# Patient Record
Sex: FEMALE | Race: WHITE | NOT HISPANIC OR LATINO | Employment: FULL TIME | URBAN - METROPOLITAN AREA
[De-identification: names, ages, dates, MRNs, and addresses within clinical notes are randomized per-mention and may not be internally consistent; named-entity substitution may affect disease eponyms.]

---

## 2017-01-23 ENCOUNTER — MYC MEDICAL ADVICE (OUTPATIENT)
Dept: FAMILY MEDICINE | Facility: CLINIC | Age: 42
End: 2017-01-23

## 2017-01-23 ENCOUNTER — OFFICE VISIT (OUTPATIENT)
Dept: FAMILY MEDICINE | Facility: CLINIC | Age: 42
End: 2017-01-23
Payer: COMMERCIAL

## 2017-01-23 VITALS
BODY MASS INDEX: 25.95 KG/M2 | WEIGHT: 141 LBS | SYSTOLIC BLOOD PRESSURE: 104 MMHG | HEART RATE: 72 BPM | TEMPERATURE: 98.6 F | DIASTOLIC BLOOD PRESSURE: 64 MMHG | HEIGHT: 62 IN

## 2017-01-23 DIAGNOSIS — N89.8 VAGINAL DISCHARGE: ICD-10-CM

## 2017-01-23 DIAGNOSIS — B37.31 YEAST INFECTION OF THE VAGINA: ICD-10-CM

## 2017-01-23 DIAGNOSIS — B96.89 BACTERIAL VAGINOSIS: ICD-10-CM

## 2017-01-23 DIAGNOSIS — Z01.419 ENCOUNTER FOR GYNECOLOGICAL EXAMINATION WITHOUT ABNORMAL FINDING: Primary | ICD-10-CM

## 2017-01-23 DIAGNOSIS — Z11.3 ROUTINE SCREENING FOR STI (SEXUALLY TRANSMITTED INFECTION): ICD-10-CM

## 2017-01-23 DIAGNOSIS — N76.0 BACTERIAL VAGINOSIS: ICD-10-CM

## 2017-01-23 LAB
MICRO REPORT STATUS: ABNORMAL
SPECIMEN SOURCE: ABNORMAL
WET PREP SPEC: ABNORMAL

## 2017-01-23 PROCEDURE — G0145 SCR C/V CYTO,THINLAYER,RESCR: HCPCS | Performed by: NURSE PRACTITIONER

## 2017-01-23 PROCEDURE — 87491 CHLMYD TRACH DNA AMP PROBE: CPT | Performed by: NURSE PRACTITIONER

## 2017-01-23 PROCEDURE — 87210 SMEAR WET MOUNT SALINE/INK: CPT | Performed by: NURSE PRACTITIONER

## 2017-01-23 PROCEDURE — 99213 OFFICE O/P EST LOW 20 MIN: CPT | Performed by: NURSE PRACTITIONER

## 2017-01-23 PROCEDURE — 87591 N.GONORRHOEAE DNA AMP PROB: CPT | Performed by: NURSE PRACTITIONER

## 2017-01-23 PROCEDURE — 87624 HPV HI-RISK TYP POOLED RSLT: CPT | Performed by: NURSE PRACTITIONER

## 2017-01-23 RX ORDER — FLUCONAZOLE 150 MG/1
150 TABLET ORAL ONCE
Qty: 1 TABLET | Refills: 0 | Status: SHIPPED | OUTPATIENT
Start: 2017-01-23 | End: 2017-01-23

## 2017-01-23 RX ORDER — METRONIDAZOLE 500 MG/1
500 TABLET ORAL 2 TIMES DAILY
Qty: 14 TABLET | Refills: 0 | Status: SHIPPED | OUTPATIENT
Start: 2017-01-23 | End: 2017-02-23

## 2017-01-23 NOTE — MR AVS SNAPSHOT
After Visit Summary   1/23/2017    Cecilia Chambers    MRN: [id number]           Patient Information     Date Of Birth          1975        Visit Information        Provider Department      1/23/2017 4:20 PM Lo Stallings APRN [facility]        Today's Diagnoses     Encounter for gynecological examination without abnormal finding    -  1     Routine screening for STI (sexually transmitted infection)         Vaginal discharge         Bacterial vaginosis         Yeast infection of the vagina           Care Instructions    Diflucan and Flagyl sent to the pharmacy  Follow up if symptoms do not improve or worsen.    Vaginal Infection: Bacterial Vaginosis  Both good and bad bacteria are present in a healthy vagina. Bacterial vaginosis (BV) occurs when these bacteria get out of balance. The numbers of good bacteria decrease. This allows the numbers of bad bacteria to increase and cause BV. In most cases, BV is not a serious problem. This sheet tells you more about BV and how it can be treated.  Causes of Bacterial Vaginosis  The cause of BV is not clear. Douching may lead to it. Having sex with a new partner or more than 1 partner makes it more likely.  Symptoms of Bacterial Vaginosis  Symptoms of BV vary for each woman. Some women have few symptoms or none at all. If symptoms are present, they can include:    Thin, milky white or gray discharge    Unpleasant  fishy  odor    Irritation, itching, and burning at opening of vagina.    Burning or irritation with sex or urination  Diagnosing Bacterial Vaginosis  Your health care provider will ask about your symptoms and health history. He or she will also perform a pelvic exam. This is an exam of your vagina and cervix. A sample of vaginal fluid or discharge may be taken. This sample is checked for signs of BV.  Treating Bacterial Vaginosis  BV is often treated with antibiotics. They may be given in oral pill form or as  a vaginal cream. To use these medications:    Be sure to take all of your medication, even if your symptoms go away.    If you re taking antibiotic pills, do not drink alcohol until you re finished with all of your medication.    If you re using vaginal cream, apply it as directed. Be aware that the cream may make condoms and diaphragms less effective.    Call your health care provider if symptoms do not go away within 4 days of starting treatment. Also call if you have a reaction to the medication.  Why Treatment Matters  Even if you have no symptoms or your symptoms are mild, BV should be treated. Untreated BV can lead to health problems such as:    Increased risk of  delivery if you re pregnant.    Increased risk of complications after surgery on the reproductive organs.    Possible increased risk of pelvic inflammatory disease (PID).     6204-7749 [employer]. [address] [place] [address]. All rights reserved. This information is not intended as a substitute for professional medical care. Always follow your healthcare professional's instructions.        Vaginal Infection: Yeast (Candidiasis)  Yeast infection occurs when yeast in the vagina increase and start attacking the vaginal tissues. Yeast is a type of fungus. These infections are often caused by a type of yeast called Candida albicans. Other species of yeast can also cause infections. Factors that may make infection more likely include recent antibiotic use, douching, or increased sex. Yeast infections are more common in women who have diabetes, or are obese or pregnant, or have a weak immune system.  Symptoms of yeast infection    Clumpy or thin, white discharge, which may look like cottage cheese    No odor or minimal odor    Severe vaginal itching or burning    Burning with urination    Swelling, redness of vulva    Pain during sex  Treating yeast infection  Yeast infection is treated with a vaginal antifungal cream. In  some cases, antifungal pills are prescribed instead. During treatment:    Finish all of your medicine, even if your symptoms go away.    Apply the cream before going to bed. Lie flat after applying so that it doesn't drip out.    Do not douche or use tampons.    Don't rely on a diaphragm or condoms, since the cream may weaken them.    Avoid intercourse if advised by your healthcare provider.     Should I treat a yeast infection myself?  Discuss with your healthcare provider whether you should use over-the-counter medicines to treat a yeast infection. Self-treatment may depend on whether:    You've had a yeast infection in the past.    You're at risk for STDs.  Call your healthcare provider if symptoms do not go away or come back after treatment.     2760-6323 [employer]. [address] [place] [address], [place] [address]. All rights reserved. This information is not intended as a substitute for professional medical care. Always follow your healthcare professional's instructions.              Follow-ups after your visit        Who to contact     If you have questions or need follow up information about today's clinic visit or your schedule please contact [facility] directly at [phone].  Normal or non-critical lab and imaging results will be communicated to you by MyChart, letter or phone within 4 business days after the clinic has received the results. If you do not hear from us within 7 days, please contact the clinic through [employer]hart or phone. If you have a critical or abnormal lab result, we will notify you by phone as soon as possible.  Submit refill requests through [employer] or call your pharmacy and they will forward the refill request to us. Please allow 3 business days for your refill to be completed.          Additional Information About Your Visit        MyChart Information     [employer] gives you secure access to your electronic health record. If you see a primary care provider,  "you can also send messages to your care team and make appointments. If you have questions, please call your primary care clinic.  If you do not have a primary care provider, please call [phone] and they will assist you.        Care EveryWhere ID     This is your Care EveryWhere ID. This could be used by other organizations to access your [facility] medical records  [id number]        Your Vitals Were     Pulse Temperature Height BMI (Body Mass Index)          72 98.6  F (37  C) (Tympanic) 5' 2\" (1.575 m) 25.78 kg/m2         Blood Pressure from Last 3 Encounters:   01/23/17 104/64   12/14/16 123/86   09/14/16 113/74    Weight from Last 3 Encounters:   01/23/17 141 lb (63.957 kg)   12/14/16 145 lb (65.772 kg)   04/08/16 143 lb (64.864 kg)              We Performed the Following     Asthma Action Plan (AAP)     Chlamydia trachomatis PCR     HPV High Risk Types DNA Cervical     Neisseria gonorrhoeae PCR     Pap imaged thin layer screen with HPV - recommended age 30 - 65 years (select HPV order below)     Wet prep          Today's Medication Changes          These changes are accurate as of: 1/23/17  4:54 PM.  If you have any questions, ask your nurse or doctor.               Start taking these medicines.        Dose/Directions    fluconazole 150 MG tablet   Commonly known as:  DIFLUCAN   Used for:  Yeast infection of the vagina   Started by:  Lo Stallings APRN CNP        Dose:  150 mg   Take 1 tablet (150 mg) by mouth once for 1 dose   Quantity:  1 tablet   Refills:  0       metroNIDAZOLE 500 MG tablet   Commonly known as:  FLAGYL   Used for:  Bacterial vaginosis   Started by:  Lo Stallings APRN CNP        Dose:  500 mg   Take 1 tablet (500 mg) by mouth 2 times daily   Quantity:  14 tablet   Refills:  0         Stop taking these medicines if you haven't already. Please contact your care team if you have questions.     mometasone-formoterol 200-5 MCG/ACT oral inhaler   Commonly known as:  DULERA "   Stopped by:  Lo Stallings APRN CNP                Where to get your medicines      These medications were sent to [id number] - [place], [place] - [address]  [address] [address]    Hours:  Closed 10-16-08 business to [facility] Phone:  [phone]    - fluconazole 150 MG tablet  - metroNIDAZOLE 500 MG tablet             Primary Care Provider Office Phone # Fax #    Ammy Rolon [phone]       [facility] [address] [facility] [address]        Thank you!     Thank you for choosing [facility]  for your care. Our goal is always to provide you with excellent care. Hearing back from our patients is one way we can continue to improve our services. Please take a few minutes to complete the written survey that you may receive in the mail after your visit with us. Thank you!             Your Updated Medication List - Protect others around you: Learn how to safely use, store and throw away your medicines at www.disposemymeds.org.          This list is accurate as of: 1/23/17  4:54 PM.  Always use your most recent med list.                   Brand Name Dispense Instructions for use    albuterol 108 (90 BASE) MCG/ACT Inhaler    PROAIR HFA/PROVENTIL HFA/VENTOLIN HFA    1 Inhaler    Inhale 2 puffs into the lungs every 6 hours as needed for shortness of breath / dyspnea       buPROPion 150 MG 24 hr tablet    WELLBUTRIN XL    90 tablet    Take 1 tablet (150 mg) by mouth daily       colestipol 1 G tablet    COLESTID    30 tablet    Take 1 tablet (1 g) by mouth daily       escitalopram 20 MG tablet    LEXAPRO    45 tablet    Take 0.5 tablets (10 mg) by mouth daily       fluconazole 150 MG tablet    DIFLUCAN    1 tablet    Take 1 tablet (150 mg) by mouth once for 1 dose       metroNIDAZOLE 500 MG tablet    FLAGYL    14 tablet    Take 1 tablet (500 mg) by mouth 2 times daily       SUMAtriptan 100 MG tablet     IMITREX    18 tablet    Take 1 tablet (100 mg) by mouth at onset of headache for migraine May repeat dose in 2 hours.  Do not exceed 200 mg in 24 hours       topiramate 50 MG tablet    TOPAMAX    90 tablet    Take 1 tablet (50 mg) by mouth daily

## 2017-01-23 NOTE — Clinical Note
My Asthma Action Plan  Name: Cecilia Chambers   YOB: 1975  Date: 1/23/2017   My doctor: Ammy Rolon   My clinic: [facility]        My Rescue Medicine: Albuterol (Proair/Ventolin/Proventil) HFA          My Asthma Severity: mild persistent  Avoid your asthma triggers        GREEN ZONE   Good Control    I feel good    No cough or wheeze    Can work, sleep and play without asthma symptoms       Take your asthma control medicine every day.     1. If exercise triggers your asthma, take your rescue medication    15 minutes before exercise or sports, and    During exercise if you have asthma symptoms  2. Spacer to use with inhaler: If you have a spacer, make sure to use it with your inhaler             YELLOW ZONE Getting Worse  I have ANY of these:    I do not feel good    Cough or wheeze    Chest feels tight    Wake up at night   1. Keep taking your Green Zone medications  2. Start taking your rescue medicine:    every 20 minutes for up to 1 hour. Then every 4 hours for 24-48 hours.  3. If you stay in the Yellow Zone for more than 12-24 hours, contact your doctor.  4. If you do not return to the Green Zone in 12-24 hours or you get worse, start taking your oral steroid medicine if prescribed by your provider.           RED ZONE Medical Alert - Get Help  I have ANY of these:    I feel awful    Medicine is not helping    Breathing getting harder    Trouble walking or talking    Nose opens wide to breathe       1. Take your rescue medicine NOW  2. If your provider has prescribed an oral steroid medicine, start taking it NOW  3. Call your doctor NOW  4. If you are still in the Red Zone after 20 minutes and you have not reached your doctor:    Take your rescue medicine again and    Call 911 or go to the emergency room right away    See your regular doctor within 2 weeks of an Emergency Room or Urgent Care visit for follow-up treatment.        The above medication may be given  at school or day care?: N/A (Adult Patient)  Child can carry and use inhaler(s) at school with approval of school nurse?: N/A (Adult Patient)    Electronically signed by: Jodi Benavidez, January 23, 2017    Annual Reminders:  Meet with Asthma Educator,  Flu Shot in the Fall, consider Pneumonia Vaccination for patients with asthma (aged 19 and older).    Pharmacy:    [facility] [address] [facility] [facility] [address] [facility] [address]  [id number] - [place], [place] - [address]                    Asthma Triggers  How To Control Things That Make Your Asthma Worse    Triggers are things that make your asthma worse.  Look at the list below to help you find your triggers and what you can do about them.  You can help prevent asthma flare-ups by staying away from your triggers.      Trigger                                                          What you can do   Cigarette Smoke  Tobacco smoke can make asthma worse. Do not allow smoking in your home, car or around you.  Be sure no one smokes at a child s day care or school.  If you smoke, ask your health care provider for ways to help you quit.  Ask family members to quit too.  Ask your health care provider for a referral to Quit Plan to help you quit smoking, or call 8-729-401-PLAN.     Colds, Flu, Bronchitis  These are common triggers of asthma. Wash your hands often.  Don t touch your eyes, nose or mouth.  Get a flu shot every year.     Dust Mites  These are tiny bugs that live in cloth or carpet. They are too small to see. Wash sheets and blankets in hot water every week.   Encase pillows and mattress in dust mite proof covers.  Avoid having carpet if you can. If you have carpet, vacuum weekly.   Use a dust mask and HEPA vacuum.   Pollen and Outdoor Mold  Some people are allergic to trees, grass, or weed pollen, or molds. Try to keep your windows closed.  Limit time out  doors when pollen count is high.   Ask you health care provider about taking medicine during allergy season.     Animal Dander  Some people are allergic to skin flakes, urine or saliva from pets with fur or feathers. Keep pets with fur or feathers out of your home.    If you can t keep the pet outdoors, then keep the pet out of your bedroom.  Keep the bedroom door closed.  Keep pets off cloth furniture and away from stuffed toys.     Mice, Rats, and Cockroaches  Some people are allergic to the waste from these pests.   Cover food and garbage.  Clean up spills and food crumbs.  Store grease in the refrigerator.   Keep food out of the bedroom.   Indoor Mold  This can be a trigger if your home has high moisture. Fix leaking faucets, pipes, or other sources of water.   Clean moldy surfaces.  Dehumidify basement if it is damp and smelly.   Smoke, Strong Odors, and Sprays  These can reduce air quality. Stay away from strong odors and sprays, such as perfume, powder, hair spray, paints, smoke incense, paint, cleaning products, candles and new carpet.   Exercise or Sports  Some people with asthma have this trigger. Be active!  Ask your doctor about taking medicine before sports or exercise to prevent symptoms.    Warm up for 5-10 minutes before and after sports or exercise.     Other Triggers of Asthma  Cold air:  Cover your nose and mouth with a scarf.  Sometimes laughing or crying can be a trigger.  Some medicines and food can trigger asthma.

## 2017-01-23 NOTE — NURSING NOTE
"Chief Complaint   Patient presents with     Gyn Exam     pap and std testing        Initial /64 mmHg  Pulse 72  Temp(Src) 98.6  F (37  C) (Tympanic)  Ht 5' 2\" (1.575 m)  Wt 141 lb (63.957 kg)  BMI 25.78 kg/m2 Estimated body mass index is 25.78 kg/(m^2) as calculated from the following:    Height as of this encounter: 5' 2\" (1.575 m).    Weight as of this encounter: 141 lb (63.957 kg).  BP completed using cuff size: regular    "

## 2017-01-23 NOTE — PROGRESS NOTES
SUBJECTIVE:                                                    Cecilia Chambers is a 41 year old female who presents to clinic today for the following health issues:      Vaginal Symptoms     Onset: 1 week      Description:  Vaginal Discharge: white - little   Itching (Pruritis): YES  Burning sensation:  no   Odor: no     Accompanying Signs & Symptoms:  Pain with Urination: no   Abdominal Pain: no   Fever: no    History:   Sexually active: YES  New Partner: YES- pt would like std testing as well   Possibility of Pregnancy:  No    Precipitating factors:   Recent Antibiotic Use: no     Alleviating factors:  Pt took bath with lavender sea salt      Therapies Tried and outcome: none       Problem list and histories reviewed & adjusted, as indicated.  Additional history: as documented    Patient Active Problem List   Diagnosis     Headache     Anxiety state     Mild persistent asthma     Family history of breast cancer in mother     HYPERLIPIDEMIA LDL GOAL <160     Acute cholecystitis     Enteritis     Abdominal pain     Past Surgical History   Procedure Laterality Date     Surgical history of -        Csection     Laparoscopic cholecystectomy N/A 12/30/2014     Procedure: LAPAROSCOPIC CHOLECYSTECTOMY;  Surgeon: Ulises Starr MD;  Location: [facility]     Abdomen surgery  [id number]     Csecttion     Colonoscopy       Gi surgery         Social History   Substance Use Topics     Smoking status: Never Smoker      Smokeless tobacco: Never Used     Alcohol Use: Yes      Comment: rare     Family History   Problem Relation Age of Onset     Depression Mother      Respiratory Mother      COPD, emphysema     CANCER Mother      Neurologic Disorder Mother      raunads syndrom     GASTROINTESTINAL DISEASE Maternal Grandmother      Depression Maternal Grandmother      Hypertension Maternal Grandmother      CANCER Maternal Grandmother      skin cancer     Breast Cancer Maternal Grandmother      Breast Cancer Mother      Stage 4      "Other Cancer Mother      Cervical     Other Cancer Maternal Grandmother      Melanoma     Anesthesia Reaction Mother      OSTEOPOROSIS Mother          Current Outpatient Prescriptions   Medication Sig Dispense Refill     metroNIDAZOLE (FLAGYL) 500 MG tablet Take 1 tablet (500 mg) by mouth 2 times daily 14 tablet 0     colestipol (COLESTID) 1 G tablet Take 1 tablet (1 g) by mouth daily 30 tablet 0     topiramate (TOPAMAX) 50 MG tablet Take 1 tablet (50 mg) by mouth daily 90 tablet 0     escitalopram (LEXAPRO) 20 MG tablet Take 0.5 tablets (10 mg) by mouth daily 45 tablet 1     buPROPion (WELLBUTRIN XL) 150 MG 24 hr tablet Take 1 tablet (150 mg) by mouth daily 90 tablet 1     SUMAtriptan (IMITREX) 100 MG tablet Take 1 tablet (100 mg) by mouth at onset of headache for migraine May repeat dose in 2 hours.  Do not exceed 200 mg in 24 hours 18 tablet 5     albuterol (PROAIR HFA, PROVENTIL HFA, VENTOLIN HFA) 108 (90 BASE) MCG/ACT inhaler Inhale 2 puffs into the lungs every 6 hours as needed for shortness of breath / dyspnea 1 Inhaler 1     No Known Allergies  Problem list, Medication list, Allergies, and Medical/Social/Surgical histories reviewed in [facility] and updated as appropriate.    ROS:  Constitutional, HEENT, cardiovascular, pulmonary, gi and gu systems are negative, except as otherwise noted.    OBJECTIVE:                                                    /64 mmHg  Pulse 72  Temp(Src) 98.6  F (37  C) (Tympanic)  Ht 5' 2\" (1.575 m)  Wt 141 lb (63.957 kg)  BMI 25.78 kg/m2  Body mass index is 25.78 kg/(m^2).  GENERAL: healthy, alert and no distress  ABDOMEN: soft, nontender, no hepatosplenomegaly, no masses and bowel sounds normal   (female): normal female external genitalia, normal urethral meatus, vaginal mucosa, normal cervix/adnexa/uterus without masses, milky discharge present  PSYCH: mentation appears normal, affect normal/bright    Diagnostic Test Results:  Results for orders placed or performed in " visit on 01/23/17 (from the past 24 hour(s))   Wet prep   Result Value Ref Range    Specimen Description Vagina     Wet Prep (A)      No Trichomonas seen  Few Clue cells seen  Few Yeast seen      Micro Report Status FINAL 01/23/2017         ASSESSMENT/PLAN:                                                      1. Encounter for gynecological examination without abnormal finding    - Pap imaged thin layer screen with HPV - recommended age 30 - 65 years (select HPV order below)  - HPV High Risk Types DNA Cervical    2. Bacterial vaginosis  Symptomatic care and follow up discussed  - metroNIDAZOLE (FLAGYL) 500 MG tablet; Take 1 tablet (500 mg) by mouth 2 times daily  Dispense: 14 tablet; Refill: 0    3. Yeast infection of the vagina  Symptomatic care and follow up discussed  - fluconazole (DIFLUCAN) 150 MG tablet; Take 1 tablet (150 mg) by mouth once for 1 dose  Dispense: 1 tablet; Refill: 0    4. Routine screening for STI (sexually transmitted infection)    - Chlamydia trachomatis PCR  - Neisseria gonorrhoeae PCR    5. Vaginal discharge    - Wet prep    Home care instructions were reviewed with the patient. The risks, benefits and treatment options of prescribed medications or other treatments have been discussed with the patient. The patient verbalized their understanding and should call or follow up if no improvement or if they develop further problems.      Patient Instructions     Diflucan and Flagyl sent to the pharmacy  Follow up if symptoms do not improve or worsen.    Vaginal Infection: Bacterial Vaginosis  Both good and bad bacteria are present in a healthy vagina. Bacterial vaginosis (BV) occurs when these bacteria get out of balance. The numbers of good bacteria decrease. This allows the numbers of bad bacteria to increase and cause BV. In most cases, BV is not a serious problem. This sheet tells you more about BV and how it can be treated.  Causes of Bacterial Vaginosis  The cause of BV is not clear.  Douching may lead to it. Having sex with a new partner or more than 1 partner makes it more likely.  Symptoms of Bacterial Vaginosis  Symptoms of BV vary for each woman. Some women have few symptoms or none at all. If symptoms are present, they can include:    Thin, milky white or gray discharge    Unpleasant  fishy  odor    Irritation, itching, and burning at opening of vagina.    Burning or irritation with sex or urination  Diagnosing Bacterial Vaginosis  Your health care provider will ask about your symptoms and health history. He or she will also perform a pelvic exam. This is an exam of your vagina and cervix. A sample of vaginal fluid or discharge may be taken. This sample is checked for signs of BV.  Treating Bacterial Vaginosis  BV is often treated with antibiotics. They may be given in oral pill form or as a vaginal cream. To use these medications:    Be sure to take all of your medication, even if your symptoms go away.    If you re taking antibiotic pills, do not drink alcohol until you re finished with all of your medication.    If you re using vaginal cream, apply it as directed. Be aware that the cream may make condoms and diaphragms less effective.    Call your health care provider if symptoms do not go away within 4 days of starting treatment. Also call if you have a reaction to the medication.  Why Treatment Matters  Even if you have no symptoms or your symptoms are mild, BV should be treated. Untreated BV can lead to health problems such as:    Increased risk of  delivery if you re pregnant.    Increased risk of complications after surgery on the reproductive organs.    Possible increased risk of pelvic inflammatory disease (PID).     9899-3542 [employer]. [address] [place] [address], [place] [address]. All rights reserved. This information is not intended as a substitute for professional medical care. Always follow your healthcare professional's instructions.        Vaginal  Infection: Yeast (Candidiasis)  Yeast infection occurs when yeast in the vagina increase and start attacking the vaginal tissues. Yeast is a type of fungus. These infections are often caused by a type of yeast called Candida albicans. Other species of yeast can also cause infections. Factors that may make infection more likely include recent antibiotic use, douching, or increased sex. Yeast infections are more common in women who have diabetes, or are obese or pregnant, or have a weak immune system.  Symptoms of yeast infection    Clumpy or thin, white discharge, which may look like cottage cheese    No odor or minimal odor    Severe vaginal itching or burning    Burning with urination    Swelling, redness of vulva    Pain during sex  Treating yeast infection  Yeast infection is treated with a vaginal antifungal cream. In some cases, antifungal pills are prescribed instead. During treatment:    Finish all of your medicine, even if your symptoms go away.    Apply the cream before going to bed. Lie flat after applying so that it doesn't drip out.    Do not douche or use tampons.    Don't rely on a diaphragm or condoms, since the cream may weaken them.    Avoid intercourse if advised by your healthcare provider.     Should I treat a yeast infection myself?  Discuss with your healthcare provider whether you should use over-the-counter medicines to treat a yeast infection. Self-treatment may depend on whether:    You've had a yeast infection in the past.    You're at risk for STDs.  Call your healthcare provider if symptoms do not go away or come back after treatment.     6952-6047 [employer]. [address] [place] [address], [place] [id number]. All rights reserved. This information is not intended as a substitute for professional medical care. Always follow your healthcare professional's instructions.              ARTURO Barber [facility]

## 2017-01-23 NOTE — PATIENT INSTRUCTIONS
Diflucan and Flagyl sent to the pharmacy  Follow up if symptoms do not improve or worsen.    Vaginal Infection: Bacterial Vaginosis  Both good and bad bacteria are present in a healthy vagina. Bacterial vaginosis (BV) occurs when these bacteria get out of balance. The numbers of good bacteria decrease. This allows the numbers of bad bacteria to increase and cause BV. In most cases, BV is not a serious problem. This sheet tells you more about BV and how it can be treated.  Causes of Bacterial Vaginosis  The cause of BV is not clear. Douching may lead to it. Having sex with a new partner or more than 1 partner makes it more likely.  Symptoms of Bacterial Vaginosis  Symptoms of BV vary for each woman. Some women have few symptoms or none at all. If symptoms are present, they can include:    Thin, milky white or gray discharge    Unpleasant  fishy  odor    Irritation, itching, and burning at opening of vagina.    Burning or irritation with sex or urination  Diagnosing Bacterial Vaginosis  Your health care provider will ask about your symptoms and health history. He or she will also perform a pelvic exam. This is an exam of your vagina and cervix. A sample of vaginal fluid or discharge may be taken. This sample is checked for signs of BV.  Treating Bacterial Vaginosis  BV is often treated with antibiotics. They may be given in oral pill form or as a vaginal cream. To use these medications:    Be sure to take all of your medication, even if your symptoms go away.    If you re taking antibiotic pills, do not drink alcohol until you re finished with all of your medication.    If you re using vaginal cream, apply it as directed. Be aware that the cream may make condoms and diaphragms less effective.    Call your health care provider if symptoms do not go away within 4 days of starting treatment. Also call if you have a reaction to the medication.  Why Treatment Matters  Even if you have no symptoms or your symptoms are mild,  BV should be treated. Untreated BV can lead to health problems such as:    Increased risk of  delivery if you re pregnant.    Increased risk of complications after surgery on the reproductive organs.    Possible increased risk of pelvic inflammatory disease (PID).     9884-3668 [employer]. [address] [place] [address]. All rights reserved. This information is not intended as a substitute for professional medical care. Always follow your healthcare professional's instructions.        Vaginal Infection: Yeast (Candidiasis)  Yeast infection occurs when yeast in the vagina increase and start attacking the vaginal tissues. Yeast is a type of fungus. These infections are often caused by a type of yeast called Candida albicans. Other species of yeast can also cause infections. Factors that may make infection more likely include recent antibiotic use, douching, or increased sex. Yeast infections are more common in women who have diabetes, or are obese or pregnant, or have a weak immune system.  Symptoms of yeast infection    Clumpy or thin, white discharge, which may look like cottage cheese    No odor or minimal odor    Severe vaginal itching or burning    Burning with urination    Swelling, redness of vulva    Pain during sex  Treating yeast infection  Yeast infection is treated with a vaginal antifungal cream. In some cases, antifungal pills are prescribed instead. During treatment:    Finish all of your medicine, even if your symptoms go away.    Apply the cream before going to bed. Lie flat after applying so that it doesn't drip out.    Do not douche or use tampons.    Don't rely on a diaphragm or condoms, since the cream may weaken them.    Avoid intercourse if advised by your healthcare provider.     Should I treat a yeast infection myself?  Discuss with your healthcare provider whether you should use over-the-counter medicines to treat a yeast infection. Self-treatment may depend  on whether:    You've had a yeast infection in the past.    You're at risk for STDs.  Call your healthcare provider if symptoms do not go away or come back after treatment.     9010-8094 [employer]. [address] [place] [address], [place] [address]. All rights reserved. This information is not intended as a substitute for professional medical care. Always follow your healthcare professional's instructions.

## 2017-01-24 ENCOUNTER — MYC MEDICAL ADVICE (OUTPATIENT)
Dept: FAMILY MEDICINE | Facility: CLINIC | Age: 42
End: 2017-01-24

## 2017-01-24 LAB
C TRACH DNA SPEC QL NAA+PROBE: NORMAL
N GONORRHOEA DNA SPEC QL NAA+PROBE: NORMAL
SPECIMEN SOURCE: NORMAL
SPECIMEN SOURCE: NORMAL

## 2017-01-24 ASSESSMENT — ASTHMA QUESTIONNAIRES: ACT_TOTALSCORE: 21

## 2017-01-25 LAB
COPATH REPORT: NORMAL
PAP: NORMAL

## 2017-01-26 LAB
FINAL DIAGNOSIS: NORMAL
HPV HR 12 DNA CVX QL NAA+PROBE: NEGATIVE
HPV16 DNA SPEC QL NAA+PROBE: NEGATIVE
HPV18 DNA SPEC QL NAA+PROBE: NEGATIVE
SPECIMEN DESCRIPTION: NORMAL

## 2017-02-01 ENCOUNTER — MYC REFILL (OUTPATIENT)
Dept: FAMILY MEDICINE | Facility: CLINIC | Age: 42
End: 2017-02-01

## 2017-02-01 DIAGNOSIS — R51.9 HEADACHE, UNSPECIFIED HEADACHE TYPE: Primary | ICD-10-CM

## 2017-02-02 RX ORDER — SUMATRIPTAN 100 MG/1
100 TABLET, FILM COATED ORAL
Qty: 18 TABLET | Refills: 11 | Status: SHIPPED | OUTPATIENT
Start: 2017-02-02 | End: 2018-08-13

## 2017-02-02 NOTE — TELEPHONE ENCOUNTER
Message from MyChart:  Original authorizing provider: MD Cecilia Lang would like a refill of the following medications:  SUMAtriptan (IMITREX) 100 MG tablet [Ammy Rolon MD]    Preferred pharmacy: [id number] [address]    Comment:

## 2017-02-09 ENCOUNTER — MYC MEDICAL ADVICE (OUTPATIENT)
Dept: FAMILY MEDICINE | Facility: CLINIC | Age: 42
End: 2017-02-09

## 2017-02-14 ENCOUNTER — MYC REFILL (OUTPATIENT)
Dept: FAMILY MEDICINE | Facility: CLINIC | Age: 42
End: 2017-02-14

## 2017-02-14 DIAGNOSIS — K59.1 FUNCTIONAL DIARRHEA: ICD-10-CM

## 2017-02-14 RX ORDER — MONTELUKAST SODIUM 4 MG/1
1 TABLET, CHEWABLE ORAL DAILY
Qty: 30 TABLET | Refills: 0 | Status: CANCELLED | OUTPATIENT
Start: 2017-02-14

## 2017-02-15 ENCOUNTER — MYC REFILL (OUTPATIENT)
Dept: FAMILY MEDICINE | Facility: CLINIC | Age: 42
End: 2017-02-15

## 2017-02-15 DIAGNOSIS — K59.1 FUNCTIONAL DIARRHEA: ICD-10-CM

## 2017-02-15 RX ORDER — MONTELUKAST SODIUM 4 MG/1
1 TABLET, CHEWABLE ORAL DAILY
Qty: 30 TABLET | Refills: 0 | Status: CANCELLED | OUTPATIENT
Start: 2017-02-15

## 2017-02-16 ENCOUNTER — MYC MEDICAL ADVICE (OUTPATIENT)
Dept: FAMILY MEDICINE | Facility: CLINIC | Age: 42
End: 2017-02-16

## 2017-02-16 DIAGNOSIS — K59.1 FUNCTIONAL DIARRHEA: ICD-10-CM

## 2017-02-16 DIAGNOSIS — Z53.9 ERRONEOUS ENCOUNTER--DISREGARD: Primary | ICD-10-CM

## 2017-02-16 RX ORDER — MONTELUKAST SODIUM 4 MG/1
1 TABLET, CHEWABLE ORAL DAILY
Qty: 30 TABLET | Refills: 0 | Status: SHIPPED | OUTPATIENT
Start: 2017-02-16 | End: 2017-02-27

## 2017-02-16 NOTE — TELEPHONE ENCOUNTER
Message from MyChart:  Original authorizing provider: MD Andi Langher RUIZ Chambers would like a refill of the following medications:  colestipol (COLESTID) 1 G tablet [Ammy Rolon MD]    Preferred pharmacy: [facility] #[id number] [address]    Comment:  I know I need an appointment for another re-fill, but can I please have 1 more refill and I will set up an appointment for this and get my tetanus shot? Thank You.

## 2017-02-16 NOTE — TELEPHONE ENCOUNTER
Message from [employer]:  Original authorizing provider: MD Cecilia Lang would like a refill of the following medications:  colestipol (COLESTID) 1 G tablet [Ammy Rolon MD]    Preferred pharmacy: [facility][id number] [facility] [address]    Comment:  I have previously made this request and I am now out. I understand I need to make an appointment however can I please get 1 more refill?

## 2017-02-16 NOTE — TELEPHONE ENCOUNTER
Routing refill request to provider for review/approval because:  Drug not on the [facility] refill protocol   In PCP absence.    Thank you  Luly DUARTE RN

## 2017-02-23 ENCOUNTER — APPOINTMENT (OUTPATIENT)
Dept: GENERAL RADIOLOGY | Facility: CLINIC | Age: 42
End: 2017-02-23
Attending: FAMILY MEDICINE
Payer: COMMERCIAL

## 2017-02-23 ENCOUNTER — HOSPITAL ENCOUNTER (EMERGENCY)
Facility: CLINIC | Age: 42
Discharge: HOME | End: 2017-02-23
Attending: FAMILY MEDICINE | Admitting: FAMILY MEDICINE
Payer: COMMERCIAL

## 2017-02-23 ENCOUNTER — APPOINTMENT (OUTPATIENT)
Dept: CT IMAGING | Facility: CLINIC | Age: 42
End: 2017-02-23
Attending: FAMILY MEDICINE
Payer: COMMERCIAL

## 2017-02-23 VITALS
BODY MASS INDEX: 25.03 KG/M2 | SYSTOLIC BLOOD PRESSURE: 102 MMHG | WEIGHT: 136 LBS | RESPIRATION RATE: 15 BRPM | HEIGHT: 62 IN | OXYGEN SATURATION: 98 % | TEMPERATURE: 98.6 F | DIASTOLIC BLOOD PRESSURE: 71 MMHG

## 2017-02-23 DIAGNOSIS — R07.89 CHEST PAIN, NON-CARDIAC: ICD-10-CM

## 2017-02-23 LAB
ALBUMIN SERPL-MCNC: 3.8 G/DL (ref 3.4–5)
ALP SERPL-CCNC: 62 U/L (ref 40–150)
ALT SERPL W P-5'-P-CCNC: 24 U/L (ref 0–50)
ANION GAP SERPL CALCULATED.3IONS-SCNC: 9 MMOL/L (ref 3–14)
AST SERPL W P-5'-P-CCNC: 15 U/L (ref 0–45)
BASOPHILS # BLD AUTO: 0 10E9/L (ref 0–0.2)
BASOPHILS NFR BLD AUTO: 0.3 %
BILIRUB SERPL-MCNC: 0.6 MG/DL (ref 0.2–1.3)
BUN SERPL-MCNC: 8 MG/DL (ref 7–30)
CALCIUM SERPL-MCNC: 8.5 MG/DL (ref 8.5–10.1)
CHLORIDE SERPL-SCNC: 106 MMOL/L (ref 94–109)
CO2 SERPL-SCNC: 26 MMOL/L (ref 20–32)
CREAT SERPL-MCNC: 0.71 MG/DL (ref 0.52–1.04)
D DIMER PPP FEU-MCNC: 0.4 UG/ML FEU (ref 0–0.5)
DIFFERENTIAL METHOD BLD: NORMAL
EOSINOPHIL # BLD AUTO: 0.2 10E9/L (ref 0–0.7)
EOSINOPHIL NFR BLD AUTO: 1.9 %
ERYTHROCYTE [DISTWIDTH] IN BLOOD BY AUTOMATED COUNT: 12.1 % (ref 10–15)
GFR SERPL CREATININE-BSD FRML MDRD: ABNORMAL ML/MIN/1.7M2
GLUCOSE SERPL-MCNC: 104 MG/DL (ref 70–99)
HCG SERPL QL: NEGATIVE
HCT VFR BLD AUTO: 43 % (ref 35–47)
HGB BLD-MCNC: 14.6 G/DL (ref 11.7–15.7)
IMM GRANULOCYTES # BLD: 0 10E9/L (ref 0–0.4)
IMM GRANULOCYTES NFR BLD: 0.1 %
LIPASE SERPL-CCNC: 179 U/L (ref 73–393)
LYMPHOCYTES # BLD AUTO: 1.8 10E9/L (ref 0.8–5.3)
LYMPHOCYTES NFR BLD AUTO: 22.5 %
MCH RBC QN AUTO: 32.8 PG (ref 26.5–33)
MCHC RBC AUTO-ENTMCNC: 34 G/DL (ref 31.5–36.5)
MCV RBC AUTO: 97 FL (ref 78–100)
MONOCYTES # BLD AUTO: 0.7 10E9/L (ref 0–1.3)
MONOCYTES NFR BLD AUTO: 8.8 %
NEUTROPHILS # BLD AUTO: 5.2 10E9/L (ref 1.6–8.3)
NEUTROPHILS NFR BLD AUTO: 66.4 %
PLATELET # BLD AUTO: 217 10E9/L (ref 150–450)
POTASSIUM SERPL-SCNC: 3.5 MMOL/L (ref 3.4–5.3)
PROT SERPL-MCNC: 7.8 G/DL (ref 6.8–8.8)
RBC # BLD AUTO: 4.45 10E12/L (ref 3.8–5.2)
SODIUM SERPL-SCNC: 141 MMOL/L (ref 133–144)
TROPONIN I SERPL-MCNC: NORMAL UG/L (ref 0–0.04)
WBC # BLD AUTO: 7.9 10E9/L (ref 4–11)

## 2017-02-23 PROCEDURE — 83690 ASSAY OF LIPASE: CPT | Performed by: FAMILY MEDICINE

## 2017-02-23 PROCEDURE — 80053 COMPREHEN METABOLIC PANEL: CPT | Performed by: FAMILY MEDICINE

## 2017-02-23 PROCEDURE — 96360 HYDRATION IV INFUSION INIT: CPT

## 2017-02-23 PROCEDURE — 84703 CHORIONIC GONADOTROPIN ASSAY: CPT | Performed by: FAMILY MEDICINE

## 2017-02-23 PROCEDURE — 85379 FIBRIN DEGRADATION QUANT: CPT | Performed by: FAMILY MEDICINE

## 2017-02-23 PROCEDURE — 25000125 ZZHC RX 250: Performed by: FAMILY MEDICINE

## 2017-02-23 PROCEDURE — 25500064 ZZH RX 255 OP 636: Performed by: FAMILY MEDICINE

## 2017-02-23 PROCEDURE — 71260 CT THORAX DX C+: CPT

## 2017-02-23 PROCEDURE — 76604 US EXAM CHEST: CPT | Mod: 26 | Performed by: FAMILY MEDICINE

## 2017-02-23 PROCEDURE — 93005 ELECTROCARDIOGRAM TRACING: CPT

## 2017-02-23 PROCEDURE — 84484 ASSAY OF TROPONIN QUANT: CPT | Performed by: FAMILY MEDICINE

## 2017-02-23 PROCEDURE — 71020 XR CHEST 2 VW: CPT

## 2017-02-23 PROCEDURE — 25000128 H RX IP 250 OP 636: Performed by: FAMILY MEDICINE

## 2017-02-23 PROCEDURE — 99285 EMERGENCY DEPT VISIT HI MDM: CPT | Mod: 25

## 2017-02-23 PROCEDURE — 76604 US EXAM CHEST: CPT

## 2017-02-23 PROCEDURE — 93010 ELECTROCARDIOGRAM REPORT: CPT | Mod: 76 | Performed by: FAMILY MEDICINE

## 2017-02-23 PROCEDURE — 99284 EMERGENCY DEPT VISIT MOD MDM: CPT | Mod: 25 | Performed by: FAMILY MEDICINE

## 2017-02-23 PROCEDURE — 85025 COMPLETE CBC W/AUTO DIFF WBC: CPT | Performed by: FAMILY MEDICINE

## 2017-02-23 PROCEDURE — 96361 HYDRATE IV INFUSION ADD-ON: CPT

## 2017-02-23 PROCEDURE — 25000132 ZZH RX MED GY IP 250 OP 250 PS 637: Performed by: FAMILY MEDICINE

## 2017-02-23 RX ORDER — ASPIRIN 81 MG/1
324 TABLET, CHEWABLE ORAL ONCE
Status: COMPLETED | OUTPATIENT
Start: 2017-02-23 | End: 2017-02-23

## 2017-02-23 RX ORDER — SODIUM CHLORIDE 9 MG/ML
1000 INJECTION, SOLUTION INTRAVENOUS CONTINUOUS
Status: DISCONTINUED | OUTPATIENT
Start: 2017-02-23 | End: 2017-02-23 | Stop reason: HOSPADM

## 2017-02-23 RX ORDER — NITROGLYCERIN 0.4 MG/1
0.4 TABLET SUBLINGUAL EVERY 5 MIN PRN
Status: DISCONTINUED | OUTPATIENT
Start: 2017-02-23 | End: 2017-02-23 | Stop reason: HOSPADM

## 2017-02-23 RX ORDER — IOPAMIDOL 755 MG/ML
80 INJECTION, SOLUTION INTRAVASCULAR ONCE
Status: COMPLETED | OUTPATIENT
Start: 2017-02-23 | End: 2017-02-23

## 2017-02-23 RX ADMIN — SODIUM CHLORIDE 1000 ML: 9 INJECTION, SOLUTION INTRAVENOUS at 10:02

## 2017-02-23 RX ADMIN — IOPAMIDOL 80 ML: 755 INJECTION, SOLUTION INTRAVENOUS at 11:25

## 2017-02-23 RX ADMIN — SODIUM CHLORIDE 1000 ML: 9 INJECTION, SOLUTION INTRAVENOUS at 10:42

## 2017-02-23 RX ADMIN — NITROGLYCERIN 0.4 MG: 0.4 TABLET SUBLINGUAL at 10:02

## 2017-02-23 RX ADMIN — ASPIRIN 81 MG 324 MG: 81 TABLET ORAL at 10:02

## 2017-02-23 RX ADMIN — NITROGLYCERIN 0.4 MG: 0.4 TABLET SUBLINGUAL at 10:40

## 2017-02-23 RX ADMIN — SODIUM CHLORIDE 80 ML: 9 INJECTION, SOLUTION INTRAVENOUS at 11:25

## 2017-02-23 NOTE — ED AVS SNAPSHOT
[facility] Emergency Department    [address] [facility] [facility] [address]    Phone:  [phone]    Fax:  [phone]                                       Cecilia Chambers   MRN: [id number]    Department:  [facility] Emergency Department   Date of Visit:  2/23/2017           Patient Information     Date Of Birth          1975        Your diagnoses for this visit were:     Chest pain, non-cardiac Unclear cause.  normal evaluation including CT chest for dissection, d-dimer for PE, bedside ultrasound, troponin for CAD along with 2 normal EKGs.  remainder of labs were normal.  Possible causes include heart related pain (unlikely), pericardiitis (unlikely based on u/s, ekg) and GERD, anxiety, chest wall.  Start with antacids - prilosec 20 mg daily for 1 month and zantac 150 mg twice daily for the first 7 days. avoid caffeine, stay hydrated >64 oz/fluid/day, avoid ibuprofen, upright for at least 2 hours after eating.   follow-up within 3 days for re-eval in clinic. in some cases stress testing is indicated.  If 1 week of antacid is not effective, then consider ibuprofen 600 mg every 6 hours for chest wall pain (also effective for pericarditis).       You were seen by Wily Renee MD.      Follow-up Information     Schedule an appointment as soon as possible for a visit with Ammy Rolon MD.    Specialty:  Family Practice    Contact information:    [facility]  [address] [address]  [phone]          Follow up with [facility] Emergency Department.    Specialty:  EMERGENCY MEDICINE    Why:  As needed, If symptoms worsen    Contact information:    [address] [place] [address] [phone]    Additional information:    The medical center is located at   [address] [facility] [address] [facility]. (between [address] and   [address] in [place], [place] north   of [place]).        Discharge Instructions         ICD-10-CM    1. Chest pain, non-cardiac  R07.89     Unclear cause.  normal evaluation including CT chest for dissection, d-dimer for PE, bedside ultrasound, troponin for CAD along with 2 normal EKGs.  remainder of labs were normal.  Possible causes include heart related pain (unlikely), pericardiitis (unlikely based on u/s, ekg) and GERD, anxiety, chest wall.  Start with antacids - prilosec 20 mg daily for 1 month and zantac 150 mg twice daily for the first 7 days. avoid caffeine, stay hydrated >64 oz/fluid/day, avoid ibuprofen, upright for at least 2 hours after eating.   follow-up within 3 days for re-eval in clinic. in some cases stress testing is indicated.  If 1 week of antacid is not effective, then consider ibuprofen 600 mg every 6 hours for chest wall pain (also effective for pericarditis).          *CHEST PAIN, UNCERTAIN CAUSE    Based on your exam today, the exact cause of your chest pain is not certain. Your condition does not seem serious at this time, and your pain does not appear to be coming from your heart. However, sometimes the signs of a serious problem take more time to appear. Therefore, watch for the warning signs listed below.  HOME CARE:  1. Rest today and avoid strenuous activity.  2. Take any prescribed medicine as directed.  FOLLOW UP with your doctor in 1-3 days.   GET PROMPT MEDICAL ATTENTION if any of the following occur:    A change in the type of pain: if it feels different, becomes more severe, lasts longer, or begins to spread into your shoulder, arm, neck, jaw or back    Shortness of breath or increased pain with breathing    Weakness, dizziness, or fainting    Cough with blood or dark colored sputum (phlegm)    Fever over 101  F (38.3  C)    Swelling, pain or redness in one leg    1757-0230 [facility], [address] [place] [address], [place] [address]. All rights reserved. This information is not intended as a substitute for professional medical care. Always follow your healthcare professional's instructions.      Future  Appointments        Provider Department Dept Phone Center    3/10/2017 3:40 PM Ammy Rolon MD [facility] [phone] [facility]      24 Hour Appointment Hotline       To make an appointment at any [facility], call [phone] ([phone]). If you don't have a family doctor or clinic, we will help you find one. [facility] are conveniently located to serve the needs of you and your family.             Review of your medicines      Our records show that you are taking the medicines listed below. If these are incorrect, please call your family doctor or clinic.        Dose / Directions Last dose taken    albuterol 108 (90 BASE) MCG/ACT Inhaler   Commonly known as:  PROAIR HFA/PROVENTIL HFA/VENTOLIN HFA   Dose:  2 puff   Quantity:  1 Inhaler        Inhale 2 puffs into the lungs every 6 hours as needed for shortness of breath / dyspnea   Refills:  1        buPROPion 150 MG 24 hr tablet   Commonly known as:  WELLBUTRIN XL   Dose:  150 mg   Quantity:  90 tablet        Take 1 tablet (150 mg) by mouth daily   Refills:  1        colestipol 1 G tablet   Commonly known as:  COLESTID   Dose:  1 g   Quantity:  30 tablet        Take 1 tablet (1 g) by mouth daily   Refills:  0        escitalopram 20 MG tablet   Commonly known as:  LEXAPRO   Dose:  10 mg   Quantity:  45 tablet        Take 0.5 tablets (10 mg) by mouth daily   Refills:  1        SUMAtriptan 100 MG tablet   Commonly known as:  IMITREX   Dose:  100 mg   Quantity:  18 tablet        Take 1 tablet (100 mg) by mouth at onset of headache for migraine May repeat dose in 2 hours.  Do not exceed 200 mg in 24 hours   Refills:  11        TYLENOL PO   Dose:  1000 mg        Take 1,000 mg by mouth daily as needed for mild pain or fever   Refills:  0                Procedures and tests performed during your visit     CBC with platelets differential    Chest CT - IV contrast only - TRAUMA / DISSECTION    Comprehensive metabolic panel    D dimer  quantitative    EKG 12 lead    EKG 12-lead    HCG qualitative pregnancy (blood)    Lipase    POC US CHEST B-SCAN (PTX/Edema/PNA)    Troponin I    XR Chest 2 Views      Orders Needing Specimen Collection     None      Pending Results     No orders found from 2/21/2017 to 2/24/2017.            Pending Culture Results     No orders found from 2/21/2017 to 2/24/2017.             Test Results from your hospital stay     2/23/2017 10:22 AM - Interface, Flexilab Results      Component Results     Component Value Ref Range & Units Status    D Dimer 0.4 0.0 - 0.50 ug/ml FEU Final    This D-dimer assay is intended for use in conjuntion with a clinical pretest   probability assessment model to exclude pulmonary embolism (PE) and as an aid   in the diagnosis of deep venous thrombosis (DVT) in outpatients suspected of   PE   or DVT. The cut-off value is 0.5 g/mL FEU.           2/23/2017 10:32 AM - Interface, Flexilab Results      Component Results     Component Value Ref Range & Units Status    Sodium 141 133 - 144 mmol/L Final    Potassium 3.5 3.4 - 5.3 mmol/L Final    Chloride 106 94 - 109 mmol/L Final    Carbon Dioxide 26 20 - 32 mmol/L Final    Anion Gap 9 3 - 14 mmol/L Final    Glucose 104 (H) 70 - 99 mg/dL Final    Urea Nitrogen 8 7 - 30 mg/dL Final    Creatinine 0.71 0.52 - 1.04 mg/dL Final    GFR Estimate >90  Non  GFR Calc   >60 mL/min/1.7m2 Final    GFR Estimate If Black >90   GFR Calc   >60 mL/min/1.7m2 Final    Calcium 8.5 8.5 - 10.1 mg/dL Final    Bilirubin Total 0.6 0.2 - 1.3 mg/dL Final    Albumin 3.8 3.4 - 5.0 g/dL Final    Protein Total 7.8 6.8 - 8.8 g/dL Final    Alkaline Phosphatase 62 40 - 150 U/L Final    ALT 24 0 - 50 U/L Final    AST 15 0 - 45 U/L Final         2/23/2017 10:32 AM - Interface, Flexilab Results      Component Results     Component Value Ref Range & Units Status    Lipase 179 73 - 393 U/L Final         2/23/2017 10:13 AM - Interface, Flexilab Results       Component Results     Component Value Ref Range & Units Status    WBC 7.9 4.0 - 11.0 10e9/L Final    RBC Count 4.45 3.8 - 5.2 10e12/L Final    Hemoglobin 14.6 11.7 - 15.7 g/dL Final    Hematocrit 43.0 35.0 - 47.0 % Final    MCV 97 78 - 100 fl Final    MCH 32.8 26.5 - 33.0 pg Final    MCHC 34.0 31.5 - 36.5 g/dL Final    RDW 12.1 10.0 - 15.0 % Final    Platelet Count 217 150 - 450 10e9/L Final    Diff Method Automated Method  Final    % Neutrophils 66.4 % Final    % Lymphocytes 22.5 % Final    % Monocytes 8.8 % Final    % Eosinophils 1.9 % Final    % Basophils 0.3 % Final    % Immature Granulocytes 0.1 % Final    Absolute Neutrophil 5.2 1.6 - 8.3 10e9/L Final    Absolute Lymphocytes 1.8 0.8 - 5.3 10e9/L Final    Absolute Monocytes 0.7 0.0 - 1.3 10e9/L Final    Absolute Eosinophils 0.2 0.0 - 0.7 10e9/L Final    Absolute Basophils 0.0 0.0 - 0.2 10e9/L Final    Abs Immature Granulocytes 0.0 0 - 0.4 10e9/L Final         2/23/2017 10:32 AM - Interface, Flexilab Results      Component Results     Component Value Ref Range & Units Status    Troponin I ES  0.000 - 0.045 ug/L Final    <0.015  The 99th percentile for upper reference range is 0.045 ug/L.  Troponin values in   the range of 0.045 - 0.120 ug/L may be associated with risks of adverse   clinical events.           2/23/2017 10:22 AM - Interface, Radiant Ib      Narrative     XR CHEST 2 VW 2/23/2017 10:18 AM    HISTORY: Chest pain.    COMPARISON: 1/22/2016    FINDINGS: No airspace consolidation, pleural effusion or pneumothorax.  Normal heart size.        Impression     IMPRESSION: No acute cardiopulmonary abnormality.    MARYLIN AGUILAR MD         2/23/2017 12:20 PM - Interface, Radiant Ib      Narrative     CT CHEST WITH CONTRAST February 23, 2017 11:36 AM     HISTORY: Chest pain radiating to back and diminished right radial  pulse, evaluate for dissection.     TECHNIQUE: Thin section axial images are performed from the thoracic  inlet to the lung bases utilizing 80  mL of Isovue 370 IV contrast  without adverse event. Coronal reformatted images are also generated.  Radiation dose for this scan was reduced using automated exposure  control, adjustment of the mA and/or kV according to patient size, or  iterative reconstruction technique.    FINDINGS: The lungs are clear. No infiltrate or consolidation. No  pleural or pericardial fluid. Heart is normal in size. Esophagus is  unremarkable. No enlarged lymph nodes. No evidence of thoracic aortic  aneurysm or dissection. No evidence of pulmonary embolism. Limited  images upper abdomen demonstrate cholecystectomy changes. Bone window  examination is unremarkable.        Impression     IMPRESSION:  1. No evidence of thoracic aortic aneurysm or dissection. No evidence  of pulmonary embolism.  2. Lungs are clear. No enlarged lymph nodes.     MONO BENTON MD         2/23/2017 11:17 AM - Interface, Flexilab Results      Component Results     Component Value Ref Range & Units Status    HCG Qualitative Serum Negative NEG Final         2/23/2017 12:27 PM - Wily Renee MD      Impression     [facility] Procedure Note      Limited Bedside ED Cardiac Ultrasound:    PROCEDURE: PERFORMED BY: Dr. Wily Renee  INDICATIONS/SYMPTOM:  Chest Pain  PROBE: Low frequency convex probe and Cardiac phased array probe  BODY LOCATION: Chest  FINDINGS:   The ultrasound was performed utilizing the subcostal views.  Cardiac contractility:  Present  Gross estimation of cardiac kinesis: normal  Pericardial Effusion:  None  RV:LV ratio: LV > RV  IVC:    Diameter:  IVC diameter expiration (IVCe) 1 cm                                                   IVC diameter inspiration (IVCi) <1 cm                                                       Collapsibility:  IVC collapses > 50% with inspiration  INTERPRETATION:    Chamber size and motion were grossly normal with LV > RV, normal cardiac kinesis.  No pericardial effusion was found.  IVC visualized and  findings indicate normovolemia.  IMAGE DOCUMENTATION: Images were archived to PACs system.                    Thank you for choosing [facility]       Thank you for choosing [facility] for your care. Our goal is always to provide you with excellent care. Hearing back from our patients is one way we can continue to improve our services. Please take a few minutes to complete the written survey that you may receive in the mail after you visit with us. Thank you!        [employer] Information     [facility] gives you secure access to your electronic health record. If you see a primary care provider, you can also send messages to your care team and make appointments. If you have questions, please call your primary care clinic.  If you do not have a primary care provider, please call [phone] and they will assist you.        Care EveryWhere ID     This is your Care EveryWhere ID. This could be used by other organizations to access your [facility] medical records  [id number]        After Visit Summary       This is your record. Keep this with you and show to your community pharmacist(s) and doctor(s) at your next visit.

## 2017-02-23 NOTE — LETTER
[facility] EMERGENCY DEPARTMENT  [address] [facility] [address]  [phone]    2017    Cecilia Chambers  [address] [id number] [phone] (home) [phone] (work)    : 1975      To Whom it may concern:    Cecilia Chambers was seen in our Emergency Department today, 2017.  I expect her condition to improve over the next 2 days.  She may return to work/school when improved.    Sincerely,        Wily Renee

## 2017-02-23 NOTE — DISCHARGE INSTRUCTIONS
ICD-10-CM    1. Chest pain, non-cardiac R07.89     Unclear cause.  normal evaluation including CT chest for dissection, d-dimer for PE, bedside ultrasound, troponin for CAD along with 2 normal EKGs.  remainder of labs were normal.  Possible causes include heart related pain (unlikely), pericardiitis (unlikely based on u/s, ekg) and GERD, anxiety, chest wall.  Start with antacids - prilosec 20 mg daily for 1 month and zantac 150 mg twice daily for the first 7 days. avoid caffeine, stay hydrated >64 oz/fluid/day, avoid ibuprofen, upright for at least 2 hours after eating.   follow-up within 3 days for re-eval in clinic. in some cases stress testing is indicated.  If 1 week of antacid is not effective, then consider ibuprofen 600 mg every 6 hours for chest wall pain (also effective for pericarditis).          *CHEST PAIN, UNCERTAIN CAUSE    Based on your exam today, the exact cause of your chest pain is not certain. Your condition does not seem serious at this time, and your pain does not appear to be coming from your heart. However, sometimes the signs of a serious problem take more time to appear. Therefore, watch for the warning signs listed below.  HOME CARE:  1. Rest today and avoid strenuous activity.  2. Take any prescribed medicine as directed.  FOLLOW UP with your doctor in 1-3 days.   GET PROMPT MEDICAL ATTENTION if any of the following occur:    A change in the type of pain: if it feels different, becomes more severe, lasts longer, or begins to spread into your shoulder, arm, neck, jaw or back    Shortness of breath or increased pain with breathing    Weakness, dizziness, or fainting    Cough with blood or dark colored sputum (phlegm)    Fever over 101  F (38.3  C)    Swelling, pain or redness in one leg    [id number] [facility], [address] [place] [address], [place] [address]. All rights reserved. This information is not intended as a substitute for professional medical care. Always follow your healthcare  professional's instructions.

## 2017-02-23 NOTE — ED NOTES
Pt with 2nd nitro given and pt states marginal relief with the meds. B/P 101 systolic with med administration

## 2017-02-23 NOTE — ED NOTES
Pt with cp that started overnight awoke her from sleep and did not change with position changes or any meds

## 2017-02-23 NOTE — ED PROVIDER NOTES
History     Chief Complaint   Patient presents with     Chest Pain     sudden onset of central chest pain, @ 2 am, woke fromsleep.  sharp radiating to back     HPI  Cecilia RUIZ Chambers is a 41 year old female who presents with chest pain anterior 2 am onset - sharp pressure x20-30 min, radiated to back. chest heaviness.  Resolved at 2:30 am spontaneously - no palliative, no provocative factors.  not affected by movement or walking. radiation into the bilateral arms, rt chest, back worse with deep breathing, but no dyspnea.. . recurred at 830 am and persists.  anterior chest heaviness  onset 7/10. nausea or vomiting.  mild sweats.  No fever.   asthma history - no recent wheezing  no hypertension  pos hyperlipidemia  No diabetes  No premature heart disease      Denies recent prolonged travel (>3 hours) by car or plane, history or FHx of venous thromboembolism, recent surgery (last 4 weeks), active cancer history, hypercoagulable state, estrogen or other medications/conditions causing VTE or  new unilateral swelling or pain in the legs or calves.    Normal stress echo in 2006 after chest pain that was likely related to bronchospasm    Patient Active Problem List   Diagnosis     Headache     Anxiety state     Mild persistent asthma     Family history of breast cancer in mother     HYPERLIPIDEMIA LDL GOAL <160     Acute cholecystitis     Enteritis     Abdominal pain     Current Outpatient Prescriptions   Medication Sig Dispense Refill     colestipol (COLESTID) 1 G tablet Take 1 tablet (1 g) by mouth daily 30 tablet 0     SUMAtriptan (IMITREX) 100 MG tablet Take 1 tablet (100 mg) by mouth at onset of headache for migraine May repeat dose in 2 hours.  Do not exceed 200 mg in 24 hours 18 tablet 11     metroNIDAZOLE (FLAGYL) 500 MG tablet Take 1 tablet (500 mg) by mouth 2 times daily 14 tablet 0     topiramate (TOPAMAX) 50 MG tablet Take 1 tablet (50 mg) by mouth daily 90 tablet 0     escitalopram (LEXAPRO) 20 MG tablet  "Take 0.5 tablets (10 mg) by mouth daily 45 tablet 1     buPROPion (WELLBUTRIN XL) 150 MG 24 hr tablet Take 1 tablet (150 mg) by mouth daily 90 tablet 1     albuterol (PROAIR HFA, PROVENTIL HFA, VENTOLIN HFA) 108 (90 BASE) MCG/ACT inhaler Inhale 2 puffs into the lungs every 6 hours as needed for shortness of breath / dyspnea 1 Inhaler 1      No Known Allergies      I have reviewed the Medications, Allergies, Past Medical and Surgical History, and Social History in the Epic system.    Review of Systems   No fever chills or sweats  No cough, sore throat or ear pain  No abdominal pain, nausea, vomiting,  constipation or blood in the stool or black tarry stools  occl diarrhea - chronically  No dysuria, urgency,  frequency or hematuria  No new rashes  No headaches  ?change vision - several days ago - ?vision cloudy both eyes  No enlarged lymph nodes  Review of systems otherwise negative     Physical Exam   BP: 120/82  Heart Rate: 115  Temp: 98.6  F (37  C)  Resp: 16  Height: 157.5 cm (5' 2\")  Weight: 61.7 kg (136 lb)  SpO2: 99 %  Physical Exam   Constitutional: She appears distressed.   HENT:   Mouth/Throat: Oropharynx is clear and moist.   Eyes: Conjunctivae are normal.   Neck: Neck supple.   Cardiovascular: Regular rhythm.  Tachycardia present.  Exam reveals no gallop and no friction rub.    No murmur heard.  Pulses:       Radial pulses are 2+ on the right side, and 1+ on the left side.   Pulmonary/Chest: Effort normal and breath sounds normal. No respiratory distress. She has no wheezes. She has no rales. She exhibits no tenderness.   Abdominal: Soft. Bowel sounds are normal. She exhibits no distension. There is tenderness in the epigastric area. There is no rebound.   Musculoskeletal: She exhibits no edema.   Skin: No rash noted. She is not diaphoretic.       ED Course     ED Course     Procedures             EKG Interpretation:      Interpreted by Wily Renee  Time reviewed: 3151  Symptoms at time of EKG: chest " pain   Rhythm: normal sinus   Rate: 100  Axis: normal  Ectopy: none  Conduction: normal; short AK, no delta  ST Segments/ T Waves: No ST-T wave changes  Q Waves: none  Comparison to prior: Unchanged from 2006    Clinical Impression: normal EKG except short AK           EKG Interpretation:      Interpreted by Wily Renee  Time reviewed: 1147  Symptoms at time of EKG: none  Rhythm: normal sinus   Rate: 100  Axis: normal  Ectopy: none  Conduction: normal; normal AK  ST Segments/ T Waves: No ST-T wave changes  Q Waves: none  Comparison to prior: Unchanged from 2006    Clinical Impression: normal EKG except short AK        Critical Care time:  none                Results for orders placed or performed during the hospital encounter of 02/23/17   XR Chest 2 Views    Narrative    XR CHEST 2 VW 2/23/2017 10:18 AM    HISTORY: Chest pain.    COMPARISON: 1/22/2016    FINDINGS: No airspace consolidation, pleural effusion or pneumothorax.  Normal heart size.      Impression    IMPRESSION: No acute cardiopulmonary abnormality.    MARYLIN AGUILAR MD   Chest CT - IV contrast only - TRAUMA / DISSECTION    Narrative    CT CHEST WITH CONTRAST February 23, 2017 11:36 AM     HISTORY: Chest pain radiating to back and diminished right radial  pulse, evaluate for dissection.     TECHNIQUE: Thin section axial images are performed from the thoracic  inlet to the lung bases utilizing 80 mL of Isovue 370 IV contrast  without adverse event. Coronal reformatted images are also generated.  Radiation dose for this scan was reduced using automated exposure  control, adjustment of the mA and/or kV according to patient size, or  iterative reconstruction technique.    FINDINGS: The lungs are clear. No infiltrate or consolidation. No  pleural or pericardial fluid. Heart is normal in size. Esophagus is  unremarkable. No enlarged lymph nodes. No evidence of thoracic aortic  aneurysm or dissection. No evidence of pulmonary embolism. Limited  images upper  abdomen demonstrate cholecystectomy changes. Bone window  examination is unremarkable.      Impression    IMPRESSION:  1. No evidence of thoracic aortic aneurysm or dissection. No evidence  of pulmonary embolism.  2. Lungs are clear. No enlarged lymph nodes.     MONO BENTON MD   POC US CHEST B-SCAN (PTX/Edema/PNA)    Impression    [facility] Procedure Note      Limited Bedside ED Cardiac Ultrasound:    PROCEDURE: PERFORMED BY: Dr. Wily Renee  INDICATIONS/SYMPTOM:  Chest Pain  PROBE: Low frequency convex probe and Cardiac phased array probe  BODY LOCATION: Chest  FINDINGS:   The ultrasound was performed utilizing the subcostal views.  Cardiac contractility:  Present  Gross estimation of cardiac kinesis: normal  Pericardial Effusion:  None  RV:LV ratio: LV > RV  IVC:    Diameter:  IVC diameter expiration (IVCe) 1 cm                                                   IVC diameter inspiration (IVCi) <1 cm                                                       Collapsibility:  IVC collapses > 50% with inspiration  INTERPRETATION:    Chamber size and motion were grossly normal with LV > RV, normal cardiac kinesis.  No pericardial effusion was found.  IVC visualized and findings indicate normovolemia.  IMAGE DOCUMENTATION: Images were archived to PACs system.       D dimer quantitative   Result Value Ref Range    D Dimer 0.4 0.0 - 0.50 ug/ml FEU   Comprehensive metabolic panel   Result Value Ref Range    Sodium 141 133 - 144 mmol/L    Potassium 3.5 3.4 - 5.3 mmol/L    Chloride 106 94 - 109 mmol/L    Carbon Dioxide 26 20 - 32 mmol/L    Anion Gap 9 3 - 14 mmol/L    Glucose 104 (H) 70 - 99 mg/dL    Urea Nitrogen 8 7 - 30 mg/dL    Creatinine 0.71 0.52 - 1.04 mg/dL    GFR Estimate >90  Non  GFR Calc   >60 mL/min/1.7m2    GFR Estimate If Black >90   GFR Calc   >60 mL/min/1.7m2    Calcium 8.5 8.5 - 10.1 mg/dL    Bilirubin Total 0.6 0.2 - 1.3 mg/dL    Albumin 3.8 3.4 - 5.0 g/dL    Protein  Total 7.8 6.8 - 8.8 g/dL    Alkaline Phosphatase 62 40 - 150 U/L    ALT 24 0 - 50 U/L    AST 15 0 - 45 U/L   Lipase   Result Value Ref Range    Lipase 179 73 - 393 U/L   CBC with platelets differential   Result Value Ref Range    WBC 7.9 4.0 - 11.0 10e9/L    RBC Count 4.45 3.8 - 5.2 10e12/L    Hemoglobin 14.6 11.7 - 15.7 g/dL    Hematocrit 43.0 35.0 - 47.0 %    MCV 97 78 - 100 fl    MCH 32.8 26.5 - 33.0 pg    MCHC 34.0 31.5 - 36.5 g/dL    RDW 12.1 10.0 - 15.0 %    Platelet Count 217 150 - 450 10e9/L    Diff Method Automated Method     % Neutrophils 66.4 %    % Lymphocytes 22.5 %    % Monocytes 8.8 %    % Eosinophils 1.9 %    % Basophils 0.3 %    % Immature Granulocytes 0.1 %    Absolute Neutrophil 5.2 1.6 - 8.3 10e9/L    Absolute Lymphocytes 1.8 0.8 - 5.3 10e9/L    Absolute Monocytes 0.7 0.0 - 1.3 10e9/L    Absolute Eosinophils 0.2 0.0 - 0.7 10e9/L    Absolute Basophils 0.0 0.0 - 0.2 10e9/L    Abs Immature Granulocytes 0.0 0 - 0.4 10e9/L   Troponin I   Result Value Ref Range    Troponin I ES  0.000 - 0.045 ug/L     <0.015  The 99th percentile for upper reference range is 0.045 ug/L.  Troponin values in   the range of 0.045 - 0.120 ug/L may be associated with risks of adverse   clinical events.     HCG qualitative pregnancy (blood)   Result Value Ref Range    HCG Qualitative Serum Negative NEG         Assessments & Plan (with Medical Decision Making)     MDM: Cecilia MELCHOR Zelalemludwinhugone is a 41 year old female  presented with acute onset chest pain overnight that it radiated to both arms and back.  on her examination diminished pulses in the left radius compared to the right. Minimal relief with nitroglycerin. Was given aspirin in the emergency department.   after d-dimer was negative she was sent to CT to  exclude dissection and this was negative.She also underwent serial EKG And labs including troponin all which were normal. Bedside ultrasound was also performed and demonstrated no pericardial effusion.  We discussed  differential diagnosis And management is below. she is given precautions for return.      I have reviewed the nursing notes.    I have reviewed the findings, diagnosis, plan and need for follow up with the patient.    New Prescriptions    No medications on file       Final diagnoses:   Chest pain, non-cardiac - Unclear cause.  normal evaluation including CT chest for dissection, d-dimer for PE, bedside ultrasound, troponin for CAD along with 2 normal EKGs.  remainder of labs were normal.  Possible causes include heart related pain (unlikely), pericardiitis (unlikely based on u/s, ekg) and GERD, anxiety, chest wall.  Start with antacids - prilosec 20 mg daily for 1 month and zantac 150 mg twice daily for the first 7 days. avoid caffeine, stay hydrated >64 oz/fluid/day, avoid ibuprofen, upright for at least 2 hours after eating.   follow-up within 3 days for re-eval in clinic. in some cases stress testing is indicated.  If 1 week of antacid is not effective, then consider ibuprofen 600 mg every 6 hours for chest wall pain (also effective for pericarditis).       2/23/2017   [facility] EMERGENCY DEPARTMENT     Wily Renee MD  02/23/17 2404

## 2017-02-23 NOTE — ED AVS SNAPSHOT
[facility] Emergency Department    [address] [address]    Phone:  [phone]    Fax:  [phone]                                       Cecilia Chambers   MRN: [id number]    Department:  [facility] Emergency Department   Date of Visit:  2/23/2017           After Visit Summary Signature Page     I have received my discharge instructions, and my questions have been answered. I have discussed any challenges I see with this plan with the nurse or doctor.    ..........................................................................................................................................  Patient/Patient Representative Signature      ..........................................................................................................................................  Patient Representative Print Name and Relationship to Patient    ..................................................               ................................................  Date                                            Time    ..........................................................................................................................................  Reviewed by Signature/Title    ...................................................              ..............................................  Date                                                            Time

## 2017-02-27 ENCOUNTER — OFFICE VISIT (OUTPATIENT)
Dept: FAMILY MEDICINE | Facility: CLINIC | Age: 42
End: 2017-02-27
Payer: COMMERCIAL

## 2017-02-27 ENCOUNTER — RADIANT APPOINTMENT (OUTPATIENT)
Dept: GENERAL RADIOLOGY | Facility: CLINIC | Age: 42
End: 2017-02-27
Attending: FAMILY MEDICINE
Payer: COMMERCIAL

## 2017-02-27 VITALS
DIASTOLIC BLOOD PRESSURE: 74 MMHG | HEART RATE: 80 BPM | TEMPERATURE: 98.5 F | BODY MASS INDEX: 26.13 KG/M2 | WEIGHT: 142 LBS | SYSTOLIC BLOOD PRESSURE: 110 MMHG | OXYGEN SATURATION: 98 % | HEIGHT: 62 IN

## 2017-02-27 DIAGNOSIS — J45.31 MILD PERSISTENT ASTHMA WITH ACUTE EXACERBATION: ICD-10-CM

## 2017-02-27 DIAGNOSIS — K59.1 FUNCTIONAL DIARRHEA: ICD-10-CM

## 2017-02-27 DIAGNOSIS — J20.9 ACUTE BRONCHITIS, UNSPECIFIED ORGANISM: Primary | ICD-10-CM

## 2017-02-27 DIAGNOSIS — K21.9 GASTROESOPHAGEAL REFLUX DISEASE WITHOUT ESOPHAGITIS: ICD-10-CM

## 2017-02-27 DIAGNOSIS — F41.1 ANXIETY STATE: ICD-10-CM

## 2017-02-27 DIAGNOSIS — J20.9 ACUTE BRONCHITIS, UNSPECIFIED ORGANISM: ICD-10-CM

## 2017-02-27 PROCEDURE — 99214 OFFICE O/P EST MOD 30 MIN: CPT | Performed by: FAMILY MEDICINE

## 2017-02-27 PROCEDURE — 71020 XR CHEST 2 VW: CPT

## 2017-02-27 RX ORDER — ALBUTEROL SULFATE 90 UG/1
2 AEROSOL, METERED RESPIRATORY (INHALATION) EVERY 6 HOURS PRN
Qty: 1 INHALER | Refills: 1 | Status: SHIPPED | OUTPATIENT
Start: 2017-02-27 | End: 2018-07-15

## 2017-02-27 RX ORDER — BUPROPION HYDROCHLORIDE 150 MG/1
150 TABLET ORAL DAILY
Qty: 90 TABLET | Refills: 1 | Status: SHIPPED | OUTPATIENT
Start: 2017-02-27 | End: 2017-06-06

## 2017-02-27 RX ORDER — MONTELUKAST SODIUM 4 MG/1
1 TABLET, CHEWABLE ORAL DAILY
Qty: 30 TABLET | Refills: 0 | Status: SHIPPED | OUTPATIENT
Start: 2017-02-27 | End: 2017-06-12

## 2017-02-27 RX ORDER — AZITHROMYCIN 250 MG/1
TABLET, FILM COATED ORAL
Qty: 6 TABLET | Refills: 0 | Status: SHIPPED | OUTPATIENT
Start: 2017-02-27 | End: 2017-03-15

## 2017-02-27 NOTE — MR AVS SNAPSHOT
After Visit Summary   2/27/2017    Cecilia Chambers    MRN: [id number]           Patient Information     Date Of Birth          1975        Visit Information        Provider Department      2/27/2017 9:20 AM Ammy Rolon MD [facility]        Today's Diagnoses     Acute bronchitis, unspecified organism    -  1    Mild persistent asthma with acute exacerbation        Functional diarrhea        Anxiety state        Gastroesophageal reflux disease without esophagitis          Care Instructions    Bronchitis with asthma exacerbation   Continue to use inhaler as needed   Zpak for bronchitis     Wellbutrin refilled   Colestid refilled     Continue on on the zantac to complete one week and then the prilosec for one month. Then off meds and if symptoms come back let me know         Follow-ups after your visit        Your next 10 appointments already scheduled     Mar 10, 2017  3:40 PM CST   MyChart Long with Ammy Rolon MD   [facility] ([facility]    [address] [place] [id number]   [phone]              Who to contact     If you have questions or need follow up information about today's clinic visit or your schedule please contact [facility] directly at [phone].  Normal or non-critical lab and imaging results will be communicated to you by MyChart, letter or phone within 4 business days after the clinic has received the results. If you do not hear from us within 7 days, please contact the clinic through ClientShow[employer] or phone. If you have a critical or abnormal lab result, we will notify you by phone as soon as possible.  Submit refill requests through [employer] or call your pharmacy and they will forward the refill request to us. Please allow 3 business days for your refill to be completed.          Additional Information About Your Visit        MyChart Information     [employer] gives you  "secure access to your electronic health record. If you see a primary care provider, you can also send messages to your care team and make appointments. If you have questions, please call your primary care clinic.  If you do not have a primary care provider, please call [phone] and they will assist you.        Care EveryWhere ID     This is your Care EveryWhere ID. This could be used by other organizations to access your [facility] medical records  [id number]        Your Vitals Were     Pulse Temperature Height Pulse Oximetry BMI (Body Mass Index)       80 98.5  F (36.9  C) (Tympanic) 5' 2\" (1.575 m) 98% 25.97 kg/m2        Blood Pressure from Last 3 Encounters:   02/27/17 110/74   02/23/17 102/71   01/23/17 104/64    Weight from Last 3 Encounters:   02/27/17 142 lb (64.4 kg)   02/23/17 136 lb (61.7 kg)   01/23/17 141 lb (64 kg)                 Today's Medication Changes          These changes are accurate as of: 2/27/17  9:58 AM.  If you have any questions, ask your nurse or doctor.               Start taking these medicines.        Dose/Directions    azithromycin 250 MG tablet   Commonly known as:  ZITHROMAX   Used for:  Acute bronchitis, unspecified organism   Started by:  Ammy Rolon MD        Two tablets first day, then one tablet daily for four days.   Quantity:  6 tablet   Refills:  0            Where to get your medicines      These medications were sent to [id number] [address] [facility] [address] [facility]  [address] [address]    Hours:  Closed 10-16-08 business to [facility] Phone:  [phone]     albuterol 108 (90 BASE) MCG/ACT Inhaler    azithromycin 250 MG tablet    buPROPion 150 MG 24 hr tablet    colestipol 1 G tablet                Primary Care Provider Office Phone # Fax #    Ammy Rolon [phone]       [facility] [id number] [facility] [address]        Thank you!     Thank you for choosing [facility]  for your care. Our goal is always to provide you with excellent care. Hearing back from our patients is one way we can continue to improve our services. Please take a few minutes to complete the written survey that you may receive in the mail after your visit with us. Thank you!             Your Updated Medication List - Protect others around you: Learn how to safely use, store and throw away your medicines at www.disposemymeds.org.          This list is accurate as of: 2/27/17  9:58 AM.  Always use your most recent med list.                   Brand Name Dispense Instructions for use    albuterol 108 (90 BASE) MCG/ACT Inhaler    PROAIR HFA/PROVENTIL HFA/VENTOLIN HFA    1 Inhaler    Inhale 2 puffs into the lungs every 6 hours as needed for shortness of breath / dyspnea       azithromycin 250 MG tablet    ZITHROMAX    6 tablet    Two tablets first day, then one tablet daily for four days.       buPROPion 150 MG 24 hr tablet    WELLBUTRIN XL    90 tablet    Take 1 tablet (150 mg) by mouth daily       colestipol 1 G tablet    COLESTID    30 tablet    Take 1 tablet (1 g) by mouth daily       escitalopram 20 MG tablet    LEXAPRO    45 tablet    Take 0.5 tablets (10 mg) by mouth daily       SUMAtriptan 100 MG tablet    IMITREX    18 tablet    Take 1 tablet (100 mg) by mouth at onset of headache for migraine May repeat dose in 2 hours.  Do not exceed 200 mg in 24 hours       TYLENOL PO      Take 1,000 mg by mouth daily as needed for mild pain or fever

## 2017-02-27 NOTE — NURSING NOTE
"Chief Complaint   Patient presents with     ER F/U     Cough       Initial /74 (BP Location: Right arm, Patient Position: Chair, Cuff Size: Adult Regular)  Pulse 80  Temp 98.5  F (36.9  C) (Tympanic)  Ht 5' 2\" (1.575 m)  Wt 142 lb (64.4 kg)  SpO2 98%  BMI 25.97 kg/m2 Estimated body mass index is 25.97 kg/(m^2) as calculated from the following:    Height as of this encounter: 5' 2\" (1.575 m).    Weight as of this encounter: 142 lb (64.4 kg).  Medication Reconciliation: complete        "

## 2017-02-27 NOTE — PATIENT INSTRUCTIONS
Bronchitis with asthma exacerbation   Continue to use inhaler as needed   Zpak for bronchitis     Wellbutrin refilled   Colestid refilled     Continue on on the zantac to complete one week and then the prilosec for one month. Then off meds and if symptoms come back let me know

## 2017-02-27 NOTE — PROGRESS NOTES
"  SUBJECTIVE:                                                    Cecilia Chambers is a 41 year old female who presents to clinic today for the following health issues:      ED/UC Followup:    Facility:  [facility]  Date of visit: 2/23/2017  Reason for visit: Chest pain  Current Status: this is much better on the zantac she was put on in the ER   She had neg work up otherwise.          Anxiety Follow-Up    Status since last visit: No change    Other associated symptoms:None    Complicating factors:   Significant life event: No   Current substance abuse: None  Depression symptoms: No  BRAULIO-7 SCORE 10/29/2013 8/5/2015 2/18/2016   Total Score 3 2 -   Total Score - 2 17 (severe anxiety)        GAD7                     SUBJECTIVE:  Cecilia Chambers is a 41 year old female who presents with the following concerns;              Symptoms: cc Present Absent Comment   Fever/Chills   x    Fatigue  x     Muscle Aches  x  A little   Eye Irritation   x    Sneezing  x     Nasal Raoul/Drg  x     Sinus Pressure/Pain  x     Loss of smell   x    Dental pain  x     Sore Throat  x     Swollen Glands   x    Ear Pain/Fullness  x     Cough  x     Wheeze  x  mild   Chest Pain  x     Shortness of breath  x     Rash   x    Other         Symptom duration:  Friday   Sympom severity:  moderate   Treatments tried:  albuterol inhaler, robitussin 12, vitamin c   Contacts:  work     Problem list and histories reviewed & adjusted, as indicated.  Additional history: as documented    Problem list, Medication list, Allergies, and Medical/Social/Surgical histories reviewed in Psychiatric and updated as appropriate.    ROS:  Constitutional, HEENT, cardiovascular, pulmonary, gi and gu systems are negative, except as otherwise noted.    OBJECTIVE:                                                    /74 (BP Location: Right arm, Patient Position: Chair, Cuff Size: Adult Regular)  Pulse 80  Temp 98.5  F (36.9  C) (Tympanic)  Ht 5' 2\" (1.575 m)  Wt " 142 lb (64.4 kg)  SpO2 98%  BMI 25.97 kg/m2  Body mass index is 25.97 kg/(m^2).  GENERAL APPEARANCE: healthy, alert and no distress  EYES: Eyes grossly normal to inspection, PERRL and conjunctivae and sclerae normal  HENT: ear canals and TM's normal and nose and mouth without ulcers or lesions  NECK: no adenopathy, no asymmetry, masses, or scars and thyroid normal to palpation  RESP: rhonchi throughout  CV: regular rates and rhythm, normal S1 S2, no S3 or S4 and no murmur, click or rub  ABDOMEN: soft, nontender, without hepatosplenomegaly or masses and bowel sounds normal  PSYCH: mentation appears normal and affect normal/bright         ASSESSMENT/PLAN:                                                    1. Acute bronchitis, unspecified organism    - XR Chest 2 Views; Future    2. Mild persistent asthma with acute exacerbation    - albuterol (PROAIR HFA/PROVENTIL HFA/VENTOLIN HFA) 108 (90 BASE) MCG/ACT Inhaler; Inhale 2 puffs into the lungs every 6 hours as needed for shortness of breath / dyspnea  Dispense: 1 Inhaler; Refill: 1    3. Functional diarrhea  Stable no change in treatment plan.   - colestipol (COLESTID) 1 G tablet; Take 1 tablet (1 g) by mouth daily  Dispense: 30 tablet; Refill: 0    4. Anxiety state  Stable no change in treatment plan.   - buPROPion (WELLBUTRIN XL) 150 MG 24 hr tablet; Take 1 tablet (150 mg) by mouth daily  Dispense: 90 tablet; Refill: 1    5. Gastroesophageal reflux disease without esophagitis  On antacid regimen and will have her continue for one month then off and see how sxs are       Patient Instructions   Bronchitis with asthma exacerbation   Continue to use inhaler as needed   Zpak for bronchitis     Wellbutrin refilled   Colestid refilled     Continue on on the zantac to complete one week and then the prilosec for one month. Then off meds and if symptoms come back let me know       Ammy Rolon MD  [facility]

## 2017-02-28 ENCOUNTER — MYC MEDICAL ADVICE (OUTPATIENT)
Dept: FAMILY MEDICINE | Facility: CLINIC | Age: 42
End: 2017-02-28

## 2017-03-15 ENCOUNTER — OFFICE VISIT (OUTPATIENT)
Dept: URGENT CARE | Facility: URGENT CARE | Age: 42
End: 2017-03-15
Payer: COMMERCIAL

## 2017-03-15 VITALS
HEART RATE: 102 BPM | DIASTOLIC BLOOD PRESSURE: 69 MMHG | SYSTOLIC BLOOD PRESSURE: 102 MMHG | WEIGHT: 142.8 LBS | OXYGEN SATURATION: 97 % | TEMPERATURE: 99.3 F | BODY MASS INDEX: 26.12 KG/M2

## 2017-03-15 DIAGNOSIS — J01.90 ACUTE SINUSITIS WITH SYMPTOMS > 10 DAYS: Primary | ICD-10-CM

## 2017-03-15 PROCEDURE — 99213 OFFICE O/P EST LOW 20 MIN: CPT | Performed by: NURSE PRACTITIONER

## 2017-03-15 RX ORDER — FLUTICASONE PROPIONATE 50 MCG
1-2 SPRAY, SUSPENSION (ML) NASAL DAILY
Qty: 16 G | Refills: 0 | Status: SHIPPED | OUTPATIENT
Start: 2017-03-15 | End: 2017-06-12

## 2017-03-15 NOTE — MR AVS SNAPSHOT
After Visit Summary   3/15/2017    Cecilia Chambers    MRN: [id number]           Patient Information     Date Of Birth          1975        Visit Information        Provider Department      3/15/2017 5:35 PM YADIRA SAME DAY PROVIDER [facility]        Today's Diagnoses     Acute sinusitis with symptoms > 10 days    -  1      Care Instructions    Augmentin 875 bid for 10 days was prescribed.  Symptom Relief: Afdrin do not use greater then 3 days  Intranasal corticosteroid   Flonase has  been prescribed.     Tylenol or Ibuprofen if able to take for fevers and discomfort.  Do not exceed 4 grams of Tylenol in a 24 hour period.  Take Ibuprofen with food.      Follow up in 3-5 days if not improving or return sooner if worsening or fail to improve as anticipated.  Follow-up with your primary care provider next week and as needed.    Indications for emergent return to emergency department discussed with patient, who verbalized good understanding and agreement.  Patient understands the limitations of today's evaluation.         Sinusitis (Antibiotic Treatment)    The sinuses are air-filled spaces within the bones of the face. They connect to the inside of the nose. Sinusitis is an inflammation of the tissue lining the sinus cavity. Sinus inflammation can occur during a cold. It can also be due to allergies to pollens and other particles in the air. Sinusitis can cause symptoms of sinus congestion and fullness. A sinus infection causes fever, headache and facial pain. There is often green or yellow drainage from the nose or into the back of the throat (post-nasal drip). You have been given antibiotics to treat this condition.  Home care:    Take the full course of antibiotics as instructed. Do not stop taking them, even if you feel better.    Drink plenty of water, hot tea, and other liquids. This may help thin mucus. It also may promote sinus drainage.    Heat may help  soothe painful areas of the face. Use a towel soaked in hot water. Or,  the shower and direct the hot spray onto your face. Using a vaporizer along with a menthol rub at night may also help.     An expectorant containing guaifenesin may help thin the mucus and promote drainage from the sinuses.    Over-the-counter decongestants may be used unless a similar medicine was prescribed. Nasal sprays work the fastest. Use one that contains phenylephrine or oxymetazoline. First blow the nose gently. Then use the spray. Do not use these medicines more often than directed on the label or symptoms may get worse. You may also use tablets containing pseudoephedrine. Avoid products that combine ingredients, because side effects may be increased. Read labels. You can also ask the pharmacist for help. (NOTE: Persons with high blood pressure should not use decongestants. They can raise blood pressure.)    Over-the-counter antihistamines may help if allergies contributed to your sinusitis.      Do not use nasal rinses or irrigation during an acute sinus infection, unless told to by your health care provider. Rinsing may spread the infection to other sinuses.    Use acetaminophen or ibuprofen to control pain, unless another pain medicine was prescribed. (If you have chronic liver or kidney disease or ever had a stomach ulcer, talk with your doctor before using these medicines. Aspirin should never be used in anyone under 18 years of age who is ill with a fever. It may cause severe liver damage.)    Don't smoke. This can worsen symptoms.  Follow-up care  Follow up with your healthcare provider or our staff if you are not improving within the next week.  When to seek medical advice  Call your healthcare provider if any of these occur:    Facial pain or headache becoming more severe    Stiff neck    Unusual drowsiness or confusion    Swelling of the forehead or eyelids    Vision problems, including blurred or double  vision    Fever of 100.4 F (38 C) or higher, or as directed by your healthcare provider    Seizure    Breathing problems    Symptoms not resolving within 10 days    8062-3743 [employer]. [address] [place] [address], [place] [address]. All rights reserved. This information is not intended as a substitute for professional medical care. Always follow your healthcare professional's instructions.              Follow-ups after your visit        Who to contact     If you have questions or need follow up information about today's clinic visit or your schedule please contact [facility] directly at [phone].  Normal or non-critical lab and imaging results will be communicated to you by [employer], letter or phone within 4 business days after the clinic has received the results. If you do not hear from us within 7 days, please contact the clinic through [employer] or phone. If you have a critical or abnormal lab result, we will notify you by phone as soon as possible.  Submit refill requests through [employer] or call your pharmacy and they will forward the refill request to us. Please allow 3 business days for your refill to be completed.          Additional Information About Your Visit        MyChart Information     [employer] gives you secure access to your electronic health record. If you see a primary care provider, you can also send messages to your care team and make appointments. If you have questions, please call your primary care clinic.  If you do not have a primary care provider, please call [phone] and they will assist you.        Care EveryWhere ID     This is your Care EveryWhere ID. This could be used by other organizations to access your [facility] medical records  [id number]        Your Vitals Were     Pulse Temperature Pulse Oximetry BMI (Body Mass Index)          102 99.3  F (37.4  C) (Tympanic) 97% 26.12 kg/m2         Blood Pressure from Last 3 Encounters:   03/15/17  102/69   02/27/17 110/74   02/23/17 102/71    Weight from Last 3 Encounters:   03/15/17 142 lb 12.8 oz (64.8 kg)   02/27/17 142 lb (64.4 kg)   02/23/17 136 lb (61.7 kg)              Today, you had the following     No orders found for display         Today's Medication Changes          These changes are accurate as of: 3/15/17  7:24 PM.  If you have any questions, ask your nurse or doctor.               Start taking these medicines.        Dose/Directions    amoxicillin-clavulanate 875-125 MG per tablet   Commonly known as:  AUGMENTIN   Used for:  Acute sinusitis with symptoms > 10 days        Dose:  1 tablet   Take 1 tablet by mouth 2 times daily   Quantity:  20 tablet   Refills:  0       fluticasone 50 MCG/ACT spray   Commonly known as:  FLONASE   Used for:  Acute sinusitis with symptoms > 10 days        Dose:  1-2 spray   Spray 1-2 sprays into both nostrils daily   Quantity:  16 g   Refills:  0            Where to get your medicines      These medications were sent to [id number] [address] [facility]  [address] [facility] [address]    Hours:  Closed 10-16-08 business to [facility] Phone:  [phone]     amoxicillin-clavulanate 875-125 MG per tablet    fluticasone 50 MCG/ACT spray                Primary Care Provider Office Phone # Fax #    Ammy Rolon [phone]       [facility] [address] [place] [address]        Thank you!     Thank you for choosing [facility]  for your care. Our goal is always to provide you with excellent care. Hearing back from our patients is one way we can continue to improve our services. Please take a few minutes to complete the written survey that you may receive in the mail after your visit with us. Thank you!             Your Updated Medication List - Protect others around you: Learn how to safely use, store and throw away your medicines at  www.disposemymeds.org.          This list is accurate as of: 3/15/17  7:24 PM.  Always use your most recent med list.                   Brand Name Dispense Instructions for use    albuterol 108 (90 BASE) MCG/ACT Inhaler    PROAIR HFA/PROVENTIL HFA/VENTOLIN HFA    1 Inhaler    Inhale 2 puffs into the lungs every 6 hours as needed for shortness of breath / dyspnea       amoxicillin-clavulanate 875-125 MG per tablet    AUGMENTIN    20 tablet    Take 1 tablet by mouth 2 times daily       buPROPion 150 MG 24 hr tablet    WELLBUTRIN XL    90 tablet    Take 1 tablet (150 mg) by mouth daily       colestipol 1 G tablet    COLESTID    30 tablet    Take 1 tablet (1 g) by mouth daily       escitalopram 20 MG tablet    LEXAPRO    45 tablet    Take 0.5 tablets (10 mg) by mouth daily       fluticasone 50 MCG/ACT spray    FLONASE    16 g    Spray 1-2 sprays into both nostrils daily       SUMAtriptan 100 MG tablet    IMITREX    18 tablet    Take 1 tablet (100 mg) by mouth at onset of headache for migraine May repeat dose in 2 hours.  Do not exceed 200 mg in 24 hours       TYLENOL PO      Take 1,000 mg by mouth daily as needed for mild pain or fever Reported on 3/15/2017

## 2017-03-15 NOTE — LETTER
[facility]  [address] [address]  Phone: [phone]  Fax: [phone]    March 15, 2017        Cecilia Chambers  [address] [address]          To whom it may concern:    RE: Cecilia Chambers    Patient was seen and treated today at our clinic and missed work.    Can return to work once fever free for 24 hours.     Please contact me for questions or concerns.      Sincerely,    Waleska MAY SAME DAY PROVIDER

## 2017-03-16 NOTE — PATIENT INSTRUCTIONS
Augmentin 875 bid for 10 days was prescribed.  Symptom Relief: Afdrin do not use greater then 3 days  Intranasal corticosteroid   Flonase has  been prescribed.     Tylenol or Ibuprofen if able to take for fevers and discomfort.  Do not exceed 4 grams of Tylenol in a 24 hour period.  Take Ibuprofen with food.      Follow up in 3-5 days if not improving or return sooner if worsening or fail to improve as anticipated.  Follow-up with your primary care provider next week and as needed.    Indications for emergent return to emergency department discussed with patient, who verbalized good understanding and agreement.  Patient understands the limitations of today's evaluation.         Sinusitis (Antibiotic Treatment)    The sinuses are air-filled spaces within the bones of the face. They connect to the inside of the nose. Sinusitis is an inflammation of the tissue lining the sinus cavity. Sinus inflammation can occur during a cold. It can also be due to allergies to pollens and other particles in the air. Sinusitis can cause symptoms of sinus congestion and fullness. A sinus infection causes fever, headache and facial pain. There is often green or yellow drainage from the nose or into the back of the throat (post-nasal drip). You have been given antibiotics to treat this condition.  Home care:    Take the full course of antibiotics as instructed. Do not stop taking them, even if you feel better.    Drink plenty of water, hot tea, and other liquids. This may help thin mucus. It also may promote sinus drainage.    Heat may help soothe painful areas of the face. Use a towel soaked in hot water. Or,  the shower and direct the hot spray onto your face. Using a vaporizer along with a menthol rub at night may also help.     An expectorant containing guaifenesin may help thin the mucus and promote drainage from the sinuses.    Over-the-counter decongestants may be used unless a similar medicine was prescribed. Nasal sprays  work the fastest. Use one that contains phenylephrine or oxymetazoline. First blow the nose gently. Then use the spray. Do not use these medicines more often than directed on the label or symptoms may get worse. You may also use tablets containing pseudoephedrine. Avoid products that combine ingredients, because side effects may be increased. Read labels. You can also ask the pharmacist for help. (NOTE: Persons with high blood pressure should not use decongestants. They can raise blood pressure.)    Over-the-counter antihistamines may help if allergies contributed to your sinusitis.      Do not use nasal rinses or irrigation during an acute sinus infection, unless told to by your health care provider. Rinsing may spread the infection to other sinuses.    Use acetaminophen or ibuprofen to control pain, unless another pain medicine was prescribed. (If you have chronic liver or kidney disease or ever had a stomach ulcer, talk with your doctor before using these medicines. Aspirin should never be used in anyone under 18 years of age who is ill with a fever. It may cause severe liver damage.)    Don't smoke. This can worsen symptoms.  Follow-up care  Follow up with your healthcare provider or our staff if you are not improving within the next week.  When to seek medical advice  Call your healthcare provider if any of these occur:    Facial pain or headache becoming more severe    Stiff neck    Unusual drowsiness or confusion    Swelling of the forehead or eyelids    Vision problems, including blurred or double vision    Fever of 100.4 F (38 C) or higher, or as directed by your healthcare provider    Seizure    Breathing problems    Symptoms not resolving within 10 days    9662-6503 [employer]. [address] [place] [address], [place] [address]. All rights reserved. This information is not intended as a substitute for professional medical care. Always follow your healthcare professional's instructions.

## 2017-03-16 NOTE — PROGRESS NOTES
SUBJECTIVE:                                                    Cecilia Chambers is a 41 year old female who presents to clinic today for the following health issues:      Chief Complaint   Patient presents with     Sinus Problem     1 month, cough, runny nose, pain behind eyes, headache, nausea, body aches, sinus pressure, cough     SUBJECTIVE:  Cecilia Chambers is a 41 year old female here with concerns about sinus infection.  She states onset of symptoms were 1 month(s) ago.    She has had maxillary, frontal pressure. Course of illness is worsening.   Severity moderate  Current and Associated symptoms: nasal congestion, ear pain bilateral, sore throat and facial pain/pressure  Predisposing factors include HX of recurrent sinusitis.   Recent treatment has included: Azithromycin     Past Medical History   Diagnosis Date     Abdominal pain, other specified site      Abdominal pain, right upper quadrant      Anxiety state, unspecified      ASCUS on Pap smear 2003     +HR HPV 16     COPD (chronic obstructive pulmonary disease) (H)      My Mom has this     Family history of breast cancer in mother 9/17/2009     Family history of breast cancer in mother      Headache 2/25/2005     Headache      Headache(784.0)      Hx of colposcopy with cervical biopsy 6/2/2003     WNL     Mental disorders of mother, postpartum      postpartum depression     Migraine, unspecified, without mention of intractable migraine without mention of status migrainosus      Other specified cardiac dysrhythmias(427.89)      Other specified complication, antepartum(646.83)      Swelling of limb      Uncomplicated asthma      Myself     Unspecified viral infection, in conditions classified elsewhere and of unspecified site      Urinary tract infection, site not specified      Social History   Substance Use Topics     Smoking status: Never Smoker     Smokeless tobacco: Never Used     Alcohol use Yes      Comment: rare          ROS:  CONSTITUTIONAL:NEGATIVE for fever, chills, change in weight  INTEGUMENTARY/SKIN: NEGATIVE for worrisome rashes, moles or lesions  EYES: NEGATIVE for vision changes or irritation  ENT/MOUTH: See above   RESP:NEGATIVE for significant cough or SOB  CV: NEGATIVE for chest pain, palpitations or peripheral edema  MUSCULOSKELETAL: NEGATIVE for significant arthralgias or myalgia  NEURO: NEGATIVE for weakness, dizziness or paresthesias    OBJECTIVE:  /69  Pulse 102  Temp 99.3  F (37.4  C) (Tympanic)  Wt 142 lb 12.8 oz (64.8 kg)  SpO2 97%  BMI 26.12 kg/m2  Exam:GENERAL APPEARANCE: healthy, alert and no distress  EYES: EOMI,  PERRL, conjunctiva clear  HENT: ear canals and TM's normal. Mouth without ulcers, erythema or lesions  HENT: Maxillary sinus tenderness, bilateral turbinates inflamed and edematous    NECK: supple, nontender, no lymphadenopathy  RESP: lungs clear to auscultation - no rales, rhonchi or wheezes  CV: regular rates and rhythm, normal S1 S2, no murmur noted  ABDOMEN:  soft, nontender, no HSM or masses and bowel sounds normal  NEURO: Normal strength and tone, sensory exam grossly normal,  normal speech and mentation  SKIN: no suspicious lesions or rashes      ASSESSMENT:    ICD-10-CM    1. Acute sinusitis with symptoms > 10 days J01.90 fluticasone (FLONASE) 50 MCG/ACT spray     amoxicillin-clavulanate (AUGMENTIN) 875-125 MG per tablet         PLAN:  Patient Instructions   Augmentin 875 bid for 10 days was prescribed.  Symptom Relief: Afdrin do not use greater then 3 days  Intranasal corticosteroid   Flonase has  been prescribed.     Tylenol or Ibuprofen if able to take for fevers and discomfort.  Do not exceed 4 grams of Tylenol in a 24 hour period.  Take Ibuprofen with food.      Follow up in 3-5 days if not improving or return sooner if worsening or fail to improve as anticipated.  Follow-up with your primary care provider next week and as needed.    Indications for emergent return to  emergency department discussed with patient, who verbalized good understanding and agreement.  Patient understands the limitations of today's evaluation.         Sinusitis (Antibiotic Treatment)    The sinuses are air-filled spaces within the bones of the face. They connect to the inside of the nose. Sinusitis is an inflammation of the tissue lining the sinus cavity. Sinus inflammation can occur during a cold. It can also be due to allergies to pollens and other particles in the air. Sinusitis can cause symptoms of sinus congestion and fullness. A sinus infection causes fever, headache and facial pain. There is often green or yellow drainage from the nose or into the back of the throat (post-nasal drip). You have been given antibiotics to treat this condition.  Home care:    Take the full course of antibiotics as instructed. Do not stop taking them, even if you feel better.    Drink plenty of water, hot tea, and other liquids. This may help thin mucus. It also may promote sinus drainage.    Heat may help soothe painful areas of the face. Use a towel soaked in hot water. Or,  the shower and direct the hot spray onto your face. Using a vaporizer along with a menthol rub at night may also help.     An expectorant containing guaifenesin may help thin the mucus and promote drainage from the sinuses.    Over-the-counter decongestants may be used unless a similar medicine was prescribed. Nasal sprays work the fastest. Use one that contains phenylephrine or oxymetazoline. First blow the nose gently. Then use the spray. Do not use these medicines more often than directed on the label or symptoms may get worse. You may also use tablets containing pseudoephedrine. Avoid products that combine ingredients, because side effects may be increased. Read labels. You can also ask the pharmacist for help. (NOTE: Persons with high blood pressure should not use decongestants. They can raise blood  pressure.)    Over-the-counter antihistamines may help if allergies contributed to your sinusitis.      Do not use nasal rinses or irrigation during an acute sinus infection, unless told to by your health care provider. Rinsing may spread the infection to other sinuses.    Use acetaminophen or ibuprofen to control pain, unless another pain medicine was prescribed. (If you have chronic liver or kidney disease or ever had a stomach ulcer, talk with your doctor before using these medicines. Aspirin should never be used in anyone under 18 years of age who is ill with a fever. It may cause severe liver damage.)    Don't smoke. This can worsen symptoms.  Follow-up care  Follow up with your healthcare provider or our staff if you are not improving within the next week.  When to seek medical advice  Call your healthcare provider if any of these occur:    Facial pain or headache becoming more severe    Stiff neck    Unusual drowsiness or confusion    Swelling of the forehead or eyelids    Vision problems, including blurred or double vision    Fever of 100.4 F (38 C) or higher, or as directed by your healthcare provider    Seizure    Breathing problems    Symptoms not resolving within 10 days    5261-4184 [employer]. [address] [place] [address]. All rights reserved. This information is not intended as a substitute for professional medical care. Always follow your healthcare professional's instructions.            ARTURO Mullins CNP

## 2017-03-17 ENCOUNTER — MYC MEDICAL ADVICE (OUTPATIENT)
Dept: FAMILY MEDICINE | Facility: CLINIC | Age: 42
End: 2017-03-17

## 2017-03-17 ENCOUNTER — E-VISIT (OUTPATIENT)
Dept: FAMILY MEDICINE | Facility: CLINIC | Age: 42
End: 2017-03-17
Payer: COMMERCIAL

## 2017-03-17 DIAGNOSIS — B37.31 YEAST INFECTION OF THE VAGINA: Primary | ICD-10-CM

## 2017-03-17 DIAGNOSIS — N89.8 VAGINAL DISCHARGE: Primary | ICD-10-CM

## 2017-03-17 PROCEDURE — 99207 ZZC NO CHARGE NURSE ONLY: CPT | Performed by: FAMILY MEDICINE

## 2017-03-17 RX ORDER — FLUCONAZOLE 150 MG/1
150 TABLET ORAL ONCE
Qty: 1 TABLET | Refills: 0 | Status: SHIPPED | OUTPATIENT
Start: 2017-03-17 | End: 2017-03-17

## 2017-03-30 ENCOUNTER — MYC MEDICAL ADVICE (OUTPATIENT)
Dept: FAMILY MEDICINE | Facility: CLINIC | Age: 42
End: 2017-03-30

## 2017-06-06 ENCOUNTER — MYC MEDICAL ADVICE (OUTPATIENT)
Dept: FAMILY MEDICINE | Facility: CLINIC | Age: 42
End: 2017-06-06

## 2017-06-06 ENCOUNTER — MYC REFILL (OUTPATIENT)
Dept: FAMILY MEDICINE | Facility: CLINIC | Age: 42
End: 2017-06-06

## 2017-06-06 DIAGNOSIS — F41.1 ANXIETY STATE: ICD-10-CM

## 2017-06-06 RX ORDER — BUPROPION HYDROCHLORIDE 150 MG/1
150 TABLET ORAL DAILY
Qty: 90 TABLET | Refills: 1 | Status: SHIPPED | OUTPATIENT
Start: 2017-06-06 | End: 2018-03-25

## 2017-06-06 ASSESSMENT — ANXIETY QUESTIONNAIRES
3. WORRYING TOO MUCH ABOUT DIFFERENT THINGS: NEARLY EVERY DAY
GAD7 TOTAL SCORE: 0
GAD7 TOTAL SCORE: 13
7. FEELING AFRAID AS IF SOMETHING AWFUL MIGHT HAPPEN: MORE THAN HALF THE DAYS
1. FEELING NERVOUS, ANXIOUS, OR ON EDGE: SEVERAL DAYS
2. NOT BEING ABLE TO STOP OR CONTROL WORRYING: NEARLY EVERY DAY
7. FEELING AFRAID AS IF SOMETHING AWFUL MIGHT HAPPEN: MORE THAN HALF THE DAYS
6. BECOMING EASILY ANNOYED OR IRRITABLE: MORE THAN HALF THE DAYS
4. TROUBLE RELAXING: SEVERAL DAYS
GAD7 TOTAL SCORE: 0
5. BEING SO RESTLESS THAT IT IS HARD TO SIT STILL: SEVERAL DAYS

## 2017-06-06 ASSESSMENT — PATIENT HEALTH QUESTIONNAIRE - PHQ9
SUM OF ALL RESPONSES TO PHQ QUESTIONS 1-9: 8
10. IF YOU CHECKED OFF ANY PROBLEMS, HOW DIFFICULT HAVE THESE PROBLEMS MADE IT FOR YOU TO DO YOUR WORK, TAKE CARE OF THINGS AT HOME, OR GET ALONG WITH OTHER PEOPLE: SOMEWHAT DIFFICULT
SUM OF ALL RESPONSES TO PHQ QUESTIONS 1-9: 8

## 2017-06-06 NOTE — TELEPHONE ENCOUNTER
Message from MyChart:  Original authorizing provider: MD Cecilia Lang would like a refill of the following medications:  buPROPion (WELLBUTRIN XL) 150 MG 24 hr tablet [Ammy Rolon MD]    Preferred pharmacy: [id number] [address] [facility]    Comment:

## 2017-06-07 ASSESSMENT — PATIENT HEALTH QUESTIONNAIRE - PHQ9: SUM OF ALL RESPONSES TO PHQ QUESTIONS 1-9: 8

## 2017-06-12 ENCOUNTER — MYC MEDICAL ADVICE (OUTPATIENT)
Dept: FAMILY MEDICINE | Facility: CLINIC | Age: 42
End: 2017-06-12

## 2017-06-12 ENCOUNTER — OFFICE VISIT (OUTPATIENT)
Dept: FAMILY MEDICINE | Facility: CLINIC | Age: 42
End: 2017-06-12
Payer: COMMERCIAL

## 2017-06-12 VITALS
WEIGHT: 134.8 LBS | DIASTOLIC BLOOD PRESSURE: 68 MMHG | HEART RATE: 76 BPM | TEMPERATURE: 98.2 F | BODY MASS INDEX: 24.8 KG/M2 | SYSTOLIC BLOOD PRESSURE: 103 MMHG | HEIGHT: 62 IN

## 2017-06-12 DIAGNOSIS — K59.1 FUNCTIONAL DIARRHEA: ICD-10-CM

## 2017-06-12 DIAGNOSIS — Z02.89 ENCOUNTER FOR COMPLETION OF FORM WITH PATIENT: Primary | ICD-10-CM

## 2017-06-12 PROCEDURE — 99213 OFFICE O/P EST LOW 20 MIN: CPT | Performed by: PHYSICIAN ASSISTANT

## 2017-06-12 RX ORDER — MONTELUKAST SODIUM 4 MG/1
TABLET, CHEWABLE ORAL
Qty: 150 TABLET | Refills: 11 | Status: SHIPPED | OUTPATIENT
Start: 2017-06-12 | End: 2018-06-19

## 2017-06-12 NOTE — PATIENT INSTRUCTIONS
Try dose increase cholestipol - we can increase further if needed  FMLA completed today   Lab sometime, but not urgent    Contact me if questions

## 2017-06-12 NOTE — MR AVS SNAPSHOT
After Visit Summary   6/12/2017    Cecilia Chambers    MRN: [id number]           Patient Information     Date Of Birth          1975        Visit Information        Provider Department      6/12/2017 6:00 PM Rebecca Ahuja PA-C [facility]        Today's Diagnoses     Functional diarrhea          Care Instructions    Try dose increase cholestipol - we can increase further if needed  FMLA completed today   Lab sometime, but not urgent    Contact me if questions          Follow-ups after your visit        Future tests that were ordered for you today     Open Future Orders        Priority Expected Expires Ordered    **TSH with free T4 reflex FUTURE anytime Routine 6/12/2017 6/12/2018 6/12/2017            Who to contact     If you have questions or need follow up information about today's clinic visit or your schedule please contact [facility] directly at [phone].  Normal or non-critical lab and imaging results will be communicated to you by MyChart, letter or phone within 4 business days after the clinic has received the results. If you do not hear from us within 7 days, please contact the clinic through [employer] or phone. If you have a critical or abnormal lab result, we will notify you by phone as soon as possible.  Submit refill requests through [employer] or call your pharmacy and they will forward the refill request to us. Please allow 3 business days for your refill to be completed.          Additional Information About Your Visit        MyChart Information     [employer] gives you secure access to your electronic health record. If you see a primary care provider, you can also send messages to your care team and make appointments. If you have questions, please call your primary care clinic.  If you do not have a primary care provider, please call [phone] and they will assist you.        Care EveryWhere ID     This is your Care EveryWhere  "ID. This could be used by other organizations to access your [facility] medical records  [id number]        Your Vitals Were     Pulse Temperature Height BMI (Body Mass Index)          76 98.2  F (36.8  C) (Tympanic) 5' 2\" (1.575 m) 24.66 kg/m2         Blood Pressure from Last 3 Encounters:   06/12/17 103/68   03/15/17 102/69   02/27/17 110/74    Weight from Last 3 Encounters:   06/12/17 134 lb 12.8 oz (61.1 kg)   03/15/17 142 lb 12.8 oz (64.8 kg)   02/27/17 142 lb (64.4 kg)                 Today's Medication Changes          These changes are accurate as of: 6/12/17  6:42 PM.  If you have any questions, ask your nurse or doctor.               These medicines have changed or have updated prescriptions.        Dose/Directions    colestipol 1 G tablet   Commonly known as:  COLESTID   This may have changed:    - how much to take  - how to take this  - when to take this  - additional instructions   Used for:  Functional diarrhea   Changed by:  Rebecca Ahuja PA-C        2-4 grams as needed once or twice daily.   Quantity:  150 tablet   Refills:  11         Stop taking these medicines if you haven't already. Please contact your care team if you have questions.     amoxicillin-clavulanate 875-125 MG per tablet   Commonly known as:  AUGMENTIN   Stopped by:  Rebecca Ahuja PA-C           fluticasone 50 MCG/ACT spray   Commonly known as:  FLONASE   Stopped by:  Rebecca Ahuja PA-C                Where to get your medicines      These medications were sent to [id number] [address] [address] [facility] [facility]  [address] [address]    Hours:  Closed 10-16-08 business to [facility] Phone:  [phone]     colestipol 1 G tablet                Primary Care Provider Office Phone # Fax #    Ammy Rolon [phone]       [facility] [address] [facility] [address]        Thank you!     Thank you for choosing [facility]  for your care. Our goal is always to provide you with excellent care. Hearing back from our patients is one way we can continue to improve our services. Please take a few minutes to complete the written survey that you may receive in the mail after your visit with us. Thank you!             Your Updated Medication List - Protect others around you: Learn how to safely use, store and throw away your medicines at www.disposemymeds.org.          This list is accurate as of: 6/12/17  6:42 PM.  Always use your most recent med list.                   Brand Name Dispense Instructions for use    albuterol 108 (90 BASE) MCG/ACT Inhaler    PROAIR HFA/PROVENTIL HFA/VENTOLIN HFA    1 Inhaler    Inhale 2 puffs into the lungs every 6 hours as needed for shortness of breath / dyspnea       buPROPion 150 MG 24 hr tablet    WELLBUTRIN XL    90 tablet    Take 1 tablet (150 mg) by mouth daily       colestipol 1 G tablet    COLESTID    150 tablet    2-4 grams as needed once or twice daily.       escitalopram 20 MG tablet    LEXAPRO    45 tablet    Take 0.5 tablets (10 mg) by mouth daily       SUMAtriptan 100 MG tablet    IMITREX    18 tablet    Take 1 tablet (100 mg) by mouth at onset of headache for migraine May repeat dose in 2 hours.  Do not exceed 200 mg in 24 hours       TYLENOL PO      Take 1,000 mg by mouth daily as needed for mild pain or fever Reported on 3/15/2017

## 2017-06-12 NOTE — NURSING NOTE
"Chief Complaint   Patient presents with     Forms       Initial /68 (BP Location: Right arm, Patient Position: Chair, Cuff Size: Adult Regular)  Pulse 76  Temp 98.2  F (36.8  C) (Tympanic)  Ht 5' 2\" (1.575 m)  Wt 134 lb 12.8 oz (61.1 kg)  BMI 24.66 kg/m2 Estimated body mass index is 24.66 kg/(m^2) as calculated from the following:    Height as of this encounter: 5' 2\" (1.575 m).    Weight as of this encounter: 134 lb 12.8 oz (61.1 kg).  Medication Reconciliation: complete    Health Maintenance that is potentially due pending provider review:  NONE    Janelle BANKS MA        "

## 2017-06-12 NOTE — PROGRESS NOTES
"  SUBJECTIVE:                                                    Cecilia Chambers is a 41 year old female who presents to clinic today for the following health issues:      * Forms-  Needs FMLA papers filled out.  Ever since she has had a gall bladder removed she has had issues with stomach problems such as pain, diarrhea.  Work is wanting to put restrictions on her in regards to leaving or coming in late.    Cholecystectomy 2014.  Tries to limit greasy foods, but intermittent diarrhea ever since.  2-3 x per week, sometimes being a few hours late, occasionally will have symptoms for whole day.  Has tried colestipol 1 g, helps a little.      Has been doing a lot of exercise and working on wt loss.  8 pounds since March.      Problem list and histories reviewed & adjusted, as indicated.  Additional history: as documented    Reviewed and updated as needed this visit by clinical staff       Reviewed and updated as needed this visit by Provider         ROS:  Constitutional, GI, and psych systems are negative, except as otherwise noted.    OBJECTIVE:                                                    /68 (BP Location: Right arm, Patient Position: Chair, Cuff Size: Adult Regular)  Pulse 76  Temp 98.2  F (36.8  C) (Tympanic)  Ht 5' 2\" (1.575 m)  Wt 134 lb 12.8 oz (61.1 kg)  BMI 24.66 kg/m2  Body mass index is 24.66 kg/(m^2).  GENERAL: healthy, alert and no distress  PSYCH: mentation appears normal, affect normal/bright       ASSESSMENT/PLAN:                                                        ICD-10-CM    1. Encounter for completion of form with patient Z02.89    2. Functional diarrhea K59.1 colestipol (COLESTID) 1 G tablet     **TSH with free T4 reflex FUTURE anytime       Patient Instructions   Try dose increase cholestipol - we can increase further if needed  FMLA completed today   Lab sometime, but not urgent    Contact me if questions      Rebecca Ahuja PA-C  [facility] " [facility]

## 2017-06-29 ENCOUNTER — MYC MEDICAL ADVICE (OUTPATIENT)
Dept: FAMILY MEDICINE | Facility: CLINIC | Age: 42
End: 2017-06-29

## 2017-06-29 ENCOUNTER — MYC REFILL (OUTPATIENT)
Dept: FAMILY MEDICINE | Facility: CLINIC | Age: 42
End: 2017-06-29

## 2017-06-29 DIAGNOSIS — F41.1 ANXIETY STATE: ICD-10-CM

## 2017-06-30 RX ORDER — ESCITALOPRAM OXALATE 20 MG/1
10 TABLET ORAL DAILY
Qty: 45 TABLET | Refills: 1 | Status: SHIPPED | OUTPATIENT
Start: 2017-06-30 | End: 2017-11-26

## 2017-06-30 NOTE — TELEPHONE ENCOUNTER
Message from MyChart:  Original authorizing provider: MD Cecilia Lang would like a refill of the following medications:  escitalopram (LEXAPRO) 20 MG tablet [Ammy Rolon MD]    Preferred pharmacy: [id number] [address]    Comment:

## 2017-07-06 ASSESSMENT — PATIENT HEALTH QUESTIONNAIRE - PHQ9
SUM OF ALL RESPONSES TO PHQ QUESTIONS 1-9: 12
10. IF YOU CHECKED OFF ANY PROBLEMS, HOW DIFFICULT HAVE THESE PROBLEMS MADE IT FOR YOU TO DO YOUR WORK, TAKE CARE OF THINGS AT HOME, OR GET ALONG WITH OTHER PEOPLE: SOMEWHAT DIFFICULT
SUM OF ALL RESPONSES TO PHQ QUESTIONS 1-9: 12

## 2017-07-07 ENCOUNTER — MYC MEDICAL ADVICE (OUTPATIENT)
Dept: FAMILY MEDICINE | Facility: CLINIC | Age: 42
End: 2017-07-07

## 2017-07-07 ASSESSMENT — PATIENT HEALTH QUESTIONNAIRE - PHQ9: SUM OF ALL RESPONSES TO PHQ QUESTIONS 1-9: 12

## 2017-07-07 NOTE — TELEPHONE ENCOUNTER
Rebecca Ahuja,  Please see my chart question below. Patient has appointment this Monday at 5:40pm, do you expect to do any fasting labs? Leodan

## 2017-07-10 ENCOUNTER — OFFICE VISIT (OUTPATIENT)
Dept: FAMILY MEDICINE | Facility: CLINIC | Age: 42
End: 2017-07-10
Payer: COMMERCIAL

## 2017-07-10 VITALS
BODY MASS INDEX: 23.55 KG/M2 | TEMPERATURE: 98 F | DIASTOLIC BLOOD PRESSURE: 61 MMHG | HEART RATE: 75 BPM | WEIGHT: 128 LBS | HEIGHT: 62 IN | SYSTOLIC BLOOD PRESSURE: 100 MMHG

## 2017-07-10 DIAGNOSIS — R53.83 FATIGUE, UNSPECIFIED TYPE: ICD-10-CM

## 2017-07-10 DIAGNOSIS — R51.9 HEADACHE, UNSPECIFIED HEADACHE TYPE: ICD-10-CM

## 2017-07-10 DIAGNOSIS — R73.09 ELEVATED GLUCOSE: ICD-10-CM

## 2017-07-10 DIAGNOSIS — Z00.00 ROUTINE HISTORY AND PHYSICAL EXAMINATION OF ADULT: Primary | ICD-10-CM

## 2017-07-10 DIAGNOSIS — M25.50 MULTIPLE JOINT PAIN: ICD-10-CM

## 2017-07-10 DIAGNOSIS — Z23 ENCOUNTER FOR IMMUNIZATION: ICD-10-CM

## 2017-07-10 DIAGNOSIS — R23.3 EASY BRUISING: ICD-10-CM

## 2017-07-10 LAB
ERYTHROCYTE [DISTWIDTH] IN BLOOD BY AUTOMATED COUNT: 12 % (ref 10–15)
HBA1C MFR BLD: 4.7 % (ref 4.3–6)
HCT VFR BLD AUTO: 38.1 % (ref 35–47)
HGB BLD-MCNC: 13 G/DL (ref 11.7–15.7)
MCH RBC QN AUTO: 33.8 PG (ref 26.5–33)
MCHC RBC AUTO-ENTMCNC: 34.1 G/DL (ref 31.5–36.5)
MCV RBC AUTO: 99 FL (ref 78–100)
PLATELET # BLD AUTO: 210 10E9/L (ref 150–450)
RBC # BLD AUTO: 3.85 10E12/L (ref 3.8–5.2)
WBC # BLD AUTO: 8.3 10E9/L (ref 4–11)

## 2017-07-10 PROCEDURE — 36415 COLL VENOUS BLD VENIPUNCTURE: CPT | Performed by: PHYSICIAN ASSISTANT

## 2017-07-10 PROCEDURE — 84443 ASSAY THYROID STIM HORMONE: CPT | Performed by: PHYSICIAN ASSISTANT

## 2017-07-10 PROCEDURE — 90471 IMMUNIZATION ADMIN: CPT | Performed by: PHYSICIAN ASSISTANT

## 2017-07-10 PROCEDURE — 86618 LYME DISEASE ANTIBODY: CPT | Performed by: PHYSICIAN ASSISTANT

## 2017-07-10 PROCEDURE — 99213 OFFICE O/P EST LOW 20 MIN: CPT | Mod: 25 | Performed by: PHYSICIAN ASSISTANT

## 2017-07-10 PROCEDURE — 80061 LIPID PANEL: CPT | Performed by: PHYSICIAN ASSISTANT

## 2017-07-10 PROCEDURE — 99395 PREV VISIT EST AGE 18-39: CPT | Mod: 25 | Performed by: PHYSICIAN ASSISTANT

## 2017-07-10 PROCEDURE — 85027 COMPLETE CBC AUTOMATED: CPT | Performed by: PHYSICIAN ASSISTANT

## 2017-07-10 PROCEDURE — 90715 TDAP VACCINE 7 YRS/> IM: CPT | Performed by: PHYSICIAN ASSISTANT

## 2017-07-10 PROCEDURE — 83036 HEMOGLOBIN GLYCOSYLATED A1C: CPT | Performed by: PHYSICIAN ASSISTANT

## 2017-07-10 NOTE — PROGRESS NOTES
SUBJECTIVE:   CC: Cecilia Chambers is an 41 year old woman who presents for preventive health visit.     Physical   Annual:     Getting at least 3 servings of Calcium per day::  Yes    Bi-annual eye exam::  NO    Dental care twice a year::  Yes    Sleep apnea or symptoms of sleep apnea::  Daytime drowsiness    Diet::  Other    Frequency of exercise::  6-7 days/week    Duration of exercise::  Greater than 60 minutes    Taking medications regularly::  No    Barriers to taking medications::  Other    Additional concerns today::  YES      * Would like to get blood work done, due to bruising extremely easily and I've been having calf   pains and joint pains in knees and elbow.    Not heavy periods, just easy bruising in past 1-2 months.  No supplements, herbs.    R elbow pain.  No particular triggers - can be sitting at work, sleeping, using or not using arm.  In olecranon fossa.  Only lasts 1-2 min at a time.  Knee pain bilaterally - under knee cap.  Initially she stats that she iaffected by weather but not affected by motion, however later agrees that symptoms exacerbate with stairs.  No edema.  Worries about recent non engorged unspecified tick bite and fam history - Fam history sister with leukemia.     Fatigue - a bit more tired in past month.  Harder to get up for her 5:30 walk.  No change in sleep.  Not snoring.  Mood - up and down.  Some stress with ex .    Migraines - 2-3 in past yr.  Reduced after ear piercing at acupressure point.    Today's PHQ-2 Score:   PHQ-2 ( 1999 Pfizer) 7/6/2017   Q1: Little interest or pleasure in doing things 1   Q2: Feeling down, depressed or hopeless 2   PHQ-2 Score 3   Q1: Little interest or pleasure in doing things Several days   Q2: Feeling down, depressed or hopeless More than half the days   PHQ-2 Score 3       Abuse: Current or Past(Physical, Sexual or Emotional)- No  Do you feel safe in your environment - Yes    Social History   Substance Use Topics      Smoking status: Never Smoker     Smokeless tobacco: Never Used     Alcohol use Yes      Comment: 1-2 times week     The patient does not drink >3 drinks per day nor >7 drinks per week.    Reviewed orders with patient.  Reviewed health maintenance and updated orders accordingly - Yes  Labs reviewed in EPIC  BP Readings from Last 3 Encounters:   07/10/17 100/61   06/12/17 103/68   03/15/17 102/69    Wt Readings from Last 3 Encounters:   07/10/17 128 lb (58.1 kg)   06/12/17 134 lb 12.8 oz (61.1 kg)   03/15/17 142 lb 12.8 oz (64.8 kg)        Mammogram not appropriate for this patient based on age.  Patient reports previous mammogram due to family history.  She is not interested in considering any further imaging aside from mammogram, and is not wishing mammogram at this time.    Pertinent mammograms are reviewed under the imaging tab.  History of abnormal Pap smear: NO - age 30-65 PAP every 5 years with negative HPV co-testing recommended    Reviewed and updated as needed this visit by clinical staff         Reviewed and updated as needed this visit by Provider            ROS:  C: NEGATIVE for fever, chills, change in weight  I: NEGATIVE for worrisome rashes, moles or lesions  E: NEGATIVE for vision changes or irritation  ENT: NEGATIVE for ear, mouth and throat problems  R: NEGATIVE for significant cough or SOB  B: NEGATIVE for masses, tenderness or discharge  CV: NEGATIVE for chest pain, palpitations or peripheral edema  GI: NEGATIVE for nausea, abdominal pain, heartburn, or change in bowel habits  : NEGATIVE for unusual urinary or vaginal symptoms. Periods are regular.  M: NEGATIVE for significant arthralgias or myalgia  N: NEGATIVE for weakness, dizziness or paresthesias  P: NEGATIVE for changes in mood or affect    ROS is negative except as listed above in ROS or in HPI.     OBJECTIVE:   /61 (BP Location: Right arm, Patient Position: Chair, Cuff Size: Adult Regular)  Pulse 75  Temp 98  F (36.7  C)  "(Tympanic)  Ht 5' 2\" (1.575 m)  Wt 128 lb (58.1 kg)  BMI 23.41 kg/m2  EXAM:  GENERAL: healthy, alert and no distress  EYES: Eyes grossly normal to inspection, PERRL and conjunctivae and sclerae normal  HENT: ear canals and TM's normal, nose and mouth without ulcers or lesions  NECK: no adenopathy, no asymmetry, masses, or scars and thyroid normal to palpation  RESP: lungs clear to auscultation - no rales, rhonchi or wheezes  BREAST: normal without masses, tenderness or nipple discharge and no palpable axillary masses or adenopathy  CV: regular rate and rhythm, normal S1 S2, no S3 or S4, no murmur, click or rub, no peripheral edema and peripheral pulses strong  ABDOMEN: soft, nontender, no hepatosplenomegaly, no masses and bowel sounds normal  MS: no gross musculoskeletal defects noted, no edema  SKIN: no suspicious lesions or rashes, several unremarkable bruises  NEURO: Normal strength and tone, mentation intact and speech normal  PSYCH: mentation appears normal, affect normal/bright      PHQ-9 SCORE 9/14/2016 6/6/2017 7/6/2017   Total Score - - -   Total Score MyChart 7 (Mild depression) 8 (Mild depression) 12 (Moderate depression)   Total Score - 8 12     BRAULIO-7 SCORE 8/5/2015 2/18/2016 6/6/2017   Total Score 2 - -   Total Score 2 17 (severe anxiety) 0 (minimal anxiety)   Total Score - - 13       ASSESSMENT/PLAN:       ICD-10-CM    1. Routine history and physical examination of adult Z00.00 Hemoglobin A1c     Lipid Profile with reflex to direct LDL   2. Easy bruising R23.8 CBC with platelets   3. Multiple joint pain M25.50 Lyme Disease Rachael with reflex to WB Serum     CBC with platelets   4. Fatigue, unspecified type R53.83 CBC with platelets     TSH with free T4 reflex   5. Elevated glucose R73.09 Hemoglobin A1c   6. Headache, unspecified headache type R51    7. Encounter for immunization Z23 TDAP VACCINE (ADACEL)     ADMIN 1st VACCINE   Her primary concern today is making sure that her symptoms do not support " "leukemia.  Half sister  of leukemia.  Patient otherwise not worried/bothered with her symptoms.  Discussed likely patellofemoral syndrome.      COUNSELING:  Reviewed preventive health counseling, as reflected in patient instructions       Regular exercise       Healthy diet/nutrition     reports that she has never smoked. She has never used smokeless tobacco.    Estimated body mass index is 24.66 kg/(m^2) as calculated from the following:    Height as of 17: 5' 2\" (1.575 m).    Weight as of 17: 134 lb 12.8 oz (61.1 kg).     Counseling Resources:  ATP IV Guidelines  Pooled Cohorts Equation Calculator  Breast Cancer Risk Calculator  FRAX Risk Assessment  ICSI Preventive Guidelines  Dietary Guidelines for Americans,   [employer]'s MyPlate  ASA Prophylaxis  Lung CA Screening    Rebecca Ahuja PA-C  [facility]    Patient Instructions   Labs   Tetanus today     Consider trial increase of wellbutrin if fatigue continues    Physical Therapy if knees bother more    Preventive Health Recommendations  Female Ages 26 - 39  Yearly exam:   See your health care provider every year in order to    Review health changes.     Discuss preventive care.      Review your medicines if you your doctor has prescribed any.    Until age 30: Get a Pap test every three years (more often if you have had an abnormal result).    After age 30: Talk to your doctor about whether you should have a Pap test every 3 years or have a Pap test with HPV screening every 5 years.   You do not need a Pap test if your uterus was removed (hysterectomy) and you have not had cancer.  You should be tested each year for STDs (sexually transmitted diseases), if you're at risk.   Talk to your provider about how often to have your cholesterol checked.  If you are at risk for diabetes, you should have a diabetes test (fasting glucose).  Shots: Get a flu shot each year. Get a tetanus shot every 10 years.   Nutrition:     Eat at least " 5 servings of fruits and vegetables each day.    Eat whole-grain bread, whole-wheat pasta and brown rice instead of white grains and rice.    Talk to your provider about Calcium and Vitamin D.     Lifestyle    Exercise at least 150 minutes a week (30 minutes a day, 5 days of the week). This will help you control your weight and prevent disease.    Limit alcohol to one drink per day.    No smoking.     Wear sunscreen to prevent skin cancer.    See your dentist every six months for an exam and cleaning.

## 2017-07-10 NOTE — PATIENT INSTRUCTIONS
Labs   Tetanus today     Consider trial increase of wellbutrin if fatigue continues    Physical Therapy if knees bother more    Preventive Health Recommendations  Female Ages 26 - 39  Yearly exam:   See your health care provider every year in order to    Review health changes.     Discuss preventive care.      Review your medicines if you your doctor has prescribed any.    Until age 30: Get a Pap test every three years (more often if you have had an abnormal result).    After age 30: Talk to your doctor about whether you should have a Pap test every 3 years or have a Pap test with HPV screening every 5 years.   You do not need a Pap test if your uterus was removed (hysterectomy) and you have not had cancer.  You should be tested each year for STDs (sexually transmitted diseases), if you're at risk.   Talk to your provider about how often to have your cholesterol checked.  If you are at risk for diabetes, you should have a diabetes test (fasting glucose).  Shots: Get a flu shot each year. Get a tetanus shot every 10 years.   Nutrition:     Eat at least 5 servings of fruits and vegetables each day.    Eat whole-grain bread, whole-wheat pasta and brown rice instead of white grains and rice.    Talk to your provider about Calcium and Vitamin D.     Lifestyle    Exercise at least 150 minutes a week (30 minutes a day, 5 days of the week). This will help you control your weight and prevent disease.    Limit alcohol to one drink per day.    No smoking.     Wear sunscreen to prevent skin cancer.    See your dentist every six months for an exam and cleaning.

## 2017-07-10 NOTE — NURSING NOTE
"Chief Complaint   Patient presents with     Physical       Initial /61 (BP Location: Right arm, Patient Position: Chair, Cuff Size: Adult Regular)  Pulse 75  Temp 98  F (36.7  C) (Tympanic)  Ht 5' 2\" (1.575 m)  Wt 128 lb (58.1 kg)  BMI 23.41 kg/m2 Estimated body mass index is 23.41 kg/(m^2) as calculated from the following:    Height as of this encounter: 5' 2\" (1.575 m).    Weight as of this encounter: 128 lb (58.1 kg).  Medication Reconciliation: complete    Health Maintenance that is potentially due pending provider review:  NONE    Janelle BANKS MA        "

## 2017-07-10 NOTE — PROGRESS NOTES
"   SUBJECTIVE:   CC: Cecilia Chambers is an 41 year old woman who presents for preventive health visit.     Healthy Habits:    Do you get at least three servings of calcium containing foods daily (dairy, green leafy vegetables, etc.)? {YES/NO, DAIRY INTAKE:480886::\"yes\"}    Amount of exercise or daily activities, outside of work: {AMOUNT EXERCISE:002875}    Problems taking medications regularly {Yes /No default:484161::\"No\"}    Medication side effects: {Yes /No default.:635719::\"No\"}    Have you had an eye exam in the past two years? {YESNOBLANK:878035}    Do you see a dentist twice per year? {YESNOBLANK:809144}    Do you have sleep apnea, excessive snoring or daytime drowsiness?{YESNOBLANK:800738}    {Outside tests to abstract? :219641}    {additional problems to add (Optional):726795}    Today's PHQ-2 Score:   PHQ-2 ( 1999 Pfizer) 7/6/2017 2/27/2017   Q1: Little interest or pleasure in doing things 1 0   Q2: Feeling down, depressed or hopeless 2 0   PHQ-2 Score 3 0   Q1: Little interest or pleasure in doing things Several days -   Q2: Feeling down, depressed or hopeless More than half the days -   PHQ-2 Score 3 -     {PHQ-2 LOOK IN ASSESSMENTS (Optional) :059732}  Abuse: Current or Past(Physical, Sexual or Emotional)- {YES/NO/NA:767766}  Do you feel safe in your environment - {YES/NO/NA:093800}    Social History   Substance Use Topics     Smoking status: Never Smoker     Smokeless tobacco: Never Used     Alcohol use Yes      Comment: 1-2 times week     {ETOH AUDIT:539052}    Reviewed orders with patient.  Reviewed health maintenance and updated orders accordingly - {Yes/No:047637::\"Yes\"}  {Chronicprobdata (Optional):635905}    {Mammo Decision Support (Optional):674916}    Pertinent mammograms are reviewed under the imaging tab.  History of abnormal Pap smear: {PAP HX:603888}    Reviewed and updated as needed this visit by clinical staff         Reviewed and updated as needed this visit by Provider      " "  {HISTORY OPTIONS (Optional):835358}    ROS:  {FEMALE PREVENTATIVE ROS:214914}    OBJECTIVE:   There were no vitals taken for this visit.  EXAM:  {Exam Choices:964607}    ASSESSMENT/PLAN:   {Diag Picklist:873028}    COUNSELING:   {FEMALE COUNSELING MESSAGES:527280::\"Reviewed preventive health counseling, as reflected in patient instructions\"}    {BP Counseling- Complete if BP >= 120/80  (Optional):973626}     reports that she has never smoked. She has never used smokeless tobacco.  {Tobacco Cessation -- Complete if patient is a smoker (Optional):582973}  Estimated body mass index is 24.66 kg/(m^2) as calculated from the following:    Height as of 6/12/17: 5' 2\" (1.575 m).    Weight as of 6/12/17: 134 lb 12.8 oz (61.1 kg).   {Weight Management Plan (ACO) Complete if BMI is abnormal-  Ages 18-64  BMI >24.9.  Age 65+ with BMI <23 or >30 (Optional):475438}    Counseling Resources:  ATP IV Guidelines  Pooled Cohorts Equation Calculator  Breast Cancer Risk Calculator  FRAX Risk Assessment  ICSI Preventive Guidelines  Dietary Guidelines for Americans, 2010  USDA's MyPlate  ASA Prophylaxis  Lung CA Screening    Rebecca Ahuja PA-C  [facility]  "

## 2017-07-10 NOTE — MR AVS SNAPSHOT
After Visit Summary   7/10/2017    Cecilia Chambers    MRN: [id number]           Patient Information     Date Of Birth          1975        Visit Information        Provider Department      7/10/2017 5:40 PM Rebecca Ahuja PA-C [facility]        Today's Diagnoses     Routine history and physical examination of adult    -  1    Easy bruising        Multiple joint pain        Fatigue, unspecified type        Elevated glucose        Headache, unspecified headache type          Care Instructions    Labs   Tetanus today     Consider trial increase of wellbutrin if fatigue continues    Physical Therapy if knees bother more    Preventive Health Recommendations  Female Ages 26 - 39  Yearly exam:   See your health care provider every year in order to    Review health changes.     Discuss preventive care.      Review your medicines if you your doctor has prescribed any.    Until age 30: Get a Pap test every three years (more often if you have had an abnormal result).    After age 30: Talk to your doctor about whether you should have a Pap test every 3 years or have a Pap test with HPV screening every 5 years.   You do not need a Pap test if your uterus was removed (hysterectomy) and you have not had cancer.  You should be tested each year for STDs (sexually transmitted diseases), if you're at risk.   Talk to your provider about how often to have your cholesterol checked.  If you are at risk for diabetes, you should have a diabetes test (fasting glucose).  Shots: Get a flu shot each year. Get a tetanus shot every 10 years.   Nutrition:     Eat at least 5 servings of fruits and vegetables each day.    Eat whole-grain bread, whole-wheat pasta and brown rice instead of white grains and rice.    Talk to your provider about Calcium and Vitamin D.     Lifestyle    Exercise at least 150 minutes a week (30 minutes a day, 5 days of the week). This will help you control your weight  "and prevent disease.    Limit alcohol to one drink per day.    No smoking.     Wear sunscreen to prevent skin cancer.    See your dentist every six months for an exam and cleaning.            Follow-ups after your visit        Who to contact     If you have questions or need follow up information about today's clinic visit or your schedule please contact [facility] directly at [phone].  Normal or non-critical lab and imaging results will be communicated to you by WILEXhart, letter or phone within 4 business days after the clinic has received the results. If you do not hear from us within 7 days, please contact the clinic through Invenit or phone. If you have a critical or abnormal lab result, we will notify you by phone as soon as possible.  Submit refill requests through Superb or call your pharmacy and they will forward the refill request to us. Please allow 3 business days for your refill to be completed.          Additional Information About Your Visit        WILEXhart Information     Superb gives you secure access to your electronic health record. If you see a primary care provider, you can also send messages to your care team and make appointments. If you have questions, please call your primary care clinic.  If you do not have a primary care provider, please call [phone] and they will assist you.        Care EveryWhere ID     This is your Care EveryWhere ID. This could be used by other organizations to access your [place] medical records  [id number]        Your Vitals Were     Pulse Temperature Height BMI (Body Mass Index)          75 98  F (36.7  C) (Tympanic) 5' 2\" (1.575 m) 23.41 kg/m2         Blood Pressure from Last 3 Encounters:   07/10/17 100/61   06/12/17 103/68   03/15/17 102/69    Weight from Last 3 Encounters:   07/10/17 128 lb (58.1 kg)   06/12/17 134 lb 12.8 oz (61.1 kg)   03/15/17 142 lb 12.8 oz (64.8 kg)              We Performed the Following     CBC with " platelets     Hemoglobin A1c     Lipid Profile with reflex to direct LDL     Lyme Disease Rachael with reflex to WB Serum     TSH with free T4 reflex          Today's Medication Changes          These changes are accurate as of: 7/10/17  6:33 PM.  If you have any questions, ask your nurse or doctor.               Stop taking these medicines if you haven't already. Please contact your care team if you have questions.     TYLENOL PO   Stopped by:  Rebecca Ahuja PA-C                    Primary Care Provider Office Phone # Fax #    Ammy Chalo Rolon [phone]       [facility] [address] [facility] [address]        Equal Access to Services     [facility] CenterERMELINDA : Hadii aad lisandro hadasho Soray, waaxda luqadaha, qaybta kaalmada adecliveyada, bryce solomon . So [facility] [phone].    ATENCIÓN: Si habla español, tiene a ortega disposición servicios gratuitos de asistencia lingüística. Ll[facility] [phone].    We comply with applicable federal civil rights laws and [place] laws. We do not discriminate on the basis of race, color, national origin, age, disability sex, sexual orientation or gender identity.            Thank you!     Thank you for choosing [facility]  for your care. Our goal is always to provide you with excellent care. Hearing back from our patients is one way we can continue to improve our services. Please take a few minutes to complete the written survey that you may receive in the mail after your visit with us. Thank you!             Your Updated Medication List - Protect others around you: Learn how to safely use, store and throw away your medicines at www.disposemymeds.org.          This list is accurate as of: 7/10/17  6:33 PM.  Always use your most recent med list.                   Brand Name Dispense Instructions for use Diagnosis    albuterol 108 (90 BASE) MCG/ACT Inhaler    PROAIR HFA/PROVENTIL HFA/VENTOLIN HFA    1  Inhaler    Inhale 2 puffs into the lungs every 6 hours as needed for shortness of breath / dyspnea    Mild persistent asthma with acute exacerbation       buPROPion 150 MG 24 hr tablet    WELLBUTRIN XL    90 tablet    Take 1 tablet (150 mg) by mouth daily    Anxiety state       colestipol 1 G tablet    COLESTID    150 tablet    2-4 grams as needed once or twice daily.    Functional diarrhea       escitalopram 20 MG tablet    LEXAPRO    45 tablet    Take 0.5 tablets (10 mg) by mouth daily    Anxiety state       SUMAtriptan 100 MG tablet    IMITREX    18 tablet    Take 1 tablet (100 mg) by mouth at onset of headache for migraine May repeat dose in 2 hours.  Do not exceed 200 mg in 24 hours    Headache, unspecified headache type

## 2017-07-11 LAB
B BURGDOR IGG+IGM SER QL: 0.12 (ref 0–0.89)
CHOLEST SERPL-MCNC: 176 MG/DL
HDLC SERPL-MCNC: 38 MG/DL
LDLC SERPL CALC-MCNC: 95 MG/DL
NONHDLC SERPL-MCNC: 138 MG/DL
TRIGL SERPL-MCNC: 217 MG/DL
TSH SERPL DL<=0.005 MIU/L-ACNC: 0.98 MU/L (ref 0.4–4)

## 2017-07-11 ASSESSMENT — ASTHMA QUESTIONNAIRES: ACT_TOTALSCORE: 24

## 2017-07-12 ENCOUNTER — MYC MEDICAL ADVICE (OUTPATIENT)
Dept: FAMILY MEDICINE | Facility: CLINIC | Age: 42
End: 2017-07-12

## 2017-07-12 NOTE — PROGRESS NOTES
Nancy Brooks,    Your complete blood count, a1c (blood sugar), thyroid and lymes test were all normal.  Cholesterol is ok.  Triglycerides were somewhat elevated, which is normal for not having fasted.  HDL is a low, which can improve with maintaining healthy weight and exercise, or can be genetic.  Focus on healthy lifestyle, but you do not need cholesterol medication at this time.    Please call clinic at [phone] if you have questions.    Rebecca Ahuja PA-C

## 2017-08-12 ENCOUNTER — HOSPITAL ENCOUNTER (EMERGENCY)
Facility: CLINIC | Age: 42
Discharge: HOME | End: 2017-08-12
Attending: PHYSICIAN ASSISTANT | Admitting: PHYSICIAN ASSISTANT
Payer: COMMERCIAL

## 2017-08-12 VITALS
SYSTOLIC BLOOD PRESSURE: 109 MMHG | DIASTOLIC BLOOD PRESSURE: 75 MMHG | WEIGHT: 123 LBS | OXYGEN SATURATION: 99 % | BODY MASS INDEX: 22.5 KG/M2 | RESPIRATION RATE: 16 BRPM

## 2017-08-12 DIAGNOSIS — S61.211A LACERATION OF LEFT INDEX FINGER WITHOUT FOREIGN BODY WITHOUT DAMAGE TO NAIL, INITIAL ENCOUNTER: ICD-10-CM

## 2017-08-12 PROCEDURE — 99213 OFFICE O/P EST LOW 20 MIN: CPT | Mod: 25 | Performed by: PHYSICIAN ASSISTANT

## 2017-08-12 PROCEDURE — 99213 OFFICE O/P EST LOW 20 MIN: CPT

## 2017-08-12 PROCEDURE — 12001 RPR S/N/AX/GEN/TRNK 2.5CM/<: CPT | Performed by: PHYSICIAN ASSISTANT

## 2017-08-12 PROCEDURE — 12001 RPR S/N/AX/GEN/TRNK 2.5CM/<: CPT

## 2017-08-12 NOTE — ED AVS SNAPSHOT
[facility]    [address] [address]    Phone:  [phone]    Fax:  [phone]                                       Cecilia Chambers   MRN: [id number]    Department:  [facility]   Date of Visit:  8/12/2017           After Visit Summary Signature Page     I have received my discharge instructions, and my questions have been answered. I have discussed any challenges I see with this plan with the nurse or doctor.    ..........................................................................................................................................  Patient/Patient Representative Signature      ..........................................................................................................................................  Patient Representative Print Name and Relationship to Patient    ..................................................               ................................................  Date                                            Time    ..........................................................................................................................................  Reviewed by Signature/Title    ...................................................              ..............................................  Date                                                            Time

## 2017-08-12 NOTE — DISCHARGE INSTRUCTIONS
Extremity Laceration: Sutures, Staples, or Tape  A laceration is a cut through the skin. If it is deep, it may require stitches (sutures) or staples to close so it can heal. Minor cuts may be treated with surgical tape closures.   X-rays may be done if something may have entered the skin through the cut. You may also need a tetanus shot if you are not up to date on this vaccination.  Home care    Follow the health care provider s instructions on how to care for the cut.    Wash your hands with soap and warm water before and after caring for your wound. This is to help prevent infection.    Keep the wound clean and dry. If a bandage was applied and it becomes wet or dirty, replace it. Otherwise, leave it in place for the first 24 hours, then change it once a day or as directed.    If sutures or staples were used, clean the wound daily:    After removing the bandage, wash the area with soap and water. Use a wet cotton swab to loosen and remove any blood or crust that forms.    After cleaning, keep the wound clean and dry. Talk with your doctor before applying any antibiotic ointment to the wound. Reapply the bandage.    You may remove the bandage to shower as usual after the first 24 hours, but do not soak the area in water (no swimming) until the stitches or staples are removed.    If surgical tape closures were used, keep the area clean and dry. If it becomes wet, blot it dry with a towel.    The doctor may prescribe an antibiotic cream or ointment to prevent infection. Do not stop taking this medication until you have finished the prescribed course or the doctor tells you to stop. The doctor may also prescribe medications for pain. Follow the doctor s instructions for taking these medications.    Avoid activities that may reopen your wound.  Follow-up care  Follow up with your health care provider. Most skin wounds heal within ten days. However, an infection may sometimes occur despite proper treatment.  Therefore, check the wound daily for the signs of infection listed below. Stitches and staples should be removed within 7-14 days. If surgical tape closures were used, you may remove them after 10 days if they have not fallen off by then.   When to seek medical advice  Call your health care provider right away if any of these occur:    Wound bleeding not controlled by direct pressure    Signs of infection, including increasing pain in the wound, increasing wound redness or swelling, or pus or bad odor coming from the wound    Fever of 100.4 F (38 C) or higher or as directed by your healthcare provider    Stitches or staples come apart or fall out or surgical tape falls off before 7 days    Wound edges re-open    Wound changes colors    Numbness around the wound     Decreased movement around the injured area  Date Last Reviewed: 6/14/2015 2000-2017 [employer]. [address] [place] [address]. All rights reserved. This information is not intended as a substitute for professional medical care. Always follow your healthcare professional's instructions.

## 2017-08-12 NOTE — ED NOTES
Patient here for laceration on the left hand - happened yesterday while cutting sausage.  Patient presents ambulatory to the urgent care.

## 2017-08-12 NOTE — ED AVS SNAPSHOT
[facility]    [address] [address]    Phone:  [phone]    Fax:  [phone]                                       Cecilia Chambers   MRN: [id number]    Department:  [facility]   Date of Visit:  8/12/2017           Patient Information     Date Of Birth          1975        Your diagnoses for this visit were:     Laceration of left index finger without foreign body without damage to nail, initial encounter        You were seen by Antoni Estes PA-C.        Discharge Instructions         Extremity Laceration: Sutures, Staples, or Tape  A laceration is a cut through the skin. If it is deep, it may require stitches (sutures) or staples to close so it can heal. Minor cuts may be treated with surgical tape closures.   X-rays may be done if something may have entered the skin through the cut. You may also need a tetanus shot if you are not up to date on this vaccination.  Home care    Follow the health care provider s instructions on how to care for the cut.    Wash your hands with soap and warm water before and after caring for your wound. This is to help prevent infection.    Keep the wound clean and dry. If a bandage was applied and it becomes wet or dirty, replace it. Otherwise, leave it in place for the first 24 hours, then change it once a day or as directed.    If sutures or staples were used, clean the wound daily:    After removing the bandage, wash the area with soap and water. Use a wet cotton swab to loosen and remove any blood or crust that forms.    After cleaning, keep the wound clean and dry. Talk with your doctor before applying any antibiotic ointment to the wound. Reapply the bandage.    You may remove the bandage to shower as usual after the first 24 hours, but do not soak the area in water (no swimming) until the stitches or staples are removed.    If surgical tape closures were used, keep the area clean and  dry. If it becomes wet, blot it dry with a towel.    The doctor may prescribe an antibiotic cream or ointment to prevent infection. Do not stop taking this medication until you have finished the prescribed course or the doctor tells you to stop. The doctor may also prescribe medications for pain. Follow the doctor s instructions for taking these medications.    Avoid activities that may reopen your wound.  Follow-up care  Follow up with your health care provider. Most skin wounds heal within ten days. However, an infection may sometimes occur despite proper treatment. Therefore, check the wound daily for the signs of infection listed below. Stitches and staples should be removed within 7-14 days. If surgical tape closures were used, you may remove them after 10 days if they have not fallen off by then.   When to seek medical advice  Call your health care provider right away if any of these occur:    Wound bleeding not controlled by direct pressure    Signs of infection, including increasing pain in the wound, increasing wound redness or swelling, or pus or bad odor coming from the wound    Fever of 100.4 F (38 C) or higher or as directed by your healthcare provider    Stitches or staples come apart or fall out or surgical tape falls off before 7 days    Wound edges re-open    Wound changes colors    Numbness around the wound     Decreased movement around the injured area  Date Last Reviewed: 6/14/2015 2000-2017 [employer] [address] [place] [address]. All rights reserved. This information is not intended as a substitute for professional medical care. Always follow your healthcare professional's instructions.          24 Hour Appointment Hotline       To make an appointment at any [facility], call [phone] ([phone]). If you don't have a family doctor or clinic, we will help you find one. [facility] clinics are conveniently located to serve the needs of you and your  family.             Review of your medicines      Our records show that you are taking the medicines listed below. If these are incorrect, please call your family doctor or clinic.        Dose / Directions Last dose taken    albuterol 108 (90 BASE) MCG/ACT Inhaler   Commonly known as:  PROAIR HFA/PROVENTIL HFA/VENTOLIN HFA   Dose:  2 puff   Quantity:  1 Inhaler        Inhale 2 puffs into the lungs every 6 hours as needed for shortness of breath / dyspnea   Refills:  1        buPROPion 150 MG 24 hr tablet   Commonly known as:  WELLBUTRIN XL   Dose:  150 mg   Quantity:  90 tablet        Take 1 tablet (150 mg) by mouth daily   Refills:  1        colestipol 1 G tablet   Commonly known as:  COLESTID   Quantity:  150 tablet        2-4 grams as needed once or twice daily.   Refills:  11        escitalopram 20 MG tablet   Commonly known as:  LEXAPRO   Dose:  10 mg   Quantity:  45 tablet        Take 0.5 tablets (10 mg) by mouth daily   Refills:  1        SUMAtriptan 100 MG tablet   Commonly known as:  IMITREX   Dose:  100 mg   Quantity:  18 tablet        Take 1 tablet (100 mg) by mouth at onset of headache for migraine May repeat dose in 2 hours.  Do not exceed 200 mg in 24 hours   Refills:  11                Orders Needing Specimen Collection     None      Pending Results     No orders found from 8/10/2017 to 8/13/2017.            Pending Culture Results     No orders found from 8/10/2017 to 8/13/2017.            Pending Results Instructions     If you had any lab results that were not finalized at the time of your Discharge, you can call the ED Lab Result RN at [phone]. You will be contacted by this team for any positive Lab results or changes in treatment. The nurses are available 7 days a week from 10A to 6:30P.  You can leave a message 24 hours per day and they will return your call.        Test Results From Your Hospital Stay               Thank you for choosing [facility]       Thank you for choosing [facility] for  your care. Our goal is always to provide you with excellent care. Hearing back from our patients is one way we can continue to improve our services. Please take a few minutes to complete the written survey that you may receive in the mail after you visit with us. Thank you!        [employer] Information     [facility] gives you secure access to your electronic health record. If you see a primary care provider, you can also send messages to your care team and make appointments. If you have questions, please call your primary care clinic.  If you do not have a primary care provider, please call [phone] and they will assist you.        Care EveryWhere ID     This is your Care EveryWhere ID. This could be used by other organizations to access your [facility] medical records  [id number]        Equal Access to Services     FELICIA AWAD : Luis Medina, tyron weinberg, andreea martines, bryce ross. So [facility] [phone].    ATENCIÓN: Si habla español, tiene a ortega disposición servicios gratuitos de asistencia lingüística. Llame al [phone].    We comply with applicable federal civil rights laws and [place] laws. We do not discriminate on the basis of race, color, national origin, age, disability sex, sexual orientation or gender identity.            After Visit Summary       This is your record. Keep this with you and show to your community pharmacist(s) and doctor(s) at your next visit.

## 2017-08-12 NOTE — ED PROVIDER NOTES
Chief Complaint: L pointer Laceration     HPI: Cecilia Chambers is an 41 year old female that presents to clinic after cutting self on the L pointer finger with a knife. She denies numbness of the finger. She denies dysfunction of the finger.  Patient is up to date on tetanus.     Review of Systems:  10 point review of systems completed and negative unless included in HPI       Vitals reviewed by Antoni Estes  /75  Resp 16  Wt 55.8 kg (123 lb)  SpO2 99%  BMI 22.5 kg/m2    Physical Exam:  General appearance: healthy, alert and no distress  Extremities - L pointer finger - full range of motion, neurologically intact, cap refill less than 2 seconds  Skin: 2cm laceration to the distal phalange of the L pointer finger subcutaneus in nature clean wound edges, no foreign bodies     Medical Decision Making:  Laceration no evidence of neurovascular injury. The wound will be closed using sutures.     Assessment:     (S61.472A) Laceration of left index finger without foreign body without damage to nail, initial encounter       Plan:  Imaging of the injured area area for foreign body or fracture was not  indicated  Wound closed per procedure note below.    Wound was cleaned with sterile saline and surgiscrub.  Antibiotic ointment and sterile dressing applied in clinic.     Discussed home wound care and need for follow up for Suture removal in 7 days. Return to  with increased swelling, pain, redness, pus or fevers.    Patient was discharged in stable condition.  Patient verbalized understanding and agreed with this plan.      Procedure Note - Wound Repair:  Procedure performed by Kamaljit Estes.     Anesthesia was obtained with 1% plain lidocaine via digital block.  The wound, located on the distal phalange of the L pointer finger, measured 2 cm and was clean wound edges, no foreign bodies.  The level of complexity was: simple .  The neurovascular exam was normal.  It was cleaned with surgiscrub, irrigated with  normal saline and explored.  The wound was closed using 5.0 Prolene interrupted.    Antoni Estes 1:49 PM       Antoni Estes PA-C  08/12/17 [id number]

## 2017-09-21 ENCOUNTER — MYC MEDICAL ADVICE (OUTPATIENT)
Dept: FAMILY MEDICINE | Facility: CLINIC | Age: 42
End: 2017-09-21

## 2017-11-26 ENCOUNTER — MYC REFILL (OUTPATIENT)
Dept: FAMILY MEDICINE | Facility: CLINIC | Age: 42
End: 2017-11-26

## 2017-11-26 DIAGNOSIS — F41.1 ANXIETY STATE: ICD-10-CM

## 2017-11-26 NOTE — TELEPHONE ENCOUNTER
Requested Prescriptions   Pending Prescriptions Disp Refills     escitalopram (LEXAPRO) 20 MG tablet   Last Written Prescription Date: 6/30/17  Last Fill Quantity: 45,  # refills: 1   Last Office Visit with G, P or [facility] prescribing provider: 2/27/17                                              45 tablet 0     Sig: TAKE 1/2 TABLET BY MOUTH DAILY    SSRIs Protocol Passed    11/26/2017  9:45 AM       Passed - Recent or future visit with authorizing provider    Patient had office visit in the last year or has a visit in the next 30 days with authorizing provider.  See chart review.              Passed - Medication is NOT Bupropion    If the medication is Bupropion (Wellbutrin), and the patient is taking for smoking cessation; OK to refill.         Passed - Patient is age 18 or older       Passed - No active pregnancy on record       Passed - No positive pregnancy test in last 12 months

## 2017-11-27 RX ORDER — ESCITALOPRAM OXALATE 20 MG/1
20 TABLET ORAL DAILY
Qty: 90 TABLET | Refills: 1 | Status: SHIPPED | OUTPATIENT
Start: 2017-11-27 | End: 2018-07-15

## 2017-11-27 RX ORDER — ESCITALOPRAM OXALATE 20 MG/1
TABLET ORAL
Qty: 45 TABLET | Refills: 0 | OUTPATIENT
Start: 2017-11-27

## 2017-11-27 NOTE — TELEPHONE ENCOUNTER
Message from AlaMarkahart:  Original authorizing provider: MD Cecilia Lang would like a refill of the following medications:  escitalopram (LEXAPRO) 20 MG tablet [Ammy Rolon MD]    Preferred pharmacy: [id number] [address]    Comment:  Just a reminder that I take 1 whole pill and no longer take a half.

## 2018-01-17 ENCOUNTER — OFFICE VISIT (OUTPATIENT)
Dept: URGENT CARE | Facility: URGENT CARE | Age: 43
End: 2018-01-17
Payer: COMMERCIAL

## 2018-01-17 ENCOUNTER — RADIANT APPOINTMENT (OUTPATIENT)
Dept: GENERAL RADIOLOGY | Facility: CLINIC | Age: 43
End: 2018-01-17
Attending: NURSE PRACTITIONER
Payer: COMMERCIAL

## 2018-01-17 VITALS
WEIGHT: 142.4 LBS | BODY MASS INDEX: 26.05 KG/M2 | SYSTOLIC BLOOD PRESSURE: 117 MMHG | DIASTOLIC BLOOD PRESSURE: 64 MMHG | TEMPERATURE: 99 F | RESPIRATION RATE: 14 BRPM | OXYGEN SATURATION: 100 %

## 2018-01-17 DIAGNOSIS — R05.9 COUGH: ICD-10-CM

## 2018-01-17 DIAGNOSIS — B34.9 VIRAL SYNDROME: Primary | ICD-10-CM

## 2018-01-17 DIAGNOSIS — R50.9 FEVER, UNSPECIFIED FEVER CAUSE: ICD-10-CM

## 2018-01-17 LAB
FLUAV+FLUBV AG SPEC QL: NEGATIVE
FLUAV+FLUBV AG SPEC QL: NEGATIVE
SPECIMEN SOURCE: NORMAL

## 2018-01-17 PROCEDURE — 71046 X-RAY EXAM CHEST 2 VIEWS: CPT | Mod: FY

## 2018-01-17 PROCEDURE — 87804 INFLUENZA ASSAY W/OPTIC: CPT | Performed by: NURSE PRACTITIONER

## 2018-01-17 PROCEDURE — 99214 OFFICE O/P EST MOD 30 MIN: CPT | Performed by: NURSE PRACTITIONER

## 2018-01-17 RX ORDER — BENZONATATE 200 MG/1
200 CAPSULE ORAL 3 TIMES DAILY PRN
Qty: 30 CAPSULE | Refills: 0 | Status: SHIPPED | OUTPATIENT
Start: 2018-01-17 | End: 2018-06-19

## 2018-01-17 NOTE — MR AVS SNAPSHOT
"              After Visit Summary   1/17/2018    Cecilia Blackmon    MRN: [id number]           Patient Information     Date Of Birth          1975        Visit Information        Provider Department      1/17/2018 5:20 PM Beth Driscoll APRN [facility]        Today's Diagnoses     Viral syndrome    -  1    Fever        Cough          Care Instructions    Increase rest and fluids. Tylenol and/or Ibuprofen for comfort. Cool mist vaporizer. If your symptoms worsen or do not resolve follow up with your primary care provider in 1 week and sooner if needed.      Mucinex 600 mg 12 hour formula for ear, head and chest congestion.  It can also thin post nasal drip which can cause a cough and sore throat.    Indications for emergent return to emergency department discussed with patient, who verbalized good understanding and agreement.  Patient understands the limitations of today's evaluation.           Viral Syndrome (Adult)  A viral illness may cause a number of symptoms. The symptoms depend on the part of the body that the virus affects. If it settles in your nose, throat, and lungs, it may cause cough, sore throat, congestion, and sometimes headache. If it settles in your stomach and intestinal tract, it may cause vomiting and diarrhea. Sometimes it causes vague symptoms like \"aching all over,\" feeling tired, loss of appetite, or fever.  A viral illness usually lasts 1 to 2 weeks, but sometimes it lasts longer. In some cases, a more serious infection can look like a viral syndrome in the first few days of the illness. You may need another exam and additional tests to know the difference. Watch for the warning signs listed below.  Home care  Follow these guidelines for taking care of yourself at home:    If symptoms are severe, rest at home for the first 2 to 3 days.    Stay away from cigarette smoke - both your smoke and the smoke from others.    You may use " over-the-counter acetaminophen or ibuprofen for fever, muscle aching, and headache, unless another medicine was prescribed for this. If you have chronic liver or kidney disease or ever had a stomach ulcer or GI bleeding, talk with your doctor before using these medicines. No one who is younger than 18 and ill with a fever should take aspirin. It may cause severe disease or death.    Your appetite may be poor, so a light diet is fine. Avoid dehydration by drinking 8 to 12 8-ounce glasses of fluids each day. This may include water; orange juice; lemonade; apple, grape, and cranberry juice; clear fruit drinks; electrolyte replacement and sports drinks; and decaffeinated teas and coffee. If you have been diagnosed with a kidney disease, ask your doctor how much and what types of fluids you should drink to prevent dehydration. If you have kidney disease, drinking too much fluid can cause it build up in the your body and be dangerous to your health.    Over-the-counter remedies won't shorten the length of the illness but may be helpful for cough, sore throat; and nasal and sinus congestion. Don't use decongestants if you have high blood pressure.  Follow-up care  Follow up with your healthcare provider if you do not improve over the next week.  Call 911  Get emergency medical care if any of the following occur:    Convulsion    Feeling weak, dizzy, or like you are going to faint    Chest pain, shortness of breath, wheezing, or difficulty breathing  When to seek medical advice  Call your healthcare provider right away if any of these occur:    Cough with lots of colored sputum (mucus) or blood in your sputum    Chest pain, shortness of breath, wheezing, or difficulty breathing    Severe headache; face, neck, or ear pain    Severe, constant pain in the lower right side of your belly (abdominal)    Continued vomiting (can t keep liquids down)    Frequent diarrhea (more than 5 times a day); blood (red or black color) or mucus  in diarrhea    Feeling weak, dizzy, or like you are going to faint    Extreme thirst    Fever of 100.4 F (38 C) or higher, or as directed by your healthcare provider  Date Last Reviewed: 9/25/2015 2000-2017 [employer]. [address] [place] [address]. All rights reserved. This information is not intended as a substitute for professional medical care. Always follow your healthcare professional's instructions.                Follow-ups after your visit        Follow-up notes from your care team     See patient instructions section of the AVS Return if symptoms worsen or fail to improve, for Follow up with your primary care provider.      Who to contact     If you have questions or need follow up information about today's clinic visit or your schedule please contact [facility] directly at [phone].  Normal or non-critical lab and imaging results will be communicated to you by [employer], letter or phone within 4 business days after the clinic has received the results. If you do not hear from us within 7 days, please contact the clinic through [employer] or phone. If you have a critical or abnormal lab result, we will notify you by phone as soon as possible.  Submit refill requests through [employer] or call your pharmacy and they will forward the refill request to us. Please allow 3 business days for your refill to be completed.          Additional Information About Your Visit        [employer] Information     [employer] gives you secure access to your electronic health record. If you see a primary care provider, you can also send messages to your care team and make appointments. If you have questions, please call your primary care clinic.  If you do not have a primary care provider, please call [phone] and they will assist you.        Care EveryWhere ID     This is your Care EveryWhere ID. This could be used by other organizations to access your [facility]  records  [id number]        Your Vitals Were     Temperature Respirations Pulse Oximetry BMI (Body Mass Index)          99  F (37.2  C) (Tympanic) 14 100% 26.05 kg/m2         Blood Pressure from Last 3 Encounters:   01/17/18 117/64   08/12/17 109/75   07/10/17 100/61    Weight from Last 3 Encounters:   01/17/18 142 lb 6.4 oz (64.6 kg)   08/12/17 123 lb (55.8 kg)   07/10/17 128 lb (58.1 kg)              We Performed the Following     Influenza A/B antigen          Today's Medication Changes          These changes are accurate as of: 1/17/18  6:43 PM.  If you have any questions, ask your nurse or doctor.               Start taking these medicines.        Dose/Directions    benzonatate 200 MG capsule   Commonly known as:  TESSALON   Used for:  Cough   Started by:  Beth Driscoll APRN CNP        Dose:  200 mg   Take 1 capsule (200 mg) by mouth 3 times daily as needed   Quantity:  30 capsule   Refills:  0            Where to get your medicines      These medications were sent to [id number] [address]  [address] [place] [address]    Hours:  Closed 10-16-08 business to [facility] Phone:  [phone]     benzonatate 200 MG capsule                Primary Care Provider Office Phone # Fax #    Ammy Rolon [phone] [id number] [facility] [address]        Equal Access to Services     FELICIA AWAD AH: Hadii lima de leóno Soray, waaxda luqadaha, qaybta kaalmada adeegyada, waxay juan solomon . So [facility] [phone].    ATENCIÓN: Si habla lucia, tiene a ortega disposición servicios gratuitos de asistencia lingüística. Llame al [phone].    We comply with applicable federal civil rights laws and [place] laws. We do not discriminate on the basis of race, color, national origin, age, disability, sex, sexual orientation, or gender identity.            Thank you!     Thank you for choosing [facility]  for your care. Our goal is always to provide you with excellent care. Hearing back from our patients is one way we can continue to improve our services. Please take a few minutes to complete the written survey that you may receive in the mail after your visit with us. Thank you!             Your Updated Medication List - Protect others around you: Learn how to safely use, store and throw away your medicines at www.disposemymeds.org.          This list is accurate as of: 1/17/18  6:43 PM.  Always use your most recent med list.                   Brand Name Dispense Instructions for use Diagnosis    albuterol 108 (90 BASE) MCG/ACT Inhaler    PROAIR HFA/PROVENTIL HFA/VENTOLIN HFA    1 Inhaler    Inhale 2 puffs into the lungs every 6 hours as needed for shortness of breath / dyspnea    Mild persistent asthma with acute exacerbation       benzonatate 200 MG capsule    TESSALON    30 capsule    Take 1 capsule (200 mg) by mouth 3 times daily as needed    Cough       buPROPion 150 MG 24 hr tablet    WELLBUTRIN XL    90 tablet    Take 1 tablet (150 mg) by mouth daily    Anxiety state       colestipol 1 G tablet    COLESTID    150 tablet    2-4 grams as needed once or twice daily.    Functional diarrhea       escitalopram 20 MG tablet    LEXAPRO    90 tablet    Take 1 tablet (20 mg) by mouth daily    Anxiety state       SUMAtriptan 100 MG tablet    IMITREX    18 tablet    Take 1 tablet (100 mg) by mouth at onset of headache for migraine May repeat dose in 2 hours.  Do not exceed 200 mg in 24 hours    Headache, unspecified headache type

## 2018-01-17 NOTE — LETTER
January 17, 2018      Cecilia Blackmon  [address] [address]        To Whom It May Concern:    Cecilia Blackmon was seen in our clinic. Please release her from work today and for the next 1-2 days due to illness.      Sincerely,        ARTURO Freeman CNP

## 2018-01-17 NOTE — PROGRESS NOTES
SUBJECTIVE:   Cecilia Blackmon is a 42 year old female who presents to clinic today for the following health issues:      Chief Complaint   Patient presents with     Fever     started monday, fevers up to 102F, sinus pressure, headache, dry cough, fatigue, shortness of breath, congestion, sore throat- because of coughing, body aches, no ear pain, no wheezing            Problem list and histories reviewed & adjusted, as indicated.  Additional history: as documented    Patient Active Problem List   Diagnosis     Headache     Anxiety state     Mild persistent asthma     Family history of breast cancer in mother     HYPERLIPIDEMIA LDL GOAL <160     Acute cholecystitis     Enteritis     Abdominal pain     Past Surgical History:   Procedure Laterality Date     ABDOMEN SURGERY  [id number]    Csecttion     COLONOSCOPY       GI SURGERY       LAPAROSCOPIC CHOLECYSTECTOMY N/A 12/30/2014    Procedure: LAPAROSCOPIC CHOLECYSTECTOMY;  Surgeon: Ulises Starr MD;  Location: [facility]     SURGICAL HISTORY OF -       Csection       Social History   Substance Use Topics     Smoking status: Never Smoker     Smokeless tobacco: Never Used     Alcohol use Yes      Comment: 1-2 times week     Family History   Problem Relation Age of Onset     Depression Mother      Respiratory Mother      COPD, emphysema     CANCER Mother      Neurologic Disorder Mother      raunads syndrom     Breast Cancer Mother      Stage 4     Other Cancer Mother      Cervical     Anesthesia Reaction Mother      OSTEOPOROSIS Mother      GASTROINTESTINAL DISEASE Maternal Grandmother      Depression Maternal Grandmother      Hypertension Maternal Grandmother      CANCER Maternal Grandmother      skin cancer     Breast Cancer Maternal Grandmother      Other Cancer Maternal Grandmother      Melanoma     Breast Cancer Cousin      Grandfathers side     Breast Cancer Cousin      Grandfathers side BRCA 1     Prostate Cancer Other      maternal side     Prostate Cancer  Other      maternal side     Prostate Cancer Other      maternal side     Leukemia Paternal Half-Sister      1/2 sister,  in age 13 or 15         Current Outpatient Prescriptions   Medication Sig Dispense Refill     benzonatate (TESSALON) 200 MG capsule Take 1 capsule (200 mg) by mouth 3 times daily as needed 30 capsule 0     escitalopram (LEXAPRO) 20 MG tablet Take 1 tablet (20 mg) by mouth daily 90 tablet 1     buPROPion (WELLBUTRIN XL) 150 MG 24 hr tablet Take 1 tablet (150 mg) by mouth daily 90 tablet 1     SUMAtriptan (IMITREX) 100 MG tablet Take 1 tablet (100 mg) by mouth at onset of headache for migraine May repeat dose in 2 hours.  Do not exceed 200 mg in 24 hours 18 tablet 11     colestipol (COLESTID) 1 G tablet 2-4 grams as needed once or twice daily. (Patient not taking: Reported on 2018) 150 tablet 11     albuterol (PROAIR HFA/PROVENTIL HFA/VENTOLIN HFA) 108 (90 BASE) MCG/ACT Inhaler Inhale 2 puffs into the lungs every 6 hours as needed for shortness of breath / dyspnea (Patient not taking: Reported on 2018) 1 Inhaler 1     No Known Allergies  Labs reviewed in EPIC      Reviewed and updated as needed this visit by clinical staffTobacco  Allergies  Meds  Problems  Med Hx  Surg Hx  Fam Hx  Soc Hx        Reviewed and updated as needed this visit by Provider  Allergies  Meds  Problems         ROS:  Constitutional, HEENT, cardiovascular, pulmonary, GI, , musculoskeletal, neuro, skin, endocrine and psych systems are negative, except as otherwise noted.      OBJECTIVE:   /64  Temp 99  F (37.2  C) (Tympanic)  Resp 14  Wt 142 lb 6.4 oz (64.6 kg)  SpO2 100%  BMI 26.05 kg/m2  Body mass index is 26.05 kg/(m^2).   GENERAL: healthy, alert and no distress, nontoxic in appearance  EYES: Eyes grossly normal to inspection, PERRL and conjunctivae and sclerae normal  HENT: ear canals and TM's normal, nose and mouth without ulcers or lesions  NECK: no adenopathy, supple with full  ROM  RESP: lungs clear to auscultation - no rales, rhonchi or wheezes  CV: regular rate and rhythm, normal S1 S2, no S3 or S4, no murmur, click or rub, no peripheral edema   ABDOMEN: soft, nontender, no hepatosplenomegaly, no masses and bowel sounds normal  MS: no gross musculoskeletal defects noted, no edema  No rash    Diagnostic Test Results:CHEST TWO VIEWS    1/17/2018 6:29 PM      HISTORY: Cough     COMPARISON: 2/27/2017.         IMPRESSION: No acute cardiopulmonary disease.  Results for orders placed or performed in visit on 01/17/18 (from the past 24 hour(s))   Influenza A/B antigen   Result Value Ref Range    Influenza A/B Agn Specimen Nasal     Influenza A Negative NEG^Negative    Influenza B Negative NEG^Negative       ASSESSMENT/PLAN:     Problem List Items Addressed This Visit     None      Visit Diagnoses     Viral syndrome    -  Primary    Fever        Relevant Orders    Influenza A/B antigen (Completed)    Cough        Relevant Medications    benzonatate (TESSALON) 200 MG capsule    Other Relevant Orders    XR Chest 2 Views (Completed)               Patient Instructions   Increase rest and fluids. Tylenol and/or Ibuprofen for comfort. Cool mist vaporizer. If your symptoms worsen or do not resolve follow up with your primary care provider in 1 week and sooner if needed.      Mucinex 600 mg 12 hour formula for ear, head and chest congestion.  It can also thin post nasal drip which can cause a cough and sore throat.    Indications for emergent return to emergency department discussed with patient, who verbalized good understanding and agreement.  Patient understands the limitations of today's evaluation.           Viral Syndrome (Adult)  A viral illness may cause a number of symptoms. The symptoms depend on the part of the body that the virus affects. If it settles in your nose, throat, and lungs, it may cause cough, sore throat, congestion, and sometimes headache. If it settles in your stomach and  "intestinal tract, it may cause vomiting and diarrhea. Sometimes it causes vague symptoms like \"aching all over,\" feeling tired, loss of appetite, or fever.  A viral illness usually lasts 1 to 2 weeks, but sometimes it lasts longer. In some cases, a more serious infection can look like a viral syndrome in the first few days of the illness. You may need another exam and additional tests to know the difference. Watch for the warning signs listed below.  Home care  Follow these guidelines for taking care of yourself at home:    If symptoms are severe, rest at home for the first 2 to 3 days.    Stay away from cigarette smoke - both your smoke and the smoke from others.    You may use over-the-counter acetaminophen or ibuprofen for fever, muscle aching, and headache, unless another medicine was prescribed for this. If you have chronic liver or kidney disease or ever had a stomach ulcer or GI bleeding, talk with your doctor before using these medicines. No one who is younger than 18 and ill with a fever should take aspirin. It may cause severe disease or death.    Your appetite may be poor, so a light diet is fine. Avoid dehydration by drinking 8 to 12 8-ounce glasses of fluids each day. This may include water; orange juice; lemonade; apple, grape, and cranberry juice; clear fruit drinks; electrolyte replacement and sports drinks; and decaffeinated teas and coffee. If you have been diagnosed with a kidney disease, ask your doctor how much and what types of fluids you should drink to prevent dehydration. If you have kidney disease, drinking too much fluid can cause it build up in the your body and be dangerous to your health.    Over-the-counter remedies won't shorten the length of the illness but may be helpful for cough, sore throat; and nasal and sinus congestion. Don't use decongestants if you have high blood pressure.  Follow-up care  Follow up with your healthcare provider if you do not improve over the next " week.  Call 911  Get emergency medical care if any of the following occur:    Convulsion    Feeling weak, dizzy, or like you are going to faint    Chest pain, shortness of breath, wheezing, or difficulty breathing  When to seek medical advice  Call your healthcare provider right away if any of these occur:    Cough with lots of colored sputum (mucus) or blood in your sputum    Chest pain, shortness of breath, wheezing, or difficulty breathing    Severe headache; face, neck, or ear pain    Severe, constant pain in the lower right side of your belly (abdominal)    Continued vomiting (can t keep liquids down)    Frequent diarrhea (more than 5 times a day); blood (red or black color) or mucus in diarrhea    Feeling weak, dizzy, or like you are going to faint    Extreme thirst    Fever of 100.4 F (38 C) or higher, or as directed by your healthcare provider  Date Last Reviewed: 9/25/2015 2000-2017 [employer]. [address] [place] [address]. All rights reserved. This information is not intended as a substitute for professional medical care. Always follow your healthcare professional's instructions.            ARTURO Freeman [facility]

## 2018-01-18 ENCOUNTER — MYC MEDICAL ADVICE (OUTPATIENT)
Dept: FAMILY MEDICINE | Facility: CLINIC | Age: 43
End: 2018-01-18

## 2018-01-18 NOTE — PATIENT INSTRUCTIONS
"Increase rest and fluids. Tylenol and/or Ibuprofen for comfort. Cool mist vaporizer. If your symptoms worsen or do not resolve follow up with your primary care provider in 1 week and sooner if needed.      Mucinex 600 mg 12 hour formula for ear, head and chest congestion.  It can also thin post nasal drip which can cause a cough and sore throat.    Indications for emergent return to emergency department discussed with patient, who verbalized good understanding and agreement.  Patient understands the limitations of today's evaluation.           Viral Syndrome (Adult)  A viral illness may cause a number of symptoms. The symptoms depend on the part of the body that the virus affects. If it settles in your nose, throat, and lungs, it may cause cough, sore throat, congestion, and sometimes headache. If it settles in your stomach and intestinal tract, it may cause vomiting and diarrhea. Sometimes it causes vague symptoms like \"aching all over,\" feeling tired, loss of appetite, or fever.  A viral illness usually lasts 1 to 2 weeks, but sometimes it lasts longer. In some cases, a more serious infection can look like a viral syndrome in the first few days of the illness. You may need another exam and additional tests to know the difference. Watch for the warning signs listed below.  Home care  Follow these guidelines for taking care of yourself at home:    If symptoms are severe, rest at home for the first 2 to 3 days.    Stay away from cigarette smoke - both your smoke and the smoke from others.    You may use over-the-counter acetaminophen or ibuprofen for fever, muscle aching, and headache, unless another medicine was prescribed for this. If you have chronic liver or kidney disease or ever had a stomach ulcer or GI bleeding, talk with your doctor before using these medicines. No one who is younger than 18 and ill with a fever should take aspirin. It may cause severe disease or death.    Your appetite may be poor, so a " light diet is fine. Avoid dehydration by drinking 8 to 12 8-ounce glasses of fluids each day. This may include water; orange juice; lemonade; apple, grape, and cranberry juice; clear fruit drinks; electrolyte replacement and sports drinks; and decaffeinated teas and coffee. If you have been diagnosed with a kidney disease, ask your doctor how much and what types of fluids you should drink to prevent dehydration. If you have kidney disease, drinking too much fluid can cause it build up in the your body and be dangerous to your health.    Over-the-counter remedies won't shorten the length of the illness but may be helpful for cough, sore throat; and nasal and sinus congestion. Don't use decongestants if you have high blood pressure.  Follow-up care  Follow up with your healthcare provider if you do not improve over the next week.  Call 911  Get emergency medical care if any of the following occur:    Convulsion    Feeling weak, dizzy, or like you are going to faint    Chest pain, shortness of breath, wheezing, or difficulty breathing  When to seek medical advice  Call your healthcare provider right away if any of these occur:    Cough with lots of colored sputum (mucus) or blood in your sputum    Chest pain, shortness of breath, wheezing, or difficulty breathing    Severe headache; face, neck, or ear pain    Severe, constant pain in the lower right side of your belly (abdominal)    Continued vomiting (can t keep liquids down)    Frequent diarrhea (more than 5 times a day); blood (red or black color) or mucus in diarrhea    Feeling weak, dizzy, or like you are going to faint    Extreme thirst    Fever of 100.4 F (38 C) or higher, or as directed by your healthcare provider  Date Last Reviewed: 9/25/2015 2000-2017 [employer]. [address] [place] [address], [place] [address]. All rights reserved. This information is not intended as a substitute for professional medical care. Always follow your healthcare  professional's instructions.

## 2018-03-25 DIAGNOSIS — F41.1 ANXIETY STATE: ICD-10-CM

## 2018-03-26 RX ORDER — BUPROPION HYDROCHLORIDE 150 MG/1
TABLET ORAL
Qty: 90 TABLET | Refills: 0 | Status: SHIPPED | OUTPATIENT
Start: 2018-03-26 | End: 2018-07-15

## 2018-03-26 NOTE — TELEPHONE ENCOUNTER
"Requested Prescriptions   Pending Prescriptions Disp Refills     buPROPion (WELLBUTRIN XL) 150 MG 24 hr tablet [Pharmacy Med Name: BUPROPN HCL X 150MG TAB PARP] 90 tablet 0     Sig: TAKE ONE TABLET BY MOUTH ONE TIME DAILY    SSRIs Protocol Passed    3/25/2018 11:10 AM       Passed - Recent (12 mo) or future (30 days) visit within the authorizing provider's specialty    Patient had office visit in the last 12 months or has a visit in the next 30 days with authorizing provider or within the authorizing provider's specialty.  See \"Patient Info\" tab in inbasket, or \"Choose Columns\" in Meds & Orders section of the refill encounter.           Passed - Medication is Bupropion    If the medication is Bupropion (Wellbutrin), and the patient is taking for smoking cessation; OK to refill.         Passed - Patient is age 18 or older       Passed - No active pregnancy on record       Passed - No positive pregnancy test in last 12 months        buPROPion (WELLBUTRIN XL) 150 MG 24 hr tablet  Last Written Prescription Date:  05/06/2017  Last Fill Quantity: 90 tablet,  # refills: 1   Last office visit: 7/10/2017 with prescribing provider:  KULDIP Ahuja   Future Office Visit:      PHQ-9 SCORE 9/14/2016 6/6/2017 7/6/2017   Total Score - - -   Total Score MyChart 7 (Mild depression) 8 (Mild depression) 12 (Moderate depression)   Total Score - 8 12     BRAULIO-7 SCORE 8/5/2015 2/18/2016 6/6/2017   Total Score 2 - -   Total Score 2 17 (severe anxiety) 0 (minimal anxiety)   Total Score - - 13       Whitley Walker RT (R) (M)    "

## 2018-06-06 ENCOUNTER — MYC MEDICAL ADVICE (OUTPATIENT)
Dept: FAMILY MEDICINE | Facility: CLINIC | Age: 43
End: 2018-06-06

## 2018-06-19 ENCOUNTER — HOSPITAL ENCOUNTER (EMERGENCY)
Facility: CLINIC | Age: 43
Discharge: HOME | End: 2018-06-19
Attending: FAMILY MEDICINE | Admitting: FAMILY MEDICINE
Payer: COMMERCIAL

## 2018-06-19 ENCOUNTER — APPOINTMENT (OUTPATIENT)
Dept: CT IMAGING | Facility: CLINIC | Age: 43
End: 2018-06-19
Attending: FAMILY MEDICINE
Payer: COMMERCIAL

## 2018-06-19 VITALS
DIASTOLIC BLOOD PRESSURE: 81 MMHG | RESPIRATION RATE: 16 BRPM | HEIGHT: 62 IN | SYSTOLIC BLOOD PRESSURE: 118 MMHG | OXYGEN SATURATION: 99 % | BODY MASS INDEX: 27.97 KG/M2 | WEIGHT: 152 LBS | TEMPERATURE: 99.2 F

## 2018-06-19 DIAGNOSIS — R10.9 RIGHT FLANK PAIN: ICD-10-CM

## 2018-06-19 LAB
ALBUMIN SERPL-MCNC: 3.5 G/DL (ref 3.4–5)
ALBUMIN UR-MCNC: NEGATIVE MG/DL
ALP SERPL-CCNC: 58 U/L (ref 40–150)
ALT SERPL W P-5'-P-CCNC: 22 U/L (ref 0–50)
ANION GAP SERPL CALCULATED.3IONS-SCNC: 6 MMOL/L (ref 3–14)
APPEARANCE UR: CLEAR
AST SERPL W P-5'-P-CCNC: 35 U/L (ref 0–45)
BACTERIA #/AREA URNS HPF: ABNORMAL /HPF
BASOPHILS # BLD AUTO: 0 10E9/L (ref 0–0.2)
BASOPHILS NFR BLD AUTO: 0.5 %
BILIRUB SERPL-MCNC: 0.4 MG/DL (ref 0.2–1.3)
BILIRUB UR QL STRIP: NEGATIVE
BUN SERPL-MCNC: 9 MG/DL (ref 7–30)
CALCIUM SERPL-MCNC: 8.3 MG/DL (ref 8.5–10.1)
CHLORIDE SERPL-SCNC: 108 MMOL/L (ref 94–109)
CO2 SERPL-SCNC: 26 MMOL/L (ref 20–32)
COLOR UR AUTO: ABNORMAL
CREAT SERPL-MCNC: 0.65 MG/DL (ref 0.52–1.04)
DIFFERENTIAL METHOD BLD: NORMAL
EOSINOPHIL # BLD AUTO: 0.1 10E9/L (ref 0–0.7)
EOSINOPHIL NFR BLD AUTO: 2.1 %
ERYTHROCYTE [DISTWIDTH] IN BLOOD BY AUTOMATED COUNT: 12.6 % (ref 10–15)
GFR SERPL CREATININE-BSD FRML MDRD: >90 ML/MIN/1.7M2
GLUCOSE SERPL-MCNC: 90 MG/DL (ref 70–99)
GLUCOSE UR STRIP-MCNC: NEGATIVE MG/DL
HCT VFR BLD AUTO: 36.8 % (ref 35–47)
HGB BLD-MCNC: 11.9 G/DL (ref 11.7–15.7)
HGB UR QL STRIP: NEGATIVE
IMM GRANULOCYTES # BLD: 0 10E9/L (ref 0–0.4)
IMM GRANULOCYTES NFR BLD: 0.2 %
KETONES UR STRIP-MCNC: NEGATIVE MG/DL
LEUKOCYTE ESTERASE UR QL STRIP: NEGATIVE
LIPASE SERPL-CCNC: 154 U/L (ref 73–393)
LYMPHOCYTES # BLD AUTO: 2.6 10E9/L (ref 0.8–5.3)
LYMPHOCYTES NFR BLD AUTO: 45.6 %
MCH RBC QN AUTO: 30.3 PG (ref 26.5–33)
MCHC RBC AUTO-ENTMCNC: 32.3 G/DL (ref 31.5–36.5)
MCV RBC AUTO: 94 FL (ref 78–100)
MONOCYTES # BLD AUTO: 0.4 10E9/L (ref 0–1.3)
MONOCYTES NFR BLD AUTO: 7.7 %
NEUTROPHILS # BLD AUTO: 2.5 10E9/L (ref 1.6–8.3)
NEUTROPHILS NFR BLD AUTO: 43.9 %
NITRATE UR QL: NEGATIVE
NRBC # BLD AUTO: 0 10*3/UL
NRBC BLD AUTO-RTO: 0 /100
PH UR STRIP: 6 PH (ref 5–7)
PLATELET # BLD AUTO: 239 10E9/L (ref 150–450)
POTASSIUM SERPL-SCNC: 4.2 MMOL/L (ref 3.4–5.3)
PROT SERPL-MCNC: 7.6 G/DL (ref 6.8–8.8)
RBC # BLD AUTO: 3.93 10E12/L (ref 3.8–5.2)
RBC #/AREA URNS AUTO: <1 /HPF (ref 0–2)
SODIUM SERPL-SCNC: 140 MMOL/L (ref 133–144)
SOURCE: ABNORMAL
SP GR UR STRIP: 1 (ref 1–1.03)
SQUAMOUS #/AREA URNS AUTO: <1 /HPF (ref 0–1)
UROBILINOGEN UR STRIP-MCNC: 0 MG/DL (ref 0–2)
WBC # BLD AUTO: 5.6 10E9/L (ref 4–11)
WBC #/AREA URNS AUTO: <1 /HPF (ref 0–5)

## 2018-06-19 PROCEDURE — 81001 URINALYSIS AUTO W/SCOPE: CPT | Performed by: FAMILY MEDICINE

## 2018-06-19 PROCEDURE — 99284 EMERGENCY DEPT VISIT MOD MDM: CPT | Mod: 25 | Performed by: FAMILY MEDICINE

## 2018-06-19 PROCEDURE — 74176 CT ABD & PELVIS W/O CONTRAST: CPT

## 2018-06-19 PROCEDURE — 83690 ASSAY OF LIPASE: CPT | Performed by: FAMILY MEDICINE

## 2018-06-19 PROCEDURE — 85025 COMPLETE CBC W/AUTO DIFF WBC: CPT | Performed by: FAMILY MEDICINE

## 2018-06-19 PROCEDURE — 25000125 ZZHC RX 250: Performed by: FAMILY MEDICINE

## 2018-06-19 PROCEDURE — 80053 COMPREHEN METABOLIC PANEL: CPT | Performed by: FAMILY MEDICINE

## 2018-06-19 PROCEDURE — 99284 EMERGENCY DEPT VISIT MOD MDM: CPT | Mod: Z6 | Performed by: FAMILY MEDICINE

## 2018-06-19 RX ORDER — ONDANSETRON 4 MG/1
4-8 TABLET, ORALLY DISINTEGRATING ORAL EVERY 8 HOURS PRN
Qty: 12 TABLET | Refills: 0 | Status: SHIPPED | OUTPATIENT
Start: 2018-06-19 | End: 2019-02-06

## 2018-06-19 RX ORDER — ONDANSETRON 4 MG/1
4 TABLET, ORALLY DISINTEGRATING ORAL ONCE
Status: COMPLETED | OUTPATIENT
Start: 2018-06-19 | End: 2018-06-19

## 2018-06-19 RX ADMIN — ONDANSETRON 4 MG: 4 TABLET, ORALLY DISINTEGRATING ORAL at 11:00

## 2018-06-19 NOTE — ED NOTES
"Hx of kidney infection.   Pt reports right lower back pain radiates to left with intermittent nausea.   Pt denies urinary frequency or burning \"but I don't get those with infections\"   Pt denies hx of kidney stones.    Pt given water to drink to get urine sample.  "

## 2018-06-19 NOTE — ED AVS SNAPSHOT
[facility] Emergency Department    [address] [address]    Phone:  [phone]    Fax:  [phone]                                       Cecilia Blackmon   MRN: [id number]    Department:  [facility] Emergency Department   Date of Visit:  6/19/2018           After Visit Summary Signature Page     I have received my discharge instructions, and my questions have been answered. I have discussed any challenges I see with this plan with the nurse or doctor.    ..........................................................................................................................................  Patient/Patient Representative Signature      ..........................................................................................................................................  Patient Representative Print Name and Relationship to Patient    ..................................................               ................................................  Date                                            Time    ..........................................................................................................................................  Reviewed by Signature/Title    ...................................................              ..............................................  Date                                                            Time

## 2018-06-19 NOTE — DISCHARGE INSTRUCTIONS
Return to the Emergency Room if the following occurs:     Worsened pain, fever >101, vomiting/dehydration, or for any concern at anytime.    Or, follow-up with the following provider as we discussed:     Return to your primary doctor as needed, or if not improved over the next 48 hours.    Medications discussed:    Ibuprofen 600 mg every six hours for pain (7 days duration).  Tylenol 1000 mg every six hours for pain (7 days duration).  Therefore, you can alternate these every three hours and do it safely.  Zofran for nausea, as needed.    If you received pain-relieving or sedating medication during your time in the ER, avoid alcohol, driving automobiles, or working with machinery.  Also, a responsible adult must stay with you.        Call the Nurse Advice Line at [phone] or [phone] for any concern at anytime.

## 2018-06-19 NOTE — ED AVS SNAPSHOT
[facility] Emergency Department    [address] [address]    Phone:  [phone]    Fax:  [phone]                                       Cecilia Blackmon   MRN: [id number]    Department:  [facility] Emergency Department   Date of Visit:  6/19/2018           Patient Information     Date Of Birth          1975        Your diagnoses for this visit were:     Right flank pain        You were seen by Saurabh Saenz MD.        Discharge Instructions       Return to the Emergency Room if the following occurs:     Worsened pain, fever >101, vomiting/dehydration, or for any concern at anytime.    Or, follow-up with the following provider as we discussed:     Return to your primary doctor as needed, or if not improved over the next 48 hours.    Medications discussed:    Ibuprofen 600 mg every six hours for pain (7 days duration).  Tylenol 1000 mg every six hours for pain (7 days duration).  Therefore, you can alternate these every three hours and do it safely.  Zofran for nausea, as needed.    If you received pain-relieving or sedating medication during your time in the ER, avoid alcohol, driving automobiles, or working with machinery.  Also, a responsible adult must stay with you.        Call the Nurse Advice Line at [phone] or [phone] for any concern at anytime.      24 Hour Appointment Hotline       To make an appointment at any [facility] clinic, call [phone] ([phone]). If you don't have a family doctor or clinic, we will help you find one. [facility] clinics are conveniently located to serve the needs of you and your family.             Review of your medicines      START taking        Dose / Directions Last dose taken    ondansetron 4 MG ODT tab   Commonly known as:  ZOFRAN ODT   Dose:  4-8 mg   Quantity:  12 tablet        Take 1-2 tablets (4-8 mg) by mouth every 8 hours as needed for nausea   Refills:  0          Our records show that you are  taking the medicines listed below. If these are incorrect, please call your family doctor or clinic.        Dose / Directions Last dose taken    albuterol 108 (90 Base) MCG/ACT Inhaler   Commonly known as:  PROAIR HFA/PROVENTIL HFA/VENTOLIN HFA   Dose:  2 puff   Quantity:  1 Inhaler        Inhale 2 puffs into the lungs every 6 hours as needed for shortness of breath / dyspnea   Refills:  1        buPROPion 150 MG 24 hr tablet   Commonly known as:  WELLBUTRIN XL   Quantity:  90 tablet        TAKE ONE TABLET BY MOUTH ONE TIME DAILY   Refills:  0        escitalopram 20 MG tablet   Commonly known as:  LEXAPRO   Dose:  20 mg   Quantity:  90 tablet        Take 1 tablet (20 mg) by mouth daily   Refills:  1        SUMAtriptan 100 MG tablet   Commonly known as:  IMITREX   Dose:  100 mg   Quantity:  18 tablet        Take 1 tablet (100 mg) by mouth at onset of headache for migraine May repeat dose in 2 hours.  Do not exceed 200 mg in 24 hours   Refills:  11                Prescriptions were sent or printed at these locations (1 Prescription)                   [id number] [address] [address]    Telephone:  [phone]   Fax:  [phone]   Hours:  Closed 10-16-08 business to [facility]                Printed at Department/Unit printer (1 of 1)         ondansetron (ZOFRAN ODT) 4 MG ODT tab                Procedures and tests performed during your visit     Abd/pelvis CT - no contrast - Stone Protocol    CBC with platelets, differential    Comprehensive metabolic panel    Lipase    UA reflex to Microscopic      Orders Needing Specimen Collection     None      Pending Results     Date and Time Order Name Status Description    6/19/2018 1243 Abd/pelvis CT - no contrast - Stone Protocol Preliminary             Pending Culture Results     No orders found from 6/17/2018 to 6/20/2018.            Pending Results Instructions     If you had any lab results that  were not finalized at the time of your Discharge, you can call the ED Lab Result RN at [phone]. You will be contacted by this team for any positive Lab results or changes in treatment. The nurses are available 7 days a week from 10A to 6:30P.  You can leave a message 24 hours per day and they will return your call.        Test Results From Your Hospital Stay        6/19/2018 11:38 AM      Component Results     Component Value Ref Range & Units Status    Color Urine Straw  Final    Appearance Urine Clear  Final    Glucose Urine Negative NEG^Negative mg/dL Final    Bilirubin Urine Negative NEG^Negative Final    Ketones Urine Negative NEG^Negative mg/dL Final    Specific Gravity Urine 1.005 1.003 - 1.035 Final    Blood Urine Negative NEG^Negative Final    pH Urine 6.0 5.0 - 7.0 pH Final    Protein Albumin Urine Negative NEG^Negative mg/dL Final    Urobilinogen mg/dL 0.0 0.0 - 2.0 mg/dL Final    Nitrite Urine Negative NEG^Negative Final    Leukocyte Esterase Urine Negative NEG^Negative Final    Source Midstream Urine  Final    RBC Urine <1 0 - 2 /HPF Final    WBC Urine <1 0 - 5 /HPF Final    Bacteria Urine Few (A) NEG^Negative /HPF Final    Squamous Epithelial /HPF Urine <1 0 - 1 /HPF Final         6/19/2018  1:44 PM      Narrative     CT ABDOMEN/PELVIS WITHOUT CONTRAST June 19, 2018 1:24 PM     HISTORY: Right flank pain.    TECHNIQUE: No IV contrast material. Radiation dose for this scan was  reduced using automated exposure control, adjustment of the mA and/or  kV according to patient size, or iterative reconstruction technique.    COMPARISON: None.    FINDINGS: Scans of the lung bases are unremarkable.    The noncontrast appearance of the liver is normal. Previous  cholecystectomy. Spleen and pancreas are normal. No adrenal lesions.    No urinary tract stones or hydronephrosis. Mildly prominent extrarenal  pelvis bilaterally. No retroperitoneal adenopathy or evidence of  aortic aneurysm.    Scans through the  pelvis demonstrate a normal-appearing bladder. No  adnexal masses. No adenopathy.    No evidence of diverticulitis. The appendix is normal. No dilated  bowel, no thickened bowel wall. No free air or free fluid.        Impression     IMPRESSION: No evidence of urinary tract stones or hydronephrosis. No  cause for pain identified.         6/19/2018  1:22 PM      Component Results     Component Value Ref Range & Units Status    WBC 5.6 4.0 - 11.0 10e9/L Final    RBC Count 3.93 3.8 - 5.2 10e12/L Final    Hemoglobin 11.9 11.7 - 15.7 g/dL Final    Hematocrit 36.8 35.0 - 47.0 % Final    MCV 94 78 - 100 fl Final    MCH 30.3 26.5 - 33.0 pg Final    MCHC 32.3 31.5 - 36.5 g/dL Final    RDW 12.6 10.0 - 15.0 % Final    Platelet Count 239 150 - 450 10e9/L Final    Diff Method Automated Method  Final    % Neutrophils 43.9 % Final    % Lymphocytes 45.6 % Final    % Monocytes 7.7 % Final    % Eosinophils 2.1 % Final    % Basophils 0.5 % Final    % Immature Granulocytes 0.2 % Final    Nucleated RBCs 0 0 /100 Final    Absolute Neutrophil 2.5 1.6 - 8.3 10e9/L Final    Absolute Lymphocytes 2.6 0.8 - 5.3 10e9/L Final    Absolute Monocytes 0.4 0.0 - 1.3 10e9/L Final    Absolute Eosinophils 0.1 0.0 - 0.7 10e9/L Final    Absolute Basophils 0.0 0.0 - 0.2 10e9/L Final    Abs Immature Granulocytes 0.0 0 - 0.4 10e9/L Final    Absolute Nucleated RBC 0.0  Final         6/19/2018  1:43 PM      Component Results     Component Value Ref Range & Units Status    Sodium 140 133 - 144 mmol/L Final    Potassium 4.2 3.4 - 5.3 mmol/L Final    Specimen slightly hemolyzed, potassium may be falsely elevated    Chloride 108 94 - 109 mmol/L Final    Carbon Dioxide 26 20 - 32 mmol/L Final    Anion Gap 6 3 - 14 mmol/L Final    Glucose 90 70 - 99 mg/dL Final    Urea Nitrogen 9 7 - 30 mg/dL Final    Creatinine 0.65 0.52 - 1.04 mg/dL Final    GFR Estimate >90 >60 mL/min/1.7m2 Final    Non  GFR Calc    GFR Estimate If Black >90 >60 mL/min/1.7m2 Final      GFR Calc    Calcium 8.3 (L) 8.5 - 10.1 mg/dL Final    Bilirubin Total 0.4 0.2 - 1.3 mg/dL Final    Albumin 3.5 3.4 - 5.0 g/dL Final    Protein Total 7.6 6.8 - 8.8 g/dL Final    Alkaline Phosphatase 58 40 - 150 U/L Final    ALT 22 0 - 50 U/L Final    AST 35 0 - 45 U/L Final    Specimen is hemolyzed which can falsely elevate AST. Analysis of a   non-hemolyzed specimen may result in a lower value.           6/19/2018  1:43 PM      Component Results     Component Value Ref Range & Units Status    Lipase 154 73 - 393 U/L Final                Thank you for choosing [facility]       Thank you for choosing [facility] for your care. Our goal is always to provide you with excellent care. Hearing back from our patients is one way we can continue to improve our services. Please take a few minutes to complete the written survey that you may receive in the mail after you visit with us. Thank you!        [employer] Information     [employer] gives you secure access to your electronic health record. If you see a primary care provider, you can also send messages to your care team and make appointments. If you have questions, please call your primary care clinic.  If you do not have a primary care provider, please call [phone] and they will assist you.        Care EveryWhere ID     This is your Care EveryWhere ID. This could be used by other organizations to access your [facility] medical records  [id number]        Equal Access to Services     FELICIA AWAD : Hadii lima damon Soray, waaxda luqadaha, qaybta kaalmada adecliveyada, waxay juan ross. So [facility] [phone].    ATENCIÓN: Si habla español, tiene a ortega disposición servicios gratuitos de asistencia lingüística. Llame al [phone].    We comply with applicable federal civil rights laws and [place] laws. We do not discriminate on the basis of race, color, national origin, age, disability, sex, sexual orientation, or gender  identity.            After Visit Summary       This is your record. Keep this with you and show to your community pharmacist(s) and doctor(s) at your next visit.

## 2018-06-19 NOTE — ED PROVIDER NOTES
HPI  Patient is a 42-year-old female presenting with right flank pain.  She has a known history of recurrent urinary tract infection.  She tells me they have presented previously without urinary symptoms and she has been treated with antibiotics with success in the past.  Other past medical history medications reviewed.    The patient had onset of right flank pain starting yesterday evening at about 5:00 PM.  She has had pain that has been constant since then.  The pain waxes and wanes without obvious cause.  The pain will be as severe as 10/10.  Currently, the pain is rated 5/10.  She has been nauseous and throwing up overnight.  She denies dysuria, urgency, frequency, or hematuria.  No vaginal bleeding.  No vaginal discharge or irritation.  No changes in bowel.  No hematochezia or melena.  No trauma or injury.  She tells me this is exactly how she is presented in the past with kidney infections.  No personal or family history of ureteral stones.    ROS: All other review of systems are negative other than that noted above.     Past Medical History:   Diagnosis Date     Abdominal pain, other specified site      Abdominal pain, right upper quadrant      Anxiety state, unspecified      ASCUS on Pap smear 2003    +HR HPV 16     COPD (chronic obstructive pulmonary disease) (H)     My Mom has this     Family history of breast cancer in mother 9/17/2009     Family history of breast cancer in mother      Family history of breast cancer in mother      Headache 2/25/2005     Headache      Headache      Headache(784.0)      Hx of colposcopy with cervical biopsy 6/2/2003    WNL     Mental disorders of mother, postpartum     postpartum depression     Migraine, unspecified, without mention of intractable migraine without mention of status migrainosus      Other specified cardiac dysrhythmias(427.89)      Other specified complication, antepartum(646.83)      Swelling of limb      Uncomplicated asthma     Myself     Unspecified  "viral infection, in conditions classified elsewhere and of unspecified site      Urinary tract infection, site not specified      Past Surgical History:   Procedure Laterality Date     ABDOMEN SURGERY  [id number]    Csecttion     COLONOSCOPY       GI SURGERY       LAPAROSCOPIC CHOLECYSTECTOMY N/A 2014    Procedure: LAPAROSCOPIC CHOLECYSTECTOMY;  Surgeon: Ulises Starr MD;  Location: [facility] OR     SURGICAL HISTORY OF -       Csection     Family History   Problem Relation Age of Onset     Depression Mother      Respiratory Mother      COPD, emphysema     Cancer Mother      Neurologic Disorder Mother      raunads syndrom     Breast Cancer Mother      Stage 4     Other Cancer Mother      Cervical     Anesthesia Reaction Mother      Osteoperosis Mother      GASTROINTESTINAL DISEASE Maternal Grandmother      Depression Maternal Grandmother      Hypertension Maternal Grandmother      Cancer Maternal Grandmother      skin cancer     Breast Cancer Maternal Grandmother      Other Cancer Maternal Grandmother      Melanoma     Breast Cancer Cousin      Grandfathers side     Breast Cancer Cousin      Grandfathers side BRCA 1     Prostate Cancer Other      maternal side     Prostate Cancer Other      maternal side     Prostate Cancer Other      maternal side     Leukemia Paternal Half-Sister      1/2 sister,  in age 13 or 15     Social History   Substance Use Topics     Smoking status: Never Smoker     Smokeless tobacco: Never Used     Alcohol use Yes      Comment: 1-2 times week         PHYSICAL  /81  Temp 99.2  F (37.3  C) (Oral)  Resp 16  Ht 1.575 m (5' 2\")  Wt 68.9 kg (152 lb)  SpO2 99%  BMI 27.8 kg/m2  General: Patient is alert and in moderate distress.  She is able to have a conversation.  She will smile and laugh with her .  She is lying in bed and has her hand on her right flank.  Neurological: Alert.  Moving upper and lower extremities equally, bilaterally.  Head / Neck: Atraumatic.  Ears: Not " done.  Eyes: Pupils are equal, round, and reactive.  Normal conjunctiva.  Nose: Midline.  No epistaxis.  Mouth / Throat: No ulcerations or lesions.  Upper pharynx is not erythematous.  Moist.  Respiratory: No respiratory distress. CTA B.  Cardiovascular: Regular rhythm.  Peripheral extremities are warm.  No edema.  No calf tenderness.  Abdomen / Pelvis: Tender in the suprapubic and right lower quadrant.  No distention.  Soft throughout.  Genitalia: Not done.  Musculoskeletal: No tenderness over major muscles and joints.  Skin: No evidence of rash or trauma.        ED COURSE  1141.  The patient has right lower quadrant tenderness.  She has complaint of right flank pain.  She has nausea with vomiting.  Urine analysis pending.  She refuses anything for analgesia.  She tells me her nausea is gone after getting Zofran.    Labs Ordered and Resulted from Time of ED Arrival Up to the Time of Departure from the ED   URINE MACROSCOPIC WITH REFLEX TO MICRO - Abnormal; Notable for the following:        Result Value    Bacteria Urine Few (*)     All other components within normal limits   COMPREHENSIVE METABOLIC PANEL - Abnormal; Notable for the following:     Calcium 8.3 (*)     All other components within normal limits   CBC WITH PLATELETS DIFFERENTIAL   LIPASE     1245.  Urine analysis is unremarkable.  CT without contrast and lab values pending.  She is sleeping.  No analgesia requested.    IMAGING  Images reviewed by me.  Radiology report also reviewed.  Abd/pelvis CT - no contrast - Stone Protocol   Preliminary Result   IMPRESSION: No evidence of urinary tract stones or hydronephrosis. No   cause for pain identified.        1421.  CT imaging is unremarkable for a cause of her acute pain.  Normal lab values as well.  Normal urine analysis.  Musculoskeletal source of pain?  She denies a history of constipation and there is no comment on a large volume of stool in the bowel so this would be less likely.  Follow-up if not  improving.  Return here for worsening.    Medications   ondansetron (ZOFRAN-ODT) ODT tab 4 mg (4 mg Oral Given 6/19/18 1100)       IMPRESSION    ICD-10-CM    1. Right flank pain R10.9            Critical Care time:  none                    Saurabh Saenz MD  06/19/18 7223

## 2018-07-15 ENCOUNTER — MYC REFILL (OUTPATIENT)
Dept: FAMILY MEDICINE | Facility: CLINIC | Age: 43
End: 2018-07-15

## 2018-07-15 DIAGNOSIS — F41.1 ANXIETY STATE: ICD-10-CM

## 2018-07-15 DIAGNOSIS — J45.31 MILD PERSISTENT ASTHMA WITH ACUTE EXACERBATION: ICD-10-CM

## 2018-07-16 ENCOUNTER — MYC MEDICAL ADVICE (OUTPATIENT)
Dept: FAMILY MEDICINE | Facility: CLINIC | Age: 43
End: 2018-07-16

## 2018-07-16 RX ORDER — BUPROPION HYDROCHLORIDE 150 MG/1
150 TABLET ORAL DAILY
Qty: 90 TABLET | Refills: 0 | Status: SHIPPED | OUTPATIENT
Start: 2018-07-16 | End: 2018-08-13

## 2018-07-16 RX ORDER — ALBUTEROL SULFATE 90 UG/1
2 AEROSOL, METERED RESPIRATORY (INHALATION) EVERY 6 HOURS PRN
Qty: 1 INHALER | Refills: 0 | Status: SHIPPED | OUTPATIENT
Start: 2018-07-16 | End: 2018-08-13

## 2018-07-16 RX ORDER — ESCITALOPRAM OXALATE 20 MG/1
20 TABLET ORAL DAILY
Qty: 90 TABLET | Refills: 0 | Status: SHIPPED | OUTPATIENT
Start: 2018-07-16 | End: 2018-08-13

## 2018-07-16 NOTE — TELEPHONE ENCOUNTER
Message from MyChart:  Original authorizing provider: MD Cecilia Lang would like a refill of the following medications:  albuterol (PROAIR HFA/PROVENTIL HFA/VENTOLIN HFA) 108 (90 BASE) MCG/ACT Inhaler [Ammy Rolon MD]  escitalopram (LEXAPRO) 20 MG tablet [Ammy Rolon MD]  buPROPion (WELLBUTRIN XL) 150 MG 24 hr tablet [Ammy Rolon MD]    Preferred pharmacy: [id number] [address]    Comment:  I am leaving for vacation Friday for 2 weeks to [place]

## 2018-08-13 ENCOUNTER — OFFICE VISIT (OUTPATIENT)
Dept: FAMILY MEDICINE | Facility: CLINIC | Age: 43
End: 2018-08-13
Payer: COMMERCIAL

## 2018-08-13 VITALS
SYSTOLIC BLOOD PRESSURE: 94 MMHG | TEMPERATURE: 99.8 F | OXYGEN SATURATION: 96 % | WEIGHT: 159.4 LBS | DIASTOLIC BLOOD PRESSURE: 62 MMHG | HEART RATE: 84 BPM | BODY MASS INDEX: 29.33 KG/M2 | HEIGHT: 62 IN

## 2018-08-13 DIAGNOSIS — J45.30 MILD PERSISTENT ASTHMA WITHOUT COMPLICATION: ICD-10-CM

## 2018-08-13 DIAGNOSIS — Z00.00 ENCOUNTER FOR WELL ADULT EXAM WITHOUT ABNORMAL FINDINGS: Primary | ICD-10-CM

## 2018-08-13 DIAGNOSIS — R51.9 HEADACHE, UNSPECIFIED HEADACHE TYPE: ICD-10-CM

## 2018-08-13 DIAGNOSIS — F41.1 ANXIETY STATE: ICD-10-CM

## 2018-08-13 PROCEDURE — 99396 PREV VISIT EST AGE 40-64: CPT | Performed by: FAMILY MEDICINE

## 2018-08-13 RX ORDER — BUPROPION HYDROCHLORIDE 150 MG/1
150 TABLET ORAL DAILY
Qty: 90 TABLET | Refills: 3 | Status: SHIPPED | OUTPATIENT
Start: 2018-08-13 | End: 2018-11-23

## 2018-08-13 RX ORDER — ALBUTEROL SULFATE 90 UG/1
2 AEROSOL, METERED RESPIRATORY (INHALATION) EVERY 6 HOURS PRN
Qty: 1 INHALER | Refills: 11 | Status: SHIPPED | OUTPATIENT
Start: 2018-08-13 | End: 2019-03-19

## 2018-08-13 RX ORDER — SUMATRIPTAN 100 MG/1
100 TABLET, FILM COATED ORAL
Qty: 18 TABLET | Refills: 11 | Status: SHIPPED | OUTPATIENT
Start: 2018-08-13 | End: 2019-07-29

## 2018-08-13 RX ORDER — ESCITALOPRAM OXALATE 20 MG/1
20 TABLET ORAL DAILY
Qty: 90 TABLET | Refills: 3 | Status: SHIPPED | OUTPATIENT
Start: 2018-08-13 | End: 2019-03-19

## 2018-08-13 ASSESSMENT — PATIENT HEALTH QUESTIONNAIRE - PHQ9
SUM OF ALL RESPONSES TO PHQ QUESTIONS 1-9: 15
SUM OF ALL RESPONSES TO PHQ QUESTIONS 1-9: 15
10. IF YOU CHECKED OFF ANY PROBLEMS, HOW DIFFICULT HAVE THESE PROBLEMS MADE IT FOR YOU TO DO YOUR WORK, TAKE CARE OF THINGS AT HOME, OR GET ALONG WITH OTHER PEOPLE: SOMEWHAT DIFFICULT

## 2018-08-13 NOTE — NURSING NOTE
"Chief Complaint   Patient presents with     Physical       Initial BP 94/62 (BP Location: Right arm, Patient Position: Chair, Cuff Size: Adult Large)  Pulse 84  Temp 99.8  F (37.7  C) (Tympanic)  Ht 5' 2\" (1.575 m)  Wt 159 lb 6.4 oz (72.3 kg)  LMP 07/22/2018  SpO2 96%  Breastfeeding? No  BMI 29.15 kg/m2 Estimated body mass index is 29.15 kg/(m^2) as calculated from the following:    Height as of this encounter: 5' 2\" (1.575 m).    Weight as of this encounter: 159 lb 6.4 oz (72.3 kg).      Health Maintenance that is potentially due pending provider review:  NONE    n/a    Is there anyone who you would like to be able to receive your results? No  If yes have patient fill out NANCY    "

## 2018-08-13 NOTE — LETTER
My Asthma Action Plan  Name: Cecilia Blackmon   YOB: 1975  Date: 8/13/2018   My doctor: Ammy Rolon MD   My clinic: [facility]        My Control Medicine: None  My Rescue Medicine: Albuterol (Proair/Ventolin/Proventil) inhaler as needed   My Asthma Severity: intermittent  Avoid your asthma triggers               GREEN ZONE   Good Control    I feel good    No cough or wheeze    Can work, sleep and play without asthma symptoms       Take your asthma control medicine every day.     1. If exercise triggers your asthma, take your rescue medication    15 minutes before exercise or sports, and    During exercise if you have asthma symptoms  2. Spacer to use with inhaler: If you have a spacer, make sure to use it with your inhaler             YELLOW ZONE Getting Worse  I have ANY of these:    I do not feel good    Cough or wheeze    Chest feels tight    Wake up at night   1. Keep taking your Green Zone medications  2. Start taking your rescue medicine:    every 20 minutes for up to 1 hour. Then every 4 hours for 24-48 hours.  3. If you stay in the Yellow Zone for more than 12-24 hours, contact your doctor.  4. If you do not return to the Green Zone in 12-24 hours or you get worse, start taking your oral steroid medicine if prescribed by your provider.           RED ZONE Medical Alert - Get Help  I have ANY of these:    I feel awful    Medicine is not helping    Breathing getting harder    Trouble walking or talking    Nose opens wide to breathe       1. Take your rescue medicine NOW  2. If your provider has prescribed an oral steroid medicine, start taking it NOW  3. Call your doctor NOW  4. If you are still in the Red Zone after 20 minutes and you have not reached your doctor:    Take your rescue medicine again and    Call 911 or go to the emergency room right away    See your regular doctor within 2 weeks of an Emergency Room or Urgent Care visit for follow-up  treatment.          Annual Reminders:  Meet with Asthma Educator,  Flu Shot in the Fall, consider Pneumonia Vaccination for patients with asthma (aged 19 and older).    Pharmacy:    [facility] - [place], [place] - [address] [facility] - [place], [place] - [address]  [id number] - [place], [place] - [address]                      Asthma Triggers  How To Control Things That Make Your Asthma Worse    Triggers are things that make your asthma worse.  Look at the list below to help you find your triggers and what you can do about them.  You can help prevent asthma flare-ups by staying away from your triggers.      Trigger                                                          What you can do   Cigarette Smoke  Tobacco smoke can make asthma worse. Do not allow smoking in your home, car or around you.  Be sure no one smokes at a child s day care or school.  If you smoke, ask your health care provider for ways to help you quit.  Ask family members to quit too.  Ask your health care provider for a referral to Quit Plan to help you quit smoking, or call 9-661-621-PLAN.     Colds, Flu, Bronchitis  These are common triggers of asthma. Wash your hands often.  Don t touch your eyes, nose or mouth.  Get a flu shot every year.     Dust Mites  These are tiny bugs that live in cloth or carpet. They are too small to see. Wash sheets and blankets in hot water every week.   Encase pillows and mattress in dust mite proof covers.  Avoid having carpet if you can. If you have carpet, vacuum weekly.   Use a dust mask and HEPA vacuum.   Pollen and Outdoor Mold  Some people are allergic to trees, grass, or weed pollen, or molds. Try to keep your windows closed.  Limit time out doors when pollen count is high.   Ask you health care provider about taking medicine during allergy season.     Animal Dander  Some people are allergic to skin flakes, urine or saliva  from pets with fur or feathers. Keep pets with fur or feathers out of your home.    If you can t keep the pet outdoors, then keep the pet out of your bedroom.  Keep the bedroom door closed.  Keep pets off cloth furniture and away from stuffed toys.     Mice, Rats, and Cockroaches  Some people are allergic to the waste from these pests.   Cover food and garbage.  Clean up spills and food crumbs.  Store grease in the refrigerator.   Keep food out of the bedroom.   Indoor Mold  This can be a trigger if your home has high moisture. Fix leaking faucets, pipes, or other sources of water.   Clean moldy surfaces.  Dehumidify basement if it is damp and smelly.   Smoke, Strong Odors, and Sprays  These can reduce air quality. Stay away from strong odors and sprays, such as perfume, powder, hair spray, paints, smoke incense, paint, cleaning products, candles and new carpet.   Exercise or Sports  Some people with asthma have this trigger. Be active!  Ask your doctor about taking medicine before sports or exercise to prevent symptoms.    Warm up for 5-10 minutes before and after sports or exercise.     Other Triggers of Asthma  Cold air:  Cover your nose and mouth with a scarf.  Sometimes laughing or crying can be a trigger.  Some medicines and food can trigger asthma.
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
## 2018-08-13 NOTE — MR AVS SNAPSHOT
After Visit Summary   8/13/2018    Cecilia Blackmon    MRN: [id number]           Patient Information     Date Of Birth          1975        Visit Information        Provider Department      8/13/2018 5:00 PM Ammy Rolon MD [facility]        Today's Diagnoses     Mild persistent asthma    -  1    Mild persistent asthma with acute exacerbation        Anxiety state        Headache, unspecified headache type          Care Instructions      Preventive Health Recommendations  Female Ages 40 to 49    Yearly exam:     See your health care provider every year in order to  1. Review health changes.   2. Discuss preventive care.    3. Review your medicines if your doctor prescribed any.      Get a Pap test every three years (unless you have an abnormal result and your provider advises testing more often).      If you get Pap tests with HPV test, you only need to test every 5 years, unless you have an abnormal result. You do not need a Pap test if your uterus was removed (hysterectomy) and you have not had cancer.      You should be tested each year for STDs (sexually transmitted diseases), if you're at risk.     Ask your doctor if you should have a mammogram.      Have a colonoscopy (test for colon cancer) if someone in your family has had colon cancer or polyps before age 50.       Have a cholesterol test every 5 years.       Have a diabetes test (fasting glucose) after age 45. If you are at risk for diabetes, you should have this test every 3 years.    Shots: Get a flu shot each year. Get a tetanus shot every 10 years.     Nutrition:     Eat at least 5 servings of fruits and vegetables each day.    Eat whole-grain bread, whole-wheat pasta and brown rice instead of white grains and rice.    Get adequate Calcium and Vitamin D.      Lifestyle    Exercise at least 150 minutes a week (an average of 30 minutes a day, 5 days a week). This will help you control your weight and  "prevent disease.    Limit alcohol to one drink per day.    No smoking.     Wear sunscreen to prevent skin cancer.    See your dentist every six months for an exam and cleaning.          Follow-ups after your visit        Who to contact     If you have questions or need follow up information about today's clinic visit or your schedule please contact [facility] directly at [phone].  Normal or non-critical lab and imaging results will be communicated to you by HipFlathart, letter or phone within 4 business days after the clinic has received the results. If you do not hear from us within 7 days, please contact the clinic through Groupitert or phone. If you have a critical or abnormal lab result, we will notify you by phone as soon as possible.  Submit refill requests through Myxer or call your pharmacy and they will forward the refill request to us. Please allow 3 business days for your refill to be completed.          Additional Information About Your Visit        HipFlathart Information     Myxer gives you secure access to your electronic health record. If you see a primary care provider, you can also send messages to your care team and make appointments. If you have questions, please call your primary care clinic.  If you do not have a primary care provider, please call [phone] and they will assist you.        Care EveryWhere ID     This is your Care EveryWhere ID. This could be used by other organizations to access your [place] medical records  [id number]        Your Vitals Were     Pulse Temperature Height Last Period Pulse Oximetry Breastfeeding?    84 99.8  F (37.7  C) (Tympanic) 5' 2\" (1.575 m) 07/22/2018 96% No    BMI (Body Mass Index)                   29.15 kg/m2            Blood Pressure from Last 3 Encounters:   08/13/18 94/62   06/19/18 118/81   01/17/18 117/64    Weight from Last 3 Encounters:   08/13/18 159 lb 6.4 oz (72.3 kg)   06/19/18 152 lb (68.9 kg)   01/17/18 142 lb 6.4 oz " (64.6 kg)              We Performed the Following     Asthma Action Plan (AAP)          Where to get your medicines      These medications were sent to [id number] - [place], [place] - [address]  [address] [address]    Hours:  Closed 10-16-08 business to [facility] Phone:  [phone]     albuterol 108 (90 Base) MCG/ACT inhaler    buPROPion 150 MG 24 hr tablet    escitalopram 20 MG tablet    SUMAtriptan 100 MG tablet          Primary Care Provider Office Phone # Fax #    Ammy Rolon [phone] [address] [facility] [address]        Equal Access to Services     FELICIA AWAD : Luis Medina, waoctavioda lenard, qaybta kaalmada conrad, bryce solomon . So [facility] [phone].    ATENCIÓN: Si habla español, tiene a ortega disposición servicios gratuitos de asistencia lingüística. Llame al [phone].    We comply with applicable federal civil rights laws and [place] laws. We do not discriminate on the basis of race, color, national origin, age, disability, sex, sexual orientation, or gender identity.            Thank you!     Thank you for choosing [facility]  for your care. Our goal is always to provide you with excellent care. Hearing back from our patients is one way we can continue to improve our services. Please take a few minutes to complete the written survey that you may receive in the mail after your visit with us. Thank you!             Your Updated Medication List - Protect others around you: Learn how to safely use, store and throw away your medicines at www.disposemymeds.org.          This list is accurate as of 8/13/18  5:14 PM.  Always use your most recent med list.                   Brand Name Dispense Instructions for use Diagnosis    albuterol 108 (90 Base) MCG/ACT inhaler    PROAIR HFA/PROVENTIL HFA/VENTOLIN HFA    1 Inhaler    Inhale 2 puffs into the lungs every  6 hours as needed for shortness of breath / dyspnea    Mild persistent asthma with acute exacerbation       buPROPion 150 MG 24 hr tablet    WELLBUTRIN XL    90 tablet    Take 1 tablet (150 mg) by mouth daily    Anxiety state       escitalopram 20 MG tablet    LEXAPRO    90 tablet    Take 1 tablet (20 mg) by mouth daily    Anxiety state       SUMAtriptan 100 MG tablet    IMITREX    18 tablet    Take 1 tablet (100 mg) by mouth at onset of headache for migraine May repeat dose in 2 hours.  Do not exceed 200 mg in 24 hours    Headache, unspecified headache type

## 2018-08-14 ASSESSMENT — PATIENT HEALTH QUESTIONNAIRE - PHQ9: SUM OF ALL RESPONSES TO PHQ QUESTIONS 1-9: 15

## 2018-08-14 ASSESSMENT — ASTHMA QUESTIONNAIRES: ACT_TOTALSCORE: 20

## 2018-08-21 ENCOUNTER — HOSPITAL ENCOUNTER (EMERGENCY)
Facility: CLINIC | Age: 43
Discharge: HOME | End: 2018-08-21
Attending: PHYSICIAN ASSISTANT | Admitting: PHYSICIAN ASSISTANT
Payer: COMMERCIAL

## 2018-08-21 VITALS
BODY MASS INDEX: 28.52 KG/M2 | HEART RATE: 87 BPM | RESPIRATION RATE: 18 BRPM | SYSTOLIC BLOOD PRESSURE: 124 MMHG | WEIGHT: 155 LBS | HEIGHT: 62 IN | OXYGEN SATURATION: 100 % | DIASTOLIC BLOOD PRESSURE: 81 MMHG | TEMPERATURE: 98 F

## 2018-08-21 DIAGNOSIS — M79.622 AXILLARY PAIN, LEFT: ICD-10-CM

## 2018-08-21 PROCEDURE — G0463 HOSPITAL OUTPT CLINIC VISIT: HCPCS | Performed by: PHYSICIAN ASSISTANT

## 2018-08-21 PROCEDURE — 99213 OFFICE O/P EST LOW 20 MIN: CPT | Mod: Z6 | Performed by: PHYSICIAN ASSISTANT

## 2018-08-21 ASSESSMENT — ENCOUNTER SYMPTOMS
FATIGUE: 1
MYALGIAS: 1
FEVER: 0
COLOR CHANGE: 0
WOUND: 0

## 2018-08-21 NOTE — ED AVS SNAPSHOT
[facility] Emergency Department    [address] [address]    Phone:  [phone]    Fax:  [phone]                                       Cecilia Blackmon   MRN: [id number]    Department:  [facility] Emergency Department   Date of Visit:  8/21/2018           Patient Information     Date Of Birth          1975        Your diagnoses for this visit were:     Axillary pain, left        You were seen by Raquel Mcdonald PA-C.      Follow-up Information     Follow up with Ammy Rolon MD In 4 days.    Specialty:  Family Practice    Why:  As needed    Contact information:    [address] [place] [address]  [phone]          Follow up with [facility] Emergency Department.    Specialty:  EMERGENCY MEDICINE    Why:  As needed, If symptoms worsen    Contact information:    [address] [place] [id number] [phone]    Additional information:    The medical center is located at   [address] (between [address] in [place], [place] miles north   of [place]).        Discharge Instructions       Heat, rest, tylenol/ibuprofen over the counter as needed for pain.    Patient to keep close eye on area and to return or follow up with primary care provider if redness occurs, increased swelling, radiation of pain, persistent fevers, fluid-filled pocket, or change in symptoms occur.    She to follow-up with primary care doctor in the next few days for recheck.    24 Hour Appointment Hotline       To make an appointment at any [place] clinic, call [phone] ([phone]). If you don't have a family doctor or clinic, we will help you find one. [facility] are conveniently located to serve the needs of you and your family.             Review of your medicines      Our records show that you are taking the medicines listed below. If these are incorrect, please call your family doctor or clinic.        Dose /  Directions Last dose taken    albuterol 108 (90 Base) MCG/ACT inhaler   Commonly known as:  PROAIR HFA/PROVENTIL HFA/VENTOLIN HFA   Dose:  2 puff   Quantity:  1 Inhaler        Inhale 2 puffs into the lungs every 6 hours as needed for shortness of breath / dyspnea   Refills:  11        buPROPion 150 MG 24 hr tablet   Commonly known as:  WELLBUTRIN XL   Dose:  150 mg   Quantity:  90 tablet        Take 1 tablet (150 mg) by mouth daily   Refills:  3        escitalopram 20 MG tablet   Commonly known as:  LEXAPRO   Dose:  20 mg   Quantity:  90 tablet        Take 1 tablet (20 mg) by mouth daily   Refills:  3        SUMAtriptan 100 MG tablet   Commonly known as:  IMITREX   Dose:  100 mg   Quantity:  18 tablet        Take 1 tablet (100 mg) by mouth at onset of headache for migraine May repeat dose in 2 hours.  Do not exceed 200 mg in 24 hours   Refills:  11                Orders Needing Specimen Collection     None      Pending Results     No orders found from 8/19/2018 to 8/22/2018.            Pending Culture Results     No orders found from 8/19/2018 to 8/22/2018.            Pending Results Instructions     If you had any lab results that were not finalized at the time of your Discharge, you can call the ED Lab Result RN at [phone]. You will be contacted by this team for any positive Lab results or changes in treatment. The nurses are available 7 days a week from 10A to 6:30P.  You can leave a message 24 hours per day and they will return your call.        Test Results From Your Hospital Stay               Thank you for choosing [facility]       Thank you for choosing [facility] for your care. Our goal is always to provide you with excellent care. Hearing back from our patients is one way we can continue to improve our services. Please take a few minutes to complete the written survey that you may receive in the mail after you visit with us. Thank you!        [employer] Information     [employer] gives you secure access to your  electronic health record. If you see a primary care provider, you can also send messages to your care team and make appointments. If you have questions, please call your primary care clinic.  If you do not have a primary care provider, please call [phone] and they will assist you.        Care EveryWhere ID     This is your Care EveryWhere ID. This could be used by other organizations to access your [place] medical records  [id number]        Equal Access to Services     FELICIA AWAD : Luis Medina, tyron weinberg, andreea martines, bryce solomon . So [facility] [phone].    ATENCIÓN: Si habla español, tiene a ortega disposición servicios gratuitos de asistencia lingüística. Llame al [phone].    We comply with applicable federal civil rights laws and [place] laws. We do not discriminate on the basis of race, color, national origin, age, disability, sex, sexual orientation, or gender identity.            After Visit Summary       This is your record. Keep this with you and show to your community pharmacist(s) and doctor(s) at your next visit.

## 2018-08-22 NOTE — DISCHARGE INSTRUCTIONS
Heat, rest, tylenol/ibuprofen over the counter as needed for pain.    Patient to keep close eye on area and to return or follow up with primary care provider if redness occurs, increased swelling, radiation of pain, persistent fevers, fluid-filled pocket, or change in symptoms occur.    She to follow-up with primary care doctor in the next few days for recheck.

## 2018-08-22 NOTE — ED TRIAGE NOTES
Patient here for pain/swelling under the L arm - feeling fatigued/headache, symptoms started this afternoon. Patient presents ambulatory to the urgent care.

## 2018-08-22 NOTE — ED PROVIDER NOTES
History     Chief Complaint   Patient presents with     Lymphadenopathy     lump in armpit     HPI  Cecilia Blackmon is a 42 year old female who presents to the urgent care with left axillary pain and swelling.  Patient states she woke up this morning and had slight headache and felt under the weather.  Patient came home from work due to fatigue and not feeling great and took a shower.  Patient states when she was taking a shower she noticed that her left axilla was tender and painful and slightly swollen.  Patient states she took a nap and when she woke up her  looked at the left axilla and stated that it was very painful and swollen also.  Patient denies any redness, fevers, nausea/vomiting, breast lumps or rash to the area, no fluid-filled pocket or fluctuance/induration noted.  Patient states she has not taken anything for the pain today.    Problem List:    Patient Active Problem List    Diagnosis Date Noted     Enteritis 02/17/2015     Priority: Medium     Abdominal pain 02/17/2015     Priority: Medium     Acute cholecystitis 12/29/2014     Priority: Medium     HYPERLIPIDEMIA LDL GOAL <160 10/31/2010     Priority: Medium     Family history of breast cancer in mother 09/17/2009     Priority: Medium     Mild persistent asthma 03/28/2006     Priority: Medium     Headache 02/25/2005     Priority: Medium      2-3 in past yr.  Reduced after ear piercing at acupressure point.       Anxiety state 02/25/2005     Priority: Medium     Problem list name updated by automated process. Provider to review          Past Medical History:    Past Medical History:   Diagnosis Date     Abdominal pain, other specified site      Abdominal pain, right upper quadrant      Anxiety state, unspecified      ASCUS on Pap smear 2003     COPD (chronic obstructive pulmonary disease) (H)      Family history of breast cancer in mother 9/17/2009     Family history of breast cancer in mother      Family history of breast cancer in  mother      Headache 2005     Headache      Headache      Headache(784.0)      Hx of colposcopy with cervical biopsy 2003     Mental disorders of mother, postpartum      Migraine, unspecified, without mention of intractable migraine without mention of status migrainosus      Other specified cardiac dysrhythmias(427.89)      Other specified complication, antepartum(646.83)      Swelling of limb      Uncomplicated asthma      Unspecified viral infection, in conditions classified elsewhere and of unspecified site      Urinary tract infection, site not specified        Past Surgical History:    Past Surgical History:   Procedure Laterality Date     ABDOMEN SURGERY  [id number]    Csecttion     COLONOSCOPY       GI SURGERY       LAPAROSCOPIC CHOLECYSTECTOMY N/A 2014    Procedure: LAPAROSCOPIC CHOLECYSTECTOMY;  Surgeon: Ulises Starr MD;  Location: [facility] OR     SURGICAL HISTORY OF -       Csection       Family History:    Family History   Problem Relation Age of Onset     Depression Mother      Respiratory Mother      COPD, emphysema     Cancer Mother      Neurologic Disorder Mother      raunads syndrom     Breast Cancer Mother      Stage 4     Other Cancer Mother      Cervical     Anesthesia Reaction Mother      Osteoperosis Mother      GASTROINTESTINAL DISEASE Maternal Grandmother      Depression Maternal Grandmother      Hypertension Maternal Grandmother      Cancer Maternal Grandmother      skin cancer     Breast Cancer Maternal Grandmother      Other Cancer Maternal Grandmother      Melanoma     Breast Cancer Cousin      Grandfathers side     Breast Cancer Cousin      Grandfathers side BRCA 1     Prostate Cancer Other      maternal side     Prostate Cancer Other      maternal side     Prostate Cancer Other      maternal side     Leukemia Paternal Half-Sister      1/2 sister,  in age 13 or 15       Social History:  Marital Status:   [4]  Social History   Substance Use Topics     Smoking status:  "Never Smoker     Smokeless tobacco: Never Used     Alcohol use Yes      Comment: 1-2 times week        Medications:      albuterol (PROAIR HFA/PROVENTIL HFA/VENTOLIN HFA) 108 (90 Base) MCG/ACT inhaler   buPROPion (WELLBUTRIN XL) 150 MG 24 hr tablet   escitalopram (LEXAPRO) 20 MG tablet   SUMAtriptan (IMITREX) 100 MG tablet         Review of Systems   Constitutional: Positive for fatigue. Negative for fever.        Felt warm today   Musculoskeletal: Positive for myalgias.        Left axilla pain and swelling.    Skin: Negative for color change, rash and wound.   All other systems reviewed and are negative.      Physical Exam   BP: 124/81  Pulse: 87  Temp: 98  F (36.7  C)  Resp: 18  Height: 157.5 cm (5' 2\")  Weight: 70.3 kg (155 lb)  SpO2: 100 %      Physical Exam   Constitutional: She is oriented to person, place, and time. She appears well-developed and well-nourished. No distress.   Cardiovascular: Normal rate, regular rhythm, normal heart sounds and intact distal pulses.    Pulmonary/Chest: Effort normal and breath sounds normal.   Musculoskeletal: Normal range of motion.   Lymphadenopathy:   Positive left axillary tenderness with soft tissue swelling noted. No erythema, induration, fluctuance, or palpable lymphadenopathy appreciated.    Neurological: She is alert and oriented to person, place, and time. She has normal strength. No sensory deficit. GCS eye subscore is 4. GCS verbal subscore is 5. GCS motor subscore is 6.   Skin: Skin is warm, dry and intact. No abrasion, no bruising, no ecchymosis, no petechiae and no rash noted. She is not diaphoretic. No erythema. No pallor.   Psychiatric: She has a normal mood and affect. Her behavior is normal. Judgment and thought content normal.       ED Course     ED Course     Procedures              Critical Care time:  none               No results found for this or any previous visit (from the past 24 hour(s)).    Medications - No data to display    Assessments & Plan " (with Medical Decision Making)     I have reviewed the nursing notes.    I have reviewed the findings, diagnosis, plan and need for follow up with the patient.  1) left axillary pain: DDX: lymphadenopathy, lymphadenitis, early abscess.   No antibiotics indicated at this time.     Heat, rest, tylenol/ibuprofen over the counter as needed for pain.    Patient to keep close eye on area and to return or follow up with primary care provider if redness occurs, increased swelling, radiation of pain, persistent fevers, fluid-filled pocket, or change in symptoms occur.    She to follow-up with primary care doctor in the next few days for recheck.    Discharge Medication List as of 8/21/2018  8:12 PM          Final diagnoses:   Axillary pain, left       8/21/2018   [facility] EMERGENCY DEPARTMENT     Raquel Mcdonald PA-C  08/21/18 2018

## 2018-11-01 ENCOUNTER — HOSPITAL ENCOUNTER (EMERGENCY)
Facility: CLINIC | Age: 43
Discharge: HOME | End: 2018-11-01
Attending: EMERGENCY MEDICINE | Admitting: EMERGENCY MEDICINE
Payer: COMMERCIAL

## 2018-11-01 ENCOUNTER — PATIENT OUTREACH (OUTPATIENT)
Dept: CARE COORDINATION | Facility: CLINIC | Age: 43
End: 2018-11-01

## 2018-11-01 VITALS
BODY MASS INDEX: 28.89 KG/M2 | HEIGHT: 62 IN | DIASTOLIC BLOOD PRESSURE: 68 MMHG | SYSTOLIC BLOOD PRESSURE: 123 MMHG | WEIGHT: 157 LBS | HEART RATE: 91 BPM | TEMPERATURE: 98.9 F | OXYGEN SATURATION: 99 % | RESPIRATION RATE: 20 BRPM

## 2018-11-01 DIAGNOSIS — B34.9 VIRAL SYNDROME: ICD-10-CM

## 2018-11-01 LAB
FLUAV+FLUBV AG SPEC QL: NEGATIVE
FLUAV+FLUBV AG SPEC QL: NEGATIVE
SPECIMEN SOURCE: NORMAL

## 2018-11-01 PROCEDURE — 25000131 ZZH RX MED GY IP 250 OP 636 PS 637: Performed by: EMERGENCY MEDICINE

## 2018-11-01 PROCEDURE — 99284 EMERGENCY DEPT VISIT MOD MDM: CPT | Performed by: EMERGENCY MEDICINE

## 2018-11-01 PROCEDURE — 96372 THER/PROPH/DIAG INJ SC/IM: CPT | Performed by: EMERGENCY MEDICINE

## 2018-11-01 PROCEDURE — 25000128 H RX IP 250 OP 636: Performed by: EMERGENCY MEDICINE

## 2018-11-01 PROCEDURE — 99284 EMERGENCY DEPT VISIT MOD MDM: CPT | Mod: Z6 | Performed by: EMERGENCY MEDICINE

## 2018-11-01 PROCEDURE — 87804 INFLUENZA ASSAY W/OPTIC: CPT | Performed by: EMERGENCY MEDICINE

## 2018-11-01 RX ORDER — ONDANSETRON 4 MG/1
4 TABLET, ORALLY DISINTEGRATING ORAL ONCE
Status: COMPLETED | OUTPATIENT
Start: 2018-11-01 | End: 2018-11-01

## 2018-11-01 RX ORDER — KETOROLAC TROMETHAMINE 30 MG/ML
30 INJECTION, SOLUTION INTRAMUSCULAR; INTRAVENOUS ONCE
Status: COMPLETED | OUTPATIENT
Start: 2018-11-01 | End: 2018-11-01

## 2018-11-01 RX ADMIN — KETOROLAC TROMETHAMINE 30 MG: 30 INJECTION, SOLUTION INTRAMUSCULAR at 03:57

## 2018-11-01 RX ADMIN — ONDANSETRON 4 MG: 4 TABLET, ORALLY DISINTEGRATING ORAL at 03:57

## 2018-11-01 ASSESSMENT — ACTIVITIES OF DAILY LIVING (ADL): DEPENDENT_IADLS:: INDEPENDENT

## 2018-11-01 NOTE — ED PROVIDER NOTES
"Chief Complaint:  Chief Complaint   Patient presents with     Flu Symptoms     pt has been feeling ill with fever, sore throat , non productive cough, chills, etc. since Tuesday.  Has a history of asthma         HPI:   Cecilia Blackmon is a 42 year old female who presents with a history of Fever 101-102, Myalgias (muscle aches), Fatigue, Sore throat and Deep cough beginning 2 day(s) ago.  There is no Altered mental status, Difficulty breathing for reasons other than congestion, Fever > 104.9 F, Flat purple or dark red spots on face or trunk and stiff or painful neck and Severe headache.  There is no known recent exposure to influenza.  The symptoms are improved by tylenol/ibuprofen and are Moderate in severity.  Reports exposure to \"public\" at work.  Also with nausea and diarrhea.  No rash.  No change in medications.  No travel.  Still tolerating some fluids.  No blood in vomit or stool.      Medications:   Current Outpatient Prescriptions   Medication Sig Dispense Refill     albuterol (PROAIR HFA/PROVENTIL HFA/VENTOLIN HFA) 108 (90 Base) MCG/ACT inhaler Inhale 2 puffs into the lungs every 6 hours as needed for shortness of breath / dyspnea 1 Inhaler 11     buPROPion (WELLBUTRIN XL) 150 MG 24 hr tablet Take 1 tablet (150 mg) by mouth daily 90 tablet 3     escitalopram (LEXAPRO) 20 MG tablet Take 1 tablet (20 mg) by mouth daily 90 tablet 3     SUMAtriptan (IMITREX) 100 MG tablet Take 1 tablet (100 mg) by mouth at onset of headache for migraine May repeat dose in 2 hours.  Do not exceed 200 mg in 24 hours 18 tablet 11       Allergies:   No Known Allergies     Medications updated and reviewed.  Past, family and surgical history is updated and reviewed in the record.     Review of Systems:  Constitutional: see HPI  HENT: see HPI  Respiratory: see HPI   Gastrointestinal: see HPI  Musculoskeletal: see HPI     Physical Exam:   /68  Pulse 91  Temp 98.9  F (37.2  C) (Oral)  Resp 20  Ht 1.575 m (5' 2\")  Wt " 71.2 kg (157 lb)  LMP 10/01/2018  SpO2 99%  BMI 28.72 kg/m2   General: healthy, alert and no distress  Eye: negative  Mouth/Throat: ABNORMAL - erythematous.  Neck: Neck supple.   Chest/Pulmonary: Normal - Clear to auscultation without rales, rhonchi, or wheezing.  Cardiovascular: {NORMAL - regular rate and rhythm without murmur.  Abdomen: Abdomen soft, non-tender. BS normal. No masses, organomegaly  Musculoskel/Extremities: Extremities normal. No deformities, edema, or skin discoloration.    Results for orders placed or performed during the hospital encounter of 11/01/18 (from the past 24 hour(s))   Influenza A/B antigen   Result Value Ref Range    Influenza A/B Agn Specimen Nasal     Influenza A Negative NEG^Negative    Influenza B Negative NEG^Negative        Medical Decision Making:  The patient has No high risk factors and has had symptoms for 2 day(s) .  Influenza test is negative and Therefore, the patient is not a candidate for antiviral treatment.    Assessment:  Influenza like illness  1. Viral syndrome        Plan:   rest and increase fluids  NSAIDS and tylenol  The patient is discharged home with instructions on home management of influenza like illness and ways to prevent spread to other individuals.  Also instructed to return to the ED if severe worsening of symptoms.     Condition on disposition: Stable         Michael Rios MD  11/01/18 3255

## 2018-11-01 NOTE — LETTER
November 1, 2018      To Whom It May Concern:      Cecilia Blackmon was seen in our Emergency Department today, 11/01/18.  I expect her condition to improve over the next few days.  She may return to work 11/3/18.     Sincerely,        Michael Rios MD

## 2018-11-01 NOTE — LETTER
Health Care Home - Access Care Plan    About Me  Patient Name:  Cecilia Blackmon    YOB: 1975  Age:                             42 year old   Eugenie MRN:            [id number] Telephone Information:     Home Phone [phone]   Mobile [phone]       Address:    [address] [address] Email address:  [email or web address]      Emergency Contact(s)  Name Relationship Lgl Grd Work Phone Home Phone Mobile Phone   1. CHERELLE BARBA Significant ot*   [phone]              Health Maintenance: Routine Health maintenance Reviewed: Due/Overdue   Health Maintenance Due   Topic Date Due     INFLUENZA VACCINE (1) 09/01/2018     Pt to talk with provider about what is needed or can continue wait.        My Access Plan  Medical Emergency 912   Questions or concerns during clinic hours Primary Clinic Line, I will call the clinic directly: [facility] - [phone]   24 Hour Appointment Line [phone] or  [phone]) (toll free)   24 Hour Nurse Line [phone] (toll free)   Questions or concerns outside clinic hours 24 Hour Appointment Line, I will call the after-hours on-call line:   [facility] [phone] or [phone]) (toll-free)   Preferred Urgent Care [facility], [phone]   Preferred Hospital [place], [place]  [phone]   Preferred Pharmacy [facility] [address] [facility]     Behavioral Health Crisis Line The National Suicide Prevention Lifeline at 1-298.819.6171 or 911     My Care Team Members  Patient Care Team       Relationship Specialty Notifications Start End    Rehder, Ammy Isabel MD PCP - General   9/13/04     Phone: [phone] Fax: [phone] [address]    Anuradha Huston MD MD Family Practice  1/18/16     Phone: [phone] Fax: [phone]         [address] [address]    Courtney Roberto, RN Clinic Care Coordinator Primary Care - [facility] Admissions 11/1/18     Phone: [phone] Fax: [phone]               My Medical and Care Information  Problem List   Patient Active Problem List   Diagnosis     Headache     Anxiety state     Mild persistent asthma     Family history of breast cancer in mother     HYPERLIPIDEMIA LDL GOAL <160     Acute cholecystitis     Enteritis     Abdominal pain      Current Medications and Allergies:  See printed Medication Report

## 2018-11-01 NOTE — PROGRESS NOTES
Clinic Care Coordination Contact  [facility]/Voicemail    Referral Source: ED Follow-Up    Clinical Data: Care Coordinator Outreach, ED f/u 10/31/18 for viral syndrome.    Outreach attempted x 1.  Left message on voicemail with call back information and requested return call.    Plan: Care Coordinator will mail out care coordination introduction letter with care coordinator contact information and explanation of care coordination services. Care Coordinator will try to reach patient again in 1-2 business days.    Courtney Roberto RN, [facility]  RN Care Coordinator  [facility], [facility]  Phone # [phone]  11/1/2018 11:34 AM

## 2018-11-01 NOTE — ED AVS SNAPSHOT
" [facility] Emergency Department    [address] [facility] [address] [address]    Phone:  [phone]    Fax:  [phone]                                       Cecilia Blackmon   MRN: [id number]    Department:  [facility] Emergency Department   Date of Visit:  11/1/2018           Patient Information     Date Of Birth          1975        Your diagnoses for this visit were:     Viral syndrome        You were seen by Michael Rios MD.        Discharge Instructions       Flu test was negative.    Continue tylenol and ibuprofen.         Viral Syndrome (Adult)  A viral illness may cause a number of symptoms. The symptoms depend on the part of the body that the virus affects. If it settles in your nose, throat, and lungs, it may cause cough, sore throat, congestion, and sometimes headache. If it settles in your stomach and intestinal tract, it may cause vomiting and diarrhea. Sometimes it causes vague symptoms like \"aching all over,\" feeling tired, loss of appetite, or fever.  A viral illness usually lasts 1 to 2 weeks, but sometimes it lasts longer. In some cases, a more serious infection can look like a viral syndrome in the first few days of the illness. You may need another exam and additional tests to know the difference. Watch for the warning signs listed below.  Home care  Follow these guidelines for taking care of yourself at home:    If symptoms are severe, rest at home for the first 2 to 3 days.    Stay away from cigarette smoke - both your smoke and the smoke from others.    You may use over-the-counter acetaminophen or ibuprofen for fever, muscle aching, and headache, unless another medicine was prescribed for this. If you have chronic liver or kidney disease or ever had a stomach ulcer or GI bleeding, talk with your doctor before using these medicines. No one who is younger than 18 and ill with a fever should take aspirin. It may cause severe disease or death.    Your appetite " may be poor, so a light diet is fine. Avoid dehydration by drinking 8 to 12 8-ounce glasses of fluids each day. This may include water; orange juice; lemonade; apple, grape, and cranberry juice; clear fruit drinks; electrolyte replacement and sports drinks; and decaffeinated teas and coffee. If you have been diagnosed with a kidney disease, ask your doctor how much and what types of fluids you should drink to prevent dehydration. If you have kidney disease, drinking too much fluid can cause it build up in the your body and be dangerous to your health.    Over-the-counter remedies won't shorten the length of the illness but may be helpful for cough, sore throat; and nasal and sinus congestion. Don't use decongestants if you have high blood pressure.  Follow-up care  Follow up with your healthcare provider if you do not improve over the next week.  Call 911  Call 911 if any of the following occur:    Convulsion    Feeling weak, dizzy, or like you are going to faint    Chest pain, shortness of breath, wheezing, or difficulty breathing  When to seek medical advice  Call your healthcare provider right away if any of these occur:    Cough with lots of colored sputum (mucus) or blood in your sputum    Chest pain, shortness of breath, wheezing, or difficulty breathing    Severe headache; face, neck, or ear pain    Severe, constant pain in the lower right side of your belly (abdominal)    Continued vomiting (can t keep liquids down)    Frequent diarrhea (more than 5 times a day); blood (red or black color) or mucus in diarrhea    Feeling weak, dizzy, or like you are going to faint    Extreme thirst    Fever of 100.4 F (38 C) or higher, or as directed by your healthcare provider  Date Last Reviewed: 9/25/2015 2000-2017 [employer]. [address] [place] [address], [place] [address]. All rights reserved. This information is not intended as a substitute for professional medical care. Always follow your healthcare  professional's instructions.          24 Hour Appointment Hotline       To make an appointment at any [facility], call [phone] ([phone]). If you don't have a family doctor or clinic, we will help you find one. [facility] are conveniently located to serve the needs of you and your family.             Review of your medicines      Our records show that you are taking the medicines listed below. If these are incorrect, please call your family doctor or clinic.        Dose / Directions Last dose taken    albuterol 108 (90 Base) MCG/ACT inhaler   Commonly known as:  PROAIR HFA/PROVENTIL HFA/VENTOLIN HFA   Dose:  2 puff   Quantity:  1 Inhaler        Inhale 2 puffs into the lungs every 6 hours as needed for shortness of breath / dyspnea   Refills:  11        buPROPion 150 MG 24 hr tablet   Commonly known as:  WELLBUTRIN XL   Dose:  150 mg   Quantity:  90 tablet        Take 1 tablet (150 mg) by mouth daily   Refills:  3        escitalopram 20 MG tablet   Commonly known as:  LEXAPRO   Dose:  20 mg   Quantity:  90 tablet        Take 1 tablet (20 mg) by mouth daily   Refills:  3        SUMAtriptan 100 MG tablet   Commonly known as:  IMITREX   Dose:  100 mg   Quantity:  18 tablet        Take 1 tablet (100 mg) by mouth at onset of headache for migraine May repeat dose in 2 hours.  Do not exceed 200 mg in 24 hours   Refills:  11                Procedures and tests performed during your visit     Influenza A/B antigen      Orders Needing Specimen Collection     None      Pending Results     No orders found from 10/30/2018 to 11/2/2018.            Pending Culture Results     No orders found from 10/30/2018 to 11/2/2018.            Pending Results Instructions     If you had any lab results that were not finalized at the time of your Discharge, you can call the ED Lab Result RN at [phone]. You will be contacted by this team for any positive Lab results or changes in treatment. The nurses are available 7  days a week from 10A to 6:30P.  You can leave a message 24 hours per day and they will return your call.        Test Results From Your Hospital Stay        11/1/2018  4:24 AM      Component Results     Component Value Ref Range & Units Status    Influenza A/B Agn Specimen Nasal  Final    Influenza A Negative NEG^Negative Final    Influenza B Negative NEG^Negative Final                Thank you for choosing [facility]       Thank you for choosing [facility] for your care. Our goal is always to provide you with excellent care. Hearing back from our patients is one way we can continue to improve our services. Please take a few minutes to complete the written survey that you may receive in the mail after you visit with us. Thank you!        [employer] Information     [employer] gives you secure access to your electronic health record. If you see a primary care provider, you can also send messages to your care team and make appointments. If you have questions, please call your primary care clinic.  If you do not have a primary care provider, please call [phone] and they will assist you.        Care EveryWhere ID     This is your Care EveryWhere ID. This could be used by other organizations to access your [facility] medical records  [id number]        Equal Access to Services     TENNILLE AWAD : Hadroberto Medina, tyron weinberg, bryce scott. So [facility] [phone].    ATENCIÓN: Si habla español, tiene a ortega disposición servicios gratuitos de asistencia lingüística. Ana al [phone].    We comply with applicable federal civil rights laws and [place] laws. We do not discriminate on the basis of race, color, national origin, age, disability, sex, sexual orientation, or gender identity.            After Visit Summary       This is your record. Keep this with you and show to your community pharmacist(s) and doctor(s) at your next visit.

## 2018-11-01 NOTE — ED AVS SNAPSHOT
[facility] Emergency Department    [address] [address]    Phone:  [phone]    Fax:  [phone]                                       Cecilia Blackmon   MRN: [id number]    Department:  [facility] Emergency Department   Date of Visit:  11/1/2018           After Visit Summary Signature Page     I have received my discharge instructions, and my questions have been answered. I have discussed any challenges I see with this plan with the nurse or doctor.    ..........................................................................................................................................  Patient/Patient Representative Signature      ..........................................................................................................................................  Patient Representative Print Name and Relationship to Patient    ..................................................               ................................................  Date                                   Time    ..........................................................................................................................................  Reviewed by Signature/Title    ...................................................              ..............................................  Date                                               Time          22EPIC Rev 08/18

## 2018-11-01 NOTE — DISCHARGE INSTRUCTIONS
"Flu test was negative.    Continue tylenol and ibuprofen.         Viral Syndrome (Adult)  A viral illness may cause a number of symptoms. The symptoms depend on the part of the body that the virus affects. If it settles in your nose, throat, and lungs, it may cause cough, sore throat, congestion, and sometimes headache. If it settles in your stomach and intestinal tract, it may cause vomiting and diarrhea. Sometimes it causes vague symptoms like \"aching all over,\" feeling tired, loss of appetite, or fever.  A viral illness usually lasts 1 to 2 weeks, but sometimes it lasts longer. In some cases, a more serious infection can look like a viral syndrome in the first few days of the illness. You may need another exam and additional tests to know the difference. Watch for the warning signs listed below.  Home care  Follow these guidelines for taking care of yourself at home:    If symptoms are severe, rest at home for the first 2 to 3 days.    Stay away from cigarette smoke - both your smoke and the smoke from others.    You may use over-the-counter acetaminophen or ibuprofen for fever, muscle aching, and headache, unless another medicine was prescribed for this. If you have chronic liver or kidney disease or ever had a stomach ulcer or GI bleeding, talk with your doctor before using these medicines. No one who is younger than 18 and ill with a fever should take aspirin. It may cause severe disease or death.    Your appetite may be poor, so a light diet is fine. Avoid dehydration by drinking 8 to 12 8-ounce glasses of fluids each day. This may include water; orange juice; lemonade; apple, grape, and cranberry juice; clear fruit drinks; electrolyte replacement and sports drinks; and decaffeinated teas and coffee. If you have been diagnosed with a kidney disease, ask your doctor how much and what types of fluids you should drink to prevent dehydration. If you have kidney disease, drinking too much fluid can cause it build " up in the your body and be dangerous to your health.    Over-the-counter remedies won't shorten the length of the illness but may be helpful for cough, sore throat; and nasal and sinus congestion. Don't use decongestants if you have high blood pressure.  Follow-up care  Follow up with your healthcare provider if you do not improve over the next week.  Call 911  Call 911 if any of the following occur:    Convulsion    Feeling weak, dizzy, or like you are going to faint    Chest pain, shortness of breath, wheezing, or difficulty breathing  When to seek medical advice  Call your healthcare provider right away if any of these occur:    Cough with lots of colored sputum (mucus) or blood in your sputum    Chest pain, shortness of breath, wheezing, or difficulty breathing    Severe headache; face, neck, or ear pain    Severe, constant pain in the lower right side of your belly (abdominal)    Continued vomiting (can t keep liquids down)    Frequent diarrhea (more than 5 times a day); blood (red or black color) or mucus in diarrhea    Feeling weak, dizzy, or like you are going to faint    Extreme thirst    Fever of 100.4 F (38 C) or higher, or as directed by your healthcare provider  Date Last Reviewed: 9/25/2015 2000-2017 [employer]. [address] [place] [address], [place] [address]. All rights reserved. This information is not intended as a substitute for professional medical care. Always follow your healthcare professional's instructions.

## 2018-11-01 NOTE — LETTER
[place] CARE COORDINATION  [place] [id number] [address]  [phone]    November 1, 2018    Cecilia Blackmon  [address] [address]      Dear Cecilia,    I am a clinic care coordinator who works with Ammy Rolon MD at [facility]. I recently tried to call and was unable to reach you. I wanted to introduce myself and provide you with my contact information so that you can call me with questions or concerns about your health care. Below is a description of clinic care coordination and how I can further assist you.     The clinic care coordinator is a registered nurse and/or  who understand the health care system. The goal of clinic care coordination is to help you manage your health and improve access to the [facility] system in the most efficient manner. The registered nurse can assist you in meeting your health care goals by providing education, coordinating services, and strengthening the communication among your providers. The  can assist you with financial, behavioral, psychosocial, chemical dependency, counseling, and/or psychiatric resources.    Please feel free to contact me at [phone], with any questions or concerns. We at [facility] are focused on providing you with the highest-quality healthcare experience possible and that all starts with you.     Sincerely,     Courtney Roberto    Enclosed: I have enclosed a copy of a 24 Hour Access Plan. This has helpful phone numbers for you to call when needed. Please keep this in an easy to access place to use as needed.

## 2018-11-06 ASSESSMENT — ACTIVITIES OF DAILY LIVING (ADL): DEPENDENT_IADLS:: INDEPENDENT

## 2018-11-06 NOTE — PROGRESS NOTES
Clinic Care Coordination Contact  [facility]/Voicemail    Referral Source: ED Follow-Up    Clinical Data: Care Coordinator Outreach, ED f/u 10/31 for viral syndrome.    Outreach attempted x 2.  Left message on voicemail with call back information and requested return call.    Plan: Care Coordinator mailed out care coordination introduction letter on 11/1/18. Care Coordinator will do no further outreaches at this time.    Courtney Roberto RN, [facility]  RN Care Coordinator  [facility], [facility]  Phone # [phone]  11/6/2018 10:04 AM

## 2018-11-23 ENCOUNTER — MYC MEDICAL ADVICE (OUTPATIENT)
Dept: FAMILY MEDICINE | Facility: CLINIC | Age: 43
End: 2018-11-23

## 2018-11-23 ENCOUNTER — MYC REFILL (OUTPATIENT)
Dept: FAMILY MEDICINE | Facility: CLINIC | Age: 43
End: 2018-11-23

## 2018-11-23 DIAGNOSIS — F41.1 ANXIETY STATE: ICD-10-CM

## 2018-11-23 RX ORDER — BUPROPION HYDROCHLORIDE 150 MG/1
150 TABLET ORAL DAILY
Qty: 90 TABLET | Refills: 3 | Status: CANCELLED | OUTPATIENT
Start: 2018-11-23

## 2018-11-23 RX ORDER — BUPROPION HYDROCHLORIDE 150 MG/1
150 TABLET ORAL DAILY
Qty: 90 TABLET | Refills: 2 | Status: SHIPPED | OUTPATIENT
Start: 2018-11-23 | End: 2019-03-19

## 2018-11-23 NOTE — TELEPHONE ENCOUNTER
Message from [employer]:  Original authorizing provider: MD Cecilia Lang would like a refill of the following medications:  buPROPion (WELLBUTRIN XL) 150 MG 24 hr tablet [Ammy Rolon MD]    Preferred pharmacy: [id number] [address] [facility]    Comment:

## 2019-02-05 ENCOUNTER — MYC MEDICAL ADVICE (OUTPATIENT)
Dept: FAMILY MEDICINE | Facility: CLINIC | Age: 44
End: 2019-02-05

## 2019-02-05 DIAGNOSIS — D22.9 ATYPICAL MOLE: Primary | ICD-10-CM

## 2019-02-06 ENCOUNTER — OFFICE VISIT (OUTPATIENT)
Dept: DERMATOLOGY | Facility: CLINIC | Age: 44
End: 2019-02-06
Payer: COMMERCIAL

## 2019-02-06 VITALS — SYSTOLIC BLOOD PRESSURE: 98 MMHG | OXYGEN SATURATION: 99 % | DIASTOLIC BLOOD PRESSURE: 68 MMHG | HEART RATE: 85 BPM

## 2019-02-06 DIAGNOSIS — L82.1 SEBORRHEIC KERATOSIS: ICD-10-CM

## 2019-02-06 DIAGNOSIS — L81.4 LENTIGO: ICD-10-CM

## 2019-02-06 DIAGNOSIS — D48.5 NEOPLASM OF UNCERTAIN BEHAVIOR OF SKIN: Primary | ICD-10-CM

## 2019-02-06 DIAGNOSIS — D22.9 MULTIPLE BENIGN NEVI: ICD-10-CM

## 2019-02-06 DIAGNOSIS — D18.01 CHERRY ANGIOMA: ICD-10-CM

## 2019-02-06 PROCEDURE — 11102 TANGNTL BX SKIN SINGLE LES: CPT | Performed by: PHYSICIAN ASSISTANT

## 2019-02-06 PROCEDURE — 88305 TISSUE EXAM BY PATHOLOGIST: CPT | Mod: TC | Performed by: PHYSICIAN ASSISTANT

## 2019-02-06 PROCEDURE — 11103 TANGNTL BX SKIN EA SEP/ADDL: CPT | Performed by: PHYSICIAN ASSISTANT

## 2019-02-06 PROCEDURE — 99214 OFFICE O/P EST MOD 30 MIN: CPT | Mod: 25 | Performed by: PHYSICIAN ASSISTANT

## 2019-02-06 NOTE — PATIENT INSTRUCTIONS
Wound Care Instructions     FOR SUPERFICIAL WOUNDS     [facility] [phone]    [facility] [phone]                       AFTER 24 HOURS YOU SHOULD REMOVE THE BANDAGE AND BEGIN DAILY DRESSING CHANGES AS FOLLOWS:     1) Remove Dressing.     2) Clean and dry the area with tap water using a Q-tip or sterile gauze pad.     3) Apply Vaseline, Aquaphor, Polysporin ointment or Bacitracin ointment over entire wound.  Do NOT use Neosporin ointment.     4) Cover the wound with a band-aid, or a sterile non-stick gauze pad and micropore paper tape      REPEAT THESE INSTRUCTIONS AT LEAST ONCE A DAY UNTIL THE WOUND HAS COMPLETELY HEALED.    It is an old wives tale that a wound heals better when it is exposed to air and allowed to dry out. The wound will heal faster with a better cosmetic result if it is kept moist with ointment and covered with a bandage.    **Do not let the wound dry out.**      Supplies Needed:      *Cotton tipped applicators (Q-tips)    *Polysporin Ointment or Bacitracin Ointment (NOT NEOSPORIN)    *Band-aids or non-stick gauze pads and micropore paper tape.      PATIENT INFORMATION:    During the healing process you will notice a number of changes. All wounds develop a small halo of redness surrounding the wound.  This means healing is occurring. Severe itching with extensive redness usually indicates sensitivity to the ointment or bandage tape used to dress the wound.  You should call our office if this develops.      Swelling  and/or discoloration around your surgical site is common, particularly when performed around the eye.    All wounds normally drain.  The larger the wound the more drainage there will be.  After 7-10 days, you will notice the wound beginning to shrink and new skin will begin to grow.  The wound is healed when you can see skin has formed over the entire area.  A healed wound has a healthy, shiny look to the surface and is red to dark pink in color  to normalize.  Wounds may take approximately 4-6 weeks to heal.  Larger wounds may take 6-8 weeks.  After the wound is healed you may discontinue dressing changes.    You may experience a sensation of tightness as your wound heals. This is normal and will gradually subside.    Your healed wound may be sensitive to temperature changes. This sensitivity improves with time, but if you re having a lot of discomfort, try to avoid temperature extremes.    Patients frequently experience itching after their wound appears to have healed because of the continue healing under the skin.  Plain Vaseline will help relieve the itching.        POSSIBLE COMPLICATIONS    BLEEDIN. Leave the bandage in place.  2. Use tightly rolled up gauze or a cloth to apply direct pressure over the bandage for 30  minutes.  3. Reapply pressure for an additional 30 minutes if necessary  4. Use additional gauze and tape to maintain pressure once the bleeding has stopped.

## 2019-02-06 NOTE — NURSING NOTE
"Chief Complaint   Patient presents with     Skin Check     look at moles on face       Initial BP 98/68 (BP Location: Left arm, Patient Position: Chair, Cuff Size: Adult Large)   Pulse 85   SpO2 99%  Estimated body mass index is 28.72 kg/m  as calculated from the following:    Height as of 11/1/18: 1.575 m (5' 2\").    Weight as of 11/1/18: 71.2 kg (157 lb).  Medications and allergies reviewed.    Thad ALVAREZ CMA    "

## 2019-02-06 NOTE — LETTER
2/6/2019         RE: Cecilia Blackmon  [address] [address]        Dear Colleague,    Thank you for referring your patient, Cecilia Blackmon, to the [facility]. Please see a copy of my visit note below.        Cecilia Blackmon is a 43 year old year old female patient here today for moles on face. She notes moles get irritated, she notes she will occasionally scratch them. Patient has no other skin complaints today.  Remainder of the HPI, Meds, PMH, Allergies, FH, and SH was reviewed in chart.    Pertinent Hx:   No personal history of skin cancer. Family history of melanoma, maternal grandmother  Past Medical History:   Diagnosis Date     Abdominal pain, other specified site      Abdominal pain, right upper quadrant      Anxiety state, unspecified      ASCUS on Pap smear 2003    +HR HPV 16     COPD (chronic obstructive pulmonary disease) (H)     My Mom has this     Family history of breast cancer in mother 9/17/2009     Family history of breast cancer in mother      Family history of breast cancer in mother      Headache 2/25/2005     Headache      Headache      Headache(784.0)      Hx of colposcopy with cervical biopsy 6/2/2003    WNL     Mental disorders of mother, postpartum     postpartum depression     Migraine, unspecified, without mention of intractable migraine without mention of status migrainosus      Other specified cardiac dysrhythmias(427.89)      Other specified complication, antepartum(646.83)      Swelling of limb      Uncomplicated asthma     Myself     Unspecified viral infection, in conditions classified elsewhere and of unspecified site      Urinary tract infection, site not specified        Past Surgical History:   Procedure Laterality Date     ABDOMEN SURGERY  [id number]    Csecttion     COLONOSCOPY       GI SURGERY       LAPAROSCOPIC CHOLECYSTECTOMY N/A 12/30/2014    Procedure: LAPAROSCOPIC CHOLECYSTECTOMY;  Surgeon: Ulises Starr  MD KULDIP;  Location: [facility] OR     SURGICAL HISTORY OF -       Csection        Family History   Problem Relation Age of Onset     Depression Mother      Respiratory Mother         COPD, emphysema     Cancer Mother      Neurologic Disorder Mother         raunads syndrom     Breast Cancer Mother         Stage 4     Other Cancer Mother         Cervical     Anesthesia Reaction Mother      Osteoporosis Mother      Gastrointestinal Disease Maternal Grandmother      Depression Maternal Grandmother      Hypertension Maternal Grandmother      Cancer Maternal Grandmother         skin cancer     Breast Cancer Maternal Grandmother      Other Cancer Maternal Grandmother         Melanoma     Breast Cancer Cousin         Grandfathers side     Breast Cancer Cousin         Grandfathers side BRCA 1     Prostate Cancer Other         maternal side     Prostate Cancer Other         maternal side     Prostate Cancer Other         maternal side     Leukemia Paternal Half-Sister         1/2 sister,  in age 13 or 15       Social History     Socioeconomic History     Marital status:      Spouse name: Not on file     Number of children: Not on file     Years of education: Not on file     Highest education level: Not on file   Social Needs     Financial resource strain: Not on file     Food insecurity - worry: Not on file     Food insecurity - inability: Not on file     Transportation needs - medical: Not on file     Transportation needs - non-medical: Not on file   Occupational History     Not on file   Tobacco Use     Smoking status: Never Smoker     Smokeless tobacco: Never Used   Substance and Sexual Activity     Alcohol use: Yes     Comment: 1-2 times week     Drug use: No     Sexual activity: Not Currently     Partners: Male     Birth control/protection: Injection, None     Comment: my current partner has had a vasectomy.   Other Topics Concern     Parent/sibling w/ CABG, MI or angioplasty before 65F 55M? No   Social History  Narrative     Not on file       Outpatient Encounter Medications as of 2/6/2019   Medication Sig Dispense Refill     albuterol (PROAIR HFA/PROVENTIL HFA/VENTOLIN HFA) 108 (90 Base) MCG/ACT inhaler Inhale 2 puffs into the lungs every 6 hours as needed for shortness of breath / dyspnea 1 Inhaler 11     buPROPion (WELLBUTRIN XL) 150 MG 24 hr tablet Take 1 tablet (150 mg) by mouth daily 90 tablet 2     escitalopram (LEXAPRO) 20 MG tablet Take 1 tablet (20 mg) by mouth daily 90 tablet 3     SUMAtriptan (IMITREX) 100 MG tablet Take 1 tablet (100 mg) by mouth at onset of headache for migraine May repeat dose in 2 hours.  Do not exceed 200 mg in 24 hours 18 tablet 11     [DISCONTINUED] ondansetron (ZOFRAN ODT) 4 MG ODT tab Take 1-2 tablets (4-8 mg) by mouth every 8 hours as needed for nausea 12 tablet 0     No facility-administered encounter medications on file as of 2/6/2019.              Review Of Systems  Skin: As above  Eyes: negative  Ears/Nose/Throat: negative  Respiratory: No shortness of breath, dyspnea on exertion, cough, or hemoptysis  Cardiovascular: negative  Gastrointestinal: negative  Genitourinary: negative  Musculoskeletal: negative  Neurologic: negative  Psychiatric: negative  Hematologic/Lymphatic/Immunologic: negative  Endocrine: negative      O:   NAD, WDWN, Alert & Oriented, Mood & Affect wnl, Vitals stable   Here today alone   BP 98/68 (BP Location: Left arm, Patient Position: Chair, Cuff Size: Adult Large)   Pulse 85   SpO2 99%    General appearance normal   Vitals stable   Alert, oriented and in no acute distress     0.5 cm skin colored papule on right mid cheek  0.3 cm skin colored papule on right NLF  Superior  0.2 cm skin colored papule on right NLF inferior    Stuck on papules and brown macules on trunk and ext   Red papules on trunk  Flesh colored papules on trunk     The remainder of the detailed exam was unremarkable; the following areas were examined:  scalp/hair, conjunctiva/lids, face,  neck, lips/teeth, oral mucosa/gingiva, chest, back, abdomen, buttocks, digits/nails, RUE, LUE, RLE, LLE.      Eyes: Conjunctivae/lids:Normal     ENT: Lips: normal    MSK:Normal    Pulm: Breathing Normal    Neuro/Psych: Orientation:Normal; Mood/Affect:Normal  A/P:  1. R/O schwannoma vs nevus lipomatosus on right mid cheek, right NLF superior, right NLF inferior   TANGENTIAL BIOPSY SENT OUT:  After consent, anesthesia with LEC and prep, tangential excision performed and specimen sent out for permanent section histology.  No complications and routine wound care. Patient told to call our office in 1-2 weeks for result.      2.Seborrheic keratosis, lentigo, angioma, dermal nevus  BENIGN LESIONS DISCUSSED WITH PATIENT:  I discussed the specifics of tumor, prognosis, and genetics of benign lesions.  I explained that treatment of these lesions would be purely cosmetic and not medically neccessary.  I discussed with patient different removal options including excision, cautery and /or laser.      Nature and genetics of benign skin lesions dicussed with patient.  Signs and Symptoms of skin cancer discussed with patient.  ABCDEs of melanoma reviewed with patient.  Patient encouraged to perform monthly skin exams.  UV precautions reviewed with patient.  Patient to follow up with Primary Care provider regarding elevated blood pressure.  Return to clinic in one year or sooner pending biopsy results.     Again, thank you for allowing me to participate in the care of your patient.        Sincerely,        Olivia Angel PA-C

## 2019-02-06 NOTE — PROGRESS NOTES
Cecilia Blackmon is a 43 year old year old female patient here today for moles on face. She notes moles get irritated, she notes she will occasionally scratch them. Patient has no other skin complaints today.  Remainder of the HPI, Meds, PMH, Allergies, FH, and SH was reviewed in chart.    Pertinent Hx:   No personal history of skin cancer. Family history of melanoma, maternal grandmother  Past Medical History:   Diagnosis Date     Abdominal pain, other specified site      Abdominal pain, right upper quadrant      Anxiety state, unspecified      ASCUS on Pap smear 2003    +HR HPV 16     COPD (chronic obstructive pulmonary disease) (H)     My Mom has this     Family history of breast cancer in mother 9/17/2009     Family history of breast cancer in mother      Family history of breast cancer in mother      Headache 2/25/2005     Headache      Headache      Headache(784.0)      Hx of colposcopy with cervical biopsy 6/2/2003    WNL     Mental disorders of mother, postpartum     postpartum depression     Migraine, unspecified, without mention of intractable migraine without mention of status migrainosus      Other specified cardiac dysrhythmias(427.89)      Other specified complication, antepartum(646.83)      Swelling of limb      Uncomplicated asthma     Myself     Unspecified viral infection, in conditions classified elsewhere and of unspecified site      Urinary tract infection, site not specified        Past Surgical History:   Procedure Laterality Date     ABDOMEN SURGERY  [id number]    Csecttion     COLONOSCOPY       GI SURGERY       LAPAROSCOPIC CHOLECYSTECTOMY N/A 12/30/2014    Procedure: LAPAROSCOPIC CHOLECYSTECTOMY;  Surgeon: Ulises Starr MD;  Location: [facility] OR     SURGICAL HISTORY OF -       Csection        Family History   Problem Relation Age of Onset     Depression Mother      Respiratory Mother         COPD, emphysema     Cancer Mother      Neurologic Disorder Mother         raunads syndrom      Breast Cancer Mother         Stage 4     Other Cancer Mother         Cervical     Anesthesia Reaction Mother      Osteoporosis Mother      Gastrointestinal Disease Maternal Grandmother      Depression Maternal Grandmother      Hypertension Maternal Grandmother      Cancer Maternal Grandmother         skin cancer     Breast Cancer Maternal Grandmother      Other Cancer Maternal Grandmother         Melanoma     Breast Cancer Cousin         Grandfathers side     Breast Cancer Cousin         Grandfathers side BRCA 1     Prostate Cancer Other         maternal side     Prostate Cancer Other         maternal side     Prostate Cancer Other         maternal side     Leukemia Paternal Half-Sister         1/2 sister,  in age 13 or 15       Social History     Socioeconomic History     Marital status:      Spouse name: Not on file     Number of children: Not on file     Years of education: Not on file     Highest education level: Not on file   Social Needs     Financial resource strain: Not on file     Food insecurity - worry: Not on file     Food insecurity - inability: Not on file     Transportation needs - medical: Not on file     Transportation needs - non-medical: Not on file   Occupational History     Not on file   Tobacco Use     Smoking status: Never Smoker     Smokeless tobacco: Never Used   Substance and Sexual Activity     Alcohol use: Yes     Comment: 1-2 times week     Drug use: No     Sexual activity: Not Currently     Partners: Male     Birth control/protection: Injection, None     Comment: my current partner has had a vasectomy.   Other Topics Concern     Parent/sibling w/ CABG, MI or angioplasty before 65F 55M? No   Social History Narrative     Not on file       Outpatient Encounter Medications as of 2019   Medication Sig Dispense Refill     albuterol (PROAIR HFA/PROVENTIL HFA/VENTOLIN HFA) 108 (90 Base) MCG/ACT inhaler Inhale 2 puffs into the lungs every 6 hours as needed for shortness of breath  / dyspnea 1 Inhaler 11     buPROPion (WELLBUTRIN XL) 150 MG 24 hr tablet Take 1 tablet (150 mg) by mouth daily 90 tablet 2     escitalopram (LEXAPRO) 20 MG tablet Take 1 tablet (20 mg) by mouth daily 90 tablet 3     SUMAtriptan (IMITREX) 100 MG tablet Take 1 tablet (100 mg) by mouth at onset of headache for migraine May repeat dose in 2 hours.  Do not exceed 200 mg in 24 hours 18 tablet 11     [DISCONTINUED] ondansetron (ZOFRAN ODT) 4 MG ODT tab Take 1-2 tablets (4-8 mg) by mouth every 8 hours as needed for nausea 12 tablet 0     No facility-administered encounter medications on file as of 2/6/2019.              Review Of Systems  Skin: As above  Eyes: negative  Ears/Nose/Throat: negative  Respiratory: No shortness of breath, dyspnea on exertion, cough, or hemoptysis  Cardiovascular: negative  Gastrointestinal: negative  Genitourinary: negative  Musculoskeletal: negative  Neurologic: negative  Psychiatric: negative  Hematologic/Lymphatic/Immunologic: negative  Endocrine: negative      O:   NAD, WDWN, Alert & Oriented, Mood & Affect wnl, Vitals stable   Here today alone   BP 98/68 (BP Location: Left arm, Patient Position: Chair, Cuff Size: Adult Large)   Pulse 85   SpO2 99%    General appearance normal   Vitals stable   Alert, oriented and in no acute distress     0.5 cm skin colored papule on right mid cheek  0.3 cm skin colored papule on right NLF  Superior  0.2 cm skin colored papule on right NLF inferior    Stuck on papules and brown macules on trunk and ext   Red papules on trunk  Flesh colored papules on trunk     The remainder of the detailed exam was unremarkable; the following areas were examined:  scalp/hair, conjunctiva/lids, face, neck, lips/teeth, oral mucosa/gingiva, chest, back, abdomen, buttocks, digits/nails, RUE, LUE, RLE, LLE.      Eyes: Conjunctivae/lids:Normal     ENT: Lips: normal    MSK:Normal    Pulm: Breathing Normal    Neuro/Psych: Orientation:Normal; Mood/Affect:Normal  A/P:  1. R/O  schwannoma vs nevus lipomatosus on right mid cheek, right NLF superior, right NLF inferior   TANGENTIAL BIOPSY SENT OUT:  After consent, anesthesia with LEC and prep, tangential excision performed and specimen sent out for permanent section histology.  No complications and routine wound care. Patient told to call our office in 1-2 weeks for result.      2.Seborrheic keratosis, lentigo, angioma, dermal nevus  BENIGN LESIONS DISCUSSED WITH PATIENT:  I discussed the specifics of tumor, prognosis, and genetics of benign lesions.  I explained that treatment of these lesions would be purely cosmetic and not medically neccessary.  I discussed with patient different removal options including excision, cautery and /or laser.      Nature and genetics of benign skin lesions dicussed with patient.  Signs and Symptoms of skin cancer discussed with patient.  ABCDEs of melanoma reviewed with patient.  Patient encouraged to perform monthly skin exams.  UV precautions reviewed with patient.  Patient to follow up with Primary Care provider regarding elevated blood pressure.  Return to clinic in one year or sooner pending biopsy results.

## 2019-02-12 LAB — COPATH REPORT: NORMAL

## 2019-03-19 ENCOUNTER — MYC REFILL (OUTPATIENT)
Dept: FAMILY MEDICINE | Facility: CLINIC | Age: 44
End: 2019-03-19

## 2019-03-19 DIAGNOSIS — J45.30 MILD PERSISTENT ASTHMA WITHOUT COMPLICATION: Primary | ICD-10-CM

## 2019-03-19 DIAGNOSIS — F41.1 ANXIETY STATE: ICD-10-CM

## 2019-03-20 RX ORDER — ESCITALOPRAM OXALATE 20 MG/1
20 TABLET ORAL DAILY
Qty: 90 TABLET | Refills: 3 | Status: SHIPPED | OUTPATIENT
Start: 2019-03-20 | End: 2020-05-04

## 2019-03-20 RX ORDER — ALBUTEROL SULFATE 90 UG/1
2 AEROSOL, METERED RESPIRATORY (INHALATION) EVERY 6 HOURS PRN
Qty: 18 G | Refills: 0 | Status: SHIPPED | OUTPATIENT
Start: 2019-03-20 | End: 2020-01-15

## 2019-03-20 RX ORDER — BUPROPION HYDROCHLORIDE 150 MG/1
150 TABLET ORAL DAILY
Qty: 90 TABLET | Refills: 2 | Status: SHIPPED | OUTPATIENT
Start: 2019-03-20 | End: 2020-05-04

## 2019-03-20 NOTE — TELEPHONE ENCOUNTER
"Requested Prescriptions   Pending Prescriptions Disp Refills     albuterol (PROAIR HFA/PROVENTIL HFA/VENTOLIN HFA) 108 (90 Base) MCG/ACT inhaler       Sig: Inhale 2 puffs into the lungs every 6 hours as needed for shortness of breath / dyspnea    Asthma Maintenance Inhalers - Anticholinergics Failed - 3/19/2019  5:46 AM       Failed - Asthma control assessment score within normal limits in last 6 months    Please review ACT score.          Failed - Recent (6 mo) or future (30 days) visit within the authorizing provider's specialty    Patient had office visit in the last 6 months or has a visit in the next 30 days with authorizing provider or within the authorizing provider's specialty.  See \"Patient Info\" tab in inbasket, or \"Choose Columns\" in Meds & Orders section of the refill encounter.           Passed - Patient is age 12 years or older       Passed - Medication is active on med list        escitalopram (LEXAPRO) 20 MG tablet 90 tablet 3     Sig: Take 1 tablet (20 mg) by mouth daily    SSRIs Protocol Passed - 3/19/2019  5:46 AM       Passed - Recent (12 mo) or future (30 days) visit within the authorizing provider's specialty    Patient had office visit in the last 12 months or has a visit in the next 30 days with authorizing provider or within the authorizing provider's specialty.  See \"Patient Info\" tab in inbasket, or \"Choose Columns\" in Meds & Orders section of the refill encounter.             Passed - Medication is Bupropion    If the medication is Bupropion (Wellbutrin), and the patient is taking for smoking cessation; OK to refill.         Passed - Medication is active on med list       Passed - Patient is age 18 or older       Passed - No active pregnancy on record       Passed - No positive pregnancy test in last 12 months        buPROPion (WELLBUTRIN XL) 150 MG 24 hr tablet 90 tablet 2     Sig: Take 1 tablet (150 mg) by mouth daily    SSRIs Protocol Passed - 3/19/2019  5:46 AM       Passed - Recent " "(12 mo) or future (30 days) visit within the authorizing provider's specialty    Patient had office visit in the last 12 months or has a visit in the next 30 days with authorizing provider or within the authorizing provider's specialty.  See \"Patient Info\" tab in inbasket, or \"Choose Columns\" in Meds & Orders section of the refill encounter.             Passed - Medication is Bupropion    If the medication is Bupropion (Wellbutrin), and the patient is taking for smoking cessation; OK to refill.         Passed - Medication is active on med list       Passed - Patient is age 18 or older       Passed - No active pregnancy on record       Passed - No positive pregnancy test in last 12 months        Albuterol inhaler      Last Written Prescription Date:  8/13/18  Last Fill Quantity: 1,   # refills: 11  Last Office Visit: 8/13/18  Future Office visit:       Bupropion 150       Last Written Prescription Date:  11/23/18  Last Fill Quantity: 90,   # refills: 2  Last Office Visit: 8/13/18  Future Office visit:       Lexapro 20 mg      Last Written Prescription Date:  8/13/18  Last Fill Quantity: 90,   # refills: 3  Last Office Visit: 8/13/18  Future Office visit:         "

## 2019-03-26 ENCOUNTER — HOSPITAL ENCOUNTER (EMERGENCY)
Facility: CLINIC | Age: 44
Discharge: HOME | End: 2019-03-26
Attending: PHYSICIAN ASSISTANT | Admitting: PHYSICIAN ASSISTANT
Payer: COMMERCIAL

## 2019-03-26 VITALS
BODY MASS INDEX: 29.44 KG/M2 | OXYGEN SATURATION: 100 % | SYSTOLIC BLOOD PRESSURE: 117 MMHG | TEMPERATURE: 98.8 F | HEART RATE: 88 BPM | WEIGHT: 160 LBS | DIASTOLIC BLOOD PRESSURE: 78 MMHG | HEIGHT: 62 IN | RESPIRATION RATE: 18 BRPM

## 2019-03-26 DIAGNOSIS — H66.001 ACUTE SUPPURATIVE OTITIS MEDIA OF RIGHT EAR WITHOUT SPONTANEOUS RUPTURE OF TYMPANIC MEMBRANE, RECURRENCE NOT SPECIFIED: ICD-10-CM

## 2019-03-26 LAB
INTERNAL QC OK POCT: YES
S PYO AG THROAT QL IA.RAPID: NEGATIVE

## 2019-03-26 PROCEDURE — 99213 OFFICE O/P EST LOW 20 MIN: CPT | Mod: Z6 | Performed by: PHYSICIAN ASSISTANT

## 2019-03-26 PROCEDURE — 87880 STREP A ASSAY W/OPTIC: CPT | Performed by: PHYSICIAN ASSISTANT

## 2019-03-26 PROCEDURE — G0463 HOSPITAL OUTPT CLINIC VISIT: HCPCS

## 2019-03-26 RX ORDER — AMOXICILLIN 875 MG
875 TABLET ORAL 2 TIMES DAILY
Qty: 20 TABLET | Refills: 0 | Status: SHIPPED | OUTPATIENT
Start: 2019-03-26 | End: 2019-07-29

## 2019-03-26 ASSESSMENT — ENCOUNTER SYMPTOMS
SHORTNESS OF BREATH: 0
CONSTIPATION: 0
PALPITATIONS: 0
NAUSEA: 0
MYALGIAS: 1
RHINORRHEA: 1
HEADACHES: 1
WHEEZING: 0
SORE THROAT: 1
FEVER: 1
VOMITING: 0
ABDOMINAL PAIN: 0
DIARRHEA: 0

## 2019-03-26 ASSESSMENT — MIFFLIN-ST. JEOR: SCORE: 1344.01

## 2019-03-26 NOTE — LETTER
March 26, 2019      To Whom It May Concern:      Cecilia Blackmon was seen in our Urgent Care Department today, 03/26/19. Please excuse patient from work today and tomorrow due to illness.       Sincerely,        Raquel Mcdonald PA-C

## 2019-03-26 NOTE — ED AVS SNAPSHOT
[facility] Emergency Department  [address] [address]  Phone:  [phone]  Fax:  [phone]                                    Cecilia Blackmon   MRN: [id number]    Department:  [facility] Emergency Department   Date of Visit:  3/26/2019           After Visit Summary Signature Page    I have received my discharge instructions, and my questions have been answered. I have discussed any challenges I see with this plan with the nurse or doctor.    ..........................................................................................................................................  Patient/Patient Representative Signature      ..........................................................................................................................................  Patient Representative Print Name and Relationship to Patient    ..................................................               ................................................  Date                                   Time    ..........................................................................................................................................  Reviewed by Signature/Title    ...................................................              ..............................................  Date                                               Time          22EPIC Rev 08/18

## 2019-03-27 NOTE — ED PROVIDER NOTES
History     Chief Complaint   Patient presents with     Pharyngitis     onset 5 days ago     Otalgia     HPI  Cecilia Blackmon is a 43 year old female who presents to the urgent care with    ENT Symptoms             Symptoms: cc Present Absent Comment   Fever/Chills  x  Low grade on and off    Fatigue   x    Muscle Aches  x     Eye Irritation   x    Sneezing   x    Nasal Raoul/Drg  x     Sinus Pressure/Pain   x    Loss of smell   x    Dental pain   x    Sore Throat  x     Swollen Glands  x     Ear Pain/Fullness  x  Right    Cough   x    Wheeze   x    Chest Pain   x    Shortness of breath   x    Rash   x    Other   x  Headache.      Symptom duration:  5-6 days.    Symptom severity:  mild and worsening    Treatments tried:  over the counter throat lozenges, tylenol and ibuprofen.    Contacts:  none known         Problem list, Medication list, Allergies, and Medical/Social/Surgical histories reviewed in EPIC and updated as appropriate.        Allergies:  No Known Allergies    Problem List:    Patient Active Problem List    Diagnosis Date Noted     Enteritis 02/17/2015     Priority: Medium     Abdominal pain 02/17/2015     Priority: Medium     Acute cholecystitis 12/29/2014     Priority: Medium     HYPERLIPIDEMIA LDL GOAL <160 10/31/2010     Priority: Medium     Family history of breast cancer in mother 09/17/2009     Priority: Medium     Mild persistent asthma 03/28/2006     Priority: Medium     Headache 02/25/2005     Priority: Medium      2-3 in past yr.  Reduced after ear piercing at acupressure point.       Anxiety state 02/25/2005     Priority: Medium     Problem list name updated by automated process. Provider to review          Past Medical History:    Past Medical History:   Diagnosis Date     Abdominal pain, other specified site      Abdominal pain, right upper quadrant      Anxiety state, unspecified      ASCUS on Pap smear 2003     COPD (chronic obstructive pulmonary disease) (H)      Family history  of breast cancer in mother 9/17/2009     Family history of breast cancer in mother      Family history of breast cancer in mother      Headache 2/25/2005     Headache      Headache      Headache(784.0)      Hx of colposcopy with cervical biopsy 6/2/2003     Mental disorders of mother, postpartum      Migraine, unspecified, without mention of intractable migraine without mention of status migrainosus      Other specified cardiac dysrhythmias(427.89)      Other specified complication, antepartum(646.83)      Swelling of limb      Uncomplicated asthma      Unspecified viral infection, in conditions classified elsewhere and of unspecified site      Urinary tract infection, site not specified        Past Surgical History:    Past Surgical History:   Procedure Laterality Date     ABDOMEN SURGERY  [id number]    Csecttion     COLONOSCOPY       GI SURGERY       LAPAROSCOPIC CHOLECYSTECTOMY N/A 12/30/2014    Procedure: LAPAROSCOPIC CHOLECYSTECTOMY;  Surgeon: Ulises Starr MD;  Location: [facility] OR     SURGICAL HISTORY OF -       Csection       Family History:    Family History   Problem Relation Age of Onset     Depression Mother      Respiratory Mother         COPD, emphysema     Cancer Mother      Neurologic Disorder Mother         raunads syndrom     Breast Cancer Mother         Stage 4     Other Cancer Mother         Cervical     Anesthesia Reaction Mother      Osteoporosis Mother      Gastrointestinal Disease Maternal Grandmother      Depression Maternal Grandmother      Hypertension Maternal Grandmother      Cancer Maternal Grandmother         skin cancer     Breast Cancer Maternal Grandmother      Other Cancer Maternal Grandmother         Melanoma     Breast Cancer Cousin         Grandfathers side     Breast Cancer Cousin         Grandfathers side BRCA 1     Prostate Cancer Other         maternal side     Prostate Cancer Other         maternal side     Prostate Cancer Other         maternal side     Leukemia Paternal  "Half-Sister         1/2 sister,  in age 13 or 15       Social History:  Marital Status:   [4]  Social History     Tobacco Use     Smoking status: Never Smoker     Smokeless tobacco: Never Used   Substance Use Topics     Alcohol use: Yes     Comment: 1-2 times week     Drug use: No        Medications:      amoxicillin (AMOXIL) 875 MG tablet   albuterol (PROAIR HFA/PROVENTIL HFA/VENTOLIN HFA) 108 (90 Base) MCG/ACT inhaler   buPROPion (WELLBUTRIN XL) 150 MG 24 hr tablet   escitalopram (LEXAPRO) 20 MG tablet   SUMAtriptan (IMITREX) 100 MG tablet         Review of Systems   Constitutional: Positive for fever (low grade on and off ).   HENT: Positive for congestion, ear pain (right), rhinorrhea and sore throat.    Respiratory: Negative for shortness of breath and wheezing.    Cardiovascular: Negative for chest pain and palpitations.   Gastrointestinal: Negative for abdominal pain, constipation, diarrhea, nausea and vomiting.   Musculoskeletal: Positive for myalgias.   Skin: Negative for rash.   Neurological: Positive for headaches.   All other systems reviewed and are negative.      Physical Exam   BP: 117/78  Pulse: 88  Temp: 98.8  F (37.1  C)  Resp: 18  Height: 157.5 cm (5' 2\")  Weight: 72.6 kg (160 lb)  SpO2: 100 %      Physical Exam   Constitutional: Vital signs are normal. She appears well-developed and well-nourished. She is active and cooperative.  Non-toxic appearance. She does not have a sickly appearance. She appears ill. No distress.   HENT:   Head: Normocephalic and atraumatic.   Right Ear: Ear canal normal. Tympanic membrane is erythematous and bulging.   Left Ear: Tympanic membrane and ear canal normal.   Nose: Mucosal edema and rhinorrhea present.   Mouth/Throat: Uvula is midline and mucous membranes are normal. Posterior oropharyngeal erythema (minimal ) present. No oropharyngeal exudate, posterior oropharyngeal edema or tonsillar abscesses.   Suppurative fluid noted behind right TM.    Neck: " Normal range of motion. Neck supple.   Cardiovascular: Normal rate and regular rhythm.   Pulmonary/Chest: Effort normal and breath sounds normal.   Abdominal: Soft. Bowel sounds are normal.   Lymphadenopathy:     She has cervical adenopathy.   Neurological: She is alert.   Skin: Skin is warm. No rash noted. She is not diaphoretic.   Psychiatric: She has a normal mood and affect. Her behavior is normal. Judgment and thought content normal.   Nursing note and vitals reviewed.      ED Course        Procedures              Critical Care time:  none               Results for orders placed or performed during the hospital encounter of 03/26/19 (from the past 24 hour(s))   Rapid strep group A screen POCT   Result Value Ref Range    Rapid Strep A Screen negative neg    Internal QC OK Yes        Medications - No data to display    Assessments & Plan (with Medical Decision Making)     I have reviewed the nursing notes.    I have reviewed the findings, diagnosis, plan and need for follow up with the patient.   43-year-old female presents the urgent care with 3-day history of ear pain and sore throat.  Rapid strep obtained in office today was negative.  See exam findings above.  Patient placed on amoxicillin twice daily for 10 days for right otitis media.  Patient increase fluids, rest, nasal saline sprays, Tylenol and ibuprofen over-the-counter as needed for pain.  Throat culture was not sent since the amoxicillin will treat strep throat also.  Patient follow-up with primary care doctor if symptoms persist.  Patient return if symptoms worsen or change.  Patient discharged in stable condition.       Medication List      Started    amoxicillin 875 MG tablet  Commonly known as:  AMOXIL  875 mg, Oral, 2 TIMES DAILY            Final diagnoses:   Acute suppurative otitis media of right ear without spontaneous rupture of tympanic membrane, recurrence not specified       3/26/2019   [facility] EMERGENCY DEPARTMENT     Harry  Raquel STAFFORD PA-C  03/26/19 2015

## 2019-03-27 NOTE — DISCHARGE INSTRUCTIONS
Use medication as directed.    May use acetaminophen, ibuprofen prn.  Follow up with PCP for recheck in 2 weeks, return sooner if symptoms worsen or change.    Increase fluids, rest, nasal saline sprays over-the-counter, salt water gargles for sore throat    Patient voiced understanding of instructions given.

## 2019-07-02 ENCOUNTER — MYC MEDICAL ADVICE (OUTPATIENT)
Dept: FAMILY MEDICINE | Facility: CLINIC | Age: 44
End: 2019-07-02

## 2019-07-29 ENCOUNTER — OFFICE VISIT (OUTPATIENT)
Dept: FAMILY MEDICINE | Facility: CLINIC | Age: 44
End: 2019-07-29
Payer: COMMERCIAL

## 2019-07-29 VITALS
OXYGEN SATURATION: 98 % | WEIGHT: 162 LBS | HEIGHT: 62 IN | DIASTOLIC BLOOD PRESSURE: 70 MMHG | RESPIRATION RATE: 16 BRPM | HEART RATE: 82 BPM | BODY MASS INDEX: 29.81 KG/M2 | SYSTOLIC BLOOD PRESSURE: 110 MMHG | TEMPERATURE: 98.9 F

## 2019-07-29 DIAGNOSIS — R53.83 FATIGUE, UNSPECIFIED TYPE: Primary | ICD-10-CM

## 2019-07-29 DIAGNOSIS — R51.9 HEADACHE, UNSPECIFIED HEADACHE TYPE: ICD-10-CM

## 2019-07-29 LAB
ALBUMIN SERPL-MCNC: 4.2 G/DL (ref 3.4–5)
ALP SERPL-CCNC: 72 U/L (ref 40–150)
ALT SERPL W P-5'-P-CCNC: 18 U/L (ref 0–50)
ANION GAP SERPL CALCULATED.3IONS-SCNC: 6 MMOL/L (ref 3–14)
AST SERPL W P-5'-P-CCNC: 14 U/L (ref 0–45)
BASOPHILS # BLD AUTO: 0 10E9/L (ref 0–0.2)
BASOPHILS NFR BLD AUTO: 0.3 %
BILIRUB DIRECT SERPL-MCNC: <0.1 MG/DL (ref 0–0.2)
BILIRUB SERPL-MCNC: 0.3 MG/DL (ref 0.2–1.3)
BUN SERPL-MCNC: 9 MG/DL (ref 7–30)
CALCIUM SERPL-MCNC: 8.6 MG/DL (ref 8.5–10.1)
CHLORIDE SERPL-SCNC: 105 MMOL/L (ref 94–109)
CO2 SERPL-SCNC: 28 MMOL/L (ref 20–32)
CREAT SERPL-MCNC: 0.77 MG/DL (ref 0.52–1.04)
DIFFERENTIAL METHOD BLD: NORMAL
EOSINOPHIL # BLD AUTO: 0.1 10E9/L (ref 0–0.7)
EOSINOPHIL NFR BLD AUTO: 1 %
ERYTHROCYTE [DISTWIDTH] IN BLOOD BY AUTOMATED COUNT: 13 % (ref 10–15)
FERRITIN SERPL-MCNC: 6 NG/ML (ref 12–150)
GFR SERPL CREATININE-BSD FRML MDRD: >90 ML/MIN/{1.73_M2}
GLUCOSE SERPL-MCNC: 85 MG/DL (ref 70–99)
HCT VFR BLD AUTO: 36.4 % (ref 35–47)
HGB BLD-MCNC: 11.8 G/DL (ref 11.7–15.7)
LYMPHOCYTES # BLD AUTO: 3.2 10E9/L (ref 0.8–5.3)
LYMPHOCYTES NFR BLD AUTO: 36 %
MCH RBC QN AUTO: 29.9 PG (ref 26.5–33)
MCHC RBC AUTO-ENTMCNC: 32.4 G/DL (ref 31.5–36.5)
MCV RBC AUTO: 92 FL (ref 78–100)
MONOCYTES # BLD AUTO: 0.6 10E9/L (ref 0–1.3)
MONOCYTES NFR BLD AUTO: 6.2 %
NEUTROPHILS # BLD AUTO: 5 10E9/L (ref 1.6–8.3)
NEUTROPHILS NFR BLD AUTO: 56.5 %
PLATELET # BLD AUTO: 336 10E9/L (ref 150–450)
POTASSIUM SERPL-SCNC: 3.4 MMOL/L (ref 3.4–5.3)
PROT SERPL-MCNC: 8.1 G/DL (ref 6.8–8.8)
RBC # BLD AUTO: 3.94 10E12/L (ref 3.8–5.2)
SODIUM SERPL-SCNC: 139 MMOL/L (ref 133–144)
TSH SERPL DL<=0.005 MIU/L-ACNC: 1.76 MU/L (ref 0.4–4)
VIT B12 SERPL-MCNC: 451 PG/ML (ref 193–986)
WBC # BLD AUTO: 8.8 10E9/L (ref 4–11)

## 2019-07-29 PROCEDURE — 36415 COLL VENOUS BLD VENIPUNCTURE: CPT | Performed by: FAMILY MEDICINE

## 2019-07-29 PROCEDURE — 82306 VITAMIN D 25 HYDROXY: CPT | Performed by: FAMILY MEDICINE

## 2019-07-29 PROCEDURE — 99214 OFFICE O/P EST MOD 30 MIN: CPT | Performed by: FAMILY MEDICINE

## 2019-07-29 PROCEDURE — 80076 HEPATIC FUNCTION PANEL: CPT | Performed by: FAMILY MEDICINE

## 2019-07-29 PROCEDURE — 84443 ASSAY THYROID STIM HORMONE: CPT | Performed by: FAMILY MEDICINE

## 2019-07-29 PROCEDURE — 85025 COMPLETE CBC W/AUTO DIFF WBC: CPT | Performed by: FAMILY MEDICINE

## 2019-07-29 PROCEDURE — 80048 BASIC METABOLIC PNL TOTAL CA: CPT | Performed by: FAMILY MEDICINE

## 2019-07-29 PROCEDURE — 82607 VITAMIN B-12: CPT | Performed by: FAMILY MEDICINE

## 2019-07-29 PROCEDURE — 82728 ASSAY OF FERRITIN: CPT | Performed by: FAMILY MEDICINE

## 2019-07-29 RX ORDER — SUMATRIPTAN 100 MG/1
100 TABLET, FILM COATED ORAL
Qty: 18 TABLET | Refills: 11 | Status: SHIPPED | OUTPATIENT
Start: 2019-07-29 | End: 2020-05-04

## 2019-07-29 ASSESSMENT — ANXIETY QUESTIONNAIRES
GAD7 TOTAL SCORE: 15
7. FEELING AFRAID AS IF SOMETHING AWFUL MIGHT HAPPEN: NEARLY EVERY DAY
3. WORRYING TOO MUCH ABOUT DIFFERENT THINGS: NEARLY EVERY DAY
1. FEELING NERVOUS, ANXIOUS, OR ON EDGE: NEARLY EVERY DAY
5. BEING SO RESTLESS THAT IT IS HARD TO SIT STILL: NOT AT ALL
2. NOT BEING ABLE TO STOP OR CONTROL WORRYING: NEARLY EVERY DAY
6. BECOMING EASILY ANNOYED OR IRRITABLE: SEVERAL DAYS

## 2019-07-29 ASSESSMENT — PATIENT HEALTH QUESTIONNAIRE - PHQ9
SUM OF ALL RESPONSES TO PHQ QUESTIONS 1-9: 16
5. POOR APPETITE OR OVEREATING: MORE THAN HALF THE DAYS

## 2019-07-29 ASSESSMENT — MIFFLIN-ST. JEOR: SCORE: 1353.08

## 2019-07-29 NOTE — PATIENT INSTRUCTIONS
Labs today     Set up sleep evaluation     Journal food and stool pattern     Recheck with me in one month

## 2019-07-29 NOTE — NURSING NOTE
"Chief Complaint   Patient presents with     Joint Pain     Fatigue       Initial /70 (BP Location: Right arm, Patient Position: Chair, Cuff Size: Adult Regular)   Pulse 82   Temp 98.9  F (37.2  C) (Tympanic)   Resp 16   Ht 1.575 m (5' 2\")   Wt 73.5 kg (162 lb)   SpO2 98%   Breastfeeding? No   BMI 29.63 kg/m   Estimated body mass index is 29.63 kg/m  as calculated from the following:    Height as of this encounter: 1.575 m (5' 2\").    Weight as of this encounter: 73.5 kg (162 lb).    Patient presents to the clinic using No DME    Health Maintenance that is potentially due pending provider review:  ACT    Possibly completing today per provider review.    Is there anyone who you would like to be able to receive your results? No  If yes have patient fill out NANCY    "

## 2019-07-29 NOTE — PROGRESS NOTES
Subjective     Cecilia Blackmon is a 43 year old female who presents to clinic today for the following health issues:    HPI   Musculoskeletal problem/pain      Duration: 1 1/2 years    Description  Location: all joints, back, neck    Intensity:  moderate, severe, 6-7/10    Accompanying signs and symptoms: numbness, tingling and swelling in feet. Extreme fatigue, headaches, IBS, etc    History  Previous similar problem: no   Previous evaluation:  none    Precipitating or alleviating factors:  Trauma or overuse: no   Aggravating factors include: none    Therapies tried and outcome:  Ibuprofen    Sleep   Falls asleep ok   Naps 3-5 hours   Tired and would sleep all day   Snores some   Sleeps in one spot     Tired all day sleepy   Falls asleep in the chair   Driving is ok     Pain all over joints and muscles   Feet are sore and swollen       Periods heavy and severe cramps    Diarrhea after eating   No specific foods that cause that     Heartburn more often         Migraine     Since your last clinic visit, how have your headaches changed?  Improved    How often are you getting headaches or migraines? Infrequent      Are you able to do normal daily activities when you have a migraine? Yes    Are you taking rescue/relief medications? (Select all that apply) sumatriptan (Imitrex)    How helpful is your rescue/relief medication?  I get total relief    Are you taking any medications to prevent migraines? (Select all that apply)  No    In the past 4 weeks, how often have you gone to urgent care or the emergency room because of your headaches?  0            Reviewed and updated as needed this visit by Provider  Tobacco  Allergies  Meds  Problems  Med Hx  Surg Hx  Fam Hx         Review of Systems   ROS COMP: Constitutional, HEENT, cardiovascular, pulmonary, gi and gu systems are negative, except as otherwise noted.      Objective    /70 (BP Location: Right arm, Patient Position: Chair, Cuff Size: Adult  "Regular)   Pulse 82   Temp 98.9  F (37.2  C) (Tympanic)   Resp 16   Ht 1.575 m (5' 2\")   Wt 73.5 kg (162 lb)   SpO2 98%   Breastfeeding? No   BMI 29.63 kg/m    Body mass index is 29.63 kg/m .  Physical Exam   GENERAL APPEARANCE: healthy, alert and no distress  HENT: ear canals and TM's normal and nose and mouth without ulcers or lesions  NECK: no adenopathy, no asymmetry, masses, or scars and thyroid normal to palpation  RESP: lungs clear to auscultation - no rales, rhonchi or wheezes  CV: regular rates and rhythm, normal S1 S2, no S3 or S4 and no murmur, click or rub  MS: extremities normal- no gross deformities noted  SKIN: no suspicious lesions or rashes  PSYCH: mentation appears normal and affect normal/bright    Diagnostic Test Results:  Labs reviewed in Epic        Assessment & Plan     1. Fatigue, unspecified type  Unclear etiol   - Hepatic panel  - Basic metabolic panel  - CBC with platelets differential  - TSH with free T4 reflex  - Vitamin B12  - Vitamin D Deficiency  - Ferritin  - SLEEP EVALUATION & MANAGEMENT REFERRAL - [facility]  [phone] (Age 2 and up); Future    2. Headache, unspecified headache type  Migraines are well controlled   - SUMAtriptan (IMITREX) 100 MG tablet; Take 1 tablet (100 mg) by mouth at onset of headache for migraine May repeat dose in 2 hours.  Do not exceed 200 mg in 24 hours  Dispense: 18 tablet; Refill: 11     BMI:   Estimated body mass index is 29.63 kg/m  as calculated from the following:    Height as of this encounter: 1.575 m (5' 2\").    Weight as of this encounter: 73.5 kg (162 lb).   Weight management plan: Discussed healthy diet and exercise guidelines        Patient Instructions   Labs today     Set up sleep evaluation     Journal food and stool pattern     Recheck with me in one month       Return in about 1 month (around 8/29/2019).    Risks, benefits, side effects and rationale for treatment plan fully discussed with the " patient and understanding expressed.     Ammy Rolon MD  [facility]

## 2019-07-30 ASSESSMENT — ASTHMA QUESTIONNAIRES: ACT_TOTALSCORE: 23

## 2019-07-30 ASSESSMENT — ANXIETY QUESTIONNAIRES: GAD7 TOTAL SCORE: 15

## 2019-07-31 ENCOUNTER — MYC MEDICAL ADVICE (OUTPATIENT)
Dept: FAMILY MEDICINE | Facility: CLINIC | Age: 44
End: 2019-07-31

## 2019-07-31 DIAGNOSIS — R53.83 FATIGUE, UNSPECIFIED TYPE: Primary | ICD-10-CM

## 2019-07-31 LAB — DEPRECATED CALCIDIOL+CALCIFEROL SERPL-MC: 33 UG/L (ref 20–75)

## 2019-08-05 ENCOUNTER — MYC MEDICAL ADVICE (OUTPATIENT)
Dept: FAMILY MEDICINE | Facility: CLINIC | Age: 44
End: 2019-08-05

## 2019-08-25 ENCOUNTER — MYC MEDICAL ADVICE (OUTPATIENT)
Dept: FAMILY MEDICINE | Facility: CLINIC | Age: 44
End: 2019-08-25

## 2019-08-26 ENCOUNTER — MYC MEDICAL ADVICE (OUTPATIENT)
Dept: FAMILY MEDICINE | Facility: CLINIC | Age: 44
End: 2019-08-26

## 2019-08-31 ENCOUNTER — MYC MEDICAL ADVICE (OUTPATIENT)
Dept: FAMILY MEDICINE | Facility: CLINIC | Age: 44
End: 2019-08-31

## 2019-09-29 DIAGNOSIS — R53.83 FATIGUE, UNSPECIFIED TYPE: ICD-10-CM

## 2019-09-29 LAB
BASOPHILS # BLD AUTO: 0 10E9/L (ref 0–0.2)
BASOPHILS NFR BLD AUTO: 0.3 %
DIFFERENTIAL METHOD BLD: NORMAL
EOSINOPHIL # BLD AUTO: 0.1 10E9/L (ref 0–0.7)
EOSINOPHIL NFR BLD AUTO: 2 %
ERYTHROCYTE [DISTWIDTH] IN BLOOD BY AUTOMATED COUNT: 14.1 % (ref 10–15)
FERRITIN SERPL-MCNC: 16 NG/ML (ref 12–150)
HCT VFR BLD AUTO: 39.5 % (ref 35–47)
HGB BLD-MCNC: 12.9 G/DL (ref 11.7–15.7)
LYMPHOCYTES # BLD AUTO: 2.7 10E9/L (ref 0.8–5.3)
LYMPHOCYTES NFR BLD AUTO: 37.9 %
MCH RBC QN AUTO: 30.7 PG (ref 26.5–33)
MCHC RBC AUTO-ENTMCNC: 32.7 G/DL (ref 31.5–36.5)
MCV RBC AUTO: 94 FL (ref 78–100)
MONOCYTES # BLD AUTO: 0.5 10E9/L (ref 0–1.3)
MONOCYTES NFR BLD AUTO: 7.2 %
NEUTROPHILS # BLD AUTO: 3.7 10E9/L (ref 1.6–8.3)
NEUTROPHILS NFR BLD AUTO: 52.6 %
PLATELET # BLD AUTO: 280 10E9/L (ref 150–450)
RBC # BLD AUTO: 4.2 10E12/L (ref 3.8–5.2)
WBC # BLD AUTO: 7.1 10E9/L (ref 4–11)

## 2019-09-29 PROCEDURE — 82728 ASSAY OF FERRITIN: CPT | Performed by: FAMILY MEDICINE

## 2019-09-29 PROCEDURE — 36415 COLL VENOUS BLD VENIPUNCTURE: CPT | Performed by: FAMILY MEDICINE

## 2019-09-29 PROCEDURE — 85025 COMPLETE CBC W/AUTO DIFF WBC: CPT | Performed by: FAMILY MEDICINE

## 2019-10-01 ENCOUNTER — MYC MEDICAL ADVICE (OUTPATIENT)
Dept: FAMILY MEDICINE | Facility: CLINIC | Age: 44
End: 2019-10-01

## 2019-10-17 ENCOUNTER — MYC MEDICAL ADVICE (OUTPATIENT)
Dept: FAMILY MEDICINE | Facility: CLINIC | Age: 44
End: 2019-10-17

## 2019-10-22 ENCOUNTER — OFFICE VISIT (OUTPATIENT)
Dept: FAMILY MEDICINE | Facility: CLINIC | Age: 44
End: 2019-10-22
Payer: COMMERCIAL

## 2019-10-22 VITALS
WEIGHT: 167 LBS | OXYGEN SATURATION: 97 % | HEIGHT: 62 IN | DIASTOLIC BLOOD PRESSURE: 76 MMHG | SYSTOLIC BLOOD PRESSURE: 114 MMHG | HEART RATE: 93 BPM | BODY MASS INDEX: 30.73 KG/M2 | RESPIRATION RATE: 16 BRPM | TEMPERATURE: 98.9 F

## 2019-10-22 DIAGNOSIS — N89.8 VAGINAL ITCHING: ICD-10-CM

## 2019-10-22 DIAGNOSIS — B37.2 YEAST INFECTION OF THE SKIN: ICD-10-CM

## 2019-10-22 DIAGNOSIS — B37.31 YEAST INFECTION OF THE VAGINA: ICD-10-CM

## 2019-10-22 DIAGNOSIS — L30.9 ECZEMA, UNSPECIFIED TYPE: Primary | ICD-10-CM

## 2019-10-22 LAB
SPECIMEN SOURCE: ABNORMAL
WET PREP SPEC: ABNORMAL

## 2019-10-22 PROCEDURE — 99213 OFFICE O/P EST LOW 20 MIN: CPT | Performed by: NURSE PRACTITIONER

## 2019-10-22 PROCEDURE — 87210 SMEAR WET MOUNT SALINE/INK: CPT | Performed by: NURSE PRACTITIONER

## 2019-10-22 RX ORDER — HYDROXYZINE HYDROCHLORIDE 25 MG/1
25 TABLET, FILM COATED ORAL EVERY 6 HOURS PRN
Qty: 60 TABLET | Refills: 0 | Status: SHIPPED | OUTPATIENT
Start: 2019-10-22 | End: 2020-01-15

## 2019-10-22 RX ORDER — TRIAMCINOLONE ACETONIDE 1 MG/G
CREAM TOPICAL 2 TIMES DAILY
Qty: 45 G | Refills: 0 | Status: SHIPPED | OUTPATIENT
Start: 2019-10-22 | End: 2020-01-15

## 2019-10-22 RX ORDER — NYSTATIN 100000 U/G
CREAM TOPICAL 2 TIMES DAILY
Qty: 15 G | Refills: 0 | Status: SHIPPED | OUTPATIENT
Start: 2019-10-22 | End: 2020-01-15

## 2019-10-22 RX ORDER — FLUCONAZOLE 150 MG/1
150 TABLET ORAL
Qty: 2 TABLET | Refills: 0 | Status: SHIPPED | OUTPATIENT
Start: 2019-10-22 | End: 2020-01-15

## 2019-10-22 ASSESSMENT — MIFFLIN-ST. JEOR: SCORE: 1375.76

## 2019-10-22 NOTE — PATIENT INSTRUCTIONS
Patient Education     Yeast Infection (Candida Vaginal Infection)    You have a Candida vaginal infection. This is also known as a yeast infection. It is most often caused by a type of yeast (fungus) called Candida. Candida are normally found in the vagina. But if they increase in number, this can lead to infection and cause symptoms.  Symptoms of a yeast infection can include:    Clumpy or thin, white discharge, which may look like cottage cheese    Itching or burning    Burning with urination  Certain factors can make a yeast infection more likely. These can include:    Taking certain medicines, such as antibiotics or birth control pills    Pregnancy    Diabetes    Weak immune system  A yeast infection is most often treated with antifungal medicine. This may be given as a vaginal cream or pills you take by mouth. Treatment may last for about 1 to 7 days. Women with severe or recurrent infections may need longer courses of treatment.  Home care    If you re prescribed medicine, be sure to use it as directed. Finish all of the medicine, even if your symptoms go away. Note: Don t try to treat yourself using over-the-counter products without talking to your provider first. He or she will let you know if this is a good option for you.    Ask your provider what steps you can take to help reduce your risk of having a yeast infection in the future.  Follow-up care  Follow up with your healthcare provider, or as directed.  When to seek medical advice  Call your healthcare provider right away if:    You have a fever of 100.4 F (38 C) or higher, or as directed by your provider.    Your symptoms worsen, or they don t go away within a few days of starting treatment.    You have new pain in the lower belly or pelvic region.    You have side effects that bother you or a reaction to the cream or pills you re prescribed.    You or any partners you have sex with have new symptoms, such as a rash, joint pain, or sores.  Date Last  Reviewed: 10/1/2017    [id number] [employer]. [address] [place] [address], [place] [address]. All rights reserved. This information is not intended as a substitute for professional medical care. Always follow your healthcare professional's instructions.           Patient Education     Candida Skin Infection (Adult)  Candida is type of yeast. It grows naturally on the skin and in the mouth. If it grows out of control, it can cause an infection. Candida can cause infections in the genital area, skin folds, in the mouth, and under the breasts. Anyone can get this infection. It is more common in a person with a weak immune system, such as from diabetes, HIV, or cancer. It s also more common in someone who has been on antibiotic therapy. And it s more common people who are overweight or who have incontinence. Wearing tight-fitting clothing and taking part in activities with lots of skin-to-skin contact can also put you at risk.  Candida causes the skin to become bright red and inflamed. The border of the infected part of the skin is often raised. The infection causes pain and itching. Sometimes the skin peels and bleeds. In the mouth, candida is called thrush, and may cause white thickened areas.  A Candida rash is most often treated with an antifungal cream or ointment. The rash will clear a few days after starting the medicine. Infections that don t go away may need a prescription medicine. In rare cases, a bacterial infection can also occur.  Home care  Your healthcare provider will recommend an antifungal cream or ointment for the rash. He or she may also prescribe a medicine for the itch. Follow all instructions for using these medicines. Don t use cornstarch powder. Cornstarch can cause the Candida infection to get worse.  General care:    Keep your skin clean by washing the area twice a day.    Use the cream as directed until your rash is gone. Once the skin has healed, keep it dry to prevent another  infection.     If you are overweight, talk with your healthcare provider about a plan to lose excess weight.    Avoid clothes that fit tightly.  Follow-up care  Follow up with your healthcare provider, or as advised. Your rash will clear in 7 to 14 days. Call your healthcare provider if the rash is not gone after 14 days.  When to seek medical advice  Call your healthcare provider right away if any of these occur:    Pain or redness that gets worse or spreads    Fluid coming from the skin    Yellow crusts on the skin    Fever of 100.4 F (38 C) or higher, or as directed by your healthcare provider  Date Last Reviewed: 9/1/2016 2000-2018 [employer]. [address] [place] [address], [place] [address]. All rights reserved. This information is not intended as a substitute for professional medical care. Always follow your healthcare professional's instructions.           Patient Education     Managing Atopic Dermatitis (Eczema)     After bathing, gently pat your skin dry (don t rub). Apply moisturizer while your skin is still damp.   To manage your symptoms and help reduce the severity and frequency, try these self-care tips:  Caring for your skin    Use a gentle, fragrance-free cleanser (or nonsoap cleanser) for bathing. Rinse well. Pat skin dry.    Take warm, not hot, baths or showers. Try to limit them to no more that 10 to 15 minutes.     Use moisturizer liberally right after you bathe, while your skin is still damp.    Avoid scratching because it will cause more damage to your skin.     Topical, over-the-counter hydrocortisone cream may help control mild symptoms.   Controlling your environment    Avoid extreme heat or cold.    Avoid very humid or very dry air.    If your home or office air is very dry, use a humidifier.    Avoid allergens, such as dust, that may be present in bedding, carpets, plush toys, or rugs.    Know that pet hair and dander can cause flare-ups.  Seeking medical treatment  Another way  to keep symptoms under control is to seek medical treatment. Talk with your healthcare provider about the type of treatment that may work best for you. Your provider may prescribe treatments such as the following:    Topical treatments to put on the skin daily    Medicines taken by mouth (oral medicines), such as antihistamines, antibiotics, or corticosteroids    In severe cases shots (injections) may be needed to control the symptoms. You may even need antibiotics if skin infections occur.  Treatments don t work the same way for every person. So if your symptoms continue or get worse, ask your healthcare provider about other treatments.  Making lifestyle choices    Manage the stress in your life.    Wear loose-fitting cotton clothing that does not bind or rub your skin.    Avoid contact with wool or other scratchy fabrics.    Use fragrance-free products.  Getting good results  Now that you know more about atopic dermatitis, the next step is up to you. Follow your healthcare provider s treatment plan and your self-care routine. This will help bring atopic dermatitis under control. If your symptoms persist, be sure to let your health care provider know.   Date Last Reviewed: 2/1/2017 2000-2018 [employer]. [address] [place] [address], [place] [address]. All rights reserved. This information is not intended as a substitute for professional medical care. Always follow your healthcare professional's instructions.

## 2019-10-22 NOTE — PROGRESS NOTES
Subjective     Cecilia Blackmon is a 43 year old female who presents to clinic today for the following health issues:    HPI   Rash      Duration: August     Description  Location: both arms, hands, under breasts, legs and side of the neck   Itching: severe    Intensity:  moderate    Accompanying signs and symptoms: some redness, raised     History (similar episodes/previous evaluation): None    Precipitating or alleviating factors:  New exposures:  None  Recent travel: no      Therapies tried and outcome: hydrocortisone cream -  not effective, Benadryl/diphenhydramine -  not effective and Zyrtec/cetirizine -  not effective, lotion     Vaginal Symptoms      Duration: 3-5 days     Description  vaginal discharge - white and itching    Intensity:  moderate    Accompanying signs and symptoms (fever/dysuria/abdominal or back pain): None    History  Sexually active: yes, single partner, contraception not required; partner is fixed.  Possibility of pregnancy: No  Recent antibiotic use: no     Precipitating or alleviating factors: None    Therapies tried and outcome: Monistat   Outcome: helped       Patient Active Problem List   Diagnosis     Headache     Anxiety state     Mild persistent asthma     Family history of breast cancer in mother     HYPERLIPIDEMIA LDL GOAL <160     Acute cholecystitis     Enteritis     Abdominal pain     Past Surgical History:   Procedure Laterality Date     ABDOMEN SURGERY  [id number]    Csecttion     COLONOSCOPY       GI SURGERY       LAPAROSCOPIC CHOLECYSTECTOMY N/A 12/30/2014    Procedure: LAPAROSCOPIC CHOLECYSTECTOMY;  Surgeon: Ulises Starr MD;  Location: [facility]     SURGICAL HISTORY OF -       Csection       Social History     Tobacco Use     Smoking status: Never Smoker     Smokeless tobacco: Never Used   Substance Use Topics     Alcohol use: Yes     Comment: 1-2 times week     Family History   Problem Relation Age of Onset     Depression Mother      Respiratory Mother         COPD,  emphysema     Cancer Mother      Neurologic Disorder Mother         raunads syndrom     Breast Cancer Mother         Stage 4     Other Cancer Mother         Cervical     Anesthesia Reaction Mother      Osteoporosis Mother      Gastrointestinal Disease Maternal Grandmother      Depression Maternal Grandmother      Hypertension Maternal Grandmother      Cancer Maternal Grandmother         skin cancer     Breast Cancer Maternal Grandmother      Other Cancer Maternal Grandmother         Melanoma     Breast Cancer Cousin         Grandfathers side     Breast Cancer Cousin         Grandfathers side BRCA 1     Prostate Cancer Other         maternal side     Prostate Cancer Other         maternal side     Prostate Cancer Other         maternal side     Leukemia Paternal Half-Sister         1/2 sister,  in age 13 or 15         Current Outpatient Medications   Medication Sig Dispense Refill     albuterol (PROAIR HFA/PROVENTIL HFA/VENTOLIN HFA) 108 (90 Base) MCG/ACT inhaler Inhale 2 puffs into the lungs every 6 hours as needed for shortness of breath / dyspnea 18 g 0     buPROPion (WELLBUTRIN XL) 150 MG 24 hr tablet Take 1 tablet (150 mg) by mouth daily 90 tablet 2     escitalopram (LEXAPRO) 20 MG tablet Take 1 tablet (20 mg) by mouth daily 90 tablet 3     SUMAtriptan (IMITREX) 100 MG tablet Take 1 tablet (100 mg) by mouth at onset of headache for migraine May repeat dose in 2 hours.  Do not exceed 200 mg in 24 hours 18 tablet 11     No Known Allergies  Recent Labs   Lab Test 19  1741 18  1305 07/10/17  1839 17  1000   A1C  --   --  4.7  --    LDL  --   --  95  --    HDL  --   --  38*  --    TRIG  --   --  217*  --    ALT 18 22  --  24   CR 0.77 0.65  --  0.71   GFRESTIMATED >90 >90  --  >90  Non  GFR Calc     GFRESTBLACK >90 >90  --  >90  African American GFR Calc     POTASSIUM 3.4 4.2  --  3.5   TSH 1.76  --  0.98  --       BP Readings from Last 3 Encounters:   10/22/19 114/76   19  "110/70   03/26/19 117/78    Wt Readings from Last 3 Encounters:   10/22/19 75.8 kg (167 lb)   07/29/19 73.5 kg (162 lb)   03/26/19 72.6 kg (160 lb)               Reviewed and updated as needed this visit by Provider         Review of Systems   ROS COMP: Constitutional, HEENT, cardiovascular, pulmonary, gi and gu systems are negative, except as otherwise noted.      Objective    /76   Pulse 93   Temp 98.9  F (37.2  C) (Tympanic)   Resp 16   Ht 1.575 m (5' 2\")   Wt 75.8 kg (167 lb)   LMP 10/04/2019 (Exact Date)   SpO2 97%   BMI 30.54 kg/m    Body mass index is 30.54 kg/m .  Physical Exam   GENERAL: healthy, alert and no distress  EYES: Eyes grossly normal to inspection, PERRL and conjunctivae and sclerae normal  HENT: ear canals and TM's normal, nose and mouth without ulcers or lesions  RESP: lungs clear to auscultation - no rales, rhonchi or wheezes  CV: regular rate and rhythm, normal S1 S2, no S3 or S4, no murmur, click or rub, no peripheral edema and peripheral pulses strong   (female): Examination of the rash external genitalia reveals: Candida: flat, intensely inflamed, well-defined, clustered bright red macules  MS: no gross musculoskeletal defects noted, no edema  SKIN: Examination of the rash to arms and abdomen  reveals: Eczema: dry, slightly raised, red patches with mild flaking.  NEURO: Normal strength and tone, mentation intact and speech normal  PSYCH: mentation appears normal, affect normal/bright    Results for orders placed or performed in visit on 10/22/19   Wet prep   Result Value Ref Range    Specimen Description Vagina     Wet Prep No Trichomonas seen     Wet Prep Rare  Clue cells seen   (A)     Wet Prep Moderate  Yeast seen   (A)     Wet Prep Rare  WBC'S seen                Assessment & Plan     (L30.9) Eczema, unspecified type  (primary encounter diagnosis)  Comment: Rash to the abdomen and arms most representative of eczema patient will start Kenalog cream if not improving will " "contact me.  Plan: hydrOXYzine (ATARAX) 25 MG tablet,         triamcinolone (KENALOG) 0.1 % external cream      (B37.3) Yeast infection of the vagina  Comment: These noted on wet prep we will treat for yeast infection.  Mild clue cells feel does not represent bacterial vaginosis we will continue to monitor if not improving patient will contact me and we will consider treating for bacterial vaginosis  Plan: fluconazole (DIFLUCAN) 150 MG tablet      (B37.2) Yeast infection of the skin  Comment:Yeast infection to the external vaginal area we will treat with nystatin cream  Plan: nystatin (MYCOSTATIN) 828256 UNIT/GM external         cream      (N89.8) Vaginal itching  Comment:   Plan: Wet prep               BMI:   Estimated body mass index is 30.54 kg/m  as calculated from the following:    Height as of this encounter: 1.575 m (5' 2\").    Weight as of this encounter: 75.8 kg (167 lb).   Weight management plan: Discussed healthy diet and exercise guidelines        See Patient Instructions    Return in about 1 week (around 10/29/2019), or if symptoms worsen or fail to improve.    ARTURO Mullins [facility]      "

## 2019-10-22 NOTE — NURSING NOTE
"Chief Complaint   Patient presents with     Derm Problem     Vaginal Problem       Initial /76   Pulse 93   Temp 98.9  F (37.2  C) (Tympanic)   Resp 16   Ht 1.575 m (5' 2\")   Wt 75.8 kg (167 lb)   LMP 10/04/2019 (Exact Date)   SpO2 97%   BMI 30.54 kg/m   Estimated body mass index is 30.54 kg/m  as calculated from the following:    Height as of this encounter: 1.575 m (5' 2\").    Weight as of this encounter: 75.8 kg (167 lb).    Patient presents to the clinic using No DME    Health Maintenance that is potentially due pending provider review:  Influenza vaccine     Pt declines influenza vaccine.     Is there anyone who you would like to be able to receive your results? No  If yes have patient fill out NANCY      "

## 2019-11-03 ENCOUNTER — HEALTH MAINTENANCE LETTER (OUTPATIENT)
Age: 44
End: 2019-11-03

## 2019-11-20 ENCOUNTER — MYC MEDICAL ADVICE (OUTPATIENT)
Dept: FAMILY MEDICINE | Facility: CLINIC | Age: 44
End: 2019-11-20

## 2019-11-20 DIAGNOSIS — L30.9 DERMATITIS: Primary | ICD-10-CM

## 2019-11-21 NOTE — TELEPHONE ENCOUNTER
If rash is not improving will have patient follow-up with dermatology.  I have placed a referral. She can stop the Atarax.    Your provider has referred you to: [facility]: [facility] [phone]       Waleska Hoyos CNP

## 2020-01-15 ENCOUNTER — OFFICE VISIT (OUTPATIENT)
Dept: FAMILY MEDICINE | Facility: CLINIC | Age: 45
End: 2020-01-15
Payer: COMMERCIAL

## 2020-01-15 VITALS
DIASTOLIC BLOOD PRESSURE: 70 MMHG | RESPIRATION RATE: 20 BRPM | HEIGHT: 63 IN | WEIGHT: 163 LBS | BODY MASS INDEX: 28.88 KG/M2 | SYSTOLIC BLOOD PRESSURE: 100 MMHG | TEMPERATURE: 98.2 F | HEART RATE: 87 BPM | OXYGEN SATURATION: 98 %

## 2020-01-15 DIAGNOSIS — J11.1 INFLUENZA-LIKE ILLNESS: Primary | ICD-10-CM

## 2020-01-15 DIAGNOSIS — J45.30 MILD PERSISTENT ASTHMA WITHOUT COMPLICATION: ICD-10-CM

## 2020-01-15 LAB
DEPRECATED S PYO AG THROAT QL EIA: NORMAL
SPECIMEN SOURCE: NORMAL

## 2020-01-15 PROCEDURE — 87880 STREP A ASSAY W/OPTIC: CPT | Performed by: PHYSICIAN ASSISTANT

## 2020-01-15 PROCEDURE — 99214 OFFICE O/P EST MOD 30 MIN: CPT | Performed by: PHYSICIAN ASSISTANT

## 2020-01-15 PROCEDURE — 87081 CULTURE SCREEN ONLY: CPT | Performed by: PHYSICIAN ASSISTANT

## 2020-01-15 RX ORDER — OSELTAMIVIR PHOSPHATE 75 MG/1
75 CAPSULE ORAL 2 TIMES DAILY
Qty: 10 CAPSULE | Refills: 0 | Status: SHIPPED | OUTPATIENT
Start: 2020-01-15 | End: 2020-06-08

## 2020-01-15 RX ORDER — ALBUTEROL SULFATE 90 UG/1
2 AEROSOL, METERED RESPIRATORY (INHALATION) EVERY 6 HOURS PRN
Qty: 18 G | Refills: 1 | Status: SHIPPED | OUTPATIENT
Start: 2020-01-15 | End: 2021-01-08

## 2020-01-15 ASSESSMENT — MIFFLIN-ST. JEOR: SCORE: 1368.49

## 2020-01-15 NOTE — PATIENT INSTRUCTIONS
Treat for influenza with tamiflu due to asthma diagnosis   Be seen if worsening symptoms     Next week when fever gone, get flu shot     Flu shot every year due to asthma

## 2020-01-15 NOTE — NURSING NOTE
"Chief Complaint   Patient presents with     Pharyngitis     x 3 days       Initial /70 (BP Location: Right arm)   Pulse 87   Temp 98.2  F (36.8  C) (Tympanic)   Resp 20   Ht 1.6 m (5' 3\")   Wt 73.9 kg (163 lb)   LMP 01/14/2020   SpO2 98%   BMI 28.87 kg/m   Estimated body mass index is 28.87 kg/m  as calculated from the following:    Height as of this encounter: 1.6 m (5' 3\").    Weight as of this encounter: 73.9 kg (163 lb).    Patient presents to the clinic using No DME    Health Maintenance that is potentially due pending provider review:  NONE    n/a    Is there anyone who you would like to be able to receive your results? No  If yes have patient fill out NANCY    "

## 2020-01-15 NOTE — LETTER
January 21, 2020      Cecilia Blackmon  [address]  [place] [address]        Dear Cecilia,         This letter is to inform you that the results of your recent throat culture are negative.  If you have any questions please call or make an appointment.          Sincerely,        Rebecca Ahuja PA-C/ [facility]isse

## 2020-01-15 NOTE — LETTER
January 15, 2020      Cecilia Blackmon  [address] [address]        To Whom It May Concern:    Cecilia Blackmon was seen in our clinic. She may return to work without restrictions when she has been fever free for 24 hours AND feeling up to return to work.      Sincerely,        Rebecca Ahuja PA-C

## 2020-01-15 NOTE — PROGRESS NOTES
"Subjective     Cecilia Blackmon is a 44 year old female who presents to clinic today for the following health issues:    HPI   RESPIRATORY SYMPTOMS      Duration: 3 days    Description  nasal congestion, rhinorrhea, sore throat, facial pain/pressure, cough, fever, chills, ear pain left, headache, fatigue/malaise, hoarse voice, myalgias, conjunctival irritation, nausea and stomach ache    Severity: moderate    Accompanying signs and symptoms: rash    History (predisposing factors):  asthma    Precipitating or alleviating factors: None    Therapies tried and outcome:  rest and fluids nyquil    Started as HA and ST.  HA and body aches, HA more bothersome.  Dry cough.  Moderately running nose.  No diarrhea.  Today rash slight on neck and under R breast.  Tired.  100.5 temp at highest.      Mod persistent asthma history, but not on meds - doing ok.  Opted out of flu shot.    BP Readings from Last 3 Encounters:   01/15/20 100/70   10/22/19 114/76   07/29/19 110/70    Wt Readings from Last 3 Encounters:   01/15/20 73.9 kg (163 lb)   10/22/19 75.8 kg (167 lb)   07/29/19 73.5 kg (162 lb)          Reviewed and updated as needed this visit by Provider  Tobacco  Allergies  Meds  Problems  Med Hx  Surg Hx  Fam Hx         Review of Systems   ROS COMP: Constitutional, HEENT, cardiovascular, pulmonary, GI, , musculoskeletal, neuro, skin, systems are negative, except as otherwise noted.      Objective    /70 (BP Location: Right arm)   Pulse 87   Temp 98.2  F (36.8  C) (Tympanic)   Resp 20   Ht 1.6 m (5' 3\")   Wt 73.9 kg (163 lb)   LMP 01/14/2020   SpO2 98%   BMI 28.87 kg/m    Body mass index is 28.87 kg/m .  Physical Exam   GENERAL: healthy, alert and no distress  EYES: Eyes grossly normal to inspection, PERRL and conjunctivae and sclerae normal  HENT: ear canals and TM's normal, nose and mouth without ulcers or lesions  NECK: no adenopathy, no asymmetry, masses, or scars  RESP: lungs clear to " auscultation - no rales, rhonchi or wheezes  CV: regular rate and rhythm, normal S1 S2, no S3 or S4, no murmur, click or rub, no peripheral edema           Assessment & Plan       ICD-10-CM    1. Influenza-like illness R69 Strep, Rapid Screen     Beta strep group A culture     oseltamivir (TAMIFLU) 75 MG capsule   2. Mild persistent asthma without complication J45.30 oseltamivir (TAMIFLU) 75 MG capsule     albuterol (PROAIR HFA/PROVENTIL HFA/VENTOLIN HFA) 108 (90 Base) MCG/ACT inhaler       Patient Instructions   Treat for influenza with tamiflu due to asthma diagnosis   Be seen if worsening symptoms     Next week when fever gone, get flu shot     Flu shot every year due to asthma       Return if symptoms worsen or fail to improve.    Rebecca Ahuja PA-C  [facility]

## 2020-01-16 LAB
BACTERIA SPEC CULT: NORMAL
SPECIMEN SOURCE: NORMAL

## 2020-02-11 ENCOUNTER — MYC MEDICAL ADVICE (OUTPATIENT)
Dept: FAMILY MEDICINE | Facility: CLINIC | Age: 45
End: 2020-02-11

## 2020-02-28 ENCOUNTER — MYC MEDICAL ADVICE (OUTPATIENT)
Dept: FAMILY MEDICINE | Facility: CLINIC | Age: 45
End: 2020-02-28

## 2020-02-28 DIAGNOSIS — L30.9 ECZEMA, UNSPECIFIED TYPE: Primary | ICD-10-CM

## 2020-03-03 ENCOUNTER — MYC MEDICAL ADVICE (OUTPATIENT)
Dept: FAMILY MEDICINE | Facility: CLINIC | Age: 45
End: 2020-03-03

## 2020-03-03 DIAGNOSIS — R53.83 FATIGUE, UNSPECIFIED TYPE: Primary | ICD-10-CM

## 2020-03-05 DIAGNOSIS — R53.83 FATIGUE, UNSPECIFIED TYPE: ICD-10-CM

## 2020-03-05 LAB — FERRITIN SERPL-MCNC: 43 NG/ML (ref 12–150)

## 2020-03-05 PROCEDURE — 36415 COLL VENOUS BLD VENIPUNCTURE: CPT | Performed by: PHYSICIAN ASSISTANT

## 2020-03-05 PROCEDURE — 82728 ASSAY OF FERRITIN: CPT | Performed by: PHYSICIAN ASSISTANT

## 2020-03-09 ENCOUNTER — MYC MEDICAL ADVICE (OUTPATIENT)
Dept: FAMILY MEDICINE | Facility: CLINIC | Age: 45
End: 2020-03-09

## 2020-03-12 ENCOUNTER — MYC MEDICAL ADVICE (OUTPATIENT)
Dept: FAMILY MEDICINE | Facility: CLINIC | Age: 45
End: 2020-03-12

## 2020-03-16 NOTE — TELEPHONE ENCOUNTER
Please call this patient and find out exactly what she needs at this time.   A letter stating she has asthma is fine to write and I will sign if that is all she needs

## 2020-03-16 NOTE — TELEPHONE ENCOUNTER
Attempted to contact pt. No answer. Left message for pt to call back to inquire more information per Dr. Rolon's note below.     Cecilia Cline MA  2:08 PM 3/16/2020

## 2020-03-16 NOTE — TELEPHONE ENCOUNTER
Cecilia says she got a letter from [employer] so she doesn't need anything from Dr Rolon at this time.

## 2020-03-18 ENCOUNTER — MYC MEDICAL ADVICE (OUTPATIENT)
Dept: FAMILY MEDICINE | Facility: CLINIC | Age: 45
End: 2020-03-18

## 2020-03-18 NOTE — TELEPHONE ENCOUNTER
I am fine with writing a letter that states that she has asthma and she can work from home.  Just need to know where she wants this.

## 2020-03-18 NOTE — LETTER
To Whom It May concern:       Cecilia MELCHOR Pabon is under my care for Asthma and may work from home if needed                         Ammy Rolon MD

## 2020-03-19 NOTE — TELEPHONE ENCOUNTER
Pt requesting addressed to Whom It May Concern  Pt works for the [employer] [employer]  Fax letter to: [phone]  [employer] [facility]

## 2020-04-12 ENCOUNTER — HOSPITAL ENCOUNTER (EMERGENCY)
Facility: CLINIC | Age: 45
Discharge: HOME | End: 2020-04-12
Attending: PHYSICIAN ASSISTANT | Admitting: PHYSICIAN ASSISTANT
Payer: COMMERCIAL

## 2020-04-12 VITALS
HEART RATE: 80 BPM | RESPIRATION RATE: 16 BRPM | WEIGHT: 165 LBS | SYSTOLIC BLOOD PRESSURE: 101 MMHG | DIASTOLIC BLOOD PRESSURE: 69 MMHG | OXYGEN SATURATION: 97 % | BODY MASS INDEX: 29.23 KG/M2 | TEMPERATURE: 98.4 F

## 2020-04-12 DIAGNOSIS — R11.10 VOMITING: ICD-10-CM

## 2020-04-12 LAB
ALBUMIN SERPL-MCNC: 4.3 G/DL (ref 3.4–5)
ALBUMIN UR-MCNC: NEGATIVE MG/DL
ALP SERPL-CCNC: 66 U/L (ref 40–150)
ALT SERPL W P-5'-P-CCNC: 22 U/L (ref 0–50)
ANION GAP SERPL CALCULATED.3IONS-SCNC: 4 MMOL/L (ref 3–14)
APPEARANCE UR: CLEAR
AST SERPL W P-5'-P-CCNC: 17 U/L (ref 0–45)
BASOPHILS # BLD AUTO: 0.1 10E9/L (ref 0–0.2)
BASOPHILS NFR BLD AUTO: 0.5 %
BILIRUB SERPL-MCNC: 0.5 MG/DL (ref 0.2–1.3)
BILIRUB UR QL STRIP: NEGATIVE
BUN SERPL-MCNC: 10 MG/DL (ref 7–30)
CALCIUM SERPL-MCNC: 9 MG/DL (ref 8.5–10.1)
CHLORIDE SERPL-SCNC: 108 MMOL/L (ref 94–109)
CO2 SERPL-SCNC: 27 MMOL/L (ref 20–32)
COLOR UR AUTO: YELLOW
CREAT SERPL-MCNC: 0.72 MG/DL (ref 0.52–1.04)
DIFFERENTIAL METHOD BLD: ABNORMAL
EOSINOPHIL # BLD AUTO: 0.2 10E9/L (ref 0–0.7)
EOSINOPHIL NFR BLD AUTO: 1.9 %
ERYTHROCYTE [DISTWIDTH] IN BLOOD BY AUTOMATED COUNT: 12.3 % (ref 10–15)
GFR SERPL CREATININE-BSD FRML MDRD: >90 ML/MIN/{1.73_M2}
GLUCOSE SERPL-MCNC: 87 MG/DL (ref 70–99)
GLUCOSE UR STRIP-MCNC: NEGATIVE MG/DL
HCG UR QL: NEGATIVE
HCT VFR BLD AUTO: 43.2 % (ref 35–47)
HGB BLD-MCNC: 14.4 G/DL (ref 11.7–15.7)
HGB UR QL STRIP: NEGATIVE
IMM GRANULOCYTES # BLD: 0 10E9/L (ref 0–0.4)
IMM GRANULOCYTES NFR BLD: 0.3 %
KETONES UR STRIP-MCNC: NEGATIVE MG/DL
LEUKOCYTE ESTERASE UR QL STRIP: NEGATIVE
LIPASE SERPL-CCNC: 122 U/L (ref 73–393)
LYMPHOCYTES # BLD AUTO: 1.7 10E9/L (ref 0.8–5.3)
LYMPHOCYTES NFR BLD AUTO: 14.2 %
MCH RBC QN AUTO: 31.9 PG (ref 26.5–33)
MCHC RBC AUTO-ENTMCNC: 33.3 G/DL (ref 31.5–36.5)
MCV RBC AUTO: 96 FL (ref 78–100)
MONOCYTES # BLD AUTO: 0.5 10E9/L (ref 0–1.3)
MONOCYTES NFR BLD AUTO: 4.5 %
NEUTROPHILS # BLD AUTO: 9.3 10E9/L (ref 1.6–8.3)
NEUTROPHILS NFR BLD AUTO: 78.6 %
NITRATE UR QL: NEGATIVE
NRBC # BLD AUTO: 0 10*3/UL
NRBC BLD AUTO-RTO: 0 /100
PH UR STRIP: 5 PH (ref 5–7)
PLATELET # BLD AUTO: 278 10E9/L (ref 150–450)
POTASSIUM SERPL-SCNC: 3.5 MMOL/L (ref 3.4–5.3)
PROT SERPL-MCNC: 8.4 G/DL (ref 6.8–8.8)
RBC # BLD AUTO: 4.51 10E12/L (ref 3.8–5.2)
SODIUM SERPL-SCNC: 139 MMOL/L (ref 133–144)
SOURCE: NORMAL
SP GR UR STRIP: 1.02 (ref 1–1.03)
UROBILINOGEN UR STRIP-MCNC: 0 MG/DL (ref 0–2)
WBC # BLD AUTO: 11.9 10E9/L (ref 4–11)

## 2020-04-12 PROCEDURE — 25800030 ZZH RX IP 258 OP 636: Performed by: PHYSICIAN ASSISTANT

## 2020-04-12 PROCEDURE — 83690 ASSAY OF LIPASE: CPT | Performed by: PHYSICIAN ASSISTANT

## 2020-04-12 PROCEDURE — 25000132 ZZH RX MED GY IP 250 OP 250 PS 637: Performed by: PHYSICIAN ASSISTANT

## 2020-04-12 PROCEDURE — 80053 COMPREHEN METABOLIC PANEL: CPT | Performed by: PHYSICIAN ASSISTANT

## 2020-04-12 PROCEDURE — 99284 EMERGENCY DEPT VISIT MOD MDM: CPT | Mod: Z6 | Performed by: PHYSICIAN ASSISTANT

## 2020-04-12 PROCEDURE — 99284 EMERGENCY DEPT VISIT MOD MDM: CPT | Mod: 25 | Performed by: PHYSICIAN ASSISTANT

## 2020-04-12 PROCEDURE — 25000128 H RX IP 250 OP 636: Performed by: PHYSICIAN ASSISTANT

## 2020-04-12 PROCEDURE — 85025 COMPLETE CBC W/AUTO DIFF WBC: CPT | Performed by: PHYSICIAN ASSISTANT

## 2020-04-12 PROCEDURE — 96374 THER/PROPH/DIAG INJ IV PUSH: CPT | Performed by: PHYSICIAN ASSISTANT

## 2020-04-12 PROCEDURE — 96375 TX/PRO/DX INJ NEW DRUG ADDON: CPT | Performed by: PHYSICIAN ASSISTANT

## 2020-04-12 PROCEDURE — 81025 URINE PREGNANCY TEST: CPT | Performed by: PHYSICIAN ASSISTANT

## 2020-04-12 PROCEDURE — 25000125 ZZHC RX 250: Performed by: PHYSICIAN ASSISTANT

## 2020-04-12 PROCEDURE — 96361 HYDRATE IV INFUSION ADD-ON: CPT | Performed by: PHYSICIAN ASSISTANT

## 2020-04-12 PROCEDURE — 81003 URINALYSIS AUTO W/O SCOPE: CPT | Performed by: PHYSICIAN ASSISTANT

## 2020-04-12 RX ORDER — SODIUM CHLORIDE 9 MG/ML
1000 INJECTION, SOLUTION INTRAVENOUS CONTINUOUS
Status: DISCONTINUED | OUTPATIENT
Start: 2020-04-12 | End: 2020-04-12 | Stop reason: HOSPADM

## 2020-04-12 RX ORDER — KETOROLAC TROMETHAMINE 15 MG/ML
15 INJECTION, SOLUTION INTRAMUSCULAR; INTRAVENOUS ONCE
Status: COMPLETED | OUTPATIENT
Start: 2020-04-12 | End: 2020-04-12

## 2020-04-12 RX ORDER — ONDANSETRON 2 MG/ML
4 INJECTION INTRAMUSCULAR; INTRAVENOUS EVERY 30 MIN PRN
Status: DISCONTINUED | OUTPATIENT
Start: 2020-04-12 | End: 2020-04-12 | Stop reason: HOSPADM

## 2020-04-12 RX ORDER — ONDANSETRON 4 MG/1
4 TABLET, ORALLY DISINTEGRATING ORAL EVERY 8 HOURS PRN
Qty: 10 TABLET | Refills: 0 | Status: SHIPPED | OUTPATIENT
Start: 2020-04-12 | End: 2020-06-08

## 2020-04-12 RX ADMIN — SODIUM CHLORIDE 1000 ML: 9 INJECTION, SOLUTION INTRAVENOUS at 14:09

## 2020-04-12 RX ADMIN — LIDOCAINE HYDROCHLORIDE 30 ML: 20 SOLUTION ORAL; TOPICAL at 14:47

## 2020-04-12 RX ADMIN — KETOROLAC TROMETHAMINE 15 MG: 15 INJECTION, SOLUTION INTRAMUSCULAR; INTRAVENOUS at 14:08

## 2020-04-12 RX ADMIN — ONDANSETRON 4 MG: 2 INJECTION INTRAMUSCULAR; INTRAVENOUS at 14:08

## 2020-04-12 NOTE — ED AVS SNAPSHOT
[facility]  [address] [address]  Phone:  [phone]  Fax:  [phone]                                    Cecilia Pabon   MRN: [id number]    Department:  [facility]   Date of Visit:  4/12/2020           After Visit Summary Signature Page    I have received my discharge instructions, and my questions have been answered. I have discussed any challenges I see with this plan with the nurse or doctor.    ..........................................................................................................................................  Patient/Patient Representative Signature      ..........................................................................................................................................  Patient Representative Print Name and Relationship to Patient    ..................................................               ................................................  Date                                   Time    ..........................................................................................................................................  Reviewed by Signature/Title    ...................................................              ..............................................  Date                                               Time          22EPIC Rev 08/18

## 2020-04-12 NOTE — ED NOTES
Pt comes in with nausea, vmiting and abd pain that started this am. No known sick contacts, lives with  and child. Healthy and nonsick. Does admit to daily or every other day screw drivers drank at home, none today. Had episode of loose stool with retching, otherwise reports per normal stools. Gall bladder removed previously, still has appendix in place. Pt is alert, oriented in NAD. Pt in room, oriented to room and call light. Call light within reach. IV access attempted X 2 with accuvein, unable to establish. Chg RN notified.

## 2020-04-12 NOTE — ED PROVIDER NOTES
History     Chief Complaint   Patient presents with     Abdominal Pain     abd pain since this am, emesis some diarrhe     HPI  Cecilia Pabon is a 44 year old female who presents to the emergency department with concern over abdominal pain which began approximately 2 hours prior to arrival.  Patient describes pain in the epigastric region that radiates to her right upper quadrant described as sharp, continuous.  She has had associated nausea and has had 5 episodes of emesis total in the last 2 hours.  She has had episodes of stool incontinence with vomiting.  She denies any preceding fever, chills, myalgias, nasal congestion, cough, dyspnea, wheezing, dysuria, urinary frequency, urgency, hematuria.  She has not attempted any OTC treatments as she has been unable to keep even water down.  She initially denied any close contacts with GI symptoms, however later stated that both  and child had vomiting diarrhea last week.  No suspected bad food exposure.  No recent travel.  No recent antibiotic use. No drinking of untreated water.  Her past surgical history is significant for being status post cholecystectomy, .  She did have persistent diarrhea following cholecystectomy which resulted in her having colonoscopy and endoscopy.  She denies any smoking history. She does have frequent ETOH use one drink 3-5 times per week per her report.        Allergies:  No Known Allergies    Problem List:    Patient Active Problem List    Diagnosis Date Noted     Enteritis 2015     Priority: Medium     Abdominal pain 2015     Priority: Medium     Acute cholecystitis 2014     Priority: Medium     HYPERLIPIDEMIA LDL GOAL <160 10/31/2010     Priority: Medium     Family history of breast cancer in mother 2009     Priority: Medium     Mild persistent asthma 2006     Priority: Medium     Headache 2005     Priority: Medium      2-3 in past yr.  Reduced after ear piercing at acupressure  point.       Anxiety state 02/25/2005     Priority: Medium     Problem list name updated by automated process. Provider to review          Past Medical History:    Past Medical History:   Diagnosis Date     Abdominal pain, other specified site      Abdominal pain, right upper quadrant      Anxiety state, unspecified      ASCUS on Pap smear 2003     COPD (chronic obstructive pulmonary disease) (H)      Family history of breast cancer in mother 9/17/2009     Family history of breast cancer in mother      Family history of breast cancer in mother      Headache 2/25/2005     Headache      Headache      Headache(784.0)      Hx of colposcopy with cervical biopsy 6/2/2003     Mental disorders of mother, postpartum      Migraine, unspecified, without mention of intractable migraine without mention of status migrainosus      Other specified cardiac dysrhythmias(427.89)      Other specified complication, antepartum(646.83)      Swelling of limb      Uncomplicated asthma      Unspecified viral infection, in conditions classified elsewhere and of unspecified site      Urinary tract infection, site not specified        Past Surgical History:    Past Surgical History:   Procedure Laterality Date     ABDOMEN SURGERY  [id number]    Csecttion     COLONOSCOPY       GI SURGERY       LAPAROSCOPIC CHOLECYSTECTOMY N/A 12/30/2014    Procedure: LAPAROSCOPIC CHOLECYSTECTOMY;  Surgeon: Ulises Starr MD;  Location: [facility]     SURGICAL HISTORY OF -       Csection     Family History:    Family History   Problem Relation Age of Onset     Depression Mother      Respiratory Mother         COPD, emphysema     Cancer Mother      Neurologic Disorder Mother         raunads syndrom     Breast Cancer Mother         Stage 4     Other Cancer Mother         Cervical     Anesthesia Reaction Mother      Osteoporosis Mother      Gastrointestinal Disease Maternal Grandmother      Depression Maternal Grandmother      Hypertension Maternal Grandmother      Cancer  Maternal Grandmother         skin cancer     Breast Cancer Maternal Grandmother      Other Cancer Maternal Grandmother         Melanoma     Breast Cancer Cousin         Grandfathers side     Breast Cancer Cousin         Grandfathers side BRCA 1     Prostate Cancer Other         maternal side     Prostate Cancer Other         maternal side     Prostate Cancer Other         maternal side     Leukemia Paternal Half-Sister         1/2 sister,  in age 13 or 15     Social History:  Marital Status:   [4]  Social History     Tobacco Use     Smoking status: Never Smoker     Smokeless tobacco: Never Used   Substance Use Topics     Alcohol use: Yes     Comment: 1-2 times week     Drug use: No        Medications:    albuterol (PROAIR HFA/PROVENTIL HFA/VENTOLIN HFA) 108 (90 Base) MCG/ACT inhaler  buPROPion (WELLBUTRIN XL) 150 MG 24 hr tablet  escitalopram (LEXAPRO) 20 MG tablet  SUMAtriptan (IMITREX) 100 MG tablet      Review of Systems   Constitutional: Negative for chills and fever.   HENT: Negative for congestion, ear pain, rhinorrhea and sore throat.    Eyes: Negative for photophobia, pain, discharge, redness, itching and visual disturbance.   Respiratory: Negative for cough, shortness of breath and wheezing.    Cardiovascular: Negative for chest pain and palpitations.   Gastrointestinal: Positive for abdominal pain, nausea and vomiting. Negative for blood in stool.   Genitourinary: Negative for dysuria, frequency and urgency.   Skin: Negative for color change, rash and wound.   Neurological: Negative for dizziness, syncope, light-headedness and headaches.     Physical Exam   BP: 90/76  Heart Rate: 92  Temp: 98.4  F (36.9  C)  Resp: 16  Weight: 74.8 kg (165 lb)  SpO2: 100 %  Physical Exam  GENERAL APPEARANCE: healthy, alert and no acute distress  EYES: EOMI,  PERRL, conjunctiva clear  HENT: ear canals and TM's normal.  Nose and mouth without ulcers, erythema or lesions  NECK: supple, nontender, no  lymphadenopathy  RESP: lungs clear to auscultation - no rales, rhonchi or wheezes  CV: regular rates and rhythm, normal S1 S2, no murmur noted  ABDOMEN:  soft, tenderness to palpation in right upper quadrant and epigastric region, no guarding, rebound, no CVA tenderness  SKIN: no suspicious lesions or rashes  ED Course        Procedures            Critical Care time:  none          Results for orders placed or performed during the hospital encounter of 04/12/20   CBC with platelets differential     Status: Abnormal   Result Value Ref Range    WBC 11.9 (H) 4.0 - 11.0 10e9/L    RBC Count 4.51 3.8 - 5.2 10e12/L    Hemoglobin 14.4 11.7 - 15.7 g/dL    Hematocrit 43.2 35.0 - 47.0 %    MCV 96 78 - 100 fl    MCH 31.9 26.5 - 33.0 pg    MCHC 33.3 31.5 - 36.5 g/dL    RDW 12.3 10.0 - 15.0 %    Platelet Count 278 150 - 450 10e9/L    Diff Method Automated Method     % Neutrophils 78.6 %    % Lymphocytes 14.2 %    % Monocytes 4.5 %    % Eosinophils 1.9 %    % Basophils 0.5 %    % Immature Granulocytes 0.3 %    Nucleated RBCs 0 0 /100    Absolute Neutrophil 9.3 (H) 1.6 - 8.3 10e9/L    Absolute Lymphocytes 1.7 0.8 - 5.3 10e9/L    Absolute Monocytes 0.5 0.0 - 1.3 10e9/L    Absolute Eosinophils 0.2 0.0 - 0.7 10e9/L    Absolute Basophils 0.1 0.0 - 0.2 10e9/L    Abs Immature Granulocytes 0.0 0 - 0.4 10e9/L    Absolute Nucleated RBC 0.0    Comprehensive metabolic panel     Status: None   Result Value Ref Range    Sodium 139 133 - 144 mmol/L    Potassium 3.5 3.4 - 5.3 mmol/L    Chloride 108 94 - 109 mmol/L    Carbon Dioxide 27 20 - 32 mmol/L    Anion Gap 4 3 - 14 mmol/L    Glucose 87 70 - 99 mg/dL    Urea Nitrogen 10 7 - 30 mg/dL    Creatinine 0.72 0.52 - 1.04 mg/dL    GFR Estimate >90 >60 mL/min/[1.73_m2]    GFR Estimate If Black >90 >60 mL/min/[1.73_m2]    Calcium 9.0 8.5 - 10.1 mg/dL    Bilirubin Total 0.5 0.2 - 1.3 mg/dL    Albumin 4.3 3.4 - 5.0 g/dL    Protein Total 8.4 6.8 - 8.8 g/dL    Alkaline Phosphatase 66 40 - 150 U/L    ALT 22  0 - 50 U/L    AST 17 0 - 45 U/L   Lipase     Status: None   Result Value Ref Range    Lipase 122 73 - 393 U/L   HCG qualitative urine     Status: None   Result Value Ref Range    HCG Qual Urine Negative NEG^Negative   UA reflex to Microscopic     Status: None   Result Value Ref Range    Color Urine Yellow     Appearance Urine Clear     Glucose Urine Negative NEG^Negative mg/dL    Bilirubin Urine Negative NEG^Negative    Ketones Urine Negative NEG^Negative mg/dL    Specific Gravity Urine 1.021 1.003 - 1.035    Blood Urine Negative NEG^Negative    pH Urine 5.0 5.0 - 7.0 pH    Protein Albumin Urine Negative NEG^Negative mg/dL    Urobilinogen mg/dL 0.0 0.0 - 2.0 mg/dL    Nitrite Urine Negative NEG^Negative    Leukocyte Esterase Urine Negative NEG^Negative    Source Midstream Urine      Medications   0.9% sodium chloride BOLUS (1,000 mLs Intravenous New Bag 4/12/20 1409)     Followed by   sodium chloride 0.9% infusion (has no administration in time range)   ondansetron (ZOFRAN) injection 4 mg (4 mg Intravenous Given 4/12/20 1408)   ketorolac (TORADOL) injection 15 mg (15 mg Intravenous Given 4/12/20 1408)   lidocaine (XYLOCAINE) 2 % 15 mL, alum & mag hydroxide-simethicone (MAALOX  ES) 15 mL GI Cocktail (30 mLs Oral Given 4/12/20 1447)     Assessments & Plan (with Medical Decision Making)     I have reviewed the nursing notes.  I have reviewed the findings, diagnosis, plan and need for follow up with the patient.       Discharge Medication List as of 4/12/2020  3:27 PM      START taking these medications    Details   omeprazole (PRILOSEC) 20 MG DR capsule Take 1 capsule (20 mg) by mouth 2 times daily, Disp-30 capsule,R-0, E-Prescribe      ondansetron (ZOFRAN ODT) 4 MG ODT tab Take 1 tablet (4 mg) by mouth every 8 hours as needed for nausea or vomiting, Disp-10 tablet,R-0, E-Prescribe           Final diagnoses:   Vomiting     44-year-old female presents to the emergency department with over vomiting, abdominal pain which  began earlier today.  She was mildly hypotensive upon arrival however this could be physiologic normal.  Physical exam findings as described above were significant for epigastric and right upper quadrant tenderness to palpation.  Tenderness in the  lower quadrant.  She had laboratory evaluation which was significant for mildly elevated white count at 11.9 with left shift, CMP, lipase within normal limits.  Urinalysis was also normal without evidence of infection to suggest pyelonephritis, nephrolithiasis is the source of her pain.  Suspect symptoms secondary to gastroenteritis also consider GERD/esophageal reflux, concern for gastric or peptic ulcer.  Do not suspect diverticulitis, bursitis or other surgical etiology. I do not suspect abdominal aortic aneurysm.  She was treated with Zofran, Toradol, GI cocktail and IV fluids with some improvement of her pain and complete resolution of her nausea.  She was discharged home stable with instructions to follow up with PCP for recheck if no improvement within the next 3-5 days. Worrisome reasons to return to ER sooner discussed.     Disclaimer: This note consists of symbols derived from keyboarding, dictation, and/or voice recognition software. As a result, there may be errors in the script that have gone undetected.  Please consider this when interpreting information found in the chart.     4/12/2020   [facility] EMERGENCY DEPARTMENT     Stacy Sharif PA-C  04/15/20 [id number]

## 2020-04-15 ASSESSMENT — ENCOUNTER SYMPTOMS
NAUSEA: 1
VOMITING: 1
PALPITATIONS: 0
CHILLS: 0
HEADACHES: 0
EYE PAIN: 0
FREQUENCY: 0
SORE THROAT: 0
DIZZINESS: 0
WHEEZING: 0
SHORTNESS OF BREATH: 0
RHINORRHEA: 0
EYE ITCHING: 0
EYE REDNESS: 0
EYE DISCHARGE: 0
WOUND: 0
BLOOD IN STOOL: 0
FEVER: 0
PHOTOPHOBIA: 0
ABDOMINAL PAIN: 1
COLOR CHANGE: 0
DYSURIA: 0
LIGHT-HEADEDNESS: 0
COUGH: 0

## 2020-05-04 ENCOUNTER — MYC REFILL (OUTPATIENT)
Dept: FAMILY MEDICINE | Facility: CLINIC | Age: 45
End: 2020-05-04

## 2020-05-04 DIAGNOSIS — R51.9 HEADACHE, UNSPECIFIED HEADACHE TYPE: ICD-10-CM

## 2020-05-04 DIAGNOSIS — F41.1 ANXIETY STATE: ICD-10-CM

## 2020-05-05 RX ORDER — BUPROPION HYDROCHLORIDE 150 MG/1
150 TABLET ORAL DAILY
Qty: 30 TABLET | Refills: 0 | Status: SHIPPED | OUTPATIENT
Start: 2020-05-05 | End: 2020-06-08

## 2020-05-05 RX ORDER — ESCITALOPRAM OXALATE 20 MG/1
20 TABLET ORAL DAILY
Qty: 30 TABLET | Refills: 0 | Status: SHIPPED | OUTPATIENT
Start: 2020-05-05 | End: 2020-06-08

## 2020-05-05 RX ORDER — SUMATRIPTAN 100 MG/1
100 TABLET, FILM COATED ORAL
Qty: 18 TABLET | Refills: 0 | Status: SHIPPED | OUTPATIENT
Start: 2020-05-05 | End: 2020-06-08

## 2020-05-12 ENCOUNTER — MYC MEDICAL ADVICE (OUTPATIENT)
Dept: DERMATOLOGY | Facility: CLINIC | Age: 45
End: 2020-05-12

## 2020-05-13 ENCOUNTER — VIRTUAL VISIT (OUTPATIENT)
Dept: DERMATOLOGY | Facility: CLINIC | Age: 45
End: 2020-05-13
Payer: COMMERCIAL

## 2020-05-13 DIAGNOSIS — L20.81 ATOPIC NEURODERMATITIS: Primary | ICD-10-CM

## 2020-05-13 PROCEDURE — 99213 OFFICE O/P EST LOW 20 MIN: CPT | Mod: TEL | Performed by: DERMATOLOGY

## 2020-05-13 RX ORDER — BETAMETHASONE DIPROPIONATE 0.5 MG/G
CREAM TOPICAL
Qty: 100 G | Refills: 3 | Status: SHIPPED | OUTPATIENT
Start: 2020-05-13 | End: 2020-08-18

## 2020-05-13 NOTE — PROGRESS NOTES
"Called patient and she stated the provider was calling on the other line so let patient go to talk to provider.  Jaja AVILA RN BSN N  [facility]    Cecilia Pabon is a 44 year old female who is being evaluated via a billable telephone visit.      The patient has been notified of following:     \"This telephone visit will be conducted via a call between you and your physician/provider. We have found that certain health care needs can be provided without the need for a physical exam.  This service lets us provide the care you need with a short phone conversation.  If a prescription is necessary we can send it directly to your pharmacy.  If lab work is needed we can place an order for that and you can then stop by our lab to have the test done at a later time.    Telephone visits are billed at different rates depending on your insurance coverage. During this emergency period, for some insurers they may be billed the same as an in-person visit.  Please reach out to your insurance provider with any questions.    If during the course of the call the physician/provider feels a telephone visit is not appropriate, you will not be charged for this service.\"    Patient has given verbal consent for Telephone visit?  Yes    What phone number would you like to be contacted at? Home     How would you like to obtain your AVS? [employer]    Phone call duration: 8 minutes    Quinten Weiss MD      Cecilia Pabon is a 44 year old year old female patient here today for rash on skin.   .  Patient states this has been present for  years.  Patient reports the following symptoms:  Itching and worse in winter.  .  Patient reports the following previous treatments TAC.  These treatments did not work.  Patient reports the following modifying factors none.  Associated symptoms: using vanicream soap.  .  Patient has no other skin complaints today.  Remainder of the HPI, Meds, PMH, Allergies, FH, and SH was reviewed in chart. "   Past Medical History:   Diagnosis Date     Abdominal pain, other specified site      Abdominal pain, right upper quadrant      Anxiety state, unspecified      ASCUS on Pap smear 2003    +HR HPV 16     COPD (chronic obstructive pulmonary disease) (H)     My Mom has this     Family history of breast cancer in mother 9/17/2009     Family history of breast cancer in mother      Family history of breast cancer in mother      Headache 2/25/2005     Headache      Headache      Headache(784.0)      Hx of colposcopy with cervical biopsy 6/2/2003    WNL     Mental disorders of mother, postpartum     postpartum depression     Migraine, unspecified, without mention of intractable migraine without mention of status migrainosus      Other specified cardiac dysrhythmias(427.89)      Other specified complication, antepartum(646.83)      Swelling of limb      Uncomplicated asthma     Myself     Unspecified viral infection, in conditions classified elsewhere and of unspecified site      Urinary tract infection, site not specified        Past Surgical History:   Procedure Laterality Date     ABDOMEN SURGERY  [id number]    Csecttion     COLONOSCOPY       GI SURGERY       LAPAROSCOPIC CHOLECYSTECTOMY N/A 12/30/2014    Procedure: LAPAROSCOPIC CHOLECYSTECTOMY;  Surgeon: Ulises Starr MD;  Location: [facility] OR     SURGICAL HISTORY OF -       Csection        Family History   Problem Relation Age of Onset     Depression Mother      Respiratory Mother         COPD, emphysema     Cancer Mother      Neurologic Disorder Mother         raunads syndrom     Breast Cancer Mother         Stage 4     Other Cancer Mother         Cervical     Anesthesia Reaction Mother      Osteoporosis Mother      Gastrointestinal Disease Maternal Grandmother      Depression Maternal Grandmother      Hypertension Maternal Grandmother      Cancer Maternal Grandmother         skin cancer     Breast Cancer Maternal Grandmother      Other Cancer Maternal Grandmother          Melanoma     Breast Cancer Cousin         Grandfathers side     Breast Cancer Cousin         Grandfathers side BRCA 1     Prostate Cancer Other         maternal side     Prostate Cancer Other         maternal side     Prostate Cancer Other         maternal side     Leukemia Paternal Half-Sister         1/2 sister,  in age 13 or 15       Social History     Socioeconomic History     Marital status:      Spouse name: Not on file     Number of children: Not on file     Years of education: Not on file     Highest education level: Not on file   Occupational History     Not on file   Social Needs     Financial resource strain: Not on file     Food insecurity     Worry: Not on file     Inability: Not on file     Transportation needs     Medical: Not on file     Non-medical: Not on file   Tobacco Use     Smoking status: Never Smoker     Smokeless tobacco: Never Used   Substance and Sexual Activity     Alcohol use: Yes     Comment: 1-2 times week     Drug use: No     Sexual activity: Not Currently     Partners: Male     Birth control/protection: Injection, None     Comment: my current partner has had a vasectomy.   Lifestyle     Physical activity     Days per week: Not on file     Minutes per session: Not on file     Stress: Not on file   Relationships     Social connections     Talks on phone: Not on file     Gets together: Not on file     Attends Synagogue service: Not on file     Active member of club or organization: Not on file     Attends meetings of clubs or organizations: Not on file     Relationship status: Not on file     Intimate partner violence     Fear of current or ex partner: Not on file     Emotionally abused: Not on file     Physically abused: Not on file     Forced sexual activity: Not on file   Other Topics Concern     Parent/sibling w/ CABG, MI or angioplasty before 65F 55M? No   Social History Narrative     Not on file       Outpatient Encounter Medications as of 2020   Medication Sig  Dispense Refill     albuterol (PROAIR HFA/PROVENTIL HFA/VENTOLIN HFA) 108 (90 Base) MCG/ACT inhaler Inhale 2 puffs into the lungs every 6 hours as needed for shortness of breath / dyspnea 18 g 1     buPROPion (WELLBUTRIN XL) 150 MG 24 hr tablet Take 1 tablet (150 mg) by mouth daily 30 tablet 0     escitalopram (LEXAPRO) 20 MG tablet Take 1 tablet (20 mg) by mouth daily Needs telephone video visit for further refills 30 tablet 0     [] omeprazole (PRILOSEC) 20 MG DR capsule Take 1 capsule (20 mg) by mouth 2 times daily 30 capsule 0     [] ondansetron (ZOFRAN ODT) 4 MG ODT tab Take 1 tablet (4 mg) by mouth every 8 hours as needed for nausea or vomiting 10 tablet 0     [] oseltamivir (TAMIFLU) 75 MG capsule Take 1 capsule (75 mg) by mouth 2 times daily for 5 days 10 capsule 0     SUMAtriptan (IMITREX) 100 MG tablet Take 1 tablet (100 mg) by mouth at onset of headache for migraine May repeat dose in 2 hours.  Do not exceed 200 mg in 24 hours 18 tablet 0     No facility-administered encounter medications on file as of 2020.              Review Of Systems  Skin: As above  Eyes: negative  Ears/Nose/Throat: negative  Respiratory: No shortness of breath, dyspnea on exertion, cough, or hemoptysis  Cardiovascular: negative  Gastrointestinal: negative  Genitourinary: negative  Musculoskeletal: negative  Neurologic: negative  Psychiatric: negative  Hematologic/Lymphatic/Immunologic: negative  Endocrine: negative      O:   Alert & Orientedx3, Mood & Affect wnl,    General appearance normal   Alert, oriented and in no acute distress     Photos:eczematous plaques on trunk and ext       Pulm: Breathing Normal, talking in normal sentences, no shortness of breath during conversation    Neuro/Psych: Orientation:Alert and Orientedx3 ; Mood/Affect:normal ; no anxiety or depression       A/P:  1. Atopic derm  claritin two in am   Zyrtec two pm   Betamethasone twice daily  Emollient use daily discussed with  patient   Skin care regimen reviewed with patient: Eliminate harsh soaps, i.e. Dial, zest, irsih spring; Mild soaps such as Cetaphil or Dove sensitive skin, avoid hot or cold showers, aggressive use of emollients including vanicream, cetaphil or cerave discussed with patient.    Return to clinic 2 weeks    Teledermatology information:  - Location of patient: home  - Location of teledermatologist: home   - Reason teledermatology is appropriate: of National Emergency Regarding Coronavirus disease (COVID 19) Outbreak  - The patient's condition can safely be assessed using telemedicine: yes  - Method of transmission: store and forward teledermatology  - Image quality and interpretability: acceptable  - Physician has received verbal consent for a Video/Photos Visit from the patient? Yes  - In-person dermatology visit recommendation: no  - Date of images: 5/12/2020  - Service start time:1215am/pm  - Service end time:1223am/pm  - Date of report: 05/13/20

## 2020-06-08 ENCOUNTER — MYC MEDICAL ADVICE (OUTPATIENT)
Dept: FAMILY MEDICINE | Facility: CLINIC | Age: 45
End: 2020-06-08

## 2020-06-08 ENCOUNTER — VIRTUAL VISIT (OUTPATIENT)
Dept: FAMILY MEDICINE | Facility: CLINIC | Age: 45
End: 2020-06-08
Payer: COMMERCIAL

## 2020-06-08 DIAGNOSIS — R51.9 HEADACHE, UNSPECIFIED HEADACHE TYPE: ICD-10-CM

## 2020-06-08 DIAGNOSIS — F41.1 ANXIETY STATE: ICD-10-CM

## 2020-06-08 PROCEDURE — 99214 OFFICE O/P EST MOD 30 MIN: CPT | Mod: GT | Performed by: FAMILY MEDICINE

## 2020-06-08 RX ORDER — SUMATRIPTAN 100 MG/1
100 TABLET, FILM COATED ORAL
Qty: 18 TABLET | Refills: 3 | Status: SHIPPED | OUTPATIENT
Start: 2020-06-08 | End: 2023-05-18

## 2020-06-08 RX ORDER — ESCITALOPRAM OXALATE 20 MG/1
20 TABLET ORAL DAILY
Qty: 90 TABLET | Refills: 3 | Status: SHIPPED | OUTPATIENT
Start: 2020-06-08 | End: 2020-10-27

## 2020-06-08 RX ORDER — BUPROPION HYDROCHLORIDE 150 MG/1
150 TABLET ORAL DAILY
Qty: 90 TABLET | Refills: 3 | Status: SHIPPED | OUTPATIENT
Start: 2020-06-08 | End: 2020-10-27

## 2020-06-08 ASSESSMENT — ANXIETY QUESTIONNAIRES
6. BECOMING EASILY ANNOYED OR IRRITABLE: SEVERAL DAYS
7. FEELING AFRAID AS IF SOMETHING AWFUL MIGHT HAPPEN: SEVERAL DAYS
IF YOU CHECKED OFF ANY PROBLEMS ON THIS QUESTIONNAIRE, HOW DIFFICULT HAVE THESE PROBLEMS MADE IT FOR YOU TO DO YOUR WORK, TAKE CARE OF THINGS AT HOME, OR GET ALONG WITH OTHER PEOPLE: SOMEWHAT DIFFICULT
GAD7 TOTAL SCORE: 6
5. BEING SO RESTLESS THAT IT IS HARD TO SIT STILL: NOT AT ALL
2. NOT BEING ABLE TO STOP OR CONTROL WORRYING: NOT AT ALL
1. FEELING NERVOUS, ANXIOUS, OR ON EDGE: SEVERAL DAYS
3. WORRYING TOO MUCH ABOUT DIFFERENT THINGS: SEVERAL DAYS

## 2020-06-08 ASSESSMENT — PATIENT HEALTH QUESTIONNAIRE - PHQ9
SUM OF ALL RESPONSES TO PHQ QUESTIONS 1-9: 12
5. POOR APPETITE OR OVEREATING: MORE THAN HALF THE DAYS

## 2020-06-08 NOTE — PROGRESS NOTES
"Cecilia Pabon is a 44 year old female who is being evaluated via a billable video visit.      The patient has been notified of following:     \"This video visit will be conducted via a call between you and your physician/provider. We have found that certain health care needs can be provided without the need for an in-person physical exam.  This service lets us provide the care you need with a video conversation.  If a prescription is necessary we can send it directly to your pharmacy.  If lab work is needed we can place an order for that and you can then stop by our lab to have the test done at a later time.    Video visits are billed at different rates depending on your insurance coverage.  Please reach out to your insurance provider with any questions.    If during the course of the call the physician/provider feels a video visit is not appropriate, you will not be charged for this service.\"    Patient has given verbal consent for Video visit? Yes    How would you like to obtain your AVS? [facility]    Patient would like the video invitation sent by: Text to cell phone: [phone]    Will anyone else be joining your video visit? No    Subjective     Cecilia Pabon is a 44 year old female who presents today via video visit for the following health issues:    HPI  Anxiety Follow-Up    How are you doing with your anxiety since your last visit? 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 Yes:  uneasy feeling    Have you had a significant life event? OTHER: see above     Are you feeling depressed? Yes:  slightly    Do you have any concerns with your use of alcohol or other drugs? No    Social History     Tobacco Use     Smoking status: Never Smoker     Smokeless tobacco: Never Used   Substance Use Topics     Alcohol use: Yes     Comment: 1-2 times week     Drug use: No     BRAULIO-7 SCORE 6/6/2017 7/29/2019 6/8/2020   Total " Score - - -   Total Score 0 (minimal anxiety) - -   Total Score 13 15 6     PHQ 8/13/2018 7/29/2019 6/8/2020   PHQ-9 Total Score 15 16 12   Q9: Thoughts of better off dead/self-harm past 2 weeks Not at all Not at all Several days     Last PHQ-9 6/8/2020   1.  Little interest or pleasure in doing things 1   2.  Feeling down, depressed, or hopeless 1   3.  Trouble falling or staying asleep, or sleeping too much 2   4.  Feeling tired or having little energy 2   5.  Poor appetite or overeating 2   6.  Feeling bad about yourself 2   7.  Trouble concentrating 1   8.  Moving slowly or restless 0   Q9: Thoughts of better off dead/self-harm past 2 weeks 1   PHQ-9 Total Score 12   Difficulty at work, home, or with people Somewhat difficult     BRAULIO-7  6/8/2020   1. Feeling nervous, anxious, or on edge 1   2. Not being able to stop or control worrying 0   3. Worrying too much about different things 1   4. Trouble relaxing 2   5. Being so restless that it is hard to sit still 0   6. Becoming easily annoyed or irritable 1   7. Feeling afraid, as if something awful might happen 1   BRAULIO-7 Total Score 6   If you checked any problems, how difficult have they made it for you to do your work, take care of things at home, or get along with other people? Somewhat difficult         How many servings of fruits and vegetables do you eat daily?  4 or more    On average, how many sweetened beverages do you drink each day (Examples: soda, juice, sweet tea, etc.  Do NOT count diet or artificially sweetened beverages)?   1    How many days per week do you exercise enough to make your heart beat faster? 3 or less    How many minutes a day do you exercise enough to make your heart beat faster? 30 - 60    How many days per week do you miss taking your medication? 0         Video Start Time: 1020am    Asthma Follow-Up    Was ACT completed today?  No      Do you have a cough?  No    Are you experiencing any wheezing in your chest?  No    Do you have  "any shortness of breath?  No     How often are you using a short-acting (rescue) inhaler or nebulizer, such as Albuterol?  with exercise     How many days per week do you miss taking your asthma controller medication?  I do not have an asthma controller medication    Please describe any recent triggers for your asthma: exercise or sports    Have you had any Emergency Room Visits, Urgent Care Visits, or Hospital Admissions since your last office visit?  No    Migraine     Since your last clinic visit, how have your headaches changed?  No change    How often are you getting headaches or migraines? monthly      Are you able to do normal daily activities when you have a migraine? Yes    Are you taking rescue/relief medications? (Select all that apply) sumatriptan (Imitrex)    How helpful is your rescue/relief medication?  I get total relief    Are you taking any medications to prevent migraines? (Select all that apply)  No    In the past 4 weeks, how often have you gone to urgent care or the emergency room because of your headaches?  0          Reviewed and updated as needed this visit by Provider  Tobacco  Allergies  Meds  Problems  Med Hx  Surg Hx  Fam Hx         Review of Systems   Constitutional, HEENT, cardiovascular, pulmonary, gi and gu systems are negative, except as otherwise noted.      Objective    There were no vitals taken for this visit.  Estimated body mass index is 29.23 kg/m  as calculated from the following:    Height as of 1/15/20: 1.6 m (5' 3\").    Weight as of 4/12/20: 74.8 kg (165 lb).  Physical Exam     GENERAL: Healthy, alert and no distress  EYES: Eyes grossly normal to inspection.  No discharge or erythema, or obvious scleral/conjunctival abnormalities.  RESP: No audible wheeze, cough, or visible cyanosis.  No visible retractions or increased work of breathing.    SKIN: Visible skin clear. No significant rash, abnormal pigmentation or lesions.  NEURO: Cranial nerves grossly intact.  " "Mentation and speech appropriate for age.  PSYCH: Mentation appears normal, affect normal/bright, judgement and insight intact, normal speech and appearance well-groomed.      Diagnostic Test Results:  Labs reviewed in Epic        Assessment & Plan     1. Headache, unspecified headache type  Stable no change in treatment plan.   - SUMAtriptan (IMITREX) 100 MG tablet; Take 1 tablet (100 mg) by mouth at onset of headache for migraine May repeat dose in 2 hours.  Do not exceed 200 mg in 24 hours  Dispense: 18 tablet; Refill: 3    2. Anxiety state  Stable no change in treatment plan.   Some triggering due to mom's death   - escitalopram (LEXAPRO) 20 MG tablet; Take 1 tablet (20 mg) by mouth daily Needs telephone video visit for further refills  Dispense: 90 tablet; Refill: 3  - buPROPion (WELLBUTRIN XL) 150 MG 24 hr tablet; Take 1 tablet (150 mg) by mouth daily  Dispense: 90 tablet; Refill: 3    3. Asthma   Will send ACT   She is using inhaler as needed with exercise      BMI:   Estimated body mass index is 29.23 kg/m  as calculated from the following:    Height as of 1/15/20: 1.6 m (5' 3\").    Weight as of 4/12/20: 74.8 kg (165 lb).   Weight management plan: Discussed healthy diet and exercise guidelines        There are no Patient Instructions on file for this visit.    Return in about 1 year (around 6/8/2021).    Ammy Rolon MD  [facility]      Video-Visit Details    Type of service:  Video Visit    Video End Time:10:42 AM    Originating Location (pt. Location): Home    Distant Location (provider location):  [facility]     Platform used for Video Visit: Doximity    Return in about 1 year (around 6/8/2021).       Ammy Rolon MD      "

## 2020-06-08 NOTE — PATIENT INSTRUCTIONS
Cecilia     As discussed stay on all current meds    I would encourage you to reach out to [employer] at your work to see if they can help with grief    We will send you and asthma questionaire via Simmr to answer and send back to us    Keep meds the same for anxiety

## 2020-06-09 ASSESSMENT — ANXIETY QUESTIONNAIRES: GAD7 TOTAL SCORE: 6

## 2020-06-11 ENCOUNTER — MYC MEDICAL ADVICE (OUTPATIENT)
Dept: FAMILY MEDICINE | Facility: CLINIC | Age: 45
End: 2020-06-11

## 2020-06-15 ENCOUNTER — MYC MEDICAL ADVICE (OUTPATIENT)
Dept: FAMILY MEDICINE | Facility: CLINIC | Age: 45
End: 2020-06-15

## 2020-08-11 ENCOUNTER — OFFICE VISIT (OUTPATIENT)
Dept: DERMATOLOGY | Facility: CLINIC | Age: 45
End: 2020-08-11
Payer: COMMERCIAL

## 2020-08-11 VITALS — DIASTOLIC BLOOD PRESSURE: 67 MMHG | SYSTOLIC BLOOD PRESSURE: 119 MMHG | OXYGEN SATURATION: 97 % | HEART RATE: 89 BPM

## 2020-08-11 DIAGNOSIS — D48.5 NEOPLASM OF UNCERTAIN BEHAVIOR OF SKIN: ICD-10-CM

## 2020-08-11 DIAGNOSIS — B86 SCABIES: Primary | ICD-10-CM

## 2020-08-11 PROCEDURE — 11103 TANGNTL BX SKIN EA SEP/ADDL: CPT | Performed by: PHYSICIAN ASSISTANT

## 2020-08-11 PROCEDURE — 88305 TISSUE EXAM BY PATHOLOGIST: CPT | Mod: TC | Performed by: PHYSICIAN ASSISTANT

## 2020-08-11 PROCEDURE — 11102 TANGNTL BX SKIN SINGLE LES: CPT | Performed by: PHYSICIAN ASSISTANT

## 2020-08-11 PROCEDURE — 99214 OFFICE O/P EST MOD 30 MIN: CPT | Mod: 25 | Performed by: PHYSICIAN ASSISTANT

## 2020-08-11 RX ORDER — PERMETHRIN 50 MG/G
CREAM TOPICAL
Qty: 120 G | Refills: 1 | Status: SHIPPED | OUTPATIENT
Start: 2020-08-11 | End: 2020-08-28

## 2020-08-11 NOTE — PROGRESS NOTES
Cecilia Pabon is a 44 year old year old female patient here today for recheck rash and itchy. Present for about a year, and  has had similar symptoms for past 6 months. She notes anthistamines and betamethasone have not been that helpful. She also notes bumps on chest and chin that she will pick at and are bothersome. Patient has no other skin complaints today.  Remainder of the HPI, Meds, PMH, Allergies, FH, and SH was reviewed in chart.    Past Medical History:   Diagnosis Date     Abdominal pain, other specified site      Abdominal pain, right upper quadrant      Anxiety state, unspecified      ASCUS on Pap smear 2003    +HR HPV 16     COPD (chronic obstructive pulmonary disease) (H)     My Mom has this     Family history of breast cancer in mother 9/17/2009     Family history of breast cancer in mother      Family history of breast cancer in mother      Headache 2/25/2005     Headache      Headache      Headache(784.0)      Hx of colposcopy with cervical biopsy 6/2/2003    WNL     Mental disorders of mother, postpartum     postpartum depression     Migraine, unspecified, without mention of intractable migraine without mention of status migrainosus      Other specified cardiac dysrhythmias(427.89)      Other specified complication, antepartum(646.83)      Swelling of limb      Uncomplicated asthma     Myself     Unspecified viral infection, in conditions classified elsewhere and of unspecified site      Urinary tract infection, site not specified        Past Surgical History:   Procedure Laterality Date     ABDOMEN SURGERY  [id number]    Csecttion     COLONOSCOPY       GI SURGERY       LAPAROSCOPIC CHOLECYSTECTOMY N/A 12/30/2014    Procedure: LAPAROSCOPIC CHOLECYSTECTOMY;  Surgeon: Ulises Starr MD;  Location: [facility]     SURGICAL HISTORY OF -       Csection        Family History   Problem Relation Age of Onset     Depression Mother      Respiratory Mother         COPD, emphysema     Cancer Mother       Neurologic Disorder Mother         raunads syndrom     Breast Cancer Mother         Stage 4     Other Cancer Mother         Cervical     Anesthesia Reaction Mother      Osteoporosis Mother      Gastrointestinal Disease Maternal Grandmother      Depression Maternal Grandmother      Hypertension Maternal Grandmother      Cancer Maternal Grandmother         skin cancer     Breast Cancer Maternal Grandmother      Other Cancer Maternal Grandmother         Melanoma     Breast Cancer Cousin         Grandfathers side     Breast Cancer Cousin         Grandfathers side BRCA 1     Prostate Cancer Other         maternal side     Prostate Cancer Other         maternal side     Prostate Cancer Other         maternal side     Leukemia Paternal Half-Sister         1/2 sister,  in age 13 or 15       Social History     Socioeconomic History     Marital status:      Spouse name: Not on file     Number of children: Not on file     Years of education: Not on file     Highest education level: Not on file   Occupational History     Not on file   Social Needs     Financial resource strain: Not on file     Food insecurity     Worry: Not on file     Inability: Not on file     Transportation needs     Medical: Not on file     Non-medical: Not on file   Tobacco Use     Smoking status: Never Smoker     Smokeless tobacco: Never Used   Substance and Sexual Activity     Alcohol use: Yes     Comment: 1-2 times week     Drug use: No     Sexual activity: Yes     Partners: Male     Birth control/protection: Male Surgical     Comment: my current partner has had a vasectomy.   Lifestyle     Physical activity     Days per week: Not on file     Minutes per session: Not on file     Stress: Not on file   Relationships     Social connections     Talks on phone: Not on file     Gets together: Not on file     Attends Oriental orthodox service: Not on file     Active member of club or organization: Not on file     Attends meetings of clubs or organizations:  Not on file     Relationship status: Not on file     Intimate partner violence     Fear of current or ex partner: Not on file     Emotionally abused: Not on file     Physically abused: Not on file     Forced sexual activity: Not on file   Other Topics Concern     Parent/sibling w/ CABG, MI or angioplasty before 65F 55M? No   Social History Narrative     Not on file       Outpatient Encounter Medications as of 8/11/2020   Medication Sig Dispense Refill     albuterol (PROAIR HFA/PROVENTIL HFA/VENTOLIN HFA) 108 (90 Base) MCG/ACT inhaler Inhale 2 puffs into the lungs every 6 hours as needed for shortness of breath / dyspnea 18 g 1     augmented betamethasone dipropionate (DIPROLENE-AF) 0.05 % external cream Apply sparingly to affected area twice daily as needed.  Do not apply to face. 100 g 3     buPROPion (WELLBUTRIN XL) 150 MG 24 hr tablet Take 1 tablet (150 mg) by mouth daily 90 tablet 3     escitalopram (LEXAPRO) 20 MG tablet Take 1 tablet (20 mg) by mouth daily Needs telephone video visit for further refills 90 tablet 3     SUMAtriptan (IMITREX) 100 MG tablet Take 1 tablet (100 mg) by mouth at onset of headache for migraine May repeat dose in 2 hours.  Do not exceed 200 mg in 24 hours 18 tablet 3     No facility-administered encounter medications on file as of 8/11/2020.              Review Of Systems  Skin: As above  Eyes: negative  Ears/Nose/Throat: negative  Respiratory: No shortness of breath, dyspnea on exertion, cough, or hemoptysis  Cardiovascular: negative  Gastrointestinal: negative  Genitourinary: negative  Musculoskeletal: negative  Neurologic: negative  Psychiatric: negative  Hematologic/Lymphatic/Immunologic: negative  Endocrine: negative      O:   NAD, WDWN, Alert & Oriented, Mood & Affect wnl, Vitals stable   Here today alone   /67   Pulse 89   SpO2 97%    General appearance normal   Vitals stable   Alert, oriented and in no acute distress     0.5 cm Skin colored papule on right breast  0.5  cm Skin colored papule on upper chest  0.4 cm Skin colored papule on left chin   Pink scaly papules interdigital on hands and feet, areas of excoriation on arms, legs  Few pink papules on torso        Eyes: Conjunctivae/lids:Normal     ENT: Lips: normal    MSK:Normal    Pulm: Breathing Normal     Neuro/Psych: Orientation:Alert and Orientedx3 ; Mood/Affect:normal     A/P:  1. Atopic Dermatitis vs scabies   Will treat for scabies, sent in prescription for her  as well.   If not improving consider dupixent to treat atopic dermatitis.   2. R/O dermal vs trichoepithelioma  On right breast, upper chest, and left chin  TANGENTIAL BIOPSY SENT OUT:  After consent, anesthesia with L[facility] and prep, tangential excision performed and specimen sent out for permanent section histology.  No complications and routine wound care. Patient told to call our office in 1-2 weeks for result.      Skin care regimen reviewed with patient: Eliminate harsh soaps, i.e. Dial, zest, irsih spring; Mild soaps such as Cetaphil or Dove sensitive skin, avoid hot or cold showers, aggressive use of emollients including vanicream, cetaphil or cerave discussed with patient.

## 2020-08-11 NOTE — PATIENT INSTRUCTIONS
Wound Care Instructions     FOR SUPERFICIAL WOUNDS     [facility] [phone]    [facility] [phone]                       AFTER 24 HOURS YOU SHOULD REMOVE THE BANDAGE AND BEGIN DAILY DRESSING CHANGES AS FOLLOWS:     1) Remove Dressing.     2) Clean and dry the area with tap water using a Q-tip or sterile gauze pad.     3) Apply Vaseline, Aquaphor, Polysporin ointment or Bacitracin ointment over entire wound.  Do NOT use Neosporin ointment.     4) Cover the wound with a band-aid, or a sterile non-stick gauze pad and micropore paper tape      REPEAT THESE INSTRUCTIONS AT LEAST ONCE A DAY UNTIL THE WOUND HAS COMPLETELY HEALED.    It is an old wives tale that a wound heals better when it is exposed to air and allowed to dry out. The wound will heal faster with a better cosmetic result if it is kept moist with ointment and covered with a bandage.    **Do not let the wound dry out.**      Supplies Needed:      *Cotton tipped applicators (Q-tips)    *Polysporin Ointment or Bacitracin Ointment (NOT NEOSPORIN)    *Band-aids or non-stick gauze pads and micropore paper tape.      PATIENT INFORMATION:    During the healing process you will notice a number of changes. All wounds develop a small halo of redness surrounding the wound.  This means healing is occurring. Severe itching with extensive redness usually indicates sensitivity to the ointment or bandage tape used to dress the wound.  You should call our office if this develops.      Swelling  and/or discoloration around your surgical site is common, particularly when performed around the eye.    All wounds normally drain.  The larger the wound the more drainage there will be.  After 7-10 days, you will notice the wound beginning to shrink and new skin will begin to grow.  The wound is healed when you can see skin has formed over the entire area.  A healed wound has a healthy, shiny look to the surface and is red to dark pink in color  to normalize.  Wounds may take approximately 4-6 weeks to heal.  Larger wounds may take 6-8 weeks.  After the wound is healed you may discontinue dressing changes.    You may experience a sensation of tightness as your wound heals. This is normal and will gradually subside.    Your healed wound may be sensitive to temperature changes. This sensitivity improves with time, but if you re having a lot of discomfort, try to avoid temperature extremes.    Patients frequently experience itching after their wound appears to have healed because of the continue healing under the skin.  Plain Vaseline will help relieve the itching.        POSSIBLE COMPLICATIONS    BLEEDIN. Leave the bandage in place.  2. Use tightly rolled up gauze or a cloth to apply direct pressure over the bandage for 30  minutes.  3. Reapply pressure for an additional 30 minutes if necessary  4. Use additional gauze and tape to maintain pressure once the bleeding has stopped.

## 2020-08-11 NOTE — LETTER
8/11/2020         RE: Cecilia Pabon  [address]  [place] [facility] [id number]        Dear Colleague,    Thank you for referring your patient, Cecilia Pabon, to the [facility]. Please see a copy of my visit note below.    Cecilia Pabon is a 44 year old year old female patient here today for recheck rash and itchy. Present for about a year, and  has had similar symptoms for past 6 months. She notes anthistamines and betamethasone have not been that helpful. She also notes bumps on chest and chin that she will pick at and are bothersome. Patient has no other skin complaints today.  Remainder of the HPI, Meds, PMH, Allergies, FH, and SH was reviewed in chart.    Past Medical History:   Diagnosis Date     Abdominal pain, other specified site      Abdominal pain, right upper quadrant      Anxiety state, unspecified      ASCUS on Pap smear 2003    +HR HPV 16     COPD (chronic obstructive pulmonary disease) (H)     My Mom has this     Family history of breast cancer in mother 9/17/2009     Family history of breast cancer in mother      Family history of breast cancer in mother      Headache 2/25/2005     Headache      Headache      Headache(784.0)      Hx of colposcopy with cervical biopsy 6/2/2003    WNL     Mental disorders of mother, postpartum     postpartum depression     Migraine, unspecified, without mention of intractable migraine without mention of status migrainosus      Other specified cardiac dysrhythmias(427.89)      Other specified complication, antepartum(646.83)      Swelling of limb      Uncomplicated asthma     Myself     Unspecified viral infection, in conditions classified elsewhere and of unspecified site      Urinary tract infection, site not specified        Past Surgical History:   Procedure Laterality Date     ABDOMEN SURGERY  [id number]    Csecttion     COLONOSCOPY       GI SURGERY       LAPAROSCOPIC CHOLECYSTECTOMY N/A 12/30/2014    Procedure: LAPAROSCOPIC  CHOLECYSTECTOMY;  Surgeon: Ulises Starr MD;  Location: [facility]     SURGICAL HISTORY OF -       Csection        Family History   Problem Relation Age of Onset     Depression Mother      Respiratory Mother         COPD, emphysema     Cancer Mother      Neurologic Disorder Mother         raunads syndrom     Breast Cancer Mother         Stage 4     Other Cancer Mother         Cervical     Anesthesia Reaction Mother      Osteoporosis Mother      Gastrointestinal Disease Maternal Grandmother      Depression Maternal Grandmother      Hypertension Maternal Grandmother      Cancer Maternal Grandmother         skin cancer     Breast Cancer Maternal Grandmother      Other Cancer Maternal Grandmother         Melanoma     Breast Cancer Cousin         Grandfathers side     Breast Cancer Cousin         Grandfathers side BRCA 1     Prostate Cancer Other         maternal side     Prostate Cancer Other         maternal side     Prostate Cancer Other         maternal side     Leukemia Paternal Half-Sister         1/2 sister,  in age 13 or 15       Social History     Socioeconomic History     Marital status:      Spouse name: Not on file     Number of children: Not on file     Years of education: Not on file     Highest education level: Not on file   Occupational History     Not on file   Social Needs     Financial resource strain: Not on file     Food insecurity     Worry: Not on file     Inability: Not on file     Transportation needs     Medical: Not on file     Non-medical: Not on file   Tobacco Use     Smoking status: Never Smoker     Smokeless tobacco: Never Used   Substance and Sexual Activity     Alcohol use: Yes     Comment: 1-2 times week     Drug use: No     Sexual activity: Yes     Partners: Male     Birth control/protection: Male Surgical     Comment: my current partner has had a vasectomy.   Lifestyle     Physical activity     Days per week: Not on file     Minutes per session: Not on file     Stress: Not on  file   Relationships     Social connections     Talks on phone: Not on file     Gets together: Not on file     Attends [employer] service: Not on file     Active member of club or organization: Not on file     Attends meetings of clubs or organizations: Not on file     Relationship status: Not on file     Intimate partner violence     Fear of current or ex partner: Not on file     Emotionally abused: Not on file     Physically abused: Not on file     Forced sexual activity: Not on file   Other Topics Concern     Parent/sibling w/ CABG, MI or angioplasty before 65F 55M? No   Social History Narrative     Not on file       Outpatient Encounter Medications as of 8/11/2020   Medication Sig Dispense Refill     albuterol (PROAIR HFA/PROVENTIL HFA/VENTOLIN HFA) 108 (90 Base) MCG/ACT inhaler Inhale 2 puffs into the lungs every 6 hours as needed for shortness of breath / dyspnea 18 g 1     augmented betamethasone dipropionate (DIPROLENE-AF) 0.05 % external cream Apply sparingly to affected area twice daily as needed.  Do not apply to face. 100 g 3     buPROPion (WELLBUTRIN XL) 150 MG 24 hr tablet Take 1 tablet (150 mg) by mouth daily 90 tablet 3     escitalopram (LEXAPRO) 20 MG tablet Take 1 tablet (20 mg) by mouth daily Needs telephone video visit for further refills 90 tablet 3     SUMAtriptan (IMITREX) 100 MG tablet Take 1 tablet (100 mg) by mouth at onset of headache for migraine May repeat dose in 2 hours.  Do not exceed 200 mg in 24 hours 18 tablet 3     No facility-administered encounter medications on file as of 8/11/2020.              Review Of Systems  Skin: As above  Eyes: negative  Ears/Nose/Throat: negative  Respiratory: No shortness of breath, dyspnea on exertion, cough, or hemoptysis  Cardiovascular: negative  Gastrointestinal: negative  Genitourinary: negative  Musculoskeletal: negative  Neurologic: negative  Psychiatric: negative  Hematologic/Lymphatic/Immunologic: negative  Endocrine: negative      O:   NAD,  WDWN, Alert & Oriented, Mood & Affect wnl, Vitals stable   Here today alone   /67   Pulse 89   SpO2 97%    General appearance normal   Vitals stable   Alert, oriented and in no acute distress     0.5 cm Skin colored papule on right breast  0.5 cm Skin colored papule on upper chest  0.4 cm Skin colored papule on left chin   Pink scaly papules interdigital on hands and feet, areas of excoriation on arms, legs  Few pink papules on torso        Eyes: Conjunctivae/lids:Normal     ENT: Lips: normal    MSK:Normal    Pulm: Breathing Normal     Neuro/Psych: Orientation:Alert and Orientedx3 ; Mood/Affect:normal     A/P:  1. Atopic Dermatitis vs scabies   Will treat for scabies, sent in prescription for her  as well.   If not improving consider dupixent to treat atopic dermatitis.   2. R/O dermal vs trichoepithelioma  On right breast, upper chest, and left chin  TANGENTIAL BIOPSY SENT OUT:  After consent, anesthesia with LEC and prep, tangential excision performed and specimen sent out for permanent section histology.  No complications and routine wound care. Patient told to call our office in 1-2 weeks for result.      Skin care regimen reviewed with patient: Eliminate harsh soaps, i.e. Dial, zest, irsih spring; Mild soaps such as Cetaphil or Dove sensitive skin, avoid hot or cold showers, aggressive use of emollients including vanicream, cetaphil or cerave discussed with patient.        Again, thank you for allowing me to participate in the care of your patient.        Sincerely,        Olivia Angel PA-C

## 2020-08-11 NOTE — NURSING NOTE
Chief Complaint   Patient presents with     Eczema       Vitals:    08/11/20 1253   BP: 119/67   Pulse: 89   SpO2: 97%     Wt Readings from Last 1 Encounters:   04/12/20 74.8 kg (165 lb)       Griselda Sainz LPN.................8/11/2020

## 2020-08-13 ENCOUNTER — MYC MEDICAL ADVICE (OUTPATIENT)
Dept: FAMILY MEDICINE | Facility: CLINIC | Age: 45
End: 2020-08-13

## 2020-08-15 LAB — COPATH REPORT: NORMAL

## 2020-08-17 ENCOUNTER — MYC MEDICAL ADVICE (OUTPATIENT)
Dept: DERMATOLOGY | Facility: CLINIC | Age: 45
End: 2020-08-17

## 2020-08-17 DIAGNOSIS — L20.81 ATOPIC NEURODERMATITIS: ICD-10-CM

## 2020-08-17 DIAGNOSIS — L30.9 DERMATITIS: Primary | ICD-10-CM

## 2020-08-18 RX ORDER — PREDNISONE 10 MG/1
TABLET ORAL
Qty: 40 TABLET | Refills: 0 | Status: SHIPPED | OUTPATIENT
Start: 2020-08-18 | End: 2020-09-04

## 2020-08-18 RX ORDER — BETAMETHASONE DIPROPIONATE 0.5 MG/G
CREAM TOPICAL
Qty: 100 G | Refills: 3 | Status: SHIPPED | OUTPATIENT
Start: 2020-08-18 | End: 2022-07-28

## 2020-09-18 DIAGNOSIS — B86 SCABIES: ICD-10-CM

## 2020-09-18 DIAGNOSIS — L30.9 DERMATITIS: ICD-10-CM

## 2020-09-18 RX ORDER — PREDNISONE 10 MG/1
TABLET ORAL
Qty: 40 TABLET | Refills: 0 | Status: SHIPPED | OUTPATIENT
Start: 2020-09-18 | End: 2020-10-28

## 2020-09-18 RX ORDER — PERMETHRIN 50 MG/G
CREAM TOPICAL
Qty: 120 G | Refills: 1 | Status: SHIPPED | OUTPATIENT
Start: 2020-09-18 | End: 2020-11-17

## 2020-09-18 RX ORDER — IVERMECTIN 3 MG/1
TABLET ORAL
Qty: 10 TABLET | Refills: 0 | Status: SHIPPED | OUTPATIENT
Start: 2020-09-18 | End: 2020-10-26

## 2020-10-21 ENCOUNTER — MYC MEDICAL ADVICE (OUTPATIENT)
Dept: FAMILY MEDICINE | Facility: CLINIC | Age: 45
End: 2020-10-21

## 2020-10-26 ENCOUNTER — MYC MEDICAL ADVICE (OUTPATIENT)
Dept: FAMILY MEDICINE | Facility: CLINIC | Age: 45
End: 2020-10-26

## 2020-10-26 ENCOUNTER — MYC MEDICAL ADVICE (OUTPATIENT)
Dept: DERMATOLOGY | Facility: CLINIC | Age: 45
End: 2020-10-26

## 2020-10-26 DIAGNOSIS — B86 SCABIES: ICD-10-CM

## 2020-10-26 RX ORDER — IVERMECTIN 3 MG/1
TABLET ORAL
Qty: 10 TABLET | Refills: 0 | Status: SHIPPED | OUTPATIENT
Start: 2020-10-26 | End: 2020-11-17

## 2020-10-26 NOTE — TELEPHONE ENCOUNTER
Please see request as pt has been treated for scabies many times.   Jaja AVILA RN BSN PHN  [facility]

## 2020-10-27 ENCOUNTER — MYC REFILL (OUTPATIENT)
Dept: FAMILY MEDICINE | Facility: CLINIC | Age: 45
End: 2020-10-27

## 2020-10-27 DIAGNOSIS — F41.1 ANXIETY STATE: ICD-10-CM

## 2020-10-27 RX ORDER — BUPROPION HYDROCHLORIDE 150 MG/1
150 TABLET ORAL DAILY
Qty: 90 TABLET | Refills: 0 | Status: SHIPPED | OUTPATIENT
Start: 2020-10-27 | End: 2021-03-05

## 2020-10-27 RX ORDER — ESCITALOPRAM OXALATE 20 MG/1
20 TABLET ORAL DAILY
Qty: 90 TABLET | Refills: 0 | Status: SHIPPED | OUTPATIENT
Start: 2020-10-27 | End: 2021-03-05

## 2020-10-28 ENCOUNTER — MYC REFILL (OUTPATIENT)
Dept: DERMATOLOGY | Facility: CLINIC | Age: 45
End: 2020-10-28

## 2020-10-28 DIAGNOSIS — L30.9 DERMATITIS: ICD-10-CM

## 2020-10-28 RX ORDER — PREDNISONE 10 MG/1
TABLET ORAL
Qty: 40 TABLET | Refills: 0 | Status: SHIPPED | OUTPATIENT
Start: 2020-10-28 | End: 2021-01-08

## 2020-11-02 ENCOUNTER — ANCILLARY PROCEDURE (OUTPATIENT)
Dept: GENERAL RADIOLOGY | Facility: CLINIC | Age: 45
End: 2020-11-02
Attending: PHYSICIAN ASSISTANT
Payer: COMMERCIAL

## 2020-11-02 ENCOUNTER — OFFICE VISIT (OUTPATIENT)
Dept: URGENT CARE | Facility: URGENT CARE | Age: 45
End: 2020-11-02
Payer: COMMERCIAL

## 2020-11-02 VITALS
WEIGHT: 169.2 LBS | SYSTOLIC BLOOD PRESSURE: 110 MMHG | DIASTOLIC BLOOD PRESSURE: 70 MMHG | OXYGEN SATURATION: 98 % | RESPIRATION RATE: 16 BRPM | HEIGHT: 63 IN | HEART RATE: 90 BPM | TEMPERATURE: 97.6 F | BODY MASS INDEX: 29.98 KG/M2

## 2020-11-02 DIAGNOSIS — M79.672 LEFT FOOT PAIN: ICD-10-CM

## 2020-11-02 DIAGNOSIS — M79.672 LEFT FOOT PAIN: Primary | ICD-10-CM

## 2020-11-02 PROCEDURE — 99214 OFFICE O/P EST MOD 30 MIN: CPT | Performed by: PHYSICIAN ASSISTANT

## 2020-11-02 PROCEDURE — 73630 X-RAY EXAM OF FOOT: CPT | Mod: LT | Performed by: RADIOLOGY

## 2020-11-02 RX ORDER — NAPROXEN 500 MG/1
500 TABLET ORAL 2 TIMES DAILY WITH MEALS
Qty: 60 TABLET | Refills: 1 | Status: SHIPPED | OUTPATIENT
Start: 2020-11-02 | End: 2021-08-19

## 2020-11-02 ASSESSMENT — PAIN SCALES - GENERAL: PAINLEVEL: SEVERE PAIN (7)

## 2020-11-02 ASSESSMENT — MIFFLIN-ST. JEOR: SCORE: 1396.62

## 2020-11-04 ENCOUNTER — MYC MEDICAL ADVICE (OUTPATIENT)
Dept: FAMILY MEDICINE | Facility: CLINIC | Age: 45
End: 2020-11-04

## 2020-11-16 ENCOUNTER — HEALTH MAINTENANCE LETTER (OUTPATIENT)
Age: 45
End: 2020-11-16

## 2020-11-17 ENCOUNTER — MYC MEDICAL ADVICE (OUTPATIENT)
Dept: FAMILY MEDICINE | Facility: CLINIC | Age: 45
End: 2020-11-17

## 2020-11-17 ENCOUNTER — VIRTUAL VISIT (OUTPATIENT)
Dept: FAMILY MEDICINE | Facility: CLINIC | Age: 45
End: 2020-11-17
Payer: COMMERCIAL

## 2020-11-17 DIAGNOSIS — J11.1 INFLUENZA-LIKE ILLNESS: ICD-10-CM

## 2020-11-17 DIAGNOSIS — Z20.822 SUSPECTED COVID-19 VIRUS INFECTION: Primary | ICD-10-CM

## 2020-11-17 PROCEDURE — 99213 OFFICE O/P EST LOW 20 MIN: CPT | Mod: 95 | Performed by: NURSE PRACTITIONER

## 2020-11-17 NOTE — NURSING NOTE
"Chief Complaint   Patient presents with     Covid Concern       Initial There were no vitals taken for this visit. Estimated body mass index is 29.97 kg/m  as calculated from the following:    Height as of 11/2/20: 1.6 m (5' 3\").    Weight as of 11/2/20: 76.7 kg (169 lb 3.2 oz).    Patient presents to the clinic using No DME    Health Maintenance that is potentially due pending provider review:  NONE        Is there anyone who you would like to be able to receive your results? No  If yes have patient fill out NANCY      "

## 2020-11-17 NOTE — PROGRESS NOTES
"Cecilia Pabon is a 44 year old female who is being evaluated via a billable video visit.      The patient has been notified of following:     \"This video visit will be conducted via a call between you and your physician/provider. We have found that certain health care needs can be provided without the need for an in-person physical exam.  This service lets us provide the care you need with a video conversation.  If a prescription is necessary we can send it directly to your pharmacy.  If lab work is needed we can place an order for that and you can then stop by our lab to have the test done at a later time.    Video visits are billed at different rates depending on your insurance coverage.  Please reach out to your insurance provider with any questions.    If during the course of the call the physician/provider feels a video visit is not appropriate, you will not be charged for this service.\"    Patient has given verbal consent for Video visit? Yes  How would you like to obtain your AVS? MyChart  If you are dropped from the video visit, the video invite should be resent to: Text to cell phone: [phone]  Will anyone else be joining your video visit? No      Subjective     Cecilia Pabon is a 44 year old female who presents today via video visit for the following health issues:    HPI       Concern for COVID-19  About how many days ago did these symptoms start? 2 days, but today symptoms hit  Is this your first visit for this illness? Yes  In the 14 days before your symptoms started, have you had close contact with someone with COVID-19 (Coronavirus)? Yes, I have been in contact with someone who has COVID-19/Coronavirus (confirmed by lab test). Was last Friday within 5 feet, unmasked for >15 minutes   Do you have a fever or chills? Yes, the highest temperature was 100.5  Are you having new or worsening difficulty breathing? No  Do you have new or worsening cough? Yes, it's a dry cough.   Have you had any new " or unexplained body aches? YES    Have you experienced any of the following NEW symptoms?    Headache: YES    Sore throat: YES    Loss of taste or smell: No    Chest pain: YES    Diarrhea: No    Rash: No  What treatments have you tried? Aspirin  Who do you live with?  and daughter.  [place]  Are you, or a household member, a healthcare worker or a ? No  Do you live in a nursing home, group home, or shelter? No  Do you have a way to get food/medications if quarantined? Yes, I have a friend or family member who can help me.               Video Start Time: 1:26 PM      Review of Systems   Constitutional, HEENT, cardiovascular, pulmonary, gi and gu systems are negative, except as otherwise noted.      Objective           Vitals:  No vitals were obtained today due to virtual visit.    Physical Exam     GENERAL: healthy, alert, no distress and fatigued  EYES: Eyes grossly normal to inspection.  No discharge or erythema, or obvious scleral/conjunctival abnormalities.  RESP: No audible wheeze, cough, or visible cyanosis.  No visible retractions or increased work of breathing.    SKIN: Visible skin clear. No significant rash, abnormal pigmentation or lesions.  NEURO: Cranial nerves grossly intact.  Mentation and speech appropriate for age.  PSYCH: Mentation appears normal, affect normal/bright, judgement and insight intact, normal speech and appearance well-groomed.      Diagnostic Test Results:  COVID and Influenza pending          Assessment & Plan     1. Suspected COVID-19 virus infection  Patient has had symptoms x 2 days, today worse with cough, headache, sore throat, fever, and headache. Had been around someone with positive COVID last week. Provided symptoms, would recommend testing for COVID and given the severity also Influenza. Quarantine and supportive cares discussed with patient per patient instructions.   - Symptomatic COVID-19 Virus (Coronavirus) by  PCR; Future    2. Influenza-like illness  Symptoms as above, test for Influenza with COVID testing.   - Influenza A/B antigen; Future         See Patient Instructions    Return in about 1 week (around 11/24/2020), or if symptoms worsen or fail to improve.    ARTURO Cason CNP  [facility]      Video-Visit Details    Type of service:  Video Visit    Video End Time:1:33 PM    Originating Location (pt. Location): Home    Distant Location (provider location):  [facility]     Platform used for Video Visit: [employer]

## 2020-11-17 NOTE — PATIENT INSTRUCTIONS
"Discharge Instructions for COVID-19 Patients  You have--or may have--COVID-19. Please follow the instructions listed below.   If you have a weakened immune system, discuss with your doctor any other actions you need to take.  How can I protect others?  If you have symptoms (fever, cough, body aches or trouble breathing):    Stay home and away from others (self-isolate) until:  ? At least 10 days have passed since your symptoms started, And   ? You've had no fever--and no medicine that reduces fever--for 1 full day (24 hours), And    ? Your other symptoms have resolved (gotten better).  If you don't show symptoms, but testing showed that you have COVID-19:    Stay home and away from others (self-isolate). Follow the tips under \"How do I self-isolate?\" below for 10 days (20 days if you have a weak immune system).    You don't need to be retested for COVID-19 before going back to school or work. As long as you're fever-free and feeling better, you can go back to school, work and other activities after waiting the 10 or 20 days.   How do I self-isolate?    Stay in your own room, even for meals. Use your own bathroom if you can.    Stay away from others in your home. No hugging, kissing or shaking hands. No visitors.    Don't go to work, school or anywhere else.    Clean \"high touch\" surfaces often (doorknobs, counters, handles). Use household cleaning spray or wipes. You'll find a full list of  on the EPA website: www.epa.gov/pesticide-registration/list-n-disinfectants-use-against-sars-cov-2.    Cover your mouth and nose with a mask or other face covering to avoid spreading germs.    Wash your hands and face often. Use soap and water.    Caregivers in these groups are at risk for severe illness due to COVID-19:  ? People 65 years and older  ? People who live in a nursing home or long-term care facility  ? People with chronic disease (lung, heart, cancer, diabetes, kidney, liver, immunologic)  ? People who have a " weakened immune system, including those who:    Are in cancer treatment    Take medicine that weakens the immune system, such as corticosteroids    Had a bone marrow or organ transplant    Have an immune deficiency    Have poorly controlled HIV or AIDS    Are obese (body mass index of 40 or higher)    Smoke regularly    Caregivers should wear gloves while washing dishes, handling laundry and cleaning bedrooms and bathrooms.    Use caution when washing and drying laundry: Don't shake dirty laundry and use the warmest water setting that you can.    For more tips on managing your health at home, go to www.cdc.gov/coronavirus/2019-ncov/downloads/10Things.pdf.  How can I take care of myself at home?  1. Get lots of rest. Drink extra fluids (unless a doctor has told you not to).    2. Take Tylenol (acetaminophen) for fever or pain. If you have liver or kidney problems, ask your family doctor if it's okay to take Tylenol.     Adults can take either:  ? 650 mg (two 325 mg pills) every 4 to 6 hours, or   ? 1,000 mg (two 500 mg pills) every 8 hours as needed.  ? Note: Don't take more than 3,000 mg in one day. Acetaminophen is found in many medicines (both prescribed and over-the-counter medicines). Read all labels to be sure you don't take too much.   For children, check the Tylenol bottle for the right dose. The dose is based on the child's age or weight.  3. If you have other health problems (like cancer, heart failure, an organ transplant or severe kidney disease): Call your specialty clinic if you don't feel better in the next 2 days.    4. Know when to call 911. Emergency warning signs include:  ? Trouble breathing or shortness of breath  ? Pain or pressure in the chest that doesn't go away  ? Feeling confused like you haven't felt before, or not being able to wake up  ? Bluish-colored lips or face    5. Your doctor may have prescribed a blood thinner medicine. Follow their instructions.  Where can I get more  information?    [facility] - About COVID-19: [email or web address]    CDC - What to Do If You're Sick: www.cdc.gov/coronavirus/2019-ncov/about/steps-when-sick.html    CDC - Ending Home Isolation: www.cdc.gov/coronavirus/2019-ncov/hcp/disposition-in-home-patients.html    CDC - Caring for Someone: www.cdc.gov/coronavirus/2019-ncov/if-you-are-sick/care-for-someone.html    [facility] - Interim Guidance for Hospital Discharge to Home: www.health.[facility].[place]./diseases/coronavirus/hcp/hospdischarge.pdf    [facility] clinical trials (COVID-19 research studies): clinicalaffairs.[facility]-clinical-trials    Below are the COVID-19 hotlines at the [place] Department of Health ([facility]). Interpreters are available.  ? For health questions: Call 907-327-8174 or 1-376.994.3666 (7 a.m. to 7 p.m.)  ? For questions about schools and childcare: Call 056-833-6961 or 1-147.883.3006 (7 a.m. to 7 p.m.)    For informational purposes only. Not to replace the advice of your health care provider. Clinically reviewed by the Infection Prevention Team. Copyright   2020 [employer]. All rights reserved. [employer] [id number] - REV 08/04/20.

## 2020-11-18 ENCOUNTER — MYC MEDICAL ADVICE (OUTPATIENT)
Dept: FAMILY MEDICINE | Facility: CLINIC | Age: 45
End: 2020-11-18

## 2020-11-18 DIAGNOSIS — Z20.822 SUSPECTED COVID-19 VIRUS INFECTION: ICD-10-CM

## 2020-11-18 DIAGNOSIS — J11.1 INFLUENZA-LIKE ILLNESS: ICD-10-CM

## 2020-11-18 LAB
FLUAV+FLUBV AG SPEC QL: NEGATIVE
FLUAV+FLUBV AG SPEC QL: NEGATIVE
SPECIMEN SOURCE: NORMAL

## 2020-11-18 PROCEDURE — U0003 INFECTIOUS AGENT DETECTION BY NUCLEIC ACID (DNA OR RNA); SEVERE ACUTE RESPIRATORY SYNDROME CORONAVIRUS 2 (SARS-COV-2) (CORONAVIRUS DISEASE [COVID-19]), AMPLIFIED PROBE TECHNIQUE, MAKING USE OF HIGH THROUGHPUT TECHNOLOGIES AS DESCRIBED BY CMS-2020-01-R: HCPCS | Performed by: NURSE PRACTITIONER

## 2020-11-18 PROCEDURE — 87804 INFLUENZA ASSAY W/OPTIC: CPT | Performed by: NURSE PRACTITIONER

## 2020-11-19 ENCOUNTER — MYC MEDICAL ADVICE (OUTPATIENT)
Dept: FAMILY MEDICINE | Facility: CLINIC | Age: 45
End: 2020-11-19

## 2020-11-19 LAB
SARS-COV-2 RNA SPEC QL NAA+PROBE: ABNORMAL
SPECIMEN SOURCE: ABNORMAL

## 2020-11-23 ENCOUNTER — MYC MEDICAL ADVICE (OUTPATIENT)
Dept: FAMILY MEDICINE | Facility: CLINIC | Age: 45
End: 2020-11-23

## 2020-11-24 ENCOUNTER — MYC MEDICAL ADVICE (OUTPATIENT)
Dept: FAMILY MEDICINE | Facility: CLINIC | Age: 45
End: 2020-11-24

## 2020-11-24 DIAGNOSIS — R11.0 NAUSEA: Primary | ICD-10-CM

## 2020-11-24 RX ORDER — ONDANSETRON 4 MG/1
4-8 TABLET, FILM COATED ORAL EVERY 8 HOURS PRN
Qty: 20 TABLET | Refills: 0 | Status: SHIPPED | OUTPATIENT
Start: 2020-11-24 | End: 2021-01-08

## 2020-11-26 ENCOUNTER — MYC MEDICAL ADVICE (OUTPATIENT)
Dept: FAMILY MEDICINE | Facility: CLINIC | Age: 45
End: 2020-11-26

## 2020-11-27 ENCOUNTER — MYC MEDICAL ADVICE (OUTPATIENT)
Dept: FAMILY MEDICINE | Facility: CLINIC | Age: 45
End: 2020-11-27

## 2020-11-27 NOTE — LETTER
[address] [address]  [phone]          November 28, 2020    Cecilia Pabno                                                                                                                     [id number] [place] [id number]          To Whom it May Concern,     Cecilia was evaluated in clinic and is under my general care. She is able to return to work 11/30/2020 with limited work hours for the week at 4 hour work days through 12/1/2020, 6 hour work days 12/2/2020-12/4/2020. She will be able to return to work regular hours starting 12/7/2020.          Sincerely,       ARTURO Hwang CNP

## 2021-01-07 ENCOUNTER — MYC MEDICAL ADVICE (OUTPATIENT)
Dept: FAMILY MEDICINE | Facility: CLINIC | Age: 46
End: 2021-01-07

## 2021-01-08 ENCOUNTER — APPOINTMENT (OUTPATIENT)
Dept: LAB | Facility: CLINIC | Age: 46
End: 2021-01-08
Payer: COMMERCIAL

## 2021-01-08 ENCOUNTER — ANCILLARY PROCEDURE (OUTPATIENT)
Dept: GENERAL RADIOLOGY | Facility: CLINIC | Age: 46
End: 2021-01-08
Attending: NURSE PRACTITIONER
Payer: COMMERCIAL

## 2021-01-08 ENCOUNTER — VIRTUAL VISIT (OUTPATIENT)
Dept: FAMILY MEDICINE | Facility: CLINIC | Age: 46
End: 2021-01-08
Payer: COMMERCIAL

## 2021-01-08 DIAGNOSIS — R05.9 COUGH: ICD-10-CM

## 2021-01-08 DIAGNOSIS — R06.02 SOB (SHORTNESS OF BREATH): Primary | ICD-10-CM

## 2021-01-08 DIAGNOSIS — R06.02 SOB (SHORTNESS OF BREATH): ICD-10-CM

## 2021-01-08 DIAGNOSIS — J45.30 MILD PERSISTENT ASTHMA WITHOUT COMPLICATION: ICD-10-CM

## 2021-01-08 LAB
ANION GAP SERPL CALCULATED.3IONS-SCNC: 6 MMOL/L (ref 3–14)
BASOPHILS # BLD AUTO: 0 10E9/L (ref 0–0.2)
BASOPHILS NFR BLD AUTO: 0.4 %
BUN SERPL-MCNC: 12 MG/DL (ref 7–30)
CALCIUM SERPL-MCNC: 9.3 MG/DL (ref 8.5–10.1)
CHLORIDE SERPL-SCNC: 104 MMOL/L (ref 94–109)
CO2 SERPL-SCNC: 26 MMOL/L (ref 20–32)
CREAT SERPL-MCNC: 0.76 MG/DL (ref 0.52–1.04)
D DIMER PPP FEU-MCNC: 1 UG/ML FEU (ref 0–0.5)
DIFFERENTIAL METHOD BLD: NORMAL
EOSINOPHIL # BLD AUTO: 0.1 10E9/L (ref 0–0.7)
EOSINOPHIL NFR BLD AUTO: 1.7 %
ERYTHROCYTE [DISTWIDTH] IN BLOOD BY AUTOMATED COUNT: 12.3 % (ref 10–15)
GFR SERPL CREATININE-BSD FRML MDRD: >90 ML/MIN/{1.73_M2}
GLUCOSE SERPL-MCNC: 96 MG/DL (ref 70–99)
HCT VFR BLD AUTO: 38.6 % (ref 35–47)
HGB BLD-MCNC: 13.1 G/DL (ref 11.7–15.7)
LYMPHOCYTES # BLD AUTO: 3 10E9/L (ref 0.8–5.3)
LYMPHOCYTES NFR BLD AUTO: 36.7 %
MCH RBC QN AUTO: 32.3 PG (ref 26.5–33)
MCHC RBC AUTO-ENTMCNC: 33.9 G/DL (ref 31.5–36.5)
MCV RBC AUTO: 95 FL (ref 78–100)
MONOCYTES # BLD AUTO: 0.6 10E9/L (ref 0–1.3)
MONOCYTES NFR BLD AUTO: 7.4 %
NEUTROPHILS # BLD AUTO: 4.4 10E9/L (ref 1.6–8.3)
NEUTROPHILS NFR BLD AUTO: 53.8 %
PLATELET # BLD AUTO: 276 10E9/L (ref 150–450)
POTASSIUM SERPL-SCNC: 3.5 MMOL/L (ref 3.4–5.3)
RBC # BLD AUTO: 4.06 10E12/L (ref 3.8–5.2)
SODIUM SERPL-SCNC: 136 MMOL/L (ref 133–144)
WBC # BLD AUTO: 8.2 10E9/L (ref 4–11)

## 2021-01-08 PROCEDURE — 99214 OFFICE O/P EST MOD 30 MIN: CPT | Mod: TEL | Performed by: NURSE PRACTITIONER

## 2021-01-08 PROCEDURE — 36415 COLL VENOUS BLD VENIPUNCTURE: CPT | Performed by: NURSE PRACTITIONER

## 2021-01-08 PROCEDURE — 85025 COMPLETE CBC W/AUTO DIFF WBC: CPT | Performed by: NURSE PRACTITIONER

## 2021-01-08 PROCEDURE — 85379 FIBRIN DEGRADATION QUANT: CPT | Performed by: NURSE PRACTITIONER

## 2021-01-08 PROCEDURE — 71046 X-RAY EXAM CHEST 2 VIEWS: CPT | Performed by: RADIOLOGY

## 2021-01-08 PROCEDURE — 80048 BASIC METABOLIC PNL TOTAL CA: CPT | Performed by: NURSE PRACTITIONER

## 2021-01-08 RX ORDER — ALBUTEROL SULFATE 90 UG/1
2 AEROSOL, METERED RESPIRATORY (INHALATION) EVERY 6 HOURS PRN
Qty: 18 G | Refills: 1 | Status: SHIPPED | OUTPATIENT
Start: 2021-01-08 | End: 2023-05-18

## 2021-01-08 RX ORDER — PREDNISONE 20 MG/1
40 TABLET ORAL DAILY
Qty: 10 TABLET | Refills: 0 | Status: SHIPPED | OUTPATIENT
Start: 2021-01-08 | End: 2021-01-14

## 2021-01-08 ASSESSMENT — ANXIETY QUESTIONNAIRES
1. FEELING NERVOUS, ANXIOUS, OR ON EDGE: NOT AT ALL
3. WORRYING TOO MUCH ABOUT DIFFERENT THINGS: NOT AT ALL
6. BECOMING EASILY ANNOYED OR IRRITABLE: SEVERAL DAYS
GAD7 TOTAL SCORE: 1
7. FEELING AFRAID AS IF SOMETHING AWFUL MIGHT HAPPEN: NOT AT ALL
2. NOT BEING ABLE TO STOP OR CONTROL WORRYING: NOT AT ALL
5. BEING SO RESTLESS THAT IT IS HARD TO SIT STILL: NOT AT ALL
IF YOU CHECKED OFF ANY PROBLEMS ON THIS QUESTIONNAIRE, HOW DIFFICULT HAVE THESE PROBLEMS MADE IT FOR YOU TO DO YOUR WORK, TAKE CARE OF THINGS AT HOME, OR GET ALONG WITH OTHER PEOPLE: SOMEWHAT DIFFICULT

## 2021-01-08 ASSESSMENT — PATIENT HEALTH QUESTIONNAIRE - PHQ9
SUM OF ALL RESPONSES TO PHQ QUESTIONS 1-9: 12
5. POOR APPETITE OR OVEREATING: NOT AT ALL

## 2021-01-08 NOTE — PATIENT INSTRUCTIONS
Mild persistent asthma without complication  Chronic, likely exacerbated post-Covid.  Difficulty with shortness of breath, even was talking and cough.  Has not been using albuterol inhaler as cannot find.  Recommend starting course of steroids, starting albuterol inhaler back up as well as continued monitoring.  If symptoms significantly worsening and unable to breathe proceed to ER.  - albuterol (PROAIR HFA/PROVENTIL HFA/VENTOLIN HFA) 108 (90 Base) MCG/ACT inhaler; Inhale 2 puffs into the lungs every 6 hours as needed for shortness of breath / dyspnea  - predniSONE (DELTASONE) 20 MG tablet; Take 2 tablets (40 mg) by mouth daily for 5 days    SOB (shortness of breath)  Significant shortness of breath since Covid, worsening.  Unable to talk in full sentences along with cough.  Advised patient would like to get lab work and chest x-ray today, scheduled for 415 at [facility].  Will call with results and recommendation, start prednisone and albuterol inhaler as above.  - D dimer, quantitative  - CBC with platelets and differential  - Basic metabolic panel  (Ca, Cl, CO2, Creat, Gluc, K, Na, BUN)  - XR Chest 2 Views; Future  - predniSONE (DELTASONE) 20 MG tablet; Take 2 tablets (40 mg) by mouth daily for 5 days    Cough  Ongoing since school rarely, worsening.  Very dry throat.  Advised as above.  - XR Chest 2 Views; Future  - predniSONE (DELTASONE) 20 MG tablet; Take 2 tablets (40 mg) by mouth daily for 5 days

## 2021-01-08 NOTE — PROGRESS NOTES
"Cecilia is a 45 year old who is being evaluated via a billable video visit.      How would you like to obtain your AVS? MyChart  If the video visit is dropped, the invitation should be resent by: Text to cell phone: [phone]  Will anyone else be joining your video visit? No      Telephone Start Time: 3:02 PM - telephone End Time:3:19 PM    Assessment & Plan     Mild persistent asthma without complication  Chronic, likely exacerbated post-Covid.  Difficulty with shortness of breath, even was talking and cough.  Has not been using albuterol inhaler as cannot find.  Recommend starting course of steroids, starting albuterol inhaler back up as well as continued monitoring.  If symptoms significantly worsening and unable to breathe proceed to ER.  - albuterol (PROAIR HFA/PROVENTIL HFA/VENTOLIN HFA) 108 (90 Base) MCG/ACT inhaler; Inhale 2 puffs into the lungs every 6 hours as needed for shortness of breath / dyspnea  - predniSONE (DELTASONE) 20 MG tablet; Take 2 tablets (40 mg) by mouth daily for 5 days    SOB (shortness of breath)  Significant shortness of breath since Covid, worsening.  Unable to talk in full sentences along with cough.  Advised patient would like to get lab work and chest x-ray today, scheduled for 415 at [facility].  Will call with results and recommendation, start prednisone and albuterol inhaler as above.  - D dimer, quantitative  - CBC with platelets and differential  - Basic metabolic panel  (Ca, Cl, CO2, Creat, Gluc, K, Na, BUN)  - XR Chest 2 Views; Future  - predniSONE (DELTASONE) 20 MG tablet; Take 2 tablets (40 mg) by mouth daily for 5 days    Cough  Ongoing since school rarely, worsening.  Very dry throat.  Advised as above.  - XR Chest 2 Views; Future  - predniSONE (DELTASONE) 20 MG tablet; Take 2 tablets (40 mg) by mouth daily for 5 days             BMI:   Estimated body mass index is 29.97 kg/m  as calculated from the following:    Height as of 11/2/20: 1.6 m (5' 3\").    Weight " as of 11/2/20: 76.7 kg (169 lb 3.2 oz).     Depression Screening Follow Up    PHQ 1/8/2021   PHQ-9 Total Score 12   Q9: Thoughts of better off dead/self-harm past 2 weeks Not at all       See Patient Instructions    No follow-ups on file.    ARTURO Cason CNP  [facility]    Noah Brooks is a 45 year old who presents to clinic today for the following health issues:  HPI       Acute Illness  Acute illness concerns: Fatigue, insomnia, shortness of breath, cough, body aches, headaches, having memory issues, confusion, sore throat, dry throat, blurry vision, minor irritability, frequent urination.   Onset/Duration: November 17, 2020  Symptoms:  Fever: no  Chills/Sweats: no  Headache (location?): YES- all over  Sinus Pressure: no  Conjunctivitis:  YES- blurry vision  Ear Pain: YES: right  Rhinorrhea: YES- clear drainage   Congestion: no  Sore Throat: YES- dry throat  Cough: YES-non-productive  Wheeze: YES  Decreased Appetite: YES  Nausea: no  Vomiting: no  Diarrhea: YES abdominal pain  Dysuria/Freq.: YES- frequency  Dysuria or Hematuria: no  Fatigue/Achiness: YES- fatigue, body aches  Sick/Strep Exposure: no  Therapies tried and outcome: None    Had COVID 11/17/2020  Shortness of breath with talking   Has not been using albuterol inhaler - can't find             Review of Systems   Constitutional, HEENT, cardiovascular, pulmonary, gi and gu systems are negative, except as otherwise noted.      Objective           Vitals:  No vitals were obtained today due to virtual visit.      Physical Exam   healthy, alert and no distress  PSYCH: Alert and oriented times 3; coherent speech, normal   rate and volume, able to articulate logical thoughts, able   to abstract reason, no tangential thoughts, no hallucinations   or delusions  Her affect is normal  RESP: No cough, no audible wheezing, able to talk in full sentences  Remainder of exam unable to be completed due to telephone  visits    Diagnostic Test Results:  Labs - pending   CXR - pending

## 2021-01-09 ENCOUNTER — MYC MEDICAL ADVICE (OUTPATIENT)
Dept: FAMILY MEDICINE | Facility: CLINIC | Age: 46
End: 2021-01-09

## 2021-01-09 ENCOUNTER — HOSPITAL ENCOUNTER (OUTPATIENT)
Dept: CT IMAGING | Facility: CLINIC | Age: 46
Discharge: HOME | End: 2021-01-09
Attending: NURSE PRACTITIONER | Admitting: NURSE PRACTITIONER
Payer: COMMERCIAL

## 2021-01-09 DIAGNOSIS — R79.89 POSITIVE D-DIMER: ICD-10-CM

## 2021-01-09 DIAGNOSIS — R06.02 SOB (SHORTNESS OF BREATH): ICD-10-CM

## 2021-01-09 DIAGNOSIS — R06.02 SOB (SHORTNESS OF BREATH): Primary | ICD-10-CM

## 2021-01-09 PROCEDURE — 71275 CT ANGIOGRAPHY CHEST: CPT

## 2021-01-09 PROCEDURE — 250N000009 HC RX 250: Performed by: NURSE PRACTITIONER

## 2021-01-09 PROCEDURE — 250N000011 HC RX IP 250 OP 636: Performed by: NURSE PRACTITIONER

## 2021-01-09 RX ORDER — IOPAMIDOL 755 MG/ML
75 INJECTION, SOLUTION INTRAVASCULAR ONCE
Status: COMPLETED | OUTPATIENT
Start: 2021-01-09 | End: 2021-01-09

## 2021-01-09 RX ADMIN — SODIUM CHLORIDE 100 ML: 9 INJECTION, SOLUTION INTRAVENOUS at 14:37

## 2021-01-09 RX ADMIN — IOPAMIDOL 75 ML: 755 INJECTION, SOLUTION INTRAVENOUS at 14:36

## 2021-01-09 ASSESSMENT — ANXIETY QUESTIONNAIRES: GAD7 TOTAL SCORE: 1

## 2021-01-10 ENCOUNTER — MYC MEDICAL ADVICE (OUTPATIENT)
Dept: FAMILY MEDICINE | Facility: CLINIC | Age: 46
End: 2021-01-10

## 2021-01-12 ENCOUNTER — MYC MEDICAL ADVICE (OUTPATIENT)
Dept: FAMILY MEDICINE | Facility: CLINIC | Age: 46
End: 2021-01-12

## 2021-01-12 DIAGNOSIS — R05.9 COUGH: Primary | ICD-10-CM

## 2021-01-13 ENCOUNTER — MYC MEDICAL ADVICE (OUTPATIENT)
Dept: FAMILY MEDICINE | Facility: CLINIC | Age: 46
End: 2021-01-13

## 2021-01-13 RX ORDER — BENZONATATE 100 MG/1
100-200 CAPSULE ORAL 3 TIMES DAILY PRN
Qty: 42 CAPSULE | Refills: 0 | Status: SHIPPED | OUTPATIENT
Start: 2021-01-13 | End: 2021-08-19

## 2021-01-14 ENCOUNTER — OFFICE VISIT (OUTPATIENT)
Dept: FAMILY MEDICINE | Facility: CLINIC | Age: 46
End: 2021-01-14
Payer: COMMERCIAL

## 2021-01-14 ENCOUNTER — ANCILLARY PROCEDURE (OUTPATIENT)
Dept: GENERAL RADIOLOGY | Facility: CLINIC | Age: 46
End: 2021-01-14
Attending: NURSE PRACTITIONER
Payer: COMMERCIAL

## 2021-01-14 DIAGNOSIS — R39.89 URINARY PROBLEM: ICD-10-CM

## 2021-01-14 DIAGNOSIS — J20.9 ACUTE BRONCHITIS WITH SYMPTOMS > 10 DAYS: ICD-10-CM

## 2021-01-14 DIAGNOSIS — U07.1 INFECTION DUE TO 2019 NOVEL CORONAVIRUS: Primary | ICD-10-CM

## 2021-01-14 LAB
ALBUMIN UR-MCNC: NEGATIVE MG/DL
APPEARANCE UR: CLEAR
BILIRUB UR QL STRIP: NEGATIVE
COLOR UR AUTO: YELLOW
GLUCOSE UR STRIP-MCNC: NEGATIVE MG/DL
HGB UR QL STRIP: NEGATIVE
KETONES UR STRIP-MCNC: NEGATIVE MG/DL
LEUKOCYTE ESTERASE UR QL STRIP: NEGATIVE
NITRATE UR QL: NEGATIVE
PH UR STRIP: 5.5 PH (ref 5–7)
SOURCE: NORMAL
SP GR UR STRIP: >1.03 (ref 1–1.03)
UROBILINOGEN UR STRIP-ACNC: 0.2 EU/DL (ref 0.2–1)

## 2021-01-14 PROCEDURE — 99214 OFFICE O/P EST MOD 30 MIN: CPT | Performed by: NURSE PRACTITIONER

## 2021-01-14 PROCEDURE — 81003 URINALYSIS AUTO W/O SCOPE: CPT | Performed by: NURSE PRACTITIONER

## 2021-01-14 PROCEDURE — 71046 X-RAY EXAM CHEST 2 VIEWS: CPT | Performed by: RADIOLOGY

## 2021-01-14 RX ORDER — ALBUTEROL SULFATE 0.83 MG/ML
2.5 SOLUTION RESPIRATORY (INHALATION) EVERY 6 HOURS PRN
Qty: 30 VIAL | Refills: 1 | Status: SHIPPED | OUTPATIENT
Start: 2021-01-14 | End: 2021-02-04

## 2021-01-14 NOTE — PROGRESS NOTES
"  Assessment & Plan     Infection due to 2019 novel coronavirus  Continued symptoms post Covid infection will have patient follow-up with Covid clinic  - ADULT POST COVID CLINIC REFERRAL; Future    Urinary problem  Urine negative for infection  - *UA reflex to Microscopic and Culture ([place] and [facility] (except [place] and [place])    Acute bronchitis with symptoms > 10 days  Patient continues to have bronchitis symptoms post Covid infection will treat with a course of nebulizers tapering dose of prednisone long-acting inhaler.  X-ray was negative no need for antibiotics we will continue to monitor  - albuterol (PROVENTIL) (2.5 MG/3ML) 0.083% neb solution; Take 1 vial (2.5 mg) by nebulization every 6 hours as needed for shortness of breath / dyspnea or wheezing  - XR Chest 2 Views; Future  - tiotropium (SPIRIVA RESPIMAT) 2.5 MCG/ACT inhaler; Inhale 2 puffs into the lungs daily  - Nebulizer and Supplies Order   :[id number]}     BMI:   Estimated body mass index is 29.97 kg/m  as calculated from the following:    Height as of 11/2/20: 1.6 m (5' 3\").    Weight as of 11/2/20: 76.7 kg (169 lb 3.2 oz).           No follow-ups on file.    ARTURO Mullins CNP  [facility]    Noah Brooks is a 45 year old who presents to clinic today for the following health issues     HPI       Patient was diagnosed with COVID in November. She still has a cough and shortness.     URINARY TRACT SYMPTOMS  Onset: a couple days    Description:   Painful urination (Dysuria): no            Frequency: YES  Blood in urine (Hematuria): no   Delay in urine (Hesitency): no     Intensity: moderate    Progression of Symptoms:  same    Accompanying Signs & Symptoms:  Fever/chills: no   Flank pain no   Nausea and vomiting: no   Any vaginal symptoms: none  Abdominal/Pelvic Pain: no     History:   History of frequent UTI's: YES  History of kidney stones: no   Sexually Active: YES  Possibility of pregnancy: " No    Precipitating factors:   none    Therapies Tried and outcome: tylenol, ibuprofen         Review of Systems   Constitutional, HEENT, cardiovascular, pulmonary, gi and gu systems are negative, except as otherwise noted.      Objective    /74   Pulse 98   Temp 98.5  F (36.9  C)   Resp 18   SpO2 98%   There is no height or weight on file to calculate BMI.  Physical Exam   GENERAL: healthy, alert and no distress  EYES: Eyes grossly normal to inspection, PERRL and conjunctivae and sclerae normal  HENT: ear canals and TM's normal, nose and mouth without ulcers or lesions  NECK: no adenopathy, no asymmetry, masses, or scars and thyroid normal to palpation  RESP: lungs clear to auscultation - no rales, rhonchi or wheezes  CV: regular rate and rhythm, normal S1 S2, no S3 or S4, no murmur, click or rub, no peripheral edema and peripheral pulses strong  MS: no gross musculoskeletal defects noted, no edema  SKIN: no suspicious lesions or rashes  NEURO: Normal strength and tone, mentation intact and speech normal  PSYCH: mentation appears normal, affect normal/bright    Results for orders placed or performed in visit on 01/14/21   XR Chest 2 Views     Status: None    Narrative    CHEST TWO VIEWS   1/14/2021 8:30 AM     HISTORY: Acute bronchitis with symptoms greater than 10 days.    COMPARISON: Chest CT on 1/9/2021 and chest x-ray on 1/8/2021      Impression    IMPRESSION: PA and lateral views of the chest were obtained. Stable  cardiomediastinal silhouette. No suspicious focal pulmonary opacities.  No significant pleural effusion or pneumothorax.    MINOR CARLSON MD   Results for orders placed or performed in visit on 01/14/21   *UA reflex to Microscopic and Culture ([place] and [facility] (except [place] and [place])     Status: None    Specimen: Midstream Urine   Result Value Ref Range    Color Urine Yellow     Appearance Urine Clear     Glucose Urine Negative NEG^Negative mg/dL    Bilirubin Urine  Negative NEG^Negative    Ketones Urine Negative NEG^Negative mg/dL    Specific Gravity Urine >1.030 1.003 - 1.035    Blood Urine Negative NEG^Negative    pH Urine 5.5 5.0 - 7.0 pH    Protein Albumin Urine Negative NEG^Negative mg/dL    Urobilinogen Urine 0.2 0.2 - 1.0 EU/dL    Nitrite Urine Negative NEG^Negative    Leukocyte Esterase Urine Negative NEG^Negative    Source Midstream Urine

## 2021-01-14 NOTE — PROGRESS NOTES
DME (Durable Medical Equipment) Orders and Documentation  Orders Placed This Encounter   Procedures     Nebulizer and Supplies Order      The patient was assessed and it was determined the patient is in need of the following listed DME Supplies/Equipment. Please complete supporting documentation below to demonstrate medical necessity.      Nebulizer Documentation  The patient was seen 01/14/2021. After assessment, the patient will need to be treated with ongoing nebulizer for treatment/management of bronchitis

## 2021-01-14 NOTE — PATIENT INSTRUCTIONS
Patient Education   [facility] Handihaler  Medicine: Tiotropium ([id number]-um)     What it does  This medicine relaxes the muscles around the airway and makes breathing easier.   Use every day to prevent symptoms, even if you are feeling well. Do not use for fast relief.  How to use this inhaler  Take care you do not get powder from the capsules in your eyes. This could be harmful.  1. Wash and dry your hands well.  2. Peel back the foil and remove a capsule right before using.  3. Open the dust cap by pulling upward.  4. Open the white mouthpiece under the cap.  5. Place the capsule in the center chamber.  6. Close the mouthpiece until you hear a click. Leave the dust cap open.  7. Hold inhaler with the mouthpiece up.  8. Press the green piercing button all the way down and release. The holes in the capsule will allow you to breathe in the medicine.  9. Hold head upright.  10. Exhale (breathe out) through mouth away from inhaler.  11. Place the mouthpiece in your mouth and seal your lips around it Inhaler should be level. Do not block the air vents.  12. Breathe in through your mouth until the lungs are full. You should hear the capsule vibrate.  13. Remove the mouthpiece from your mouth and hold breath for 10 seconds or as long as you can.  14. Breathe out slowly away from the inhaler.  15. Repeat, following steps 9 to14 (full dose is two puffs from the same capsule).  16. Open the mouthpiece and gently tap out capsule and powder into trash. Do not store capsules in the inhaler.  17. Close the mouthpiece and dust cap. Store in a cool, dry place.  Cleaning  Clean as needed. Rinse in warm water and let air-dry for 24 hours.  If you have questions about the use of your inhaler, please ask your pharmacist or provider.   For more information and video demos, go to [email or web address].  For informational purposes only. Not to replace the advice of your health care provider.   Copyright   2013 [facility]  Services. All rights reserved. [employer] 231915 - REV 05/16.

## 2021-01-18 ENCOUNTER — MYC MEDICAL ADVICE (OUTPATIENT)
Dept: FAMILY MEDICINE | Facility: CLINIC | Age: 46
End: 2021-01-18

## 2021-01-19 ENCOUNTER — MYC MEDICAL ADVICE (OUTPATIENT)
Dept: FAMILY MEDICINE | Facility: CLINIC | Age: 46
End: 2021-01-19

## 2021-01-20 VITALS
HEART RATE: 98 BPM | OXYGEN SATURATION: 98 % | RESPIRATION RATE: 18 BRPM | SYSTOLIC BLOOD PRESSURE: 132 MMHG | DIASTOLIC BLOOD PRESSURE: 74 MMHG | TEMPERATURE: 98.5 F

## 2021-01-29 ENCOUNTER — MYC MEDICAL ADVICE (OUTPATIENT)
Dept: FAMILY MEDICINE | Facility: CLINIC | Age: 46
End: 2021-01-29

## 2021-02-03 ENCOUNTER — VIRTUAL VISIT (OUTPATIENT)
Dept: PHYSICAL MEDICINE AND REHAB | Facility: CLINIC | Age: 46
End: 2021-02-03
Attending: NURSE PRACTITIONER
Payer: COMMERCIAL

## 2021-02-03 VITALS — HEIGHT: 62 IN | WEIGHT: 167 LBS | BODY MASS INDEX: 30.73 KG/M2

## 2021-02-03 DIAGNOSIS — G93.31 POSTVIRAL FATIGUE SYNDROME: Primary | ICD-10-CM

## 2021-02-03 DIAGNOSIS — G31.84 MILD COGNITIVE IMPAIRMENT: ICD-10-CM

## 2021-02-03 DIAGNOSIS — J20.9 ACUTE BRONCHITIS WITH SYMPTOMS > 10 DAYS: ICD-10-CM

## 2021-02-03 DIAGNOSIS — U07.1 INFECTION DUE TO 2019 NOVEL CORONAVIRUS: ICD-10-CM

## 2021-02-03 PROCEDURE — 99205 OFFICE O/P NEW HI 60 MIN: CPT | Mod: 95 | Performed by: PHYSICAL MEDICINE & REHABILITATION

## 2021-02-03 RX ORDER — IVERMECTIN 3 MG/1
TABLET ORAL
COMMUNITY
Start: 2020-10-26 | End: 2021-04-28

## 2021-02-03 RX ORDER — ONDANSETRON 4 MG/1
TABLET, ORALLY DISINTEGRATING ORAL
COMMUNITY
Start: 2020-04-12 | End: 2021-08-19

## 2021-02-03 SDOH — HEALTH STABILITY: MENTAL HEALTH: HOW MANY STANDARD DRINKS CONTAINING ALCOHOL DO YOU HAVE ON A TYPICAL DAY?: 3 OR 4

## 2021-02-03 SDOH — HEALTH STABILITY: MENTAL HEALTH: HOW OFTEN DO YOU HAVE A DRINK CONTAINING ALCOHOL?: 2-4 TIMES A MONTH

## 2021-02-03 SDOH — HEALTH STABILITY: MENTAL HEALTH: HOW OFTEN DO YOU HAVE 6 OR MORE DRINKS ON ONE OCCASION?: NOT ASKED

## 2021-02-03 ASSESSMENT — ANXIETY QUESTIONNAIRES
7. FEELING AFRAID AS IF SOMETHING AWFUL MIGHT HAPPEN: NOT AT ALL
GAD7 TOTAL SCORE: 4
7. FEELING AFRAID AS IF SOMETHING AWFUL MIGHT HAPPEN: NOT AT ALL
GAD7 TOTAL SCORE: 4
5. BEING SO RESTLESS THAT IT IS HARD TO SIT STILL: NOT AT ALL
4. TROUBLE RELAXING: NOT AT ALL
6. BECOMING EASILY ANNOYED OR IRRITABLE: SEVERAL DAYS
GAD7 TOTAL SCORE: 4
3. WORRYING TOO MUCH ABOUT DIFFERENT THINGS: NOT AT ALL
1. FEELING NERVOUS, ANXIOUS, OR ON EDGE: MORE THAN HALF THE DAYS
2. NOT BEING ABLE TO STOP OR CONTROL WORRYING: SEVERAL DAYS

## 2021-02-03 ASSESSMENT — PATIENT HEALTH QUESTIONNAIRE - PHQ9
SUM OF ALL RESPONSES TO PHQ QUESTIONS 1-9: 10
10. IF YOU CHECKED OFF ANY PROBLEMS, HOW DIFFICULT HAVE THESE PROBLEMS MADE IT FOR YOU TO DO YOUR WORK, TAKE CARE OF THINGS AT HOME, OR GET ALONG WITH OTHER PEOPLE: SOMEWHAT DIFFICULT
SUM OF ALL RESPONSES TO PHQ QUESTIONS 1-9: 10

## 2021-02-03 ASSESSMENT — PAIN SCALES - GENERAL: PAINLEVEL: NO PAIN (0)

## 2021-02-03 ASSESSMENT — MIFFLIN-ST. JEOR: SCORE: 1361.79

## 2021-02-03 NOTE — PATIENT INSTRUCTIONS
Pulmonary rehab   Check Vitamin E and D levels  Psychology referral  Melatonin one and half hours prior to sleep     Recommend Steroid inhaler per PCP    Recommend rest breaks at work for 15-30 min every 3 hours.          Breathing exercises:  [email or web address]://[email or web address]/health/conditions-and-diseases/coronavirus/coronavirus-recovery-breathing-exercises      Energy Conservation:  https://www.archives-pmr.org/article/-9447(60)78628-2/pdf    Cognitive rehab:  [email or web address]://[email or web address].Movea/en/free-cognitive-activities-for-adults/

## 2021-02-03 NOTE — NURSING NOTE
"Chief Complaint   Patient presents with     Covid Concern     DX COVID 11/17/2020 - shortness of breath, cough, myalgias, fatigue.       Vitals:    02/03/21 0813   Weight: 75.8 kg (167 lb)   Height: 1.568 m (5' 1.75\")       Body mass index is 30.79 kg/m .                          "

## 2021-02-03 NOTE — PROGRESS NOTES
Cecilia is a 45 year old who is being evaluated via a billable video visit.      How would you like to obtain your AVS? MyChart  If the video visit is dropped, the invitation should be resent by: Text to cell phone: [phone]   Will anyone else be joining your video visit? No      Video Start Time: 9:13 AM  Video-Visit Details    Type of service:  Video Visit    Video End Time:10:01 AM    Originating Location (pt. Location): Home    Distant Location (provider location):  [facility] [place]     Platform used for Video Visit: [employer]  Answers for HPI/ROS submitted by the patient on 2/3/2021   If you checked off any problems, how difficult have these problems made it for you to do your work, take care of things at home, or get along with other people?: Somewhat difficult  PHQ9 TOTAL SCORE: 10  BRAULIO 7 TOTAL SCORE: 4      Post-COVID Clinic    Referral from Primary Care Provider: Ammy Rolon in [place]   Date of encounter: 02/03/21      Assessment and Plan:  Cecilia Pabon is a 45 year old female who was diagnosed with Covid infection on 17 November 2020 and presents with post viral fatigue syndrome, persistent dry cough associated with shortness of breath, insomnia leading to impaired mobility and ADLs.    Post-COVID-19 Infection: NOVEMBER 17, 2020 precautions, risk of recurrence, timing of vaccination    Postviral fatigue/Chronic Fatigue syndrome: following the diagnosis of COVID-19 on 17 November 2020, she presents with Dry cough and SOB associated with severe fatigue.  She had been started on a Medrol Dosepak however this was discontinued due to her developing a rash.  Thus she is unable to take a medrol dose pack due to allergy.  On my examination and encounter today she presents with a dry irritating cough without any production.  It is clear during my encounter that she is not able to take adequate breaths due to the this cough leading to the shortness  "of breath.  Though she has history of asthma, she does not present with any wheezing or difficulty with breathing.  I had a long conversation with her for possible options, however she will benefit from a strong steroid inhaler.    We also discussed other treatment options specifically supplementation.  Though there is not much evidence in the impact of Vitamin A in the management of post Covid virus symptoms, she will benefit from getting levels checked.   Check Vitamin level     Mental health; she is currently on Wellbutrin and Lexapro for history of depression.  Her PHQ-9 score was 10 today indicating moderate depression.  As she has already been maximized on her antidepressants, I would like to recommend psychological services for her.  She is agreeable to the plan of care.  A referral was made to psychology.  It is likely that her mental fog is secondary to multiple factors including depression, anxiety, and the fatigue    Work: Counseled the patient on mental fog and its impact on work.  She works in the [place] \"and her work involves significant problem solving and multitasking.  Given her symptoms today I would recommend rest breaks at work for 15-30 min every 3 hours.      Exercise Intolerance: patient counseled on energy conservation and gradual return to exercise mostly aerobic exercise  Use the following website for energy conservation:   https://www.archives-pmr.org/article/-2254(71)60187-2/pdf    Cognitive/memory deficits: reviewed regimen optimizing sleep and nutrition was discussed. Vitamin E and D levels to be assessed today. Will add supplementation.  Will defer neuropsychological testing to next encounter she may benefit from psychological therapy referral as well as pulmonary rehab.      https://www.[employer].com/en/free-cognitive-activities-for-adults/      Breathing " exercises:  https://www.[email or web address].org/health/conditions-and-diseases/coronavirus/coronavirus-recovery-breathing-exercises      Energy Conservation:  https://www.archives-pmr.org/article/-8151(86)50577-2/pdf    Cognitive rehab:  https://www.[employer]ly/en/free-cognitive-activities-for-adults/    Consider neuropsychological testing in the next visit based on her response     Corina Cash MD, [facility]   Department of Rehabilitation       History of COVID-19 infection:  Date of first symptoms: NOVEMBER 17TH   Diagnosis: clinical and then confirmed by antibodies  initail diarhea and then progressed to SOB and cough. She was placed on Spiriva and nebulizer. She has history of Asthma   She continues to have SOB,and Cough   Hospitalization:  no   Treatment:    Ambulatory: antibodies:  no   Hospitalization: No hospitalization, and she was not placed on oxygen, she was placed on prednisone and developed an allergic reaction, and rash, and was stopped.    Ventilatory support: no    Enrolled in clinical trial: no    Current symptoms (dropdown)   Cough and SOB she had an CT scan of the chest, and small nodule was seen. SOB is on and off and is exacerbated by speaking and talking.    D Dimer was noted to be 1. Cough is non productive      Fatigue; significant fatigue, she is very fatigued by the end of the day.    Insomnia; at baseline she was a sound sleeper, but is unable to sleep. She is not taking anything for the insomnia   Muscle aches/joint aches; LBP and she feels that it is from coughing. Generalized aches and pains.   Cognitive impairment ; endorses mental fog, she has poor attention and STM.     Headache: endorse some headaches but not every day, relieved from tylenol. She has history of migraines. These are not migraines.      Goals of Care:  Every   Current Concerns:  Her mother passed away from Asthma and she is scared.  She reports that she is quite concerned with her cough given that her mother  passed away with asthma.  She reports that her entire family and siblings are also concerned for the same reason.    Job Concerns; she works for the [employer] of [place], in the [employer] in [place]. She she was off for 2 weeks following her diagnosis. She has been working from home now. She is computer based. She takes some time off for a few hours. In the past weeks, she has asked for half days. She is doing this several times a week.            PMHx  Past Medical History:   Diagnosis Date     Abdominal pain, other specified site      Abdominal pain, right upper quadrant      Anxiety state, unspecified      ASCUS on Pap smear 2003    +HR HPV 16     COPD (chronic obstructive pulmonary disease) (H)     My Mom has this     Family history of breast cancer in mother 9/17/2009     Family history of breast cancer in mother      Family history of breast cancer in mother      Headache 2/25/2005     Headache      Headache      Headache(784.0)      Hx of colposcopy with cervical biopsy 6/2/2003    WNL     Mental disorders of mother, postpartum     postpartum depression     Migraine, unspecified, without mention of intractable migraine without mention of status migrainosus      Other specified cardiac dysrhythmias(427.89)      Other specified complication, antepartum(646.83)      Swelling of limb      Uncomplicated asthma     Myself     Unspecified viral infection, in conditions classified elsewhere and of unspecified site      Urinary tract infection, site not specified          Medications   Current Outpatient Medications   Medication     albuterol (PROVENTIL) (2.5 MG/3ML) 0.083% neb solution     augmented betamethasone dipropionate (DIPROLENE-AF) 0.05 % external cream     buPROPion (WELLBUTRIN XL) 150 MG 24 hr tablet     escitalopram (LEXAPRO) 20 MG tablet     omeprazole (PRILOSEC) 20 MG DR capsule     ondansetron (ZOFRAN-ODT) 4 MG ODT tab     SUMAtriptan (IMITREX) 100 MG tablet     tiotropium (SPIRIVA RESPIMAT) 2.5 MCG/ACT inhaler      albuterol (PROAIR HFA/PROVENTIL HFA/VENTOLIN HFA) 108 (90 Base) MCG/ACT inhaler     benzonatate (TESSALON) 100 MG capsule     fluticasone (FLOVENT HFA) 110 MCG/ACT inhaler     ivermectin (STROMECTOL) 3 MG TABS tablet     naproxen (NAPROSYN) 500 MG tablet     No current facility-administered medications for this visit.          Review of Symptoms   A total of 10 systems were reviewed today  Pertinent positives as in HPI    Physical   Cecilia is sitting in a chair comfortable  She is not in any acute distress  She does look somewhat depressed  The entire encounter is accompanied by dry consistent cough  Associated with shortness of breath when speaking prolonged sentences  Good insight  Comprehension is remarkable  Good problem-solving skills        Labs were reviewed and discussed with the patient:   1/8/21  BMP  WNL  CBC with diff  wnl white count was 8.2 without any shift   D Dimer 1.0            Thank you for consulting the PM&R Department.   For any questions, please feel free to page me at [phone]     Corina Cash MD   Department of PM&R

## 2021-02-03 NOTE — LETTER
2/3/2021       RE: Cecilia Pabon  [id number] [address] [place] [id number]     Dear Colleague,    Thank you for referring your patient, Cecilia Pabon, to the [facility] PHYSICAL MEDICINE AND REHABILITATION CLINIC [place] at [facility]. Please see a copy of my visit note below.    Cecilia is a 45 year old who is being evaluated via a billable video visit.      How would you like to obtain your AVS? MyChart  If the video visit is dropped, the invitation should be resent by: Text to cell phone: [phone]   Will anyone else be joining your video visit? No      Video Start Time: 9:13 AM  Video-Visit Details    Type of service:  Video Visit    Video End Time:10:01 AM    Originating Location (pt. Location): Home    Distant Location (provider location):  [facility] PHYSICAL MEDICINE AND REHABILITATION [facility]     Platform used for Video Visit: [employer] in [employer]  Answers for HPI/ROS submitted by the patient on 2/3/2021   If you checked off any problems, how difficult have these problems made it for you to do your work, take care of things at home, or get along with other people?: Somewhat difficult  PHQ9 TOTAL SCORE: 10  BRAULIO 7 TOTAL SCORE: 4      Post-COVID Clinic    Referral from Primary Care Provider: Ammy Rolon in [place]   Date of encounter: 02/03/21      Assessment and Plan:  Cecilia Pabon is a 45 year old female who was diagnosed with Covid infection on 17 November 2020 and presents with post viral fatigue syndrome, persistent dry cough associated with shortness of breath, insomnia leading to impaired mobility and ADLs.    Post-COVID-19 Infection: NOVEMBER 17, 2020 precautions, risk of recurrence, timing of vaccination    Postviral fatigue/Chronic Fatigue syndrome: following the diagnosis of COVID-19 on 17 November 2020, she presents with Dry cough and SOB associated with severe fatigue.  She had been started on a Medrol Dosepak however this  "was discontinued due to her developing a rash.  Thus she is unable to take a medrol dose pack due to allergy.  On my examination and encounter today she presents with a dry irritating cough without any production.  It is clear during my encounter that she is not able to take adequate breaths due to the this cough leading to the shortness of breath.  Though she has history of asthma, she does not present with any wheezing or difficulty with breathing.  I had a long conversation with her for possible options, however she will benefit from a strong steroid inhaler.    We also discussed other treatment options specifically supplementation.  Though there is not much evidence in the impact of Vitamin A in the management of post Covid virus symptoms, she will benefit from getting levels checked.   Check Vitamin level     Mental health; she is currently on Wellbutrin and Lexapro for history of depression.  Her PHQ-9 score was 10 today indicating moderate depression.  As she has already been maximized on her antidepressants, I would like to recommend psychological services for her.  She is agreeable to the plan of care.  A referral was made to psychology.  It is likely that her mental fog is secondary to multiple factors including depression, anxiety, and the fatigue    Work: Counseled the patient on mental fog and its impact on work.  She works in the [place] \"and her work involves significant problem solving and multitasking.  Given her symptoms today I would recommend rest breaks at work for 15-30 min every 3 hours.      Exercise Intolerance: patient counseled on energy conservation and gradual return to exercise mostly aerobic exercise  Use the following website for energy conservation:   https://www.archives-pmr.org/article/-8584(07)27891-2/pdf    Cognitive/memory deficits: reviewed regimen optimizing sleep and nutrition was discussed. Vitamin E and D levels to be assessed today. Will add supplementation.  Will " defer neuropsychological testing to next encounter she may benefit from psychological therapy referral as well as pulmonary rehab.      [email or web address]-cognitive-activities-for-adults/      Breathing exercises:  [email or web address]/[email or web address]-[email or web address]/[email or web address]-recovery-breathing-exercises      Energy Conservation:  https://www.archives-pmr.org/article/-4360(49)18177-2/pdf    Cognitive rehab:  [email or web address]/free-cognitive-activities-for-adults/    Consider neuropsychological testing in the next visit based on her response     Corina Cash MD, [facility]   Department of Rehabilitation       History of COVID-19 infection:  Date of first symptoms: NOVEMBER 17TH   Diagnosis: clinical and then confirmed by antibodies  initail diarhea and then progressed to SOB and cough. She was placed on Spiriva and nebulizer. She has history of Asthma   She continues to have SOB,and Cough   Hospitalization:  no   Treatment:    Ambulatory: antibodies:  no   Hospitalization: No hospitalization, and she was not placed on oxygen, she was placed on prednisone and developed an allergic reaction, and rash, and was stopped.    Ventilatory support: no    Enrolled in clinical trial: no    Current symptoms (dropdown)   Cough and SOB she had an CT scan of the chest, and small nodule was seen. SOB is on and off and is exacerbated by speaking and talking.    D Dimer was noted to be 1. Cough is non productive      Fatigue; significant fatigue, she is very fatigued by the end of the day.    Insomnia; at baseline she was a sound sleeper, but is unable to sleep. She is not taking anything for the insomnia   Muscle aches/joint aches; LBP and she feels that it is from coughing. Generalized aches and pains.   Cognitive impairment ; endorses mental fog, she has poor attention and STM.     Headache: endorse some headaches but not every day, relieved from tylenol. She has  history of migraines. These are not migraines.      Goals of Care:  Every   Current Concerns:  Her mother passed away from Asthma and she is scared.  She reports that she is quite concerned with her cough given that her mother passed away with asthma.  She reports that her entire family and siblings are also concerned for the same reason.    Job Concerns; she works for the [employer], in the [email or web address] in [place]. She she was off for 2 weeks following her diagnosis. She has been working from home now. She is computer based. She takes some time off for a few hours. In the past weeks, she has asked for half days. She is doing this several times a week.            PMHx  Past Medical History:   Diagnosis Date     Abdominal pain, other specified site      Abdominal pain, right upper quadrant      Anxiety state, unspecified      ASCUS on Pap smear 2003    +HR HPV 16     COPD (chronic obstructive pulmonary disease) (H)     My Mom has this     Family history of breast cancer in mother 9/17/2009     Family history of breast cancer in mother      Family history of breast cancer in mother      Headache 2/25/2005     Headache      Headache      Headache(784.0)      Hx of colposcopy with cervical biopsy 6/2/2003    WNL     Mental disorders of mother, postpartum     postpartum depression     Migraine, unspecified, without mention of intractable migraine without mention of status migrainosus      Other specified cardiac dysrhythmias(427.89)      Other specified complication, antepartum(646.83)      Swelling of limb      Uncomplicated asthma     Myself     Unspecified viral infection, in conditions classified elsewhere and of unspecified site      Urinary tract infection, site not specified          Medications   Current Outpatient Medications   Medication     albuterol (PROVENTIL) (2.5 MG/3ML) 0.083% neb solution     augmented betamethasone dipropionate (DIPROLENE-AF) 0.05 % external cream     buPROPion (WELLBUTRIN XL) 150 MG 24 hr  tablet     escitalopram (LEXAPRO) 20 MG tablet     omeprazole (PRILOSEC) 20 MG DR capsule     ondansetron (ZOFRAN-ODT) 4 MG ODT tab     SUMAtriptan (IMITREX) 100 MG tablet     tiotropium (SPIRIVA RESPIMAT) 2.5 MCG/ACT inhaler     albuterol (PROAIR HFA/PROVENTIL HFA/VENTOLIN HFA) 108 (90 Base) MCG/ACT inhaler     benzonatate (TESSALON) 100 MG capsule     fluticasone (FLOVENT HFA) 110 MCG/ACT inhaler     ivermectin (STROMECTOL) 3 MG TABS tablet     naproxen (NAPROSYN) 500 MG tablet     No current facility-administered medications for this visit.          Review of Symptoms   A total of 10 systems were reviewed today  Pertinent positives as in HPI    Physical   Cecilia is sitting in a chair comfortable  She is not in any acute distress  She does look somewhat depressed  The entire encounter is accompanied by dry consistent cough  Associated with shortness of breath when speaking prolonged sentences  Good insight  Comprehension is remarkable  Good problem-solving skills        Labs were reviewed and discussed with the patient:   1/8/21  BMP  WNL  CBC with diff  wnl white count was 8.2 without any shift   D Dimer 1.0            Thank you for consulting the PM&R Department.   For any questions, please feel free to page me at [phone]     Corina Cash MD   Department of PM&R

## 2021-02-04 RX ORDER — ALBUTEROL SULFATE 0.83 MG/ML
SOLUTION RESPIRATORY (INHALATION)
Qty: 90 ML | Refills: 1 | Status: SHIPPED | OUTPATIENT
Start: 2021-02-04 | End: 2022-07-28

## 2021-02-04 ASSESSMENT — PATIENT HEALTH QUESTIONNAIRE - PHQ9: SUM OF ALL RESPONSES TO PHQ QUESTIONS 1-9: 10

## 2021-02-04 ASSESSMENT — ANXIETY QUESTIONNAIRES: GAD7 TOTAL SCORE: 4

## 2021-02-05 ENCOUNTER — TELEPHONE (OUTPATIENT)
Dept: PHYSICAL MEDICINE AND REHAB | Facility: CLINIC | Age: 46
End: 2021-02-05

## 2021-02-11 ENCOUNTER — MYC REFILL (OUTPATIENT)
Dept: FAMILY MEDICINE | Facility: CLINIC | Age: 46
End: 2021-02-11

## 2021-02-11 DIAGNOSIS — J45.31 MILD PERSISTENT ASTHMA WITH ACUTE EXACERBATION: ICD-10-CM

## 2021-02-11 RX ORDER — FLUTICASONE PROPIONATE 110 UG/1
1 AEROSOL, METERED RESPIRATORY (INHALATION) 2 TIMES DAILY
Qty: 12 G | Refills: 0 | Status: SHIPPED | OUTPATIENT
Start: 2021-02-11 | End: 2021-04-13

## 2021-02-16 DIAGNOSIS — G93.31 POSTVIRAL FATIGUE SYNDROME: Primary | ICD-10-CM

## 2021-02-16 DIAGNOSIS — U07.1 INFECTION DUE TO 2019 NOVEL CORONAVIRUS: ICD-10-CM

## 2021-02-21 DIAGNOSIS — U07.1 INFECTION DUE TO 2019 NOVEL CORONAVIRUS: ICD-10-CM

## 2021-02-21 PROCEDURE — 85730 THROMBOPLASTIN TIME PARTIAL: CPT | Performed by: PHYSICAL MEDICINE & REHABILITATION

## 2021-02-21 PROCEDURE — 85613 RUSSELL VIPER VENOM DILUTED: CPT | Performed by: PHYSICAL MEDICINE & REHABILITATION

## 2021-02-21 PROCEDURE — 36415 COLL VENOUS BLD VENIPUNCTURE: CPT | Performed by: PHYSICAL MEDICINE & REHABILITATION

## 2021-02-21 PROCEDURE — 82306 VITAMIN D 25 HYDROXY: CPT | Performed by: PHYSICAL MEDICINE & REHABILITATION

## 2021-02-21 PROCEDURE — 99N1023 PR STATISTIC INR NC: Performed by: PHYSICAL MEDICINE & REHABILITATION

## 2021-02-21 PROCEDURE — 86146 BETA-2 GLYCOPROTEIN ANTIBODY: CPT | Performed by: PHYSICAL MEDICINE & REHABILITATION

## 2021-02-21 PROCEDURE — 86147 CARDIOLIPIN ANTIBODY EA IG: CPT | Performed by: PHYSICAL MEDICINE & REHABILITATION

## 2021-02-21 PROCEDURE — 99000 SPECIMEN HANDLING OFFICE-LAB: CPT | Performed by: PHYSICAL MEDICINE & REHABILITATION

## 2021-02-21 PROCEDURE — 99N1035 PR STATISTIC THROMBIN TIME NC: Performed by: PHYSICAL MEDICINE & REHABILITATION

## 2021-02-21 PROCEDURE — 84446 ASSAY OF VITAMIN E: CPT | Mod: 90 | Performed by: PHYSICAL MEDICINE & REHABILITATION

## 2021-02-22 LAB
B2 GLYCOPROT1 IGG SERPL IA-ACNC: 0.8 U/ML
B2 GLYCOPROT1 IGM SERPL IA-ACNC: 3.1 U/ML
CARDIOLIPIN ANTIBODY IGG: <1.6 GPL-U/ML (ref 0–19.9)
CARDIOLIPIN ANTIBODY IGM: 1.9 MPL-U/ML (ref 0–19.9)
DEPRECATED CALCIDIOL+CALCIFEROL SERPL-MC: 24 UG/L (ref 20–75)

## 2021-02-23 LAB — LA PPP-IMP: NEGATIVE

## 2021-02-25 ENCOUNTER — HOSPITAL ENCOUNTER (OUTPATIENT)
Dept: CARDIAC REHAB | Facility: CLINIC | Age: 46
End: 2021-02-25
Attending: PHYSICAL MEDICINE & REHABILITATION
Payer: COMMERCIAL

## 2021-02-25 DIAGNOSIS — G93.31 POSTVIRAL FATIGUE SYNDROME: ICD-10-CM

## 2021-02-25 DIAGNOSIS — U07.1 INFECTION DUE TO 2019 NOVEL CORONAVIRUS: ICD-10-CM

## 2021-02-25 LAB
A-TOCOPHEROL VIT E SERPL-MCNC: 16.4 MG/L (ref 5.5–18)
BETA+GAMMA TOCOPHEROL SERPL-MCNC: 5 MG/L (ref 0–6)

## 2021-02-25 PROCEDURE — G0238 OTH RESP PROC, INDIV: HCPCS | Performed by: REHABILITATION PRACTITIONER

## 2021-03-02 ENCOUNTER — HOSPITAL ENCOUNTER (OUTPATIENT)
Dept: CARDIAC REHAB | Facility: CLINIC | Age: 46
End: 2021-03-02
Attending: PHYSICAL MEDICINE & REHABILITATION
Payer: COMMERCIAL

## 2021-03-02 PROCEDURE — G0239 OTH RESP PROC, GROUP: HCPCS

## 2021-03-04 ENCOUNTER — HOSPITAL ENCOUNTER (OUTPATIENT)
Dept: CARDIAC REHAB | Facility: CLINIC | Age: 46
End: 2021-03-04
Attending: PHYSICAL MEDICINE & REHABILITATION
Payer: COMMERCIAL

## 2021-03-04 PROCEDURE — G0238 OTH RESP PROC, INDIV: HCPCS | Performed by: REHABILITATION PRACTITIONER

## 2021-03-05 DIAGNOSIS — F41.1 ANXIETY STATE: ICD-10-CM

## 2021-03-05 RX ORDER — BUPROPION HYDROCHLORIDE 150 MG/1
150 TABLET ORAL DAILY
Qty: 30 TABLET | Refills: 0 | Status: SHIPPED | OUTPATIENT
Start: 2021-03-05 | End: 2021-04-13

## 2021-03-05 RX ORDER — ESCITALOPRAM OXALATE 20 MG/1
20 TABLET ORAL DAILY
Qty: 30 TABLET | Refills: 0 | Status: SHIPPED | OUTPATIENT
Start: 2021-03-05 | End: 2021-04-13

## 2021-03-05 NOTE — TELEPHONE ENCOUNTER
Message left patient needs annual health maintenance visit for further refill, Asked to call to schedule. 1 time short refill sent to pharmacy.  Kalina WINSTON RN

## 2021-03-11 ENCOUNTER — HOSPITAL ENCOUNTER (OUTPATIENT)
Dept: CARDIAC REHAB | Facility: CLINIC | Age: 46
End: 2021-03-11
Attending: PHYSICAL MEDICINE & REHABILITATION
Payer: COMMERCIAL

## 2021-03-11 PROCEDURE — G0239 OTH RESP PROC, GROUP: HCPCS

## 2021-03-16 ENCOUNTER — HOSPITAL ENCOUNTER (OUTPATIENT)
Dept: CARDIAC REHAB | Facility: CLINIC | Age: 46
End: 2021-03-16
Attending: PHYSICAL MEDICINE & REHABILITATION
Payer: COMMERCIAL

## 2021-03-16 PROCEDURE — G0239 OTH RESP PROC, GROUP: HCPCS | Performed by: REHABILITATION PRACTITIONER

## 2021-03-25 ENCOUNTER — HOSPITAL ENCOUNTER (OUTPATIENT)
Dept: CARDIAC REHAB | Facility: CLINIC | Age: 46
End: 2021-03-25
Attending: PHYSICAL MEDICINE & REHABILITATION
Payer: COMMERCIAL

## 2021-03-25 PROCEDURE — G0239 OTH RESP PROC, GROUP: HCPCS

## 2021-03-30 ENCOUNTER — HOSPITAL ENCOUNTER (OUTPATIENT)
Dept: CARDIAC REHAB | Facility: CLINIC | Age: 46
End: 2021-03-30
Attending: PHYSICAL MEDICINE & REHABILITATION
Payer: COMMERCIAL

## 2021-03-30 PROCEDURE — G0239 OTH RESP PROC, GROUP: HCPCS

## 2021-04-01 ENCOUNTER — HOSPITAL ENCOUNTER (OUTPATIENT)
Dept: CARDIAC REHAB | Facility: CLINIC | Age: 46
End: 2021-04-01
Attending: PHYSICAL MEDICINE & REHABILITATION
Payer: COMMERCIAL

## 2021-04-01 ENCOUNTER — MYC MEDICAL ADVICE (OUTPATIENT)
Dept: FAMILY MEDICINE | Facility: CLINIC | Age: 46
End: 2021-04-01

## 2021-04-01 PROCEDURE — G0238 OTH RESP PROC, INDIV: HCPCS

## 2021-04-01 PROCEDURE — G0237 THERAPEUTIC PROCD STRG ENDUR: HCPCS

## 2021-04-06 ENCOUNTER — HOSPITAL ENCOUNTER (OUTPATIENT)
Dept: CARDIAC REHAB | Facility: CLINIC | Age: 46
End: 2021-04-06
Attending: PHYSICAL MEDICINE & REHABILITATION
Payer: COMMERCIAL

## 2021-04-06 PROCEDURE — G0239 OTH RESP PROC, GROUP: HCPCS

## 2021-04-13 ENCOUNTER — MYC REFILL (OUTPATIENT)
Dept: FAMILY MEDICINE | Facility: CLINIC | Age: 46
End: 2021-04-13

## 2021-04-13 ENCOUNTER — HOSPITAL ENCOUNTER (OUTPATIENT)
Dept: CARDIAC REHAB | Facility: CLINIC | Age: 46
End: 2021-04-13
Attending: PHYSICAL MEDICINE & REHABILITATION
Payer: COMMERCIAL

## 2021-04-13 DIAGNOSIS — J45.31 MILD PERSISTENT ASTHMA WITH ACUTE EXACERBATION: ICD-10-CM

## 2021-04-13 DIAGNOSIS — F41.1 ANXIETY STATE: ICD-10-CM

## 2021-04-13 DIAGNOSIS — J20.9 ACUTE BRONCHITIS WITH SYMPTOMS > 10 DAYS: ICD-10-CM

## 2021-04-13 PROCEDURE — G0239 OTH RESP PROC, GROUP: HCPCS

## 2021-04-15 NOTE — TELEPHONE ENCOUNTER
"Requested Prescriptions   Pending Prescriptions Disp Refills     tiotropium (SPIRIVA RESPIMAT) 2.5 MCG/ACT inhaler 4 g 0     Sig: Inhale 2 puffs into the lungs daily       Asthma Maintenance Inhalers - Anticholinergics Failed - 4/13/2021 11:16 AM        Failed - Asthma control assessment score within normal limits in last 6 months     Please review ACT score.           Passed - Patient is age 12 years or older        Passed - Medication is active on med list        Passed - Recent (6 mo) or future (30 days) visit within the authorizing provider's specialty     Patient had office visit in the last 6 months or has a visit in the next 30 days with authorizing provider or within the authorizing provider's specialty.  See \"Patient Info\" tab in inbasket, or \"Choose Columns\" in Meds & Orders section of the refill encounter.           Inhaled Steroids Protocol Failed - 4/13/2021 11:16 AM        Failed - Asthma control assessment score within normal limits in last 6 months     Please review ACT score.           Passed - Patient is age 12 or older        Passed - Medication is active on med list        Passed - Recent (6 mo) or future (30 days) visit within the authorizing provider's specialty     Patient had office visit in the last 6 months or has a visit in the next 30 days with authorizing provider or within the authorizing provider's specialty.  See \"Patient Info\" tab in inbasket, or \"Choose Columns\" in Meds & Orders section of the refill encounter.                 "

## 2021-04-15 NOTE — TELEPHONE ENCOUNTER
I have attempted to contact this patient by phone with the following results: no answer, mailbox is full, unable to leave a message.    Her last visit with PCP Dr. Rolon was virtually on 6/8/2020. Has not had Face to Face Visit with Dr. Rolon since 7/29/2019.   Due for appt now.    MyChart sent.    Rosita RAMOS RN, BSN

## 2021-04-19 RX ORDER — ESCITALOPRAM OXALATE 20 MG/1
20 TABLET ORAL DAILY
Qty: 30 TABLET | Refills: 0 | Status: SHIPPED | OUTPATIENT
Start: 2021-04-19 | End: 2021-06-08

## 2021-04-19 RX ORDER — BUPROPION HYDROCHLORIDE 150 MG/1
150 TABLET ORAL DAILY
Qty: 30 TABLET | Refills: 0 | Status: SHIPPED | OUTPATIENT
Start: 2021-04-19 | End: 2021-06-08

## 2021-04-19 RX ORDER — FLUTICASONE PROPIONATE 110 UG/1
1 AEROSOL, METERED RESPIRATORY (INHALATION) 2 TIMES DAILY
Qty: 12 G | Refills: 0 | Status: SHIPPED | OUTPATIENT
Start: 2021-04-19 | End: 2021-04-28 | Stop reason: ALTCHOICE

## 2021-04-20 ENCOUNTER — HOSPITAL ENCOUNTER (OUTPATIENT)
Dept: CARDIAC REHAB | Facility: CLINIC | Age: 46
End: 2021-04-20
Attending: PHYSICAL MEDICINE & REHABILITATION
Payer: COMMERCIAL

## 2021-04-20 PROCEDURE — G0239 OTH RESP PROC, GROUP: HCPCS

## 2021-04-22 ENCOUNTER — HOSPITAL ENCOUNTER (OUTPATIENT)
Dept: CARDIAC REHAB | Facility: CLINIC | Age: 46
End: 2021-04-22
Attending: PHYSICAL MEDICINE & REHABILITATION
Payer: COMMERCIAL

## 2021-04-22 PROCEDURE — G0239 OTH RESP PROC, GROUP: HCPCS | Performed by: REHABILITATION PRACTITIONER

## 2021-04-27 ASSESSMENT — ENCOUNTER SYMPTOMS
PALPITATIONS: 1
PANIC: 0
TREMORS: 1
DIARRHEA: 1
BLOOD IN STOOL: 0
DECREASED APPETITE: 0
HOT FLASHES: 0
SINUS PAIN: 1
EYE REDNESS: 0
NECK MASS: 0
BRUISES/BLEEDS EASILY: 1
SNORES LOUDLY: 0
WEIGHT GAIN: 1
FEVER: 0
STIFFNESS: 1
INCREASED ENERGY: 1
SHORTNESS OF BREATH: 1
BLOATING: 1
EYE PAIN: 0
COUGH: 1
WHEEZING: 0
JOINT SWELLING: 1
SLEEP DISTURBANCES DUE TO BREATHING: 0
COUGH DISTURBING SLEEP: 1
DYSPNEA ON EXERTION: 1
DIZZINESS: 1
SEIZURES: 0
SINUS CONGESTION: 0
DISTURBANCES IN COORDINATION: 1
DOUBLE VISION: 1
LIGHT-HEADEDNESS: 1
ALTERED TEMPERATURE REGULATION: 1
ABDOMINAL PAIN: 1
NERVOUS/ANXIOUS: 1
BACK PAIN: 1
DECREASED CONCENTRATION: 1
POSTURAL DYSPNEA: 0
SWOLLEN GLANDS: 0
NECK PAIN: 1
CONSTIPATION: 0
POLYPHAGIA: 1
HEARTBURN: 1
SYNCOPE: 0
POLYDIPSIA: 1
MUSCLE WEAKNESS: 1
LOSS OF CONSCIOUSNESS: 0
EYE IRRITATION: 1
LEG PAIN: 1
MEMORY LOSS: 1
ARTHRALGIAS: 1
TASTE DISTURBANCE: 0
WEAKNESS: 1
JAUNDICE: 0
DECREASED LIBIDO: 1
HOARSE VOICE: 1
TINGLING: 1
VOMITING: 0
DEPRESSION: 0
MYALGIAS: 1
BOWEL INCONTINENCE: 0
NIGHT SWEATS: 0
SPEECH CHANGE: 1
HEMOPTYSIS: 0
CHILLS: 0
FATIGUE: 1
HYPERTENSION: 0
NUMBNESS: 1
TROUBLE SWALLOWING: 0
HALLUCINATIONS: 0
SORE THROAT: 1
ORTHOPNEA: 0
SPUTUM PRODUCTION: 0
RECTAL PAIN: 0
WEIGHT LOSS: 0
HEADACHES: 1
SMELL DISTURBANCE: 0
INSOMNIA: 1
PARALYSIS: 0
EXERCISE INTOLERANCE: 1
NAUSEA: 0
EYE WATERING: 0
MUSCLE CRAMPS: 1
HYPOTENSION: 0

## 2021-04-27 ASSESSMENT — ANXIETY QUESTIONNAIRES
GAD7 TOTAL SCORE: 3
6. BECOMING EASILY ANNOYED OR IRRITABLE: SEVERAL DAYS
4. TROUBLE RELAXING: SEVERAL DAYS
GAD7 TOTAL SCORE: 3
3. WORRYING TOO MUCH ABOUT DIFFERENT THINGS: NOT AT ALL
GAD7 TOTAL SCORE: 3
7. FEELING AFRAID AS IF SOMETHING AWFUL MIGHT HAPPEN: NOT AT ALL
2. NOT BEING ABLE TO STOP OR CONTROL WORRYING: NOT AT ALL
7. FEELING AFRAID AS IF SOMETHING AWFUL MIGHT HAPPEN: NOT AT ALL
5. BEING SO RESTLESS THAT IT IS HARD TO SIT STILL: NOT AT ALL
1. FEELING NERVOUS, ANXIOUS, OR ON EDGE: SEVERAL DAYS

## 2021-04-27 ASSESSMENT — PATIENT HEALTH QUESTIONNAIRE - PHQ9
10. IF YOU CHECKED OFF ANY PROBLEMS, HOW DIFFICULT HAVE THESE PROBLEMS MADE IT FOR YOU TO DO YOUR WORK, TAKE CARE OF THINGS AT HOME, OR GET ALONG WITH OTHER PEOPLE: NOT DIFFICULT AT ALL
SUM OF ALL RESPONSES TO PHQ QUESTIONS 1-9: 9
SUM OF ALL RESPONSES TO PHQ QUESTIONS 1-9: 9

## 2021-04-28 ENCOUNTER — VIRTUAL VISIT (OUTPATIENT)
Dept: PHYSICAL MEDICINE AND REHAB | Facility: CLINIC | Age: 46
End: 2021-04-28
Payer: COMMERCIAL

## 2021-04-28 ENCOUNTER — OFFICE VISIT (OUTPATIENT)
Dept: FAMILY MEDICINE | Facility: CLINIC | Age: 46
End: 2021-04-28
Payer: COMMERCIAL

## 2021-04-28 VITALS
DIASTOLIC BLOOD PRESSURE: 65 MMHG | SYSTOLIC BLOOD PRESSURE: 103 MMHG | OXYGEN SATURATION: 99 % | HEIGHT: 62 IN | TEMPERATURE: 98.5 F | WEIGHT: 168 LBS | HEART RATE: 81 BPM | BODY MASS INDEX: 30.91 KG/M2

## 2021-04-28 VITALS — HEIGHT: 62 IN | BODY MASS INDEX: 30.36 KG/M2 | WEIGHT: 165 LBS

## 2021-04-28 DIAGNOSIS — R53.83 FATIGUE, UNSPECIFIED TYPE: ICD-10-CM

## 2021-04-28 DIAGNOSIS — J45.31 MILD PERSISTENT ASTHMA WITH ACUTE EXACERBATION: Primary | ICD-10-CM

## 2021-04-28 DIAGNOSIS — Z86.16 HISTORY OF 2019 NOVEL CORONAVIRUS DISEASE (COVID-19): ICD-10-CM

## 2021-04-28 DIAGNOSIS — G93.31 POSTVIRAL FATIGUE SYNDROME: Primary | ICD-10-CM

## 2021-04-28 LAB
DEPRECATED CALCIDIOL+CALCIFEROL SERPL-MC: 42 UG/L (ref 20–75)
ERYTHROCYTE [DISTWIDTH] IN BLOOD BY AUTOMATED COUNT: 12.5 % (ref 10–15)
FERRITIN SERPL-MCNC: 37 NG/ML (ref 12–150)
HCT VFR BLD AUTO: 38.7 % (ref 35–47)
HGB BLD-MCNC: 13 G/DL (ref 11.7–15.7)
MCH RBC QN AUTO: 31.9 PG (ref 26.5–33)
MCHC RBC AUTO-ENTMCNC: 33.6 G/DL (ref 31.5–36.5)
MCV RBC AUTO: 95 FL (ref 78–100)
PLATELET # BLD AUTO: 301 10E9/L (ref 150–450)
RBC # BLD AUTO: 4.07 10E12/L (ref 3.8–5.2)
WBC # BLD AUTO: 6.1 10E9/L (ref 4–11)

## 2021-04-28 PROCEDURE — 86769 SARS-COV-2 COVID-19 ANTIBODY: CPT | Performed by: FAMILY MEDICINE

## 2021-04-28 PROCEDURE — 82306 VITAMIN D 25 HYDROXY: CPT | Performed by: FAMILY MEDICINE

## 2021-04-28 PROCEDURE — 36415 COLL VENOUS BLD VENIPUNCTURE: CPT | Performed by: FAMILY MEDICINE

## 2021-04-28 PROCEDURE — 99214 OFFICE O/P EST MOD 30 MIN: CPT | Performed by: FAMILY MEDICINE

## 2021-04-28 PROCEDURE — 85027 COMPLETE CBC AUTOMATED: CPT | Performed by: FAMILY MEDICINE

## 2021-04-28 PROCEDURE — 99215 OFFICE O/P EST HI 40 MIN: CPT | Mod: 95 | Performed by: PHYSICAL MEDICINE & REHABILITATION

## 2021-04-28 PROCEDURE — 86769 SARS-COV-2 COVID-19 ANTIBODY: CPT | Mod: 59 | Performed by: FAMILY MEDICINE

## 2021-04-28 PROCEDURE — 82728 ASSAY OF FERRITIN: CPT | Performed by: FAMILY MEDICINE

## 2021-04-28 RX ORDER — B-COMPLEX WITH VITAMIN C
1 TABLET ORAL DAILY
COMMUNITY

## 2021-04-28 RX ORDER — CHLORAL HYDRATE 500 MG
2 CAPSULE ORAL DAILY
COMMUNITY
End: 2022-07-28

## 2021-04-28 RX ORDER — VITAMIN A 3000 MCG
CAPSULE ORAL
COMMUNITY

## 2021-04-28 RX ORDER — FAMOTIDINE 20 MG
TABLET ORAL
COMMUNITY

## 2021-04-28 ASSESSMENT — ANXIETY QUESTIONNAIRES: GAD7 TOTAL SCORE: 3

## 2021-04-28 ASSESSMENT — PAIN SCALES - GENERAL
PAINLEVEL: NO PAIN (0)
PAINLEVEL: MODERATE PAIN (5)

## 2021-04-28 ASSESSMENT — MIFFLIN-ST. JEOR
SCORE: 1366.32
SCORE: 1356.69

## 2021-04-28 ASSESSMENT — PATIENT HEALTH QUESTIONNAIRE - PHQ9: SUM OF ALL RESPONSES TO PHQ QUESTIONS 1-9: 9

## 2021-04-28 NOTE — PROGRESS NOTES
Assessment & Plan     Mild persistent asthma with acute exacerbation  Stable no change in treatment plan.   - fluticasone-salmeterol (ADVAIR) 250-50 MCG/DOSE inhaler; Inhale 1 puff into the lungs every 12 hours    Fatigue, unspecified type  Likely related to covid recovery will rule out other etiologies   - Vitamin D Deficiency  - CBC with platelets  - Ferritin    History of 2019 novel coronavirus disease (COVID-19)  Working with covid clinic   - COVID-19 Esa RBD Rachael & Titer Reflex; Future  - COVID-19 Esa RBD Rachael & Titer Reflex    Review of external notes as documented elsewhere in note         Patient Instructions   Do labs today     stop flovent     Start advair       Return in about 1 month (around 5/28/2021) for using an in person visit, with me.    Ammy Rolon MD  [facility]    Noah Brooks is a 45 year old who presents for the following health issues     HPI     Concern - COVID  Onset: 11/17/2020  Description: Patient had COVID in November 2020, patient wants to continue discussion covid long haul.    neuropsych testing  Ibuprofen for headaches and joints     Fatigue brain fog     Insomnia irritability     Reviewed covid clinic note     Asthma Follow-Up    Was ACT completed today?   Score is 14 today   Yes    ACT Total Scores 7/29/2019   ACT TOTAL SCORE -   ASTHMA ER VISITS -   ASTHMA HOSPITALIZATIONS -   ACT TOTAL SCORE (Goal Greater than or Equal to 20) 23   In the past 12 months, how many times did you visit the emergency room for your asthma without being admitted to the hospital? 0   In the past 12 months, how many times were you hospitalized overnight because of your asthma? 0          How many days per week do you miss taking your asthma controller medication?  0    Please describe any recent triggers for your asthma: covid has trigger asthma and not recovered fully yet    Have you had any Emergency Room Visits, Urgent Care Visits, or Hospital  "Admissions since your last office visit?  No        Review of Systems   Constitutional, HEENT, cardiovascular, pulmonary, gi and gu systems are negative, except as otherwise noted.      Objective    /65   Pulse 81   Temp 98.5  F (36.9  C) (Tympanic)   Ht 1.568 m (5' 1.75\")   Wt 76.2 kg (168 lb)   LMP 04/26/2021 (Approximate)   SpO2 99%   BMI 30.98 kg/m    Body mass index is 30.98 kg/m .  Physical Exam   GENERAL APPEARANCE: healthy, alert and no distress  RESP: lungs clear to auscultation - no rales, rhonchi or wheezes  CV: regular rates and rhythm, normal S1 S2, no S3 or S4 and no murmur, click or rub  MS: extremities normal- no gross deformities noted  PSYCH: mentation appears normal and affect normal/bright                "

## 2021-04-28 NOTE — PROGRESS NOTES
Cecilia is a 45 year old who is being evaluated via a billable video visit.      How would you like to obtain your AVS? MyChart  If the video visit is dropped, the invitation should be resent by: Text to cell phone: [phone]   Will anyone else be joining your video visit? No      Video Start Time: 8:04  Video-Visit Details    Type of service:  Video Visit    Video End Time:8:35 AM    Originating Location (pt. Location): Home    Distant Location (provider location):  [facility] [place]     Platform used for Video Visit: [employer] COVID 19 PMR FOLLOW UP   Cecilia Pabon is a 45 year old female who was diagnosed with Covid infection on 17 November 2020 and presents with post viral fatigue syndrome, persistent dry cough associated with shortness of breath, insomnia leading to impaired mobility and ADLs  She was last seen in the [facility] on 2/3/2021      Current Symptoms:  Current symptoms are brain fog, hard time thinking and difficulty comprehending. She has headaches, fatigue and minor hair loss. Also she is myalgic.       Goals of Care:   She has been to pulmonary therapy, it improved her SOB and cough. She has a history of asthma. Her oxygen saturations are consistent. She has been in Pulmonary rehab for the last month, twice a week. Recommend     Headaches: her headaches are dull, and persistent nature. Rates it to 3-5/10. Location is in the temples, left worse than the right. She gets blurred vision occasionally. No phonophobia/photophobia. Currently she takes tylenol, and it takes the edge off. Recommendation ibuprofen 200-600 mg q 8hrs based on the pain severity. Likely tension headache. NSAID would also help with her joint pains and myalgias. She takes coffee in the morning. Given the insomnia, needs to be curtailed beyond a certain time of day.     Recommend optometrist/opthamologist to get check vision.     Workability: she continues to work,  she has some guilt for not working at full strength. She has understanding boss. She works 40 hr weeks, Monday through Friday, remotely, sometimes going in. She is compensating her memory she has adopted several strategies including writing information. She notes deficits in her sequencing an judgement errors. 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 She has a adjustable desk, and she is able to stand. Recommend neuropsychological testing. She notes decrease in attention and focus. The results will help guide her work schedule.     High PHQ9 is high at 9, we had a long discussion around it. She states that she has more anxiety rather than depression. She continues to grief for the loss of her mother who passed away 3 years back. She is on lexapro and Wellbutrin. Continue the medications.     Aerobic exercise: she denies any regimen, she is going for walks, and has a bike which she has not used. She has a watch to keep count of the steps walked everyday. Recommend daily walks with a odometer, which also checks HR. She stated that she did not need a workability letter, but stated that fatigue some days is very severe, and she is unable ti wake up and be productive. Workability letter given, see letter for details.     Current concerns: Job Concernsd     PHQ Assesment Total Score(s) 4/27/2021   PHQ-9 Score 9   Some recent data might be hidden     BRAULIO-7 Results 4/27/2021   BRAULIO 7 TOTAL SCORE 3 (minimal anxiety)   Some recent data might be hidden     No flowsheet data found.  No flowsheet data found.      Past Medical History:   Diagnosis Date     Abdominal pain, other specified site      Abdominal pain, right upper quadrant      Anxiety state, unspecified      ASCUS on Pap smear 2003    +HR HPV 16     COPD (chronic obstructive pulmonary disease) (H)     My Mom has this     Family history of breast cancer in mother 9/17/2009     Family history of breast cancer in mother      Family history of breast cancer in mother      Headache  2/25/2005     Headache      Headache      Headache(784.0)      Hx of colposcopy with cervical biopsy 6/2/2003    WNL     Mental disorders of mother, postpartum     postpartum depression     Migraine, unspecified, without mention of intractable migraine without mention of status migrainosus      Other specified cardiac dysrhythmias(427.89)      Other specified complication, antepartum(646.83)      Swelling of limb      Uncomplicated asthma     Myself     Unspecified viral infection, in conditions classified elsewhere and of unspecified site      Urinary tract infection, site not specified        Past Surgical History:   Procedure Laterality Date     ABDOMEN SURGERY  [id number]    Csecttion     COLONOSCOPY       GI SURGERY       LAPAROSCOPIC CHOLECYSTECTOMY N/A 12/30/2014    Procedure: LAPAROSCOPIC CHOLECYSTECTOMY;  Surgeon: Ulises Starr MD;  Location: [facility]     SURGICAL HISTORY OF -       Csection       Family History   Problem Relation Age of Onset     Depression Mother      Respiratory Mother         COPD, emphysema     Cancer Mother      Neurologic Disorder Mother         raunads syndrom     Breast Cancer Mother         Stage 4     Other Cancer Mother         Cervical     Anesthesia Reaction Mother      Osteoporosis Mother      Anxiety Disorder Mother      Unknown/Adopted Mother         Dying of COPD     Gastrointestinal Disease Maternal Grandmother      Depression Maternal Grandmother      Hypertension Maternal Grandmother      Cancer Maternal Grandmother         skin cancer     Breast Cancer Maternal Grandmother      Other Cancer Maternal Grandmother         Melanoma     Anxiety Disorder Maternal Grandmother      Breast Cancer Cousin         Grandfathers side     Breast Cancer Cousin         Grandfathers side BRCA 1     Prostate Cancer Other         maternal side     Prostate Cancer Other         maternal side     Prostate Cancer Other         maternal side     Leukemia Paternal Half-Sister         1/2 sister,   in age 13 or 15     Diabetes Cousin      Hypertension Other      Hypertension Maternal Grandfather      Hyperlipidemia Maternal Grandfather      Asthma Other        Social History     Tobacco Use     Smoking status: Never Smoker     Smokeless tobacco: Never Used   Substance Use Topics     Alcohol use: Yes     Frequency: 2-4 times a month     Drinks per session: 3 or 4     Comment: Maybe 1-2 times a week     Drug use: No         Current Outpatient Medications:      albuterol (PROAIR HFA/PROVENTIL HFA/VENTOLIN HFA) 108 (90 Base) MCG/ACT inhaler, Inhale 2 puffs into the lungs every 6 hours as needed for shortness of breath / dyspnea, Disp: 18 g, Rfl: 1     augmented betamethasone dipropionate (DIPROLENE-AF) 0.05 % external cream, Apply sparingly to affected area twice daily as needed.  Do not apply to face., Disp: 100 g, Rfl: 3     buPROPion (WELLBUTRIN XL) 150 MG 24 hr tablet, Take 1 tablet (150 mg) by mouth daily, Disp: 30 tablet, Rfl: 0     escitalopram (LEXAPRO) 20 MG tablet, Take 1 tablet (20 mg) by mouth daily Needs telephone video visit for further refills, Disp: 30 tablet, Rfl: 0     fish oil-omega-3 fatty acids 1000 MG capsule, Take 2 g by mouth daily, Disp: , Rfl:      fluticasone (FLOVENT HFA) 110 MCG/ACT inhaler, Inhale 1 puff into the lungs 2 times daily, Disp: 12 g, Rfl: 0     SUMAtriptan (IMITREX) 100 MG tablet, Take 1 tablet (100 mg) by mouth at onset of headache for migraine May repeat dose in 2 hours.  Do not exceed 200 mg in 24 hours, Disp: 18 tablet, Rfl: 3     tiotropium (SPIRIVA RESPIMAT) 2.5 MCG/ACT inhaler, Inhale 2 puffs into the lungs daily, Disp: 4 g, Rfl: 0     Vitamin A 7.5 MG (88161 UT) CAPS, , Disp: , Rfl:      vitamin B complex with vitamin C (VITAMIN  B COMPLEX) tablet, Take 1 tablet by mouth daily, Disp: , Rfl:      Vitamin D, Cholecalciferol, 25 MCG (1000 UT) CAPS, , Disp: , Rfl:      albuterol (PROVENTIL) (2.5 MG/3ML) 0.083% neb solution, NEBULIZE CONTENTS OF 1 VIAL EVERY 6  HOURS AS NEEDED FOR SHORTNESS OF BREATH / DYSPNEA / WHEEZING (Patient not taking: Reported on 4/28/2021), Disp: 90 mL, Rfl: 1     benzonatate (TESSALON) 100 MG capsule, Take 1-2 capsules (100-200 mg) by mouth 3 times daily as needed for cough (Patient not taking: Reported on 1/14/2021), Disp: 42 capsule, Rfl: 0     ivermectin (STROMECTOL) 3 MG TABS tablet, , Disp: , Rfl:      naproxen (NAPROSYN) 500 MG tablet, Take 1 tablet (500 mg) by mouth 2 times daily (with meals) (Patient not taking: Reported on 2/3/2021), Disp: 60 tablet, Rfl: 1     omeprazole (PRILOSEC) 20 MG DR capsule, , Disp: , Rfl:      ondansetron (ZOFRAN-ODT) 4 MG ODT tab, , Disp: , Rfl:               Answers for HPI/ROS submitted by the patient on 4/27/2021   If you checked off any problems, how difficult have these problems made it for you to do your work, take care of things at home, or get along with other people?: Not difficult at all  PHQ9 TOTAL SCORE: 9  BRAULIO 7 TOTAL SCORE: 3  General Symptoms: Yes  Skin Symptoms: No  HENT Symptoms: Yes  EYE SYMPTOMS: Yes  HEART SYMPTOMS: Yes  LUNG SYMPTOMS: Yes  INTESTINAL SYMPTOMS: Yes  URINARY SYMPTOMS: No  GYNECOLOGIC SYMPTOMS: Yes  BREAST SYMPTOMS: No  SKELETAL SYMPTOMS: Yes  BLOOD SYMPTOMS: Yes  NERVOUS SYSTEM SYMPTOMS: Yes  MENTAL HEALTH SYMPTOMS: Yes  Fever: No  Loss of appetite: No  Weight loss: No  Weight gain: Yes  Fatigue: Yes  Night sweats: No  Chills: No  Increased stress: No  Excessive hunger: Yes  Excessive thirst: Yes  Feeling hot or cold when others believe the temperature is normal: Yes  Loss of height: No  Post-operative complications: No  Surgical site pain: No  Hallucinations: No  Change in or Loss of Energy: Yes  Hyperactivity: No  Confusion: Yes  Ear pain: No  Ear discharge: No  Hearing loss: No  Tinnitus: No  Nosebleeds: No  Congestion: No  Sinus pain: Yes  Trouble swallowing: No   Voice hoarseness: Yes  Mouth sores: No  Sore throat: Yes  Tooth pain: Yes  Gum tenderness: No  Bleeding gums:  No  Change in taste: No  Change in sense of smell: No  Dry mouth: No  Hearing aid used: No  Neck lump: No  Eye pain: No  Vision loss: No  Dry eyes: Yes  Watery eyes: No  Eye bulging: No  Double vision: Yes  Flashing of lights: No  Spots: Yes  Floaters: No  Redness: No  Crossed eyes: No  Tunnel Vision: No  Yellowing of eyes: No  Eye irritation: Yes  Cough: Yes  Sputum or phlegm: No  Coughing up blood: No  Difficulty breating or shortness of breath: Yes  Snoring: No  Wheezing: No  Difficulty breathing on exertion: Yes  Nighttime Cough: Yes  Difficulty breathing when lying flat: No  Chest pain or pressure: Yes  Fast or irregular heartbeat: Yes  Pain in legs with walking: Yes  Trouble breathing while lying down: No  Fingers or toes appear blue: No  High blood pressure: No  Low blood pressure: No  Fainting: No  Murmurs: No  Pacemaker: No  Varicose veins: No  Edema or swelling: Yes  Wake up at night with shortness of breath: No  Light-headedness: Yes  Exercise intolerance: Yes  Heart burn or indigestion: Yes  Nausea: No  Vomiting: No  Abdominal pain: Yes  Bloating: Yes  Constipation: No  Diarrhea: Yes  Blood in stool: No  Black stools: No  Rectal or Anal pain: No  Fecal incontinence: No  Yellowing of skin or eyes: No  Vomit with blood: No  Change in stools: No  Back pain: Yes  Muscle aches: Yes  Neck pain: Yes  Swollen joints: Yes  Joint pain: Yes  Bone pain: Yes  Muscle cramps: Yes  Muscle weakness: Yes  Joint stiffness: Yes  Bone fracture: No  Anemia: Yes  Swollen glands: No  Easy bleeding or bruising: Yes  Trouble with coordination: Yes  Dizziness or trouble with balance: Yes  Fainting or black-out spells: No  Memory loss: Yes  Headache: Yes  Seizures: No  Speech problems: Yes  Tingling: Yes  Tremor: Yes  Weakness: Yes  Difficulty walking: Yes  Paralysis: No  Numbness: Yes  Bleeding or spotting between periods: No  Heavy or painful periods: Yes  Irregular periods: Yes  Vaginal discharge: No  Hot flashes: No  Vaginal  dryness: No  Genital ulcers: No  Reduced libido: Yes  Painful intercourse: No  Difficulty with sexual arousal: No  Post-menopausal bleeding: No  Nervous or Anxious: Yes  Depression: No  Trouble sleeping: Yes  Trouble thinking or concentrating: Yes  Mood changes: No  Panic attacks: No

## 2021-04-28 NOTE — LETTER
[facility] PH[facility] MEDICINE AND REHABILITATION CLINIC [address] [address]  Phone: [phone]  Fax: [phone]      REPORT OF WORK ABILITY    NOTE TO EMPLOYEE: You must promptly provide a copy of this report to your  employer or worker's compensation insurer, and Qualified Rehabilitation Consultant.    Date: 4/28/2021                     Employee Name: Cecilia Pabon         YOB: 1975  Medical Record Number: [id number]   Soc.Sec.No: [id number]  Employer: None                Date of Injury: 11/17/2020  Managed Care Organization / Insurance Company Name: UNKNOWN    Diagnosis: Post COVID Syndrome   Work Related: unknown     MMI: UNKNOWN   Permanent Partial Disability(PPD) likely: UNKNOWN    EMPLOYEE IS ABLE TO WORK: with restrictions from 4/28/2021 to 7/28/2021 -  Full shift     RESTRICTIONS IF ANY:  Allow a late start of 2 hours twice a week as needed.     OTHER RESTRICTIONS: None    TREATMENT PLAN/NOTES: Pulmonary rehab and continue medications as discussed.        Corina Cash MD, A   Department of Rehabilitation

## 2021-04-28 NOTE — NURSING NOTE
"Chief Complaint   Patient presents with     Covid Concern     DX COVID 11/17/2020 - shortness of breath, cough, myalgias, fatigue.Cognitive deficits       Vitals:    04/28/21 0738   Weight: 74.8 kg (165 lb)   Height: 1.575 m (5' 2\")       Body mass index is 30.18 kg/m .                            "

## 2021-04-28 NOTE — LETTER
4/28/2021       RE: Cecilia Pabon  [address] [address] [place] [id number]     Dear Colleague,    Thank you for referring your patient, Cecilia Pabon, to the [facility] MEDICINE [facility] RE[facility]AB[facility]ATION CLIN[facility] [place] [facility]. Please see a copy of my visit note below.    Cecilia is a 45 year old who is being evaluated via a billable video visit.      How would you like to obtain your AVS? MyChart  If the video visit is dropped, the invitation should be resent by: Text to cell phone: [phone]   Will anyone else be joining your video visit? No    Video-Visit Details    Type of service:  Video Visit    Video Start Time: 8:04  Video End Time:8:35 AM    Originating Location (pt. Location): Home    Distant Location (provider location):  [facility]YSICAL MEDICINE AND REHABILITATION [facility]     Platform used for Video Visit: [employer] COVID 19 PMR FOLLOW UP   Cecilia Pabon is a 45 year old female who was diagnosed with Covid infection on 17 November 2020 and presents with post viral fatigue syndrome, persistent dry cough associated with shortness of breath, insomnia leading to impaired mobility and ADLs  She was last seen in the [facility] on 2/3/2021      Current Symptoms:  Current symptoms are brain fog, hard time thinking and difficulty comprehending. She has headaches, fatigue and minor hair loss. Also she is myalgic.       Goals of Care:   She has been to pulmonary therapy, it improved her SOB and cough. She has a history of asthma. Her oxygen saturations are consistent. She has been in Pulmonary rehab for the last month, twice a week. Recommend     Headaches: her headaches are dull, and persistent nature. Rates it to 3-5/10. Location is in the temples, left worse than the right. She gets blurred vision occasionally. No phonophobia/photophobia. Currently she takes tylenol, and it takes the edge off.  Recommendation ibuprofen 200-600 mg q 8hrs based on the pain severity. Likely tension headache. NSAID would also help with her joint pains and myalgias. She takes coffee in the morning. Given the insomnia, needs to be curtailed beyond a certain time of day.     Recommend optometrist/opthamologist to get check vision.     Workability: she continues to work, she has some guilt for not working at full strength. She has understanding boss. She works 40 hr weeks, Monday through Friday, remotely, sometimes going in. She is compensating her memory she has adopted several strategies including writing information. She notes deficits in her sequencing an judgement errors. 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 She has a adjustable desk, and she is able to stand. Recommend neuropsychological testing. She notes decrease in attention and focus. The results will help guide her work schedule.     High PHQ9 is high at 9, we had a long discussion around it. She states that she has more anxiety rather than depression. She continues to grief for the loss of her mother who passed away 3 years back. She is on lexapro and Wellbutrin. Continue the medications.     Aerobic exercise: she denies any regimen, she is going for walks, and has a bike which she has not used. She has a watch to keep count of the steps walked everyday. Recommend daily walks with a odometer, which also checks HR. She stated that she did not need a workability letter, but stated that fatigue some days is very severe, and she is unable ti wake up and be productive. Workability letter given, see letter for details.     Current concerns: Job Concernsd     PHQ Assesment Total Score(s) 4/27/2021   PHQ-9 Score 9   Some recent data might be hidden     BRAULIO-7 Results 4/27/2021   BRAULIO 7 TOTAL SCORE 3 (minimal anxiety)   Some recent data might be hidden     No flowsheet data found.  No flowsheet data found.      Past Medical History:   Diagnosis Date     Abdominal pain, other specified  site      Abdominal pain, right upper quadrant      Anxiety state, unspecified      ASCUS on Pap smear 2003    +HR HPV 16     COPD (chronic obstructive pulmonary disease) (H)     My Mom has this     Family history of breast cancer in mother 9/17/2009     Family history of breast cancer in mother      Family history of breast cancer in mother      Headache 2/25/2005     Headache      Headache      Headache(784.0)      Hx of colposcopy with cervical biopsy 6/2/2003    WNL     Mental disorders of mother, postpartum     postpartum depression     Migraine, unspecified, without mention of intractable migraine without mention of status migrainosus      Other specified cardiac dysrhythmias(427.89)      Other specified complication, antepartum(646.83)      Swelling of limb      Uncomplicated asthma     Myself     Unspecified viral infection, in conditions classified elsewhere and of unspecified site      Urinary tract infection, site not specified        Past Surgical History:   Procedure Laterality Date     ABDOMEN SURGERY  [id number]    Csecttion     COLONOSCOPY       GI SURGERY       LAPAROSCOPIC CHOLECYSTECTOMY N/A 12/30/2014    Procedure: LAPAROSCOPIC CHOLECYSTECTOMY;  Surgeon: Ulises Starr MD;  Location: [facility]     SURGICAL HISTORY OF -       Csection       Family History   Problem Relation Age of Onset     Depression Mother      Respiratory Mother         COPD, emphysema     Cancer Mother      Neurologic Disorder Mother         raunads syndrom     Breast Cancer Mother         Stage 4     Other Cancer Mother         Cervical     Anesthesia Reaction Mother      Osteoporosis Mother      Anxiety Disorder Mother      Unknown/Adopted Mother         Dying of COPD     Gastrointestinal Disease Maternal Grandmother      Depression Maternal Grandmother      Hypertension Maternal Grandmother      Cancer Maternal Grandmother         skin cancer     Breast Cancer Maternal Grandmother      Other Cancer Maternal Grandmother          Melanoma     Anxiety Disorder Maternal Grandmother      Breast Cancer Cousin         Grandfathers side     Breast Cancer Cousin         Grandfathers side BRCA 1     Prostate Cancer Other         maternal side     Prostate Cancer Other         maternal side     Prostate Cancer Other         maternal side     Leukemia Paternal Half-Sister         1/2 sister,  in age 13 or 15     Diabetes Cousin      Hypertension Other      Hypertension Maternal Grandfather      Hyperlipidemia Maternal Grandfather      Asthma Other        Social History     Tobacco Use     Smoking status: Never Smoker     Smokeless tobacco: Never Used   Substance Use Topics     Alcohol use: Yes     Frequency: 2-4 times a month     Drinks per session: 3 or 4     Comment: Maybe 1-2 times a week     Drug use: No         Current Outpatient Medications:      albuterol (PROAIR HFA/PROVENTIL HFA/VENTOLIN HFA) 108 (90 Base) MCG/ACT inhaler, Inhale 2 puffs into the lungs every 6 hours as needed for shortness of breath / dyspnea, Disp: 18 g, Rfl: 1     augmented betamethasone dipropionate (DIPROLENE-AF) 0.05 % external cream, Apply sparingly to affected area twice daily as needed.  Do not apply to face., Disp: 100 g, Rfl: 3     buPROPion (WELLBUTRIN XL) 150 MG 24 hr tablet, Take 1 tablet (150 mg) by mouth daily, Disp: 30 tablet, Rfl: 0     escitalopram (LEXAPRO) 20 MG tablet, Take 1 tablet (20 mg) by mouth daily Needs telephone video visit for further refills, Disp: 30 tablet, Rfl: 0     fish oil-omega-3 fatty acids 1000 MG capsule, Take 2 g by mouth daily, Disp: , Rfl:      fluticasone (FLOVENT HFA) 110 MCG/ACT inhaler, Inhale 1 puff into the lungs 2 times daily, Disp: 12 g, Rfl: 0     SUMAtriptan (IMITREX) 100 MG tablet, Take 1 tablet (100 mg) by mouth at onset of headache for migraine May repeat dose in 2 hours.  Do not exceed 200 mg in 24 hours, Disp: 18 tablet, Rfl: 3     tiotropium (SPIRIVA RESPIMAT) 2.5 MCG/ACT inhaler, Inhale 2 puffs into the lungs  daily, Disp: 4 g, Rfl: 0     Vitamin A 7.5 MG (07881 UT) CAPS, , Disp: , Rfl:      vitamin B complex with vitamin C (VITAMIN  B COMPLEX) tablet, Take 1 tablet by mouth daily, Disp: , Rfl:      Vitamin D, Cholecalciferol, 25 MCG (1000 UT) CAPS, , Disp: , Rfl:      albuterol (PROVENTIL) (2.5 MG/3ML) 0.083% neb solution, NEBULIZE CONTENTS OF 1 VIAL EVERY 6 HOURS AS NEEDED FOR SHORTNESS OF BREATH / DYSPNEA / WHEEZING (Patient not taking: Reported on 4/28/2021), Disp: 90 mL, Rfl: 1     benzonatate (TESSALON) 100 MG capsule, Take 1-2 capsules (100-200 mg) by mouth 3 times daily as needed for cough (Patient not taking: Reported on 1/14/2021), Disp: 42 capsule, Rfl: 0     ivermectin (STROMECTOL) 3 MG TABS tablet, , Disp: , Rfl:      naproxen (NAPROSYN) 500 MG tablet, Take 1 tablet (500 mg) by mouth 2 times daily (with meals) (Patient not taking: Reported on 2/3/2021), Disp: 60 tablet, Rfl: 1     omeprazole (PRILOSEC) 20 MG DR capsule, , Disp: , Rfl:      ondansetron (ZOFRAN-ODT) 4 MG ODT tab, , Disp: , Rfl:     Again, thank you for allowing me to participate in the care of your patient.      Sincerely,    Corina Cash MD

## 2021-04-29 ENCOUNTER — HOSPITAL ENCOUNTER (OUTPATIENT)
Dept: CARDIAC REHAB | Facility: CLINIC | Age: 46
End: 2021-04-29
Attending: PHYSICAL MEDICINE & REHABILITATION
Payer: COMMERCIAL

## 2021-04-29 ENCOUNTER — MYC MEDICAL ADVICE (OUTPATIENT)
Dept: PHYSICAL MEDICINE AND REHAB | Facility: CLINIC | Age: 46
End: 2021-04-29

## 2021-04-29 LAB
SARS-COV-2 AB PNL SERPL IA: POSITIVE
SARS-COV-2 IGG SERPL IA-ACNC: NORMAL

## 2021-04-29 PROCEDURE — G0239 OTH RESP PROC, GROUP: HCPCS | Performed by: REHABILITATION PRACTITIONER

## 2021-04-29 ASSESSMENT — ASTHMA QUESTIONNAIRES: ACT_TOTALSCORE: 13

## 2021-04-30 ENCOUNTER — MYC MEDICAL ADVICE (OUTPATIENT)
Dept: FAMILY MEDICINE | Facility: CLINIC | Age: 46
End: 2021-04-30

## 2021-05-03 ENCOUNTER — MYC MEDICAL ADVICE (OUTPATIENT)
Dept: FAMILY MEDICINE | Facility: CLINIC | Age: 46
End: 2021-05-03

## 2021-05-04 DIAGNOSIS — G93.31 POSTVIRAL FATIGUE SYNDROME: Primary | ICD-10-CM

## 2021-05-18 ENCOUNTER — HOSPITAL ENCOUNTER (OUTPATIENT)
Dept: CARDIAC REHAB | Facility: CLINIC | Age: 46
End: 2021-05-18
Attending: PHYSICAL MEDICINE & REHABILITATION
Payer: COMMERCIAL

## 2021-05-18 PROCEDURE — G0239 OTH RESP PROC, GROUP: HCPCS

## 2021-05-25 ENCOUNTER — HOSPITAL ENCOUNTER (OUTPATIENT)
Dept: CARDIAC REHAB | Facility: CLINIC | Age: 46
End: 2021-05-25
Attending: PHYSICAL MEDICINE & REHABILITATION
Payer: COMMERCIAL

## 2021-05-25 PROCEDURE — G0239 OTH RESP PROC, GROUP: HCPCS

## 2021-06-07 ENCOUNTER — OFFICE VISIT (OUTPATIENT)
Dept: URGENT CARE | Facility: URGENT CARE | Age: 46
End: 2021-06-07
Payer: COMMERCIAL

## 2021-06-07 VITALS
OXYGEN SATURATION: 98 % | SYSTOLIC BLOOD PRESSURE: 112 MMHG | HEART RATE: 86 BPM | WEIGHT: 170 LBS | BODY MASS INDEX: 31.35 KG/M2 | TEMPERATURE: 98.8 F | DIASTOLIC BLOOD PRESSURE: 74 MMHG

## 2021-06-07 DIAGNOSIS — F41.1 ANXIETY STATE: ICD-10-CM

## 2021-06-07 DIAGNOSIS — L30.9 DERMATITIS: Primary | ICD-10-CM

## 2021-06-07 PROCEDURE — 99213 OFFICE O/P EST LOW 20 MIN: CPT | Performed by: PHYSICIAN ASSISTANT

## 2021-06-07 RX ORDER — TRIAMCINOLONE ACETONIDE 1 MG/G
CREAM TOPICAL
Qty: 45 G | Refills: 1 | Status: SHIPPED | OUTPATIENT
Start: 2021-06-07 | End: 2022-07-28

## 2021-06-07 NOTE — PROGRESS NOTES
Assessment & Plan     Dermatitis  Will treat with triamcinolone (KENALOG) 0.1 % external cream; Apply sparingly to affected area three times daily for 7-14 days. Avoid new exposures. Avoid itching and watch for signs of infection. Return to clinic if symptoms worsen or do not improve; otherwise follow up as needed                     Return in about 1 week (around 6/14/2021), or if symptoms worsen or fail to improve.    Camille Hansen PA-C  [facility]    Subjective   Chief Complaint   Patient presents with     Derm Problem     left arm. Laceration to left hand as well. Low grade fever.          HPI     Derm problem     Onset of symptoms was 2 day(s) ago.  Course of illness is worsening.    Severity mild  Current and Associated symptoms: itchy rash on left forearm   Treatment measures tried include None tried.  Predisposing factors include no new exposures.              Review of Systems   Constitutional, HEENT, cardiovascular, pulmonary, gi and gu systems are negative, except as otherwise noted.      Objective    /74 (BP Location: Right arm, Patient Position: Sitting, Cuff Size: Adult Regular)   Pulse 86   Temp 98.8  F (37.1  C) (Tympanic)   Wt 77.1 kg (170 lb)   SpO2 98%   BMI 31.35 kg/m    Body mass index is 31.35 kg/m .  Physical Exam  Constitutional:       General: She is not in acute distress.  Skin:     Comments: Left forearm has mild dermatitis type rash. No drainage/discharge or signs of infection    Neurological:      Mental Status: She is alert.   Psychiatric:         Speech: Speech normal.         Behavior: Behavior normal.

## 2021-06-08 ENCOUNTER — HOSPITAL ENCOUNTER (OUTPATIENT)
Dept: CARDIAC REHAB | Facility: CLINIC | Age: 46
End: 2021-06-08
Attending: PHYSICAL MEDICINE & REHABILITATION
Payer: COMMERCIAL

## 2021-06-08 PROCEDURE — G0239 OTH RESP PROC, GROUP: HCPCS

## 2021-06-08 RX ORDER — BUPROPION HYDROCHLORIDE 150 MG/1
150 TABLET ORAL DAILY
Qty: 90 TABLET | Refills: 0 | Status: SHIPPED | OUTPATIENT
Start: 2021-06-08 | End: 2021-10-18

## 2021-06-08 RX ORDER — ESCITALOPRAM OXALATE 20 MG/1
20 TABLET ORAL DAILY
Qty: 90 TABLET | Refills: 0 | Status: SHIPPED | OUTPATIENT
Start: 2021-06-08 | End: 2021-10-18

## 2021-06-15 ENCOUNTER — HOSPITAL ENCOUNTER (OUTPATIENT)
Dept: CARDIAC REHAB | Facility: CLINIC | Age: 46
End: 2021-06-15
Attending: PHYSICAL MEDICINE & REHABILITATION
Payer: COMMERCIAL

## 2021-06-15 PROCEDURE — G0239 OTH RESP PROC, GROUP: HCPCS

## 2021-06-22 ENCOUNTER — HOSPITAL ENCOUNTER (OUTPATIENT)
Dept: CARDIAC REHAB | Facility: CLINIC | Age: 46
End: 2021-06-22
Attending: PHYSICAL MEDICINE & REHABILITATION
Payer: COMMERCIAL

## 2021-06-22 PROCEDURE — G0239 OTH RESP PROC, GROUP: HCPCS

## 2021-06-29 ENCOUNTER — VIRTUAL VISIT (OUTPATIENT)
Dept: NEUROPSYCHOLOGY | Facility: CLINIC | Age: 46
End: 2021-06-29
Attending: PHYSICAL MEDICINE & REHABILITATION
Payer: COMMERCIAL

## 2021-06-29 DIAGNOSIS — R41.3 MEMORY LOSS: Primary | ICD-10-CM

## 2021-06-29 DIAGNOSIS — U07.1 COVID-19: ICD-10-CM

## 2021-06-29 PROCEDURE — 99207 PR PSYCHIATRIC DIAGNOSTIC EVALUATION: CPT | Mod: 95

## 2021-06-29 NOTE — PROGRESS NOTES
Cecilia Pabon is a 45 year old female who is being evaluated via a billable video visit.      If the video visit is dropped, the invitation should be resent by: Send to e-mail at: [email or web address]  Will anyone else be joining your video visit? No    Video-Visit Details    Type of service:  Video Visit  Interview:  Video Start Time: 1:50 PM      Video End Time:2:14 PM    Originating Location (pt. Location): Home    Distant Location (provider location):  [facility] NEUROPSYCHOLOGY [place]     Platform used for Video Visit: [facility]     RE: Cecilia Pabon  MR#: [id number]  : 1975  DOS: 2021  DEVAUGHN:  2021    CLINICAL INTERVIEW      REASON FOR REFERRAL:  Cecilia Pabon is a 45 year old female with 13 years of education. The patient was referred for a neuropsychological evaluation by Dr. Cash secondary to memory concerns in the context of Covid-19 infection. The evaluation was requested in order to document her cognitive and emotional functioning, assist with the differential diagnosis, and provide appropriate recommendations. The patient was informed that the evaluation included multiple measures of performance and symptom validity, and she was encouraged to provide her best effort at all times.  The nature of the neuropsychological evaluation was also discussed, including limits of confidentiality (for suspected child or elder abuse, potential homicide or suicide, and court orders). The patient was also informed that the report would be placed on the electronic medical records system.  The patient was also given an opportunity to ask questions. The patient indicated that she understood the information and consented to participate in the evaluation.Due to circumstances that limit in-person clinical visits, this interview was conducted using telehealth methods     PROCEDURES:   Clinical interview    CLINICAL INTERVIEW: The patient was interviewed via video  "platform and she was interviewed at home. On interview, the patient reported significant difficulty with her memory for approximately the past 2 months. Specifically, she reported she has difficulty recalling details of events from the day before. She also noted that she will place items in the wrong place (e.g. coffee creamer in the cupboard rather than the refrigerator) and one time she left the stove burner on for about 15. Hours. She said that she is very forgetful and has to write down information to recall it. She noted that her difficulties began after her Covid-19 infection, but not immediately afterward.  She also reported significant difficulties with attention/ concentration. For example , she said she needs to work in a distraction free environment to get tasks completed. She also said that it takes her longer to process her paperwork in her job than previously.  She said she is still accurate in her job, but it takes her longer to complete the job.  She also noted increased word finding. Further, the patient reported that she takes longer to make decisions than prior to her Covid-19 infection and she has to ask a lot of questions. She indicated that she finds her cognitive difficulties to be frustrating.  Functionally, she reported that she will forget where she is going when she drives. She also noted that she will forget to pay bills more frequently because she \"spaces it.\" She also reported that she will occasionally forget to take her medications even though she uses a pill box. She said that she has missed her medications for 2-3 days. She denied difficulties with most household chores, except that she has left food on the counter overnight instead of putting it in the fridge and the above noted incident where she forgot to turn the stove burner off.     In terms of her mood, she reported that she experiences \"ups and downs\" and she has times when she feels irritable without a clear reason. She " "said that she usually tried to be happy. She reported that her sleep was \"terribe\" and this is a significant change which began the second week after her Covid-19 symptoms began. She noted that she tries to sleep 7-8 hours/night. She denied difficulty with her appetite and noted that \"I like food a lot.\" She denied changes to her sense of taste and smell following Covid-19.  She reported that she worked with a counselor in the past but note currently. She said that her mother passed away about 2.5 years ago and this continues to bother her. She also noted stressors related to her ongoing Covid-19 symptoms. She denied any current or previous suicidal or homicidal ideations, and denied any history of suicide attempts. She denied any hallucinations or delusions as well, but noted sometimes when she is waking up she will see things that are not there. In terms of alcohol use, she reported that his has decreased and now she will drink approximately 2 drinks twice a week on average. She denied any use of tobacco or illicit substances.      Medically, she reported that she has been experiencing headaches and attending pulmonary treatment after Covid-19. She noted that she struggles with her weight and is working on weight reduction. She noted that she was never diagnosed with sleep apnea but is interested in a sleep study because of her difficulty with sleep.  She said she was uncertain if she has thyroid issues because of her weight, but she has tested negative in the past. She also reported that her eyesight changed after Covid-19 from needing classes for close up work to needing glasses for distance. She confirmed the medications listed in the medical records were up to date, and she continues to take lexapro and Wellbutrin. She denied any history of head injury with loss of consciousness, multiple sclerosis, stroke, seizures, Parkinson's disease, hypercholesterolemia, diabetes, heart disease, cancer, hypertension, " liver disease, kidney disease, eye diseases or hearing problems.      Academically, she noted that she completed a one year certificate program in  training after high school and she was a good student in school who was on the honor roll. She denied ever being in special education or having to repeat a grade. She reported that she is in her second marriage and she has 2 children, a son and a daughter. She noted that she lives with her  and her daughter. She reported that she works as a .       PLAN: Plan [facility] [place]21. Full report to follow.    Diagnosis Codes: R41.3, U07.10  Procedure Code: 50502    Thank you for referring this interesting individual. If you have any questions, please feel free to contact me.      Blane Rios, PhD, ABPP, LP  Professor and Licensed Psychologist [id number]  Board Certified Clinical Neuropsychologist

## 2021-06-29 NOTE — NURSING NOTE
Pt was seen for neuropsychological evaluation at the request of Dr. Corina Cash for the purposes of diagnostic clarification and treatment planning. 50 minutes of video test administration and scoring were provided by this writer.This was part-one of a two part appt to be completed on 7/7/2021. Please see Dr. Blane Rios's report for a full interpretation of the findings after the above date.    Video Start: 1:32  Video Stop: 1:45    Video Start: 2:18  Video Stop: 2:42    Ki Zaman  Psychometrist

## 2021-07-07 ENCOUNTER — OFFICE VISIT (OUTPATIENT)
Dept: NEUROPSYCHOLOGY | Facility: CLINIC | Age: 46
End: 2021-07-07
Payer: COMMERCIAL

## 2021-07-07 DIAGNOSIS — R41.3 MEMORY LOSS: Primary | ICD-10-CM

## 2021-07-07 DIAGNOSIS — F32.A DEPRESSION, UNSPECIFIED DEPRESSION TYPE: ICD-10-CM

## 2021-07-07 DIAGNOSIS — U07.1 COVID-19: ICD-10-CM

## 2021-07-07 PROCEDURE — 96139 PSYCL/NRPSYC TST TECH EA: CPT | Performed by: PSYCHOLOGIST

## 2021-07-07 PROCEDURE — 96133 NRPSYC TST EVAL PHYS/QHP EA: CPT | Performed by: PSYCHOLOGIST

## 2021-07-07 PROCEDURE — 96132 NRPSYC TST EVAL PHYS/QHP 1ST: CPT | Performed by: PSYCHOLOGIST

## 2021-07-07 PROCEDURE — 90791 PSYCH DIAGNOSTIC EVALUATION: CPT | Performed by: PSYCHOLOGIST

## 2021-07-07 PROCEDURE — 96138 PSYCL/NRPSYC TECH 1ST: CPT | Performed by: PSYCHOLOGIST

## 2021-07-07 NOTE — NURSING NOTE
Pt was seen for neuropsychological evaluation at the request of Dr. Corina Cash for the purposes of diagnostic clarification and treatment planning. 288 minutes of test administration and scoring were provided by this writer. This was the final appointment of a two-part appointment, accumulating 338 minutes of test administration and scoring time provided by tech(s). Please see Dr. Blane Rios's report for a full interpretation of the findings.    Ki Zaman  Psychometrist

## 2021-07-07 NOTE — PROGRESS NOTES
NAME  Cecilia Pabon   EDGE  2021 & 2021          MRN  [id number]   PROVIDER  DW             [birth year]    TECH  SH/SN           AGE  45    STATION  OP           SEX  Female                 HANDEDNESS Right                 EDUCATION 13                                    TOMM      DCT      ORIENTATION      Trial 1  46          Time  4     Trial 2  50          Personal Info. 0 /4          WAIS-IV      Place  2 /2          Digit Span RDS 9    Presidents  5 /6                       WRAT-4 READING     WAIS-IV      TRAIL MAKING TEST      SS  92      Raw SSa    Time Errors SSa T   %ile  30    Digit Span  25 8   A 27 0 10 47   Grade Equivalence 11.6    Arithmetic  15 10   B 76 3 9 39         Similarities 24 9                Vocabulary  32 9                Block Design 24 6                Matrix Reasoning 13 7                Coding  62 9                                   VIQ (est)  95                 MARITZA (est)  81                 WMI  95                               HVLT   1   WMS-IV LOGICAL MEMORY YA  TANYA-O COMPLEX FIGURE       Raw T     Raw SSa/%ile     Raw T %ile   Trial 1  6    LM I  38 15   Time to Copy 241  >16   Trial 2  8    LM II  29 13   Copy  29  <1   Trial 3  8    Recognition 29 >75   Short Delay Recall 19 45 31   Learning  2          Long Delay Recall 18 42 21   Total Recall 22 35         Recognition Total 23 63 90   Delayed Recall 8 40                Percent Retention 100% 55                True Positives 11                 False Positives 0                 Disc. Index  11 49                                    BVMT   1   NAB NAMING  1  ANIMAL FLUENCY        Raw T/%ile   Raw 31 /31    Raw 14      Trial 1  4    z 0.33     SS 7      Trial 2  7    T 53     T 31      Trial 3  8                 Learning  4    COWAT FAS      COMPLEX IDEATIONAL MATERIAL     Total Recall 19 38   Raw 34     Raw 10      Delayed Recall 6 52   SS 9     SS 7      Percent Retention 75% 6-10   T 41     T 32      Recognition Hits 6                  Recognition F.P 0                 Disc. Index  6 >16                                   CLOCK DRAWING      EMILY   H  STROOP       Command  BORDERLINE   Raw 21      Raw Corrected T    Copy  NORMAL    %ile 22     Word 61 61 27                Color 60 60 37                C/W 18 18 23                Intf. 12.75  62                       WCST (64 CARDS)     GROOVED PEGBOARD              Raw T %ile   Raw Drops SSa T         # Categories 5  >16  RH 56 0 13 59         Total Correct 57    LH 61 0 13 59         Total Errors  7 55 68               Perseverative Err. 4 49 47               % Concept Resp. 57 56 73               FTMS:  0                 LTL  0  >16                                  BDI      CHENG      REYNALDO       Raw  22    Raw  5           Interpretation MODERATE    Interpretation MINIMAL

## 2021-07-07 NOTE — LETTER
2021      RE: Cecilia Pabon  [address] [address] [place] [id number]     RE: Cecilia Pabon  MR#: [id number]  : 1975  DOS: 2021  DEVAUGHN:  2021      NEUROPSYCHOLOGICAL CONSULTATION      REASON FOR REFERRAL:  Cecilia Pabon is a 45 year old female with 13 years of education. The patient was referred for a neuropsychological evaluation by Dr. Cash secondary to memory concerns in the context of Covid-19 infection. The evaluation was requested in order to document her cognitive and emotional functioning, assist with the differential diagnosis, and provide appropriate recommendations. The patient was informed that the evaluation included multiple measures of performance and symptom validity, and she was encouraged to provide her best effort at all times.  The nature of the neuropsychological evaluation was also discussed, including limits of confidentiality (for suspected child or elder abuse, potential homicide or suicide, and court orders). The patient was also informed that the report would be placed on the electronic medical records system.  The patient was also given an opportunity to ask questions. The patient indicated that she understood the information and consented to participate in the evaluation.    PROCEDURES:   Review of records and clinical interview,  Mental Status-orientation, Wide Range Achievement Test-4, Wechsler Adult Intelligence Scale-IV, Peres Verbal Learning Test-Revised, Clock Drawing Test, Verbal Fluency Test, BDAE Complex Ideational Material, Ruiz Depression Inventory, Ruiz Anxiety Inventory, Personality Assessment Inventory, Freddy Complex Figure Test, Trailmaking Test, Buckland Naming Test, TOMM, Judgment of Line Orientation Test, Stroop Test, Grooved Pegboard Test, Wisconsin Card Sorting Test, Wechsler Memory Scale-IV (selected subtests) and Lopes Visual Memory Test-Revised    REVIEW OF RECORDS: Medical records from 21 noted that the patient was  diagnosed with Covid-19 on 11/17/2020 and  presents with post viral fatigue syndrome, persistent dry cough associated with shortness of breath, insomnia leading to impaired mobility and ADLs.  The records also stated that she  has been to pulmonary therapy, it improved her SOB and cough. She has a history of asthma. Her oxygen saturations are consistent. She has been in Pulmonary rehab for the last month, twice a week. Headaches: her headaches are dull, and persistent nature. Rates it to 3-5/10. Location is in the temples, left worse than the right. She gets blurred vision occasionally. No phonophobia/ photophobia.  The records also said that her  Current symptoms are brain fog, hard time thinking and difficulty comprehending. She has headaches, fatigue and minor hair loss. Also she is myalgic.  The records said that  she continues to work, she has some guilt for not working at full strength. She has understanding boss. She works 40 hr weeks, Monday through Friday, remotely, sometimes going in. She is compensating her memory she has adopted several strategies including writing information. She notes deficits in her sequencing an judgement errors. She works for the [employer]. She has a adjustable desk, and she is able to stand.  The records further noted that  PHQ9 is high at 9, we had a long discussion around it. She states that she has more anxiety rather than depression. She continues to grief for the loss of her mother who passed away 3 years back. She is on lexapro and Wellbutrin. Continue the medications.  Records noted other significant medical history includes abdominal pain, anxiety, headache, and urinary tract infection. No neuroimaging scan of the brain reports were found in the records. Records noted that the patient is being treated with Advair, Wellbutrin, Lexapro, Spiriva, albuterol, Tessalon, naproxen, Diprolene, Imitrex, omeprazole, ondansetron, fish oil, vitamin A, vitamin B complex with C, and  "vitamin D.     From 6/29/21:  CLINICAL INTERVIEW: The patient was interviewed via video platform and she was interviewed at home. On interview, the patient reported significant difficulty with her memory for approximately the past 2 months. Specifically, she reported she has difficulty recalling details of events from the day before. She also noted that she will place items in the wrong place (e.g. coffee creamer in the cupboard rather than the refrigerator) and one time she left the stove burner on for about 15. Hours. She said that she is very forgetful and has to write down information to recall it. She noted that her difficulties began after her Covid-19 infection, but not immediately afterward.  She also reported significant difficulties with attention/ concentration. For example , she said she needs to work in a distraction free environment to get tasks completed. She also said that it takes her longer to process her paperwork in her job than previously.  She said she is still accurate in her job, but it takes her longer to complete the job.  She also noted increased word finding. Further, the patient reported that she takes longer to make decisions than prior to her Covid-19 infection and she has to ask a lot of questions. She indicated that she finds her cognitive difficulties to be frustrating.  Functionally, she reported that she will forget where she is going when she drives. She also noted that she will forget to pay bills more frequently because she \"spaces it.\" She also reported that she will occasionally forget to take her medications even though she uses a pill box. She said that she has missed her medications for 2-3 days. She denied difficulties with most household chores, except that she has left food on the counter overnight instead of putting it in the fridge and the above noted incident where she forgot to turn the stove burner off.      In terms of her mood, she reported that she experiences " "\"ups and downs\" and she has times when she feels irritable without a clear reason. She said that she usually tried to be happy. She reported that her sleep was \"terribe\" and this is a significant change which began the second week after her Covid-19 symptoms began. She noted that she tries to sleep 7-8 hours/night. She denied difficulty with her appetite and noted that \"I like food a lot.\" She denied changes to her sense of taste and smell following Covid-19.  She reported that she worked with a counselor in the past but not currently. She said that her mother passed away about 2.5 years ago and this continues to bother her. She also noted stressors related to her ongoing Covid-19 symptoms. She denied any current or previous suicidal or homicidal ideations, and denied any history of suicide attempts. She denied any hallucinations or delusions as well, but noted sometimes when she is waking up she will see things that are not there. In terms of alcohol use, she reported that his has decreased and now she will drink approximately 2 drinks twice a week on average. She denied any use of tobacco or illicit substances.       Medically, she reported that she has been experiencing headaches and attending pulmonary treatment after Covid-19. She noted that she struggles with her weight and is working on weight reduction. She noted that she was never diagnosed with sleep apnea but is interested in a sleep study because of her difficulty with sleep.  She said she was uncertain if she has thyroid issues because of her weight, but she has tested negative in the past. She also reported that her eyesight changed after Covid-19 from needing classes for close up work to needing glasses for distance. She confirmed the medications listed in the medical records were up to date, and she continues to take lexapro and Wellbutrin. She denied any history of head injury with loss of consciousness, multiple sclerosis, stroke, seizures, " Parkinson's disease, hypercholesterolemia, diabetes, heart disease, cancer, hypertension, liver disease, kidney disease, eye diseases or hearing problems.       Academically, she noted that she completed a one year certificate program in  training after high school and she was a good student in school who was on the honor roll. She denied ever being in special education or having to repeat a grade. She reported that she is in her second marriage and she has 2 children, a son and a daughter. She noted that she lives with her  and her daughter. She reported that she works as a .     BEHAVIORAL OBSERVATIONS: On examination, the patient was casually but appropriately dressed and groomed. The patient was cooperative with the evaluation and was talkative.  Her speech was normal in volume, rate and tone.  The patient also appeared to put forth her best effort on all tasks. Thus, the results of this evaluation are a reasonably valid reflection of the patient s current level of functioning. There were no indications of hallucinations, delusions or unusual thought processes and she was generally well oriented to personal information, place, and time. Her affect was also generally appropriate.      RESULTS: Formal performance validity measures were within expected limits and consistent with the above-noted observations. Psychometric estimates of the patient's premorbid global cognitive functioning based upon single word reading ability were in the average range.  Likewise, measures of her current verbally mediated intellectual functioning were in the average range, while visually mediated intellectual functioning was in the low average range.  Thus, there was no evidence for significant change or decline in global cognitive functioning.    Measures of attention/concentration were generally within expected limits.  Specifically, digit span and mental calculation tasks were in the  average range.  Further, a visual scanning task that integrates attention was in the average range.  Speed of information processing was variable, however.  For example, psychomotor speed was in the average range.  However, motor-free processing speed ranged from low average to below average and was poorer.    Measures of the patient's memory functioning were variable.  Specifically, encoding of new verbal and visual information was variable, but there was no evidence for rapid forgetting of previously learned information.  Immediate recall on a word list learning task was in the low average range, but immediate recall on a story memory task was higher in the high average range and a personal strength.  Delayed verbal recall and recognition tasks ranged from the average to high average range.  Likewise, with visual memory, immediate recall on a complex figure drawing task was in the average range.  Delayed recall was also in the average range, while a recognition format was in the high average range.  On a visual learning test, immediate recall was slightly poor in the low average range.  However, delayed recall and recognition on this task were generally in the average range and better.    Expressive and receptive language functioning was also mildly variable, likely secondary to the variability in processing speed noted above rather than language processing deficits.  Specifically, vocabulary ability was in the average range.  Likewise, confrontation naming was in the average range.  Phonemic fluency was likewise in the average range, but semantic fluency was in the low average range.  Receptive language functioning, as assessed by complex ideational material test, was also in the low average range.    Visual-spatial and visual constructional abilities were variable.  For example, motor-free visual reasoning and a block construction task were in the low average range.  However, clock drawing and form copying tasks  did not indicate evidence for significant visual-spatial distortions.  Further, motor-free line orientation judgment was at the lower end of expected limits.    Measures of the patient's executive functioning were broadly within expected limits.  For example, a measure of cognitive flexibility and set shifting ability was in the average range, and indicated evidence the patient could appropriately utilize feedback in order to alter and improve her performance.  Likewise, a measure of response inhibition that integrates processing speed was in the average range.  A complex visual scanning test and integrates cognitive flexibility was at the lower end of expected limits.  Likewise, clock drawing and complex form copying tasks indicated no evidence for significant planning or organizational difficulties.    Formal personality evaluation was completed utilizing the clinical interview, an objective measure of emotional functioning, and self-report measures of depression and anxiety.  On the objective measure, the patient responded in a valid and interpretable fashion.  She responded similarly to individuals who are experiencing mild depressive symptoms, including thoughts of pessimism, worthlessness, hopelessness, and personal failure.  She also responded similarly to individuals who are experiencing heightened levels of anxiety and concern, particular about their physical functioning and health.  On the self-report measures, the patient endorsed moderate symptoms of depression, but she did not endorse clinically significant anxiety symptoms.  Additionally, the patient did not endorse suicidal thoughts.    SUMMARY:  In summary, the neuropsychological assessment results indicated no evidence for significant change or decline in global cognitive functioning.  In summary, the neuropsychological assessment results indicated evidence for variability in speed of information processing, which likely resulted in variability in  encoding of new verbal and visual information, language functioning, and visual-spatial abilities.  However, attention/concentration and executive functioning were generally within expected limits, and there was no evidence for a decline in global cognitive functioning.  Further, there was no evidence for rapid forgetting of previously learned information. The patient endorsed mild to moderate depressive symptoms, consistent with a depressive disorder, which is likely related to the significant stressors that she is experiencing. Thus, the results indicated that a neurological etiology of the patient's cognitive concerns is unlikely.  There was also no evidence for a progressive dementing condition, such as Alzheimer's disease.    More likely, multiple factors, including depressive symptoms, pain, fatigue, and sleep difficulties are contributing to her cognitive concerns.     RECOMMENDATIONS:   1. A referral for psychotherapeutic intervention is recommended. A highly structured cognitive-behavioral intervention focused on coping and stress management would likely be the most beneficial.  Working with a health psychologist who specializes in assisting individuals with long-term health issues may be particularly helpful.  One option for this service is through the [facility] at [email or web address] or [phone].  2. Continued psychiatric consultation to address medication management to help manage her psychiatric symptoms is also recommended.    3. Given the reported sleep difficulties, it is recommended that sleep hygiene be addressed to improve the quality and duration of sleep. The following are recommendations from the National Sleep Foundation that may be helpful:     Avoid napping during the day; it can disturb the normal pattern of sleep and wakefulness.    Avoid stimulants such as caffeine, nicotine, and alcohol too close to bedtime.  While alcohol is well known to speed the onset of sleep, it disrupts sleep in the second half as the body begins to metabolize the alcohol, causing arousal.    Exercise can promote good sleep. Vigorous exercise should be taken in the morning or late afternoon. A relaxing exercise, like yoga, can be done before bed to help initiate a restful night's sleep.    Food can be disruptive right before sleep; stay away from large meals close to bedtime. Also dietary changes can cause sleep problems, if someone is struggling with a sleep problem, it's not a good time to start experimenting with spicy dishes. And, remember, chocolate has caffeine.    Ensure adequate exposure to natural light. This is particularly important for older people who may not venture outside as frequently as children and adults. Light exposure helps maintain a healthy sleep-wake cycle.    Establish a regular relaxing bedtime routine. Try to avoid emotionally upsetting conversations and activities before trying to go to sleep. Don't dwell on, or bring your problems to bed.    Associate your bed with sleep. It's not a good idea to use your bed to watch TV, listen to the radio, or read.    Make sure that the sleep environment is pleasant and relaxing. The bed should be comfortable, the room should not be too hot or cold, or too bright.  4. Further, given her history of sleep difficulties, a referral to a sleep medicine specialist might be considered to evaluate for any medically related issues with her sleep, such as possible sleep apnea.  5. The patient may find the following attention and organizational strategies helpful:     Use cell phone reminders to help monitor upcoming appointments and due dates as well as other important tasks (e.g., when to take medications). Set the reminder to go off several times prior to the event with advanced notice (e.g., one week before, two days before, the day before, and the morning of the event).     She should  attempt to reduce distractions at home. Having a clutter-free work environment and removing or at least making it harder to access potential distractions would help optimize her attention. She might also consider facing away from high traffic areas and using earplugs or noise cancellation headphones when desiring a quiet work environment.    Taking short breaks during her day may help optimize and maintain her concentration.     It is common for individuals like the patient who struggle with inattention to develop procrastination habits. Javed may find it useful to evaluate whether she is procrastinating on a task because she considers it to be overwhelming. If so, she may find it useful to break the task down into more manageable components or steps.    Make to-do lists and prioritize tasks. Break down large tasks into smaller steps and check off each small step when completed.   6. In order to promote learning and memorization of new verbal material, it is recommended that the patient add structure to the material she is learning to promote subsequent recall (e.g., use mnemonic devices, acronyms, make up a story or song). Additionally, she is encouraged to use visual aids, such as flash cards, diagrams, charts, etc.   7. The patient is encouraged to work with her treating healthcare providers to develop a medically appropriate exercise program.  8. The results of the evaluation now constitute a baseline of the patient s cognitive and emotional functioning. If further cognitive difficulties are observed in the future, a referral for a neuropsychological re-evaluation at that time might be considered.     I attempted to contact the patient via telephone on 7/7/2021 but the phone went to voicemail each time.  I then sent her a message through [employer] with some initial information and initial impressions.  Thank you for referring this interesting individual. If you have any questions, please feel free to contact  me.      Blane Rios, PhD, ABPP, LP  Professor and Licensed Psychologist [id number]  Board Certified Clinical Neuropsychologist    Time Spent:      Minutes Date Code Units   Total Time-Neuropsychologist Professional 213 7/7/21 [id number] 1     7/7/21 09659 3   Neuropsychologist Admin/scoring 0 7/7/21 [id number] 0     7/7/21 [id number] 0   Diagnostic Interview (billed on 6/29/21) 24 6/29/21 [id number] 1   Psychometrician Time-Test Administration/Score 338 7/7/21 82646 1   (50 min on 6/29/21 and 288 on 7/7/21)  7/7/21 93778 11   Diagnosis R41.3 U07.10 F32.9

## 2021-07-07 NOTE — PROGRESS NOTES
RE: Cecilia Pabon  MR#: [id number]  : 1975  DOS: 2021  DEVAUGHN:  2021      NEUROPSYCHOLOGICAL CONSULTATION      REASON FOR REFERRAL:  Cecilia Pabon is a 45 year old female with 13 years of education. The patient was referred for a neuropsychological evaluation by Dr. Cash secondary to memory concerns in the context of Covid-19 infection. The evaluation was requested in order to document her cognitive and emotional functioning, assist with the differential diagnosis, and provide appropriate recommendations. The patient was informed that the evaluation included multiple measures of performance and symptom validity, and she was encouraged to provide her best effort at all times.  The nature of the neuropsychological evaluation was also discussed, including limits of confidentiality (for suspected child or elder abuse, potential homicide or suicide, and court orders). The patient was also informed that the report would be placed on the electronic medical records system.  The patient was also given an opportunity to ask questions. The patient indicated that she understood the information and consented to participate in the evaluation.    PROCEDURES:   Review of records and clinical interview,  Mental Status-orientation, Wide Range Achievement Test-4, Wechsler Adult Intelligence Scale-IV, Peres Verbal Learning Test-Revised, Clock Drawing Test, Verbal Fluency Test, BDAE Complex Ideational Material, Ruiz Depression Inventory, Ruiz Anxiety Inventory, Personality Assessment Inventory, Freddy Complex Figure Test, Trailmaking Test, Bristol Naming Test, TOMM, Judgment of Line Orientation Test, Stroop Test, Grooved Pegboard Test, Wisconsin Card Sorting Test, Wechsler Memory Scale-IV (selected subtests) and Lopes Visual Memory Test-Revised    REVIEW OF RECORDS: Medical records from 21 noted that the patient was diagnosed with Covid-19 on 2020 and  presents with post viral fatigue syndrome,  persistent dry cough associated with shortness of breath, insomnia leading to impaired mobility and ADLs.  The records also stated that she  has been to pulmonary therapy, it improved her SOB and cough. She has a history of asthma. Her oxygen saturations are consistent. She has been in Pulmonary rehab for the last month, twice a week. Headaches: her headaches are dull, and persistent nature. Rates it to 3-5/10. Location is in the temples, left worse than the right. She gets blurred vision occasionally. No phonophobia/ photophobia.  The records also said that her  Current symptoms are brain fog, hard time thinking and difficulty comprehending. She has headaches, fatigue and minor hair loss. Also she is myalgic.  The records said that  she continues to work, she has some guilt for not working at full strength. She has understanding boss. She works 40 hr weeks, Monday through Friday, remotely, sometimes going in. She is compensating her memory she has adopted several strategies including writing information. She notes deficits in her sequencing an judgement errors. 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 She has a adjustable desk, and she is able to stand.  The records further noted that  PHQ9 is high at 9, we had a long discussion around it. She states that she has more anxiety rather than depression. She continues to grief for the loss of her mother who passed away 3 years back. She is on lexapro and Wellbutrin. Continue the medications.  Records noted other significant medical history includes abdominal pain, anxiety, headache, and urinary tract infection. No neuroimaging scan of the brain reports were found in the records. Records noted that the patient is being treated with Advair, Wellbutrin, Lexapro, Spiriva, albuterol, Tessalon, naproxen, Diprolene, Imitrex, omeprazole, ondansetron, fish oil, vitamin A, vitamin B complex with C, and vitamin D.     From 6/29/21:  CLINICAL INTERVIEW: The patient was interviewed via video  "platform and she was interviewed at home. On interview, the patient reported significant difficulty with her memory for approximately the past 2 months. Specifically, she reported she has difficulty recalling details of events from the day before. She also noted that she will place items in the wrong place (e.g. coffee creamer in the cupboard rather than the refrigerator) and one time she left the stove burner on for about 15. Hours. She said that she is very forgetful and has to write down information to recall it. She noted that her difficulties began after her Covid-19 infection, but not immediately afterward.  She also reported significant difficulties with attention/ concentration. For example , she said she needs to work in a distraction free environment to get tasks completed. She also said that it takes her longer to process her paperwork in her job than previously.  She said she is still accurate in her job, but it takes her longer to complete the job.  She also noted increased word finding. Further, the patient reported that she takes longer to make decisions than prior to her Covid-19 infection and she has to ask a lot of questions. She indicated that she finds her cognitive difficulties to be frustrating.  Functionally, she reported that she will forget where she is going when she drives. She also noted that she will forget to pay bills more frequently because she \"spaces it.\" She also reported that she will occasionally forget to take her medications even though she uses a pill box. She said that she has missed her medications for 2-3 days. She denied difficulties with most household chores, except that she has left food on the counter overnight instead of putting it in the fridge and the above noted incident where she forgot to turn the stove burner off.      In terms of her mood, she reported that she experiences \"ups and downs\" and she has times when she feels irritable without a clear reason. She " "said that she usually tried to be happy. She reported that her sleep was \"terribe\" and this is a significant change which began the second week after her Covid-19 symptoms began. She noted that she tries to sleep 7-8 hours/night. She denied difficulty with her appetite and noted that \"I like food a lot.\" She denied changes to her sense of taste and smell following Covid-19.  She reported that she worked with a counselor in the past but not currently. She said that her mother passed away about 2.5 years ago and this continues to bother her. She also noted stressors related to her ongoing Covid-19 symptoms. She denied any current or previous suicidal or homicidal ideations, and denied any history of suicide attempts. She denied any hallucinations or delusions as well, but noted sometimes when she is waking up she will see things that are not there. In terms of alcohol use, she reported that his has decreased and now she will drink approximately 2 drinks twice a week on average. She denied any use of tobacco or illicit substances.       Medically, she reported that she has been experiencing headaches and attending pulmonary treatment after Covid-19. She noted that she struggles with her weight and is working on weight reduction. She noted that she was never diagnosed with sleep apnea but is interested in a sleep study because of her difficulty with sleep.  She said she was uncertain if she has thyroid issues because of her weight, but she has tested negative in the past. She also reported that her eyesight changed after Covid-19 from needing classes for close up work to needing glasses for distance. She confirmed the medications listed in the medical records were up to date, and she continues to take lexapro and Wellbutrin. She denied any history of head injury with loss of consciousness, multiple sclerosis, stroke, seizures, Parkinson's disease, hypercholesterolemia, diabetes, heart disease, cancer, hypertension, " liver disease, kidney disease, eye diseases or hearing problems.       Academically, she noted that she completed a one year certificate program in  training after high school and she was a good student in school who was on the honor roll. She denied ever being in special education or having to repeat a grade. She reported that she is in her second marriage and she has 2 children, a son and a daughter. She noted that she lives with her  and her daughter. She reported that she works as a .     BEHAVIORAL OBSERVATIONS: On examination, the patient was casually but appropriately dressed and groomed. The patient was cooperative with the evaluation and was talkative.  Her speech was normal in volume, rate and tone.  The patient also appeared to put forth her best effort on all tasks. Thus, the results of this evaluation are a reasonably valid reflection of the patient s current level of functioning. There were no indications of hallucinations, delusions or unusual thought processes and she was generally well oriented to personal information, place, and time. Her affect was also generally appropriate.      RESULTS: Formal performance validity measures were within expected limits and consistent with the above-noted observations. Psychometric estimates of the patient's premorbid global cognitive functioning based upon single word reading ability were in the average range.  Likewise, measures of her current verbally mediated intellectual functioning were in the average range, while visually mediated intellectual functioning was in the low average range.  Thus, there was no evidence for significant change or decline in global cognitive functioning.    Measures of attention/concentration were generally within expected limits.  Specifically, digit span and mental calculation tasks were in the average range.  Further, a visual scanning task that integrates attention was in the  average range.  Speed of information processing was variable, however.  For example, psychomotor speed was in the average range.  However, motor-free processing speed ranged from low average to below average and was poorer.    Measures of the patient's memory functioning were variable.  Specifically, encoding of new verbal and visual information was variable, but there was no evidence for rapid forgetting of previously learned information.  Immediate recall on a word list learning task was in the low average range, but immediate recall on a story memory task was higher in the high average range and a personal strength.  Delayed verbal recall and recognition tasks ranged from the average to high average range.  Likewise, with visual memory, immediate recall on a complex figure drawing task was in the average range.  Delayed recall was also in the average range, while a recognition format was in the high average range.  On a visual learning test, immediate recall was slightly poor in the low average range.  However, delayed recall and recognition on this task were generally in the average range and better.    Expressive and receptive language functioning was also mildly variable, likely secondary to the variability in processing speed noted above rather than language processing deficits.  Specifically, vocabulary ability was in the average range.  Likewise, confrontation naming was in the average range.  Phonemic fluency was likewise in the average range, but semantic fluency was in the low average range.  Receptive language functioning, as assessed by complex ideational material test, was also in the low average range.    Visual-spatial and visual constructional abilities were variable.  For example, motor-free visual reasoning and a block construction task were in the low average range.  However, clock drawing and form copying tasks did not indicate evidence for significant visual-spatial distortions.  Further,  motor-free line orientation judgment was at the lower end of expected limits.    Measures of the patient's executive functioning were broadly within expected limits.  For example, a measure of cognitive flexibility and set shifting ability was in the average range, and indicated evidence the patient could appropriately utilize feedback in order to alter and improve her performance.  Likewise, a measure of response inhibition that integrates processing speed was in the average range.  A complex visual scanning test and integrates cognitive flexibility was at the lower end of expected limits.  Likewise, clock drawing and complex form copying tasks indicated no evidence for significant planning or organizational difficulties.    Formal personality evaluation was completed utilizing the clinical interview, an objective measure of emotional functioning, and self-report measures of depression and anxiety.  On the objective measure, the patient responded in a valid and interpretable fashion.  She responded similarly to individuals who are experiencing mild depressive symptoms, including thoughts of pessimism, worthlessness, hopelessness, and personal failure.  She also responded similarly to individuals who are experiencing heightened levels of anxiety and concern, particular about their physical functioning and health.  On the self-report measures, the patient endorsed moderate symptoms of depression, but she did not endorse clinically significant anxiety symptoms.  Additionally, the patient did not endorse suicidal thoughts.    SUMMARY:  In summary, the neuropsychological assessment results indicated no evidence for significant change or decline in global cognitive functioning.  In summary, the neuropsychological assessment results indicated evidence for variability in speed of information processing, which likely resulted in variability in encoding of new verbal and visual information, language functioning, and  visual-spatial abilities.  However, attention/concentration and executive functioning were generally within expected limits, and there was no evidence for a decline in global cognitive functioning.  Further, there was no evidence for rapid forgetting of previously learned information. The patient endorsed mild to moderate depressive symptoms, consistent with a depressive disorder, which is likely related to the significant stressors that she is experiencing. Thus, the results indicated that a neurological etiology of the patient's cognitive concerns is unlikely.  There was also no evidence for a progressive dementing condition, such as Alzheimer's disease.    More likely, multiple factors, including depressive symptoms, pain, fatigue, and sleep difficulties are contributing to her cognitive concerns.     RECOMMENDATIONS:   1. A referral for psychotherapeutic intervention is recommended. A highly structured cognitive-behavioral intervention focused on coping and stress management would likely be the most beneficial.  Working with a health psychologist who specializes in assisting individuals with long-term health issues may be particularly helpful.  One option for this service is through the [facility] at [email or web address] or [phone].  2. Continued psychiatric consultation to address medication management to help manage her psychiatric symptoms is also recommended.    3. Given the reported sleep difficulties, it is recommended that sleep hygiene be addressed to improve the quality and duration of sleep. The following are recommendations from the National Sleep Foundation that may be helpful:     Avoid napping during the day; it can disturb the normal pattern of sleep and wakefulness.    Avoid stimulants such as caffeine, nicotine, and alcohol too close to bedtime. While alcohol is well known to speed the onset of sleep, it disrupts sleep in  the second half as the body begins to metabolize the alcohol, causing arousal.    Exercise can promote good sleep. Vigorous exercise should be taken in the morning or late afternoon. A relaxing exercise, like yoga, can be done before bed to help initiate a restful night's sleep.    Food can be disruptive right before sleep; stay away from large meals close to bedtime. Also dietary changes can cause sleep problems, if someone is struggling with a sleep problem, it's not a good time to start experimenting with spicy dishes. And, remember, chocolate has caffeine.    Ensure adequate exposure to natural light. This is particularly important for older people who may not venture outside as frequently as children and adults. Light exposure helps maintain a healthy sleep-wake cycle.    Establish a regular relaxing bedtime routine. Try to avoid emotionally upsetting conversations and activities before trying to go to sleep. Don't dwell on, or bring your problems to bed.    Associate your bed with sleep. It's not a good idea to use your bed to watch TV, listen to the radio, or read.    Make sure that the sleep environment is pleasant and relaxing. The bed should be comfortable, the room should not be too hot or cold, or too bright.  4. Further, given her history of sleep difficulties, a referral to a sleep medicine specialist might be considered to evaluate for any medically related issues with her sleep, such as possible sleep apnea.  5. The patient may find the following attention and organizational strategies helpful:     Use cell phone reminders to help monitor upcoming appointments and due dates as well as other important tasks (e.g., when to take medications). Set the reminder to go off several times prior to the event with advanced notice (e.g., one week before, two days before, the day before, and the morning of the event).     She should attempt to reduce distractions at home. Having a clutter-free work environment  and removing or at least making it harder to access potential distractions would help optimize her attention. She might also consider facing away from high traffic areas and using earplugs or noise cancellation headphones when desiring a quiet work environment.    Taking short breaks during her day may help optimize and maintain her concentration.     It is common for individuals like the patient who struggle with inattention to develop procrastination habits. Javed may find it useful to evaluate whether she is procrastinating on a task because she considers it to be overwhelming. If so, she may find it useful to break the task down into more manageable components or steps.    Make to-do lists and prioritize tasks. Break down large tasks into smaller steps and check off each small step when completed.   6. In order to promote learning and memorization of new verbal material, it is recommended that the patient add structure to the material she is learning to promote subsequent recall (e.g., use mnemonic devices, acronyms, make up a story or song). Additionally, she is encouraged to use visual aids, such as flash cards, diagrams, charts, etc.   7. The patient is encouraged to work with her treating healthcare providers to develop a medically appropriate exercise program.  8. The results of the evaluation now constitute a baseline of the patient s cognitive and emotional functioning. If further cognitive difficulties are observed in the future, a referral for a neuropsychological re-evaluation at that time might be considered.     I attempted to contact the patient via telephone on 7/7/2021 but the phone went to voicemail each time.  I then sent her a message through [employer] with some initial information and initial impressions.  Thank you for referring this interesting individual. If you have any questions, please feel free to contact me.      Blane Rios, PhD, ABPP, LP  Professor and Licensed Psychologist  [id number]  Board Certified Clinical Neuropsychologist    Time Spent:      Minutes Date Code Units   Total Time-Neuropsychologist Professional 213 7/7/21 36726 1     7/7/21 14103 3   Neuropsychologist Admin/scoring 0 7/7/21 [id number] 0     7/7/21 04883 0   Diagnostic Interview (billed on 6/29/21) 24 6/29/21 57254 1   Psychometrician Time-Test Administration/Score 338 7/7/21 22258 1   (50 min on 6/29/21 and 288 on 7/7/21)  7/7/21 01642 11   Diagnosis R41.3 U07.10 F32.9

## 2021-07-08 ENCOUNTER — HOSPITAL ENCOUNTER (OUTPATIENT)
Dept: CARDIAC REHAB | Facility: CLINIC | Age: 46
End: 2021-07-08
Attending: PHYSICAL MEDICINE & REHABILITATION
Payer: COMMERCIAL

## 2021-07-08 PROCEDURE — G0239 OTH RESP PROC, GROUP: HCPCS

## 2021-07-12 ENCOUNTER — MYC MEDICAL ADVICE (OUTPATIENT)
Dept: PHYSICAL MEDICINE AND REHAB | Facility: CLINIC | Age: 46
End: 2021-07-12

## 2021-07-13 ENCOUNTER — HOSPITAL ENCOUNTER (OUTPATIENT)
Dept: CARDIAC REHAB | Facility: CLINIC | Age: 46
End: 2021-07-13
Attending: PHYSICAL MEDICINE & REHABILITATION
Payer: COMMERCIAL

## 2021-07-13 PROCEDURE — G0239 OTH RESP PROC, GROUP: HCPCS

## 2021-07-13 SDOH — SOCIAL STABILITY: SOCIAL NETWORK: I HAVE TROUBLE DOING ALL OF THE ACTIVITIES WITH FRIENDS THAT I WANT TO DO: NEVER

## 2021-07-13 SDOH — SOCIAL STABILITY: SOCIAL NETWORK: I HAVE TROUBLE DOING ALL OF MY REGULAR LEISURE ACTIVITIES WITH OTHERS: NEVER

## 2021-07-13 SDOH — SOCIAL STABILITY: SOCIAL NETWORK: I HAVE TROUBLE DOING ALL OF MY USUAL WORK (INCLUDE WORK AT HOME): NEVER

## 2021-07-13 SDOH — SOCIAL STABILITY: SOCIAL NETWORK: PROMIS ABILITY TO PARTICIPATE IN SOCIAL ROLES & ACTIVITIES T-SCORE: 64.1

## 2021-07-13 SDOH — SOCIAL STABILITY: SOCIAL NETWORK: I HAVE TROUBLE DOING ALL OF THE FAMILY ACTIVITIES THAT I WANT TO DO: NEVER

## 2021-07-13 ASSESSMENT — ENCOUNTER SYMPTOMS
WEIGHT LOSS: 0
DYSPNEA ON EXERTION: 1
STIFFNESS: 1
DECREASED CONCENTRATION: 1
INCREASED ENERGY: 1
PANIC: 0
DEPRESSION: 0
MYALGIAS: 1
DECREASED APPETITE: 0
HEARTBURN: 1
ABDOMINAL PAIN: 1
FEVER: 0
RECTAL PAIN: 0
NIGHT SWEATS: 0
ALTERED TEMPERATURE REGULATION: 1
SPEECH CHANGE: 0
POSTURAL DYSPNEA: 0
CONSTIPATION: 0
COUGH DISTURBING SLEEP: 0
PARALYSIS: 0
HEMATURIA: 0
JOINT SWELLING: 1
LOSS OF CONSCIOUSNESS: 0
WEAKNESS: 1
ARTHRALGIAS: 1
DISTURBANCES IN COORDINATION: 1
MUSCLE CRAMPS: 1
TINGLING: 0
HEMOPTYSIS: 0
INSOMNIA: 1
DIZZINESS: 0
SEIZURES: 0
BLOOD IN STOOL: 0
VOMITING: 0
FATIGUE: 1
BLOATING: 1
NAUSEA: 0
HEADACHES: 1
DIARRHEA: 1
TREMORS: 0
HALLUCINATIONS: 0
BOWEL INCONTINENCE: 0
SPUTUM PRODUCTION: 0
MUSCLE WEAKNESS: 1
WHEEZING: 0
COUGH: 0
POLYDIPSIA: 0
SNORES LOUDLY: 0
NECK PAIN: 1
NUMBNESS: 1
DIFFICULTY URINATING: 0
NERVOUS/ANXIOUS: 0
CHILLS: 0
DYSURIA: 0
FLANK PAIN: 0
JAUNDICE: 0
BACK PAIN: 1
MEMORY LOSS: 1
POLYPHAGIA: 1
WEIGHT GAIN: 1

## 2021-07-13 ASSESSMENT — ANXIETY QUESTIONNAIRES
4. TROUBLE RELAXING: NOT AT ALL
GAD7 TOTAL SCORE: 1
7. FEELING AFRAID AS IF SOMETHING AWFUL MIGHT HAPPEN: NOT AT ALL
GAD7 TOTAL SCORE: 1
7. FEELING AFRAID AS IF SOMETHING AWFUL MIGHT HAPPEN: NOT AT ALL
3. WORRYING TOO MUCH ABOUT DIFFERENT THINGS: NOT AT ALL
5. BEING SO RESTLESS THAT IT IS HARD TO SIT STILL: NOT AT ALL
1. FEELING NERVOUS, ANXIOUS, OR ON EDGE: NOT AT ALL
8. IF YOU CHECKED OFF ANY PROBLEMS, HOW DIFFICULT HAVE THESE MADE IT FOR YOU TO DO YOUR WORK, TAKE CARE OF THINGS AT HOME, OR GET ALONG WITH OTHER PEOPLE?: NOT DIFFICULT AT ALL
6. BECOMING EASILY ANNOYED OR IRRITABLE: SEVERAL DAYS
2. NOT BEING ABLE TO STOP OR CONTROL WORRYING: NOT AT ALL
GAD7 TOTAL SCORE: 1

## 2021-07-13 ASSESSMENT — PATIENT HEALTH QUESTIONNAIRE - PHQ9
SUM OF ALL RESPONSES TO PHQ QUESTIONS 1-9: 3
SUM OF ALL RESPONSES TO PHQ QUESTIONS 1-9: 3
10. IF YOU CHECKED OFF ANY PROBLEMS, HOW DIFFICULT HAVE THESE PROBLEMS MADE IT FOR YOU TO DO YOUR WORK, TAKE CARE OF THINGS AT HOME, OR GET ALONG WITH OTHER PEOPLE: SOMEWHAT DIFFICULT

## 2021-07-14 ENCOUNTER — VIRTUAL VISIT (OUTPATIENT)
Dept: PHYSICAL MEDICINE AND REHAB | Facility: CLINIC | Age: 46
End: 2021-07-14
Payer: COMMERCIAL

## 2021-07-14 DIAGNOSIS — I69.919 COGNITIVE DEFICITS AS LATE EFFECT OF CEREBROVASCULAR DISEASE: Primary | ICD-10-CM

## 2021-07-14 DIAGNOSIS — R41.89 COGNITIVE IMPAIRMENT: ICD-10-CM

## 2021-07-14 PROCEDURE — 99215 OFFICE O/P EST HI 40 MIN: CPT | Mod: 95 | Performed by: PHYSICAL MEDICINE & REHABILITATION

## 2021-07-14 RX ORDER — MODAFINIL 100 MG/1
100 TABLET ORAL DAILY
Qty: 30 TABLET | Refills: 0 | Status: CANCELLED | OUTPATIENT
Start: 2021-07-14 | End: 2021-08-13

## 2021-07-14 RX ORDER — MODAFINIL 100 MG/1
100 TABLET ORAL DAILY
Qty: 30 TABLET | Refills: 1 | Status: CANCELLED | OUTPATIENT
Start: 2021-07-14

## 2021-07-14 ASSESSMENT — ANXIETY QUESTIONNAIRES: GAD7 TOTAL SCORE: 1

## 2021-07-14 ASSESSMENT — PATIENT HEALTH QUESTIONNAIRE - PHQ9: SUM OF ALL RESPONSES TO PHQ QUESTIONS 1-9: 3

## 2021-07-14 NOTE — LETTER
7/14/2021       RE: Cecilia Pabon  [address] [address] [place] [id number]     Dear Colleague,    Thank you for referring your patient, Cecilia Pabon, to the [facility] PHYSICAL MEDICINE AND REHABILITATION CLINIC [place] [facility]. Please see a copy of my visit note below.    Cecilia is a 45 year old who is being evaluated via a billable video visit.      How would you like to obtain your AVS? MyChart  If the video visit is dropped, the invitation should be resent by: Text to cell phone: [phone]  Will anyone else be joining your video visit? No        Video start time 10:20 AM  Video end time  10: 54    Cecilia Pabon is a 45 year old female who was diagnosed with Covid infection on 17 November 2020 and presents with post viral fatigue syndrome, persistent dry cough associated with shortness of breath, insomnia leading to impaired mobility and ADLs  [facility]21        Current Symptoms:  This is a follow up visit.   Brain fog is stable, some things are easier, she feels she is getting better at memory.   Ongoing aches and pains. Fatigue and headaches are still there.   She got 2 puppies and she feels they have helped her, she states. They fatigue.         Goals of Care:   She continues to go to pulmonary therapy SOB is much better, cough has completely resolved. She has a history of asthma.      Headaches: ongoing, more when she has had long days. She notes that she had a neuropsychological test and notes that she had a headache.   Caffeine and coke/doctor pepper helps.   Headaches are dull, and rates it to 3-5/10, a few are 8/10. Location is in the back of the neck, left worse than the right. She takes tylenol, or ibuprofen.    She did follow up with a optometrist/opthamologist to get check vision. She was noted to have long vision and glasses have helped.     Cognition: she is utilizing the medication box  which helps her take all the medications, specifically the Lexapro and Wellbutrin. She continues to have STM deficits and comprehend. She is using strategies well.   Reviewed neuropsychological test results   - Speed of information processing was variable.   - Expressive and receptive language functioning was also mildly variable, likely secondary to the variability in processing speed noted above rather than language processing deficits.    - Expressive and receptive language functioning was also mildly variable, likely secondary to the variability in processing speed noted above rather than language processing deficits.  Specifically, vocabulary ability was in the average range     Workability: Cecilia is very motivated and continues to work through her symptoms. She takes pride in her work. We discussed putting in strategies, Cecilia has breaks in place, but is driven by not completing her work. She endorses understanding the need for rest breaks. Could benefit from Modafinil, however Wellbutrin does have a stimulant. Discussed the side effects and drug interactions  Will prescribe Provigil and reassess.    Encouraged patient to take rest breaks during work. workability letter signed and sent.      High PHQ9 was high at 9 last visit, and is much improved now at 3. Her anxiety scores are at 1. She is on Lexapro and Wellbutrin. Recommend continue the medications. Her mood is much imporved and is impacting all aspects of her life.       Answers for HPI/ROS submitted by the patient on 7/13/2021  If you checked off any problems, how difficult have these problems made it for you to do your work, take care of things at home, or get along with other people?: Somewhat difficult  PHQ9 TOTAL SCORE: 3  BRAULIO 7 TOTAL SCORE: 1  General Symptoms: Yes  Skin Symptoms: No  HENT Symptoms: No  EYE SYMPTOMS: No  HEART SYMPTOMS: No  LUNG SYMPTOMS: Yes  INTESTINAL SYMPTOMS: Yes  URINARY SYMPTOMS: Yes  GYNECOLOGIC SYMPTOMS: No  BREAST  SYMPTOMS: No  SKELETAL SYMPTOMS: Yes  BLOOD SYMPTOMS: No  NERVOUS SYSTEM SYMPTOMS: Yes  MENTAL HEALTH SYMPTOMS: Yes  Fever: No  Loss of appetite: No  Weight loss: No  Weight gain: Yes  Fatigue: Yes  Night sweats: No  Chills: No  Increased stress: No  Excessive hunger: Yes  Excessive thirst: No  Feeling hot or cold when others believe the temperature is normal: Yes  Loss of height: No  Post-operative complications: No  Surgical site pain: No  Hallucinations: No  Change in or Loss of Energy: Yes  Hyperactivity: No  Confusion: Yes  Cough: No  Sputum or phlegm: No  Coughing up blood: No  Snoring: No  Wheezing: No  Difficulty breathing on exertion: Yes  Nighttime Cough: No  Difficulty breathing when lying flat: No  Heart burn or indigestion: Yes  Nausea: No  Vomiting: No  Abdominal pain: Yes  Bloating: Yes  Constipation: No  Diarrhea: Yes  Blood in stool: No  Black stools: No  Rectal or Anal pain: No  Fecal incontinence: No  Yellowing of skin or eyes: No  Vomit with blood: No  Change in stools: No  Trouble holding urine or incontinence: Yes  Pain or burning: No  Trouble starting or stopping: No  Increased frequency of urination: Yes  Blood in urine: No  Decreased frequency of urination: No  Frequent nighttime urination: No  Flank pain: No  Difficulty emptying bladder: No  Back pain: Yes  Muscle aches: Yes  Neck pain: Yes  Swollen joints: Yes  Joint pain: Yes  Bone pain: Yes  Muscle cramps: Yes  Muscle weakness: Yes  Joint stiffness: Yes  Bone fracture: No  Trouble with coordination: Yes  Dizziness or trouble with balance: No  Fainting or black-out spells: No  Memory loss: Yes  Headache: Yes  Seizures: No  Speech problems: No  Tingling: No  Tremor: No  Weakness: Yes  Difficulty walking: Yes  Paralysis: No  Numbness: Yes  Nervous or Anxious: No  Depression: No  Trouble sleeping: Yes  Trouble thinking or concentrating: Yes  Mood changes: No  Panic attacks: No          Again, thank you for allowing me to participate in the  care of your patient.      Sincerely,    Corina Cash MD

## 2021-07-14 NOTE — PROGRESS NOTES
Cecilia is a 45 year old who is being evaluated via a billable video visit.      How would you like to obtain your AVS? MyChart  If the video visit is dropped, the invitation should be resent by: Text to cell phone: [phone]  Will anyone else be joining your video visit? No        Video start time 10:20 AM  Video end time  10: 54    Cecilia Pabon is a 45 year old female who was diagnosed with Covid infection on 17 November 2020 and presents with post viral fatigue syndrome, persistent dry cough associated with shortness of breath, insomnia leading to impaired mobility and ADLs  She was last seen in the [facility] on 4/28/2021        Current Symptoms:  This is a follow up visit.   Brain fog is stable, some things are easier, she feels she is getting better at memory.   Ongoing aches and pains. Fatigue and headaches are still there.   She got 2 puppies and she feels they have helped her, she states. They fatigue.         Goals of Care:   She continues to go to pulmonary therapy SOB is much better, cough has completely resolved. She has a history of asthma.      Headaches: ongoing, more when she has had long days. She notes that she had a neuropsychological test and notes that she had a headache.   Caffeine and coke/doctor pepper helps.   Headaches are dull, and rates it to 3-5/10, a few are 8/10. Location is in the back of the neck, left worse than the right. She takes tylenol, or ibuprofen.    She did follow up with a optometrist/opthamologist to get check vision. She was noted to have long vision and glasses have helped.     Cognition: she is utilizing the medication box which helps her take all the medications, specifically the Lexapro and Wellbutrin. She continues to have STM deficits and comprehend. She is using strategies well.   Reviewed neuropsychological test results   - Speed of information processing was variable.   - Expressive and receptive language functioning was also mildly variable, likely secondary  to the variability in processing speed noted above rather than language processing deficits.    - Expressive and receptive language functioning was also mildly variable, likely secondary to the variability in processing speed noted above rather than language processing deficits.  Specifically, vocabulary ability was in the average range     Workability: Cecilia is very motivated and continues to work through her symptoms. She takes pride in her work. We discussed putting in strategies, Cecilia has breaks in place, but is driven by not completing her work. She endorses understanding the need for rest breaks. Could benefit from Modafinil, however Wellbutrin does have a stimulant. Discussed the side effects and drug interactions  Will prescribe Provigil and reassess.    Encouraged patient to take rest breaks during work. workability letter signed and sent.      High PHQ9 was high at 9 last visit, and is much improved now at 3. Her anxiety scores are at 1. She is on Lexapro and Wellbutrin. Recommend continue the medications. Her mood is much imporved and is impacting all aspects of her life.       Answers for HPI/ROS submitted by the patient on 7/13/2021  If you checked off any problems, how difficult have these problems made it for you to do your work, take care of things at home, or get along with other people?: Somewhat difficult  PHQ9 TOTAL SCORE: 3  BRAULIO 7 TOTAL SCORE: 1  General Symptoms: Yes  Skin Symptoms: No  HENT Symptoms: No  EYE SYMPTOMS: No  HEART SYMPTOMS: No  LUNG SYMPTOMS: Yes  INTESTINAL SYMPTOMS: Yes  URINARY SYMPTOMS: Yes  GYNECOLOGIC SYMPTOMS: No  BREAST SYMPTOMS: No  SKELETAL SYMPTOMS: Yes  BLOOD SYMPTOMS: No  NERVOUS SYSTEM SYMPTOMS: Yes  MENTAL HEALTH SYMPTOMS: Yes  Fever: No  Loss of appetite: No  Weight loss: No  Weight gain: Yes  Fatigue: Yes  Night sweats: No  Chills: No  Increased stress: No  Excessive hunger: Yes  Excessive thirst: No  Feeling hot or cold when others believe the temperature  is normal: Yes  Loss of height: No  Post-operative complications: No  Surgical site pain: No  Hallucinations: No  Change in or Loss of Energy: Yes  Hyperactivity: No  Confusion: Yes  Cough: No  Sputum or phlegm: No  Coughing up blood: No  Snoring: No  Wheezing: No  Difficulty breathing on exertion: Yes  Nighttime Cough: No  Difficulty breathing when lying flat: No  Heart burn or indigestion: Yes  Nausea: No  Vomiting: No  Abdominal pain: Yes  Bloating: Yes  Constipation: No  Diarrhea: Yes  Blood in stool: No  Black stools: No  Rectal or Anal pain: No  Fecal incontinence: No  Yellowing of skin or eyes: No  Vomit with blood: No  Change in stools: No  Trouble holding urine or incontinence: Yes  Pain or burning: No  Trouble starting or stopping: No  Increased frequency of urination: Yes  Blood in urine: No  Decreased frequency of urination: No  Frequent nighttime urination: No  Flank pain: No  Difficulty emptying bladder: No  Back pain: Yes  Muscle aches: Yes  Neck pain: Yes  Swollen joints: Yes  Joint pain: Yes  Bone pain: Yes  Muscle cramps: Yes  Muscle weakness: Yes  Joint stiffness: Yes  Bone fracture: No  Trouble with coordination: Yes  Dizziness or trouble with balance: No  Fainting or black-out spells: No  Memory loss: Yes  Headache: Yes  Seizures: No  Speech problems: No  Tingling: No  Tremor: No  Weakness: Yes  Difficulty walking: Yes  Paralysis: No  Numbness: Yes  Nervous or Anxious: No  Depression: No  Trouble sleeping: Yes  Trouble thinking or concentrating: Yes  Mood changes: No  Panic attacks: No

## 2021-07-14 NOTE — LETTER
[facility] PHYSICAL MEDICINE AND REHABILITATION CLINIC [address] [address]  Phone: [phone]  Fax: [phone]      REPORT OF WORK ABILITY    NOTE TO EMPLOYEE: You must promptly provide a copy of this report to your  employer or worker's compensation insurer, and Qualified Rehabilitation Consultant.    Date: 7/14/2021                     Employee Name: Cecilia Pabon         YOB: 1975  Medical Record Number: [id number]   Soc.Sec.No: [id number]  Employer: None                Date of Injury: 11/17/2020  Managed Care Organization / Insurance Company Name: UNKNOWN    Diagnosis: PST COVID SYNDROME   Work Related: no     MMI: UNKNOWN   Permanent Partial Disability(PPD) likely: UNKNOWN    EMPLOYEE IS ABLE TO WORK: with restrictions from 7/14/2021 to 10/14/2021 -       RESTRICTIONS IF ANY:  Rest breaks of 15 min every 2 hours       Corina Cash MD, MHA   Department of Rehabilitation

## 2021-07-20 ENCOUNTER — HOSPITAL ENCOUNTER (OUTPATIENT)
Dept: CARDIAC REHAB | Facility: CLINIC | Age: 46
End: 2021-07-20
Attending: PHYSICAL MEDICINE & REHABILITATION
Payer: COMMERCIAL

## 2021-07-20 PROCEDURE — G0239 OTH RESP PROC, GROUP: HCPCS

## 2021-07-27 ENCOUNTER — HOSPITAL ENCOUNTER (OUTPATIENT)
Dept: CARDIAC REHAB | Facility: CLINIC | Age: 46
End: 2021-07-27
Attending: PHYSICAL MEDICINE & REHABILITATION
Payer: COMMERCIAL

## 2021-07-27 PROCEDURE — G0238 OTH RESP PROC, INDIV: HCPCS | Performed by: REHABILITATION PRACTITIONER

## 2021-07-27 NOTE — TELEPHONE ENCOUNTER
Informed patient that Dr Cash. Encourages patient to conform to work restriction written at last appointment. Follow up appointment arranged. Patient agrees with plan of care.

## 2021-08-12 ENCOUNTER — HOSPITAL ENCOUNTER (OUTPATIENT)
Dept: CARDIAC REHAB | Facility: CLINIC | Age: 46
End: 2021-08-12
Attending: PHYSICAL MEDICINE & REHABILITATION
Payer: COMMERCIAL

## 2021-08-12 ENCOUNTER — MYC MEDICAL ADVICE (OUTPATIENT)
Dept: PHYSICAL MEDICINE AND REHAB | Facility: CLINIC | Age: 46
End: 2021-08-12

## 2021-08-12 PROCEDURE — G0239 OTH RESP PROC, GROUP: HCPCS

## 2021-08-19 ENCOUNTER — MYC MEDICAL ADVICE (OUTPATIENT)
Dept: DERMATOLOGY | Facility: CLINIC | Age: 46
End: 2021-08-19
Payer: COMMERCIAL

## 2021-08-19 ENCOUNTER — HOSPITAL ENCOUNTER (OUTPATIENT)
Dept: CARDIAC REHAB | Facility: CLINIC | Age: 46
End: 2021-08-19
Attending: PHYSICAL MEDICINE & REHABILITATION
Payer: COMMERCIAL

## 2021-08-19 ENCOUNTER — TELEPHONE (OUTPATIENT)
Dept: PHYSICAL MEDICINE AND REHAB | Facility: CLINIC | Age: 46
End: 2021-08-19
Payer: COMMERCIAL

## 2021-08-19 DIAGNOSIS — G93.31 POSTVIRAL FATIGUE SYNDROME: Primary | ICD-10-CM

## 2021-08-19 PROCEDURE — G0239 OTH RESP PROC, GROUP: HCPCS

## 2021-08-19 RX ORDER — MODAFINIL 100 MG/1
100 TABLET ORAL DAILY
Qty: 30 TABLET | Refills: 3 | Status: SHIPPED | OUTPATIENT
Start: 2021-08-19 | End: 2021-09-22

## 2021-08-19 NOTE — TELEPHONE ENCOUNTER
PRIOR AUTHORIZATION DENIED - COMPLETED VIA EPA     Medication: modafinil (PROVIGIL) 100 MG tablet  - DENIED     Denial Date:  08/19/2021    Denial Rational: This medication is not covered for your diagnosis: Postviral fatigue syndrome (G93.3)    This medication is covered to treat the following:            Appeal Information:

## 2021-08-24 ENCOUNTER — HOSPITAL ENCOUNTER (OUTPATIENT)
Dept: CARDIAC REHAB | Facility: CLINIC | Age: 46
End: 2021-08-24
Attending: PHYSICAL MEDICINE & REHABILITATION
Payer: COMMERCIAL

## 2021-08-24 PROCEDURE — G0239 OTH RESP PROC, GROUP: HCPCS

## 2021-08-30 ENCOUNTER — E-VISIT (OUTPATIENT)
Dept: URGENT CARE | Facility: CLINIC | Age: 46
End: 2021-08-30
Payer: COMMERCIAL

## 2021-08-30 ENCOUNTER — LAB (OUTPATIENT)
Dept: FAMILY MEDICINE | Facility: CLINIC | Age: 46
End: 2021-08-30
Payer: COMMERCIAL

## 2021-08-30 DIAGNOSIS — Z20.822 SUSPECTED COVID-19 VIRUS INFECTION: ICD-10-CM

## 2021-08-30 DIAGNOSIS — Z20.822 SUSPECTED COVID-19 VIRUS INFECTION: Primary | ICD-10-CM

## 2021-08-30 LAB — SARS-COV-2 RNA RESP QL NAA+PROBE: NEGATIVE

## 2021-08-30 PROCEDURE — U0005 INFEC AGEN DETEC AMPLI PROBE: HCPCS

## 2021-08-30 PROCEDURE — U0003 INFECTIOUS AGENT DETECTION BY NUCLEIC ACID (DNA OR RNA); SEVERE ACUTE RESPIRATORY SYNDROME CORONAVIRUS 2 (SARS-COV-2) (CORONAVIRUS DISEASE [COVID-19]), AMPLIFIED PROBE TECHNIQUE, MAKING USE OF HIGH THROUGHPUT TECHNOLOGIES AS DESCRIBED BY CMS-2020-01-R: HCPCS

## 2021-08-30 PROCEDURE — 99421 OL DIG E/M SVC 5-10 MIN: CPT | Performed by: PHYSICIAN ASSISTANT

## 2021-08-30 NOTE — PATIENT INSTRUCTIONS
Dear Cecilia Pabon,    Your symptoms show that you may have coronavirus (COVID-19). This illness can cause fever, cough and trouble breathing. Many people get a mild case and get better on their own. Some people can get very sick.    Will I be tested for COVID-19?  We would like to test you for Covid-19 virus. I have placed orders for this test.     To schedule: go to your [employer] home page and scroll down to the section that says  You have an appointment that needs to be scheduled  and click the large green button that says  Schedule Now  and follow the steps to find the next available openings.    If you are unable to complete these [employer] scheduling steps, please call [phone] to schedule your testing.     Return to work/school/ guidance:  Please let your workplace manager and staffing office know when your quarantine ends     We can t give you an exact date as it depends on the above. You can calculate this on your own or work with your manager/staffing office to calculate this. (For example if you were exposed on 10/4, you would have to quarantine for 14 full days. That would be through 10/18. You could return on 10/19.)      If you receive a positive COVID-19 test result, follow the guidance of the those who are giving you the results. Usually the return to work is 10 (or in some cases 20 days from symptom onset.) If you work at [facility], you must also be cleared by Employee Occupational Health and Safety to return to work.        If you receive a negative COVID-19 test result and did not have a high risk exposure to someone with a known positive COVID-19 test, you can return to work once you're free of fever for 24 hours without fever-reducing medication and your symptoms are improving or resolved.      If you receive a negative COVID-19 test and If you had a high risk exposure to someone who has tested positive for COVID-19 then you can return to work 14 days after your last contact  with the positive individual    Note: If you have ongoing exposure to the covid positive person, this quarantine period may be more than 14 days. (For example, if you are continued to be exposed to your child who tested positive and cannot isolate from them, then the quarantine of 7-14 days can't start until your child is no longer contagious. This is typically 10 days from onset of the child's symptoms. So the total duration may be 17-24 days in this case.)    Sign up for [employer].   We know it's scary to hear that you might have COVID-19. We want to track your symptoms to make sure you're okay over the next 2 weeks. Please look for an email from [employer]--this is a free, online program that we'll use to keep in touch. To sign up, follow the link in the email you will receive. Learn more at [email or web address]    How can I take care of myself?    Get lots of rest. Drink extra fluids (unless a doctor has told you not to)    Take Tylenol (acetaminophen) or ibuprofen for fever or pain. If you have liver or kidney problems, ask your family doctor if it's okay to take Tylenol o ibuprofen    If you have other health problems (like cancer, heart failure, an organ transplant or severe kidney disease): Call your specialty clinic if you don't feel better in the next 2 days.    Know when to call 911. Emergency warning signs include:  o Trouble breathing or shortness of breath  o Pain or pressure in the chest that doesn't go away  o Feeling confused like you haven't felt before, or not being able to wake up  o Bluish-colored lips or face    Where can I get more information?  [facility] - About COVID-19:   [email or web address]    CDC - What to Do If You're Sick:   www.cdc.gov/coronavirus/2019-ncov/about/steps-when-sick.html    August 30, 2021  RE:  Cecilia Pabon                                                                                                                  [id number]  [facility] [id number]      To whom it may concern:    I evaluated Cecilia Pabon on August 30, 2021. Cecilia Pabon should be excused from work/school.     They should let their workplace manager and staffing office know when their quarantine ends.    We can not give an exact date as it depends on the information below. They can calculate this on their own or work with their manager/staffing office to calculate this. (For example if they were exposed on 10/04, they would have to quarantine for 14 full days. That would be through 10/18. They could return on 10/19.)    Quarantine Guidelines:      If patient receives a positive COVID-19 test result, they should follow the guidance of those who are giving the results. Usually the return to work is 10 (or in some cases 20 days from symptom onset.) If they work at [employer], they must be cleared by Employee Occupational Health and Safety to return to work.        If patient receives a negative COVID-19 test result and did not have a high risk exposure to someone with a known positive COVID-19 test, they can return to work once they're free of fever for 24 hours without fever-reducing medication and their symptoms are improving or resolved.      If patient receives a negative COVID-19 test and if they had a high risk exposure to someone who has tested positive for COVID-19 then they can return to work 14 days after their last contact with the positive individual    Note: If there is ongoing exposure to the covid positive person, this quarantine period may be longer than 14 days. (For example, if they are continually exposed to their child, who tested positive and cannot isolate from them, then the quarantine of 7-14 days can't start until their child is no longer contagious. This is typically 10 days from onset to the child's symptoms. So the total duration may be 17-24 days in this case.)     Sincerely,  Geri Francisco PA-C, DWAIN

## 2021-08-31 ENCOUNTER — TELEPHONE (OUTPATIENT)
Dept: PHYSICAL MEDICINE AND REHAB | Facility: CLINIC | Age: 46
End: 2021-08-31

## 2021-08-31 DIAGNOSIS — G93.31 POSTVIRAL FATIGUE SYNDROME: Primary | ICD-10-CM

## 2021-08-31 RX ORDER — BUPROPION HYDROCHLORIDE 100 MG/1
50 TABLET ORAL 2 TIMES DAILY
Qty: 60 TABLET | Refills: 3 | Status: SHIPPED | OUTPATIENT
Start: 2021-08-31 | End: 2023-03-01

## 2021-09-07 ENCOUNTER — HOSPITAL ENCOUNTER (OUTPATIENT)
Dept: CARDIAC REHAB | Facility: CLINIC | Age: 46
End: 2021-09-07
Attending: PHYSICAL MEDICINE & REHABILITATION
Payer: COMMERCIAL

## 2021-09-07 PROCEDURE — G0238 OTH RESP PROC, INDIV: HCPCS

## 2021-09-08 ENCOUNTER — TELEPHONE (OUTPATIENT)
Dept: PHYSICAL MEDICINE AND REHAB | Facility: CLINIC | Age: 46
End: 2021-09-08
Payer: COMMERCIAL

## 2021-09-08 NOTE — TELEPHONE ENCOUNTER
Health Call Center    Phone Message    May a detailed message be left on voicemail: yes     Reason for Call: Medication Question or concern regarding medication   Prescription Clarification  Name of Medication: modafinil (PROVIGIL) 100 MG tablet [[id number]] (Order [id number])    Prescribing Provider: Dr. Cash   Pharmacy: [facility].   What on the order needs clarification?  Prior Auth is needed for Rx.     Requesting to get this process started.          Action Taken: Message routed to:  [facility] ([facility]    Travel Screening: Not Applicable                                                                         (2) potential problem

## 2021-09-08 NOTE — TELEPHONE ENCOUNTER
Central Prior Authorization Team   Phone: [phone]      PA Initiation    Medication: modafinil (PROVIGIL) 100 MG tablet  Insurance Company: [employer] - Phone [phone] Fax [phone]  Pharmacy Filling the Rx: [place], [place] - [address] [place] [address]  Filling Pharmacy Phone:    Filling Pharmacy Fax:    Start Date: 9/8/2021

## 2021-09-13 ENCOUNTER — MYC MEDICAL ADVICE (OUTPATIENT)
Dept: PHYSICAL MEDICINE AND REHAB | Facility: CLINIC | Age: 46
End: 2021-09-13
Payer: COMMERCIAL

## 2021-09-13 NOTE — TELEPHONE ENCOUNTER
PRIOR AUTHORIZATION DENIED    Medication: modafinil (PROVIGIL) 100 MG tablet    Denial Date: 9/10/2021    Denial Rational:         Appeal Information:

## 2021-09-13 NOTE — TELEPHONE ENCOUNTER
Routing to provider to advise; please see patient's MyChart questions.      Kalin Hutton RN, BSN, PHN

## 2021-09-18 ENCOUNTER — HEALTH MAINTENANCE LETTER (OUTPATIENT)
Age: 46
End: 2021-09-18

## 2021-09-22 ENCOUNTER — OFFICE VISIT (OUTPATIENT)
Dept: FAMILY MEDICINE | Facility: CLINIC | Age: 46
End: 2021-09-22
Payer: COMMERCIAL

## 2021-09-22 VITALS
DIASTOLIC BLOOD PRESSURE: 70 MMHG | RESPIRATION RATE: 12 BRPM | BODY MASS INDEX: 30.98 KG/M2 | SYSTOLIC BLOOD PRESSURE: 100 MMHG | WEIGHT: 168 LBS | HEART RATE: 80 BPM

## 2021-09-22 DIAGNOSIS — U07.1 COVID-19: ICD-10-CM

## 2021-09-22 DIAGNOSIS — N94.6 DYSMENORRHEA: Primary | ICD-10-CM

## 2021-09-22 PROCEDURE — 99214 OFFICE O/P EST MOD 30 MIN: CPT | Performed by: FAMILY MEDICINE

## 2021-09-22 NOTE — PROGRESS NOTES
"    Assessment & Plan     Dysmenorrhea  Unclear etiol   May be ovarian cyst vs endometriosis   - US Pelvic Complete with Transvaginal; Future    COVID-19  Long haul  Seeing covid clinic   Has fibromyalgia like pain syndrome   Had months of insomnia during covid sleep is better now     rec heat and tylenol and/or ibuprofen PRN.       Review of external notes as documented elsewhere in note         BMI:   Estimated body mass index is 30.98 kg/m  as calculated from the following:    Height as of 4/28/21: 1.568 m (5' 1.75\").    Weight as of this encounter: 76.2 kg (168 lb).           Return in about 3 months (around 12/22/2021).    Ammy Rolon MD  [facility]    Noah Brooks is a 45 year old who presents for the following health issues     HPI     Long term effects of Covid  Was Dx 11/17/2020  Joint pain, stiffness, muscle pain, headaches, GI issues, painful cramping when she doesn't have her period, fatigue  Eye sight has gotten worse - had Rx changed 3 months ago and eyes are changing again  Is followed by covid clinic     Had had very poor sleep and now it is better   Very painful joints and muscles     Reviewed covid clinic notes     Vaginal Bleeding (Dysmenorrhea)      Onset: last few cycles     Description:  Duration of bleeding episodes: 3-5 days   Frequency between periods:  Monthly   Describe bleeding/flow:   Clots: no  Number of pads/hour: na  Cramping: severe    Intensity:  severe    Accompanying signs and symptoms: cramping in between periods     History (similar episodes/previous evaluation): None    Precipitating or alleviating factors: None    Therapies tried and outcome: None       Review of Systems   Constitutional, HEENT, cardiovascular, pulmonary, gi and gu systems are negative, except as otherwise noted.      Objective    /70   Pulse 80   Resp 12   Wt 76.2 kg (168 lb)   LMP 09/20/2021   BMI 30.98 kg/m    Body mass index is 30.98 kg/m .  Physical " Exam   GENERAL APPEARANCE: healthy, alert and no distress  RESP: lungs clear to auscultation - no rales, rhonchi or wheezes  CV: regular rates and rhythm, normal S1 S2, no S3 or S4 and no murmur, click or rub  PSYCH: mentation appears normal and affect normal/bright

## 2021-09-22 NOTE — NURSING NOTE
"Chief Complaint   Patient presents with     Fatigue     /70   Pulse 80   Resp 12   Wt 76.2 kg (168 lb)   LMP 09/20/2021   BMI 30.98 kg/m   Estimated body mass index is 30.98 kg/m  as calculated from the following:    Height as of 4/28/21: 1.568 m (5' 1.75\").    Weight as of this encounter: 76.2 kg (168 lb).  Patient presents to the clinic using No DME      Health Maintenance that is potentially due pending provider review:    Health Maintenance Due   Topic Date Due     ANNUAL REVIEW OF HM ORDERS  Never done     ADVANCE CARE PLANNING  Never done     DEPRESSION ACTION PLAN  Never done     Pneumococcal Vaccine: Pediatrics (0 to 5 Years) and At-Risk Patients (6 to 64 Years) (1 of 2 - PPSV23) Never done     COVID-19 Vaccine (1) Never done     HEPATITIS C SCREENING  Never done     PREVENTIVE CARE VISIT  08/13/2019     ASTHMA ACTION PLAN  08/13/2019     INFLUENZA VACCINE (1) 09/01/2021     HPV TEST  01/23/2022     PAP  01/23/2022        n/a        "

## 2021-09-24 ENCOUNTER — HOSPITAL ENCOUNTER (OUTPATIENT)
Dept: ULTRASOUND IMAGING | Facility: CLINIC | Age: 46
Discharge: HOME | End: 2021-09-24
Attending: FAMILY MEDICINE | Admitting: FAMILY MEDICINE
Payer: COMMERCIAL

## 2021-09-24 DIAGNOSIS — N94.6 DYSMENORRHEA: ICD-10-CM

## 2021-09-24 PROCEDURE — 76830 TRANSVAGINAL US NON-OB: CPT

## 2021-09-28 ENCOUNTER — MYC MEDICAL ADVICE (OUTPATIENT)
Dept: FAMILY MEDICINE | Facility: CLINIC | Age: 46
End: 2021-09-28
Payer: COMMERCIAL

## 2021-09-29 ENCOUNTER — MYC MEDICAL ADVICE (OUTPATIENT)
Dept: FAMILY MEDICINE | Facility: CLINIC | Age: 46
End: 2021-09-29
Payer: COMMERCIAL

## 2021-10-07 ENCOUNTER — MYC MEDICAL ADVICE (OUTPATIENT)
Dept: PHYSICAL MEDICINE AND REHAB | Facility: CLINIC | Age: 46
End: 2021-10-07
Payer: COMMERCIAL

## 2021-10-18 ENCOUNTER — MYC REFILL (OUTPATIENT)
Dept: FAMILY MEDICINE | Facility: CLINIC | Age: 46
End: 2021-10-18

## 2021-10-18 DIAGNOSIS — F41.1 ANXIETY STATE: ICD-10-CM

## 2021-10-18 RX ORDER — ESCITALOPRAM OXALATE 20 MG/1
20 TABLET ORAL DAILY
Qty: 90 TABLET | Refills: 1 | Status: SHIPPED | OUTPATIENT
Start: 2021-10-18 | End: 2022-10-28

## 2021-10-18 RX ORDER — BUPROPION HYDROCHLORIDE 150 MG/1
150 TABLET ORAL DAILY
Qty: 90 TABLET | Refills: 1 | Status: SHIPPED | OUTPATIENT
Start: 2021-10-18 | End: 2022-10-28

## 2021-10-22 ENCOUNTER — MYC MEDICAL ADVICE (OUTPATIENT)
Dept: FAMILY MEDICINE | Facility: CLINIC | Age: 46
End: 2021-10-22
Payer: COMMERCIAL

## 2021-10-22 ENCOUNTER — MYC MEDICAL ADVICE (OUTPATIENT)
Dept: PHYSICAL MEDICINE AND REHAB | Facility: CLINIC | Age: 46
End: 2021-10-22
Payer: COMMERCIAL

## 2021-10-26 SDOH — SOCIAL STABILITY: SOCIAL NETWORK: I HAVE TROUBLE DOING ALL OF THE FAMILY ACTIVITIES THAT I WANT TO DO: RARELY

## 2021-10-26 SDOH — SOCIAL STABILITY: SOCIAL NETWORK: I HAVE TROUBLE DOING ALL OF MY REGULAR LEISURE ACTIVITIES WITH OTHERS: RARELY

## 2021-10-26 SDOH — SOCIAL STABILITY: SOCIAL NETWORK: I HAVE TROUBLE DOING ALL OF THE ACTIVITIES WITH FRIENDS THAT I WANT TO DO: RARELY

## 2021-10-26 SDOH — SOCIAL STABILITY: SOCIAL NETWORK: PROMIS ABILITY TO PARTICIPATE IN SOCIAL ROLES & ACTIVITIES T-SCORE: 51.6

## 2021-10-26 SDOH — SOCIAL STABILITY: SOCIAL NETWORK: I HAVE TROUBLE DOING ALL OF MY USUAL WORK (INCLUDE WORK AT HOME): RARELY

## 2021-10-26 ASSESSMENT — ANXIETY QUESTIONNAIRES
GAD7 TOTAL SCORE: 0
8. IF YOU CHECKED OFF ANY PROBLEMS, HOW DIFFICULT HAVE THESE MADE IT FOR YOU TO DO YOUR WORK, TAKE CARE OF THINGS AT HOME, OR GET ALONG WITH OTHER PEOPLE?: NOT DIFFICULT AT ALL
5. BEING SO RESTLESS THAT IT IS HARD TO SIT STILL: NOT AT ALL
3. WORRYING TOO MUCH ABOUT DIFFERENT THINGS: NOT AT ALL
GAD7 TOTAL SCORE: 0
4. TROUBLE RELAXING: NOT AT ALL
1. FEELING NERVOUS, ANXIOUS, OR ON EDGE: NOT AT ALL
7. FEELING AFRAID AS IF SOMETHING AWFUL MIGHT HAPPEN: NOT AT ALL
2. NOT BEING ABLE TO STOP OR CONTROL WORRYING: NOT AT ALL
6. BECOMING EASILY ANNOYED OR IRRITABLE: NOT AT ALL
7. FEELING AFRAID AS IF SOMETHING AWFUL MIGHT HAPPEN: NOT AT ALL
GAD7 TOTAL SCORE: 0

## 2021-10-26 ASSESSMENT — ENCOUNTER SYMPTOMS
FEVER: 0
MUSCLE CRAMPS: 1
SYNCOPE: 0
PARALYSIS: 0
DECREASED LIBIDO: 1
HEADACHES: 1
INCREASED ENERGY: 1
CONSTIPATION: 1
BLOATING: 0
SLEEP DISTURBANCES DUE TO BREATHING: 0
LOSS OF CONSCIOUSNESS: 0
VOMITING: 0
ORTHOPNEA: 0
HOT FLASHES: 0
EYE REDNESS: 0
FATIGUE: 1
NAUSEA: 1
SPEECH CHANGE: 0
JAUNDICE: 0
LIGHT-HEADEDNESS: 1
NIGHT SWEATS: 0
POOR WOUND HEALING: 0
MEMORY LOSS: 1
LEG PAIN: 1
BOWEL INCONTINENCE: 0
EYE WATERING: 0
POLYPHAGIA: 1
WEIGHT GAIN: 1
SEIZURES: 0
BACK PAIN: 1
DECREASED APPETITE: 0
ALTERED TEMPERATURE REGULATION: 0
BLOOD IN STOOL: 0
ABDOMINAL PAIN: 1
CHILLS: 0
SKIN CHANGES: 0
ARTHRALGIAS: 1
JOINT SWELLING: 1
POLYDIPSIA: 1
DIARRHEA: 1
DOUBLE VISION: 0
TINGLING: 0
WEAKNESS: 1
DISTURBANCES IN COORDINATION: 0
EYE IRRITATION: 0
PALPITATIONS: 0
NUMBNESS: 1
NAIL CHANGES: 1
HEARTBURN: 1
DIZZINESS: 1
MUSCLE WEAKNESS: 1
NECK PAIN: 1
TREMORS: 0
HYPERTENSION: 0
RECTAL PAIN: 0
MYALGIAS: 1
HYPOTENSION: 1
STIFFNESS: 1
HALLUCINATIONS: 0
EYE PAIN: 0
EXERCISE INTOLERANCE: 1
WEIGHT LOSS: 0

## 2021-10-26 ASSESSMENT — PATIENT HEALTH QUESTIONNAIRE - PHQ9
SUM OF ALL RESPONSES TO PHQ QUESTIONS 1-9: 3
SUM OF ALL RESPONSES TO PHQ QUESTIONS 1-9: 3
10. IF YOU CHECKED OFF ANY PROBLEMS, HOW DIFFICULT HAVE THESE PROBLEMS MADE IT FOR YOU TO DO YOUR WORK, TAKE CARE OF THINGS AT HOME, OR GET ALONG WITH OTHER PEOPLE: NOT DIFFICULT AT ALL

## 2021-10-27 ENCOUNTER — VIRTUAL VISIT (OUTPATIENT)
Dept: PHYSICAL MEDICINE AND REHAB | Facility: CLINIC | Age: 46
End: 2021-10-27
Payer: COMMERCIAL

## 2021-10-27 DIAGNOSIS — G93.31 POSTVIRAL FATIGUE SYNDROME: Primary | ICD-10-CM

## 2021-10-27 DIAGNOSIS — F32.0 CURRENT MILD EPISODE OF MAJOR DEPRESSIVE DISORDER, UNSPECIFIED WHETHER RECURRENT (H): ICD-10-CM

## 2021-10-27 DIAGNOSIS — R41.89 COGNITIVE IMPAIRMENT: ICD-10-CM

## 2021-10-27 PROCEDURE — 99215 OFFICE O/P EST HI 40 MIN: CPT | Mod: 95 | Performed by: PHYSICAL MEDICINE & REHABILITATION

## 2021-10-27 ASSESSMENT — PATIENT HEALTH QUESTIONNAIRE - PHQ9: SUM OF ALL RESPONSES TO PHQ QUESTIONS 1-9: 3

## 2021-10-27 ASSESSMENT — ANXIETY QUESTIONNAIRES: GAD7 TOTAL SCORE: 0

## 2021-10-27 NOTE — PROGRESS NOTES
Cecilia is a 45 year old who is being evaluated via a billable video visit.      How would you like to obtain your AVS? MyChart  If the video visit is dropped, the invitation should be resent by: Text to cell phone: [phone]  Will anyone else be joining your video visit? No    Video Start Time: 8:03  Video-Visit Details    Type of service:  Video Visit    Video End Time:8:54 AM    Originating Location (pt. Location): Home    Distant Location (provider location):  [facility] [address]  Current Symptoms:  Cecilia Pabon is a 45 year old female who was diagnosed with Covid infection on 17 November 2020 and presents with post viral fatigue syndrome, persistent dry cough associated with shortness of breath, insomnia leading to impaired mobility and ADLs  She was last seen in the [facility] on 4/8/2021     Her main symptoms are joint pains, fatigue and   She states that she needs assistance with getting her out of bed/chair when she gets stiff.   She needs pain in her joints   Headaches are ongoing   She feels her eye sight is getting worse, she is seeing blurred however she states that she completed her eye exam this year and has had a eyeglasses.   Fatigue is ongoing, she thinks she is improved. It stops her perform activities that she would like.   She denies any insomnia. This is improved. She sleeps 9-11 pm, if not tired, she wakes up at 6-7 am, she has 2 puppies. She sleeps with the puppies on the sofa. She has to wake up once to twice for the puppies or by herself. She does not feel rested. She is able to go back to sleep. She is not taking any somnolent. She states that she sleeps 6-8 hours.    Cecilia is grieving from her cousins loss who committed suicide. She slid her carotid artery and wrist.   She has completed her pulmonary therapy and OP PT.   Her brain fog seems to be improving   She is full time, 2 rest breaks, and one hour lunch break.   Her  PHQ9 score is 3 today, and GAD7 is 0.   She scores mild for insomnia and pain.       Goals of Care:   Post viral fatigue: conservative management.     Brain fog: improving, and she is working 8-9 hours which provides her good stimulation.     Insomnia;  Her sleep is disturbed by both her puppies, and by herself. She feels a sense of coziness when sleeping with the puppies. Counseled on REM sleep and need to sleep without interruptions. None of the medications have worked for her.     Depression/anxiety: she is on Wellbutrin and Lexapro. Recommend no change as she is doing well with this regimen and recommend continuing it.   [facility] at [email or web address] or [phone]     Nutrition:   She is on Vitamin A, E, D, C, and Zinc. Recommend adding magnesium as a cognitive enhancer. Quercetin is another supplement she takes, one of the side effects are headaches.     Joint pains: recommend stretching exercises, caution with medications.      Exercise regimen:   She uses a stationary bike and stretches given by Pul[facility] and walking. Recommend recording her activities.      Workability   She is working FT, is taking rest breaks and is nearing a 1 year period. She is much improved now. Continue the current strategies such as rest breaks.     Current concerns: Grief or Loss    PHQ Assesment Total Score(s) 10/26/2021   PHQ-9 Score 3   Some recent data might be hidden     BRAULIO-7 Results 10/26/2021   BRAULIO 7 TOTAL SCORE 0 (minimal anxiety)   Some recent data might be hidden     PTSD Screen Score 10/26/2021   Have you ever experienced this kind of event? Yes   PTSD Screen (Score of 3 or more suggests positive screen) 2   Some recent data might be hidden     PROMIS-29 10/26/2021   PROMIS Physical Function T-Score 48 (within normal limits)   PROMIS Anxiety T-Score 40.3 (within normal limits)   PROMIS Depression T-Score 41 (within normal limits)   PROMIS  Fatigue T-Score 60.7   PROMIS Sleep Disturbance T-Score 56.1 (mild)   PROMIS Ability to Participate in Social Roles & Activities T-Score 51.6 (within normal limits)   PROMIS Pain Interference T-Score 57.1 (mild)   PROMIS Pain Intensity 7     Work Productivity and Activity Impairment Questionnaire: General Health V2.0 10/27/2021   Are you currently employed (working for pay)? Yes   During the past seven days, how many hours did you miss from work because of your health problems?  0   During the past seven days, how many hours did you miss from work because of any other reason, such as vacation, holidays, time off to participate in this study? 1   During the past seven days, how many hours did you actually work? 9   During the past seven days, how much did your health problems affect your productivity while you were working? 3   During the past seven days, how much did your health problems affect your ability to do your regular activities, other than work at a job? 3         Past Medical History:   Diagnosis Date     Abdominal pain, other specified site      Abdominal pain, right upper quadrant      Anxiety state, unspecified      ASCUS on Pap smear 2003    +HR HPV 16     COPD (chronic obstructive pulmonary disease) (H)     My Mom has this     Family history of breast cancer in mother 9/17/2009     Family history of breast cancer in mother      Family history of breast cancer in mother      Headache 2/25/2005     Headache      Headache      Headache(784.0)      Hx of colposcopy with cervical biopsy 6/2/2003    WNL     Mental disorders of mother, postpartum     postpartum depression     Migraine, unspecified, without mention of intractable migraine without mention of status migrainosus      Other specified cardiac dysrhythmias(427.89)      Other specified complication, antepartum(646.83)      Swelling of limb      Uncomplicated asthma     Myself     Unspecified viral infection, in conditions classified elsewhere and of  unspecified site      Urinary tract infection, site not specified        Past Surgical History:   Procedure Laterality Date     ABDOMEN SURGERY  [id number]    Csecttion     COLONOSCOPY       GI SURGERY       LAPAROSCOPIC CHOLECYSTECTOMY N/A 2014    Procedure: LAPAROSCOPIC CHOLECYSTECTOMY;  Surgeon: Ulises Starr MD;  Location: [facility] OR     SURGICAL HISTORY OF -       Csection       Family History   Problem Relation Age of Onset     Depression Mother      Respiratory Mother         COPD, emphysema     Cancer Mother      Neurologic Disorder Mother         raunads syndrom     Breast Cancer Mother         Stage 4     Other Cancer Mother         Cervical     Anesthesia Reaction Mother      Osteoporosis Mother      Anxiety Disorder Mother      Unknown/Adopted Mother         Dying of COPD     Gastrointestinal Disease Maternal Grandmother      Depression Maternal Grandmother      Hypertension Maternal Grandmother      Cancer Maternal Grandmother         skin cancer     Breast Cancer Maternal Grandmother      Other Cancer Maternal Grandmother         Melanoma     Anxiety Disorder Maternal Grandmother      Breast Cancer Cousin         Grandfathers side     Breast Cancer Cousin         Grandfathers side BRCA 1     Prostate Cancer Other         maternal side     Prostate Cancer Other         maternal side     Prostate Cancer Other         maternal side     Leukemia Paternal Half-Sister         1/2 sister,  in age 13 or 15     Diabetes Cousin      Hypertension Other      Hypertension Maternal Grandfather      Hyperlipidemia Maternal Grandfather      Asthma Other        Social History     Tobacco Use     Smoking status: Never Smoker     Smokeless tobacco: Never Used   Substance Use Topics     Alcohol use: Yes     Comment: Maybe 1-2 times a week     Drug use: No         Current Outpatient Medications:      albuterol (PROAIR HFA/PROVENTIL HFA/VENTOLIN HFA) 108 (90 Base) MCG/ACT inhaler, Inhale 2 puffs into the lungs every 6  hours as needed for shortness of breath / dyspnea, Disp: 18 g, Rfl: 1     albuterol (PROVENTIL) (2.5 MG/3ML) 0.083% neb solution, NEBULIZE CONTENTS OF 1 VIAL EVERY 6 HOURS AS NEEDED FOR SHORTNESS OF BREATH / DYSPNEA / WHEEZING (Patient not taking: Reported on 9/22/2021), Disp: 90 mL, Rfl: 1     augmented betamethasone dipropionate (DIPROLENE-AF) 0.05 % external cream, Apply sparingly to affected area twice daily as needed.  Do not apply to face., Disp: 100 g, Rfl: 3     buPROPion (WELLBUTRIN XL) 150 MG 24 hr tablet, Take 1 tablet (150 mg) by mouth daily, Disp: 90 tablet, Rfl: 1     buPROPion (WELLBUTRIN) 100 MG tablet, Take 0.5 tablets (50 mg) by mouth 2 times daily, Disp: 60 tablet, Rfl: 3     escitalopram (LEXAPRO) 20 MG tablet, Take 1 tablet (20 mg) by mouth daily, Disp: 90 tablet, Rfl: 1     fish oil-omega-3 fatty acids 1000 MG capsule, Take 2 g by mouth daily, Disp: , Rfl:      fluticasone-salmeterol (ADVAIR) 250-50 MCG/DOSE inhaler, Inhale 1 puff into the lungs every 12 hours (Patient not taking: Reported on 9/22/2021), Disp: 1 each, Rfl: 11     SUMAtriptan (IMITREX) 100 MG tablet, Take 1 tablet (100 mg) by mouth at onset of headache for migraine May repeat dose in 2 hours.  Do not exceed 200 mg in 24 hours, Disp: 18 tablet, Rfl: 3     tiotropium (SPIRIVA RESPIMAT) 2.5 MCG/ACT inhaler, Inhale 2 puffs into the lungs daily (Patient not taking: Reported on 9/22/2021), Disp: 4 g, Rfl: 0     triamcinolone (KENALOG) 0.1 % external cream, Apply sparingly to affected area three times daily for 7-14 days., Disp: 45 g, Rfl: 1     Vitamin A 7.5 MG (02940 UT) CAPS, , Disp: , Rfl:      vitamin B complex with vitamin C (VITAMIN  B COMPLEX) tablet, Take 1 tablet by mouth daily , Disp: , Rfl:      Vitamin D, Cholecalciferol, 25 MCG (1000 UT) CAPS, , Disp: , Rfl:       Platform used for Video Visit: Cristina  Answers for HPI/ROS submitted by the patient on 10/26/2021  If you checked off any problems, how difficult have these  problems made it for you to do your work, take care of things at home, or get along with other people?: Not difficult at all  PHQ9 TOTAL SCORE: 3  BRAULIO 7 TOTAL SCORE: 0  General Symptoms: Yes  Skin Symptoms: Yes  HENT Symptoms: No  EYE SYMPTOMS: Yes  HEART SYMPTOMS: Yes  LUNG SYMPTOMS: No  INTESTINAL SYMPTOMS: Yes  URINARY SYMPTOMS: No  GYNECOLOGIC SYMPTOMS: Yes  BREAST SYMPTOMS: No  SKELETAL SYMPTOMS: Yes  BLOOD SYMPTOMS: No  NERVOUS SYSTEM SYMPTOMS: Yes  MENTAL HEALTH SYMPTOMS: No  Fever: No  Loss of appetite: No  Weight loss: No  Weight gain: Yes  Fatigue: Yes  Night sweats: No  Chills: No  Increased stress: No  Excessive hunger: Yes  Excessive thirst: Yes  Feeling hot or cold when others believe the temperature is normal: No  Loss of height: No  Post-operative complications: No  Surgical site pain: No  Hallucinations: No  Change in or Loss of Energy: Yes  Hyperactivity: No  Confusion: No  Changes in hair: No  Changes in moles/birth marks: No  Itching: Yes  Rashes: No  Changes in nails: Yes  Acne: No  Hair in places you don't want it: No  Change in facial hair: No  Warts: No  Non-healing sores: No  Scarring: No  Flaking of skin: No  Color changes of hands/feet in cold : Yes  Sun sensitivity: No  Skin thickening: No  Eye pain: No  Vision loss: Yes  Dry eyes: No  Watery eyes: No  Eye bulging: No  Double vision: No  Flashing of lights: No  Spots: No  Floaters: No  Redness: No  Crossed eyes: No  Tunnel Vision: No  Yellowing of eyes: No  Eye irritation: No  Chest pain or pressure: No  Fast or irregular heartbeat: No  Pain in legs with walking: Yes  Trouble breathing while lying down: No  Fingers or toes appear blue: No  High blood pressure: No  Low blood pressure: Yes  Fainting: No  Murmurs: No  Pacemaker: No  Varicose veins: No  Edema or swelling: Yes  Wake up at night with shortness of breath: No  Light-headedness: Yes  Exercise intolerance: Yes  Heart burn or indigestion: Yes  Nausea: Yes  Vomiting: No  Abdominal  pain: Yes  Bloating: No  Constipation: Yes  Diarrhea: Yes  Blood in stool: No  Black stools: No  Rectal or Anal pain: No  Fecal incontinence: No  Yellowing of skin or eyes: No  Vomit with blood: No  Change in stools: No  Bleeding or spotting between periods: No  Heavy or painful periods: Yes  Irregular periods: No  Vaginal discharge: No  Hot flashes: No  Vaginal dryness: No  Genital ulcers: No  Reduced libido: Yes  Painful intercourse: No  Difficulty with sexual arousal: No  Post-menopausal bleeding: No  Back pain: Yes  Muscle aches: Yes  Neck pain: Yes  Swollen joints: Yes  Joint pain: Yes  Bone pain: Yes  Muscle cramps: Yes  Muscle weakness: Yes  Joint stiffness: Yes  Bone fracture: No  Trouble with coordination: No  Dizziness or trouble with balance: Yes  Fainting or black-out spells: No  Memory loss: Yes  Headache: Yes  Seizures: No  Speech problems: No  Tingling: No  Tremor: No  Weakness: Yes  Difficulty walking: Yes  Paralysis: No  Numbness: Yes

## 2021-10-27 NOTE — PATIENT INSTRUCTIONS
[facility] at [email or web address] or [phone]

## 2021-10-27 NOTE — LETTER
10/27/2021       RE: Cecilia Pabon  [address] [address] [place] [id number]     Dear Colleague,    Thank you for referring your patient, Cecilia Pabon, to the [facility] [place] at [facility]. Please see a copy of my visit note below.    Current Symptoms:  Cecilia Pabon is a 45 year old female who was diagnosed with Covid infection on 17 November 2020 and presents with post viral fatigue syndrome, persistent dry cough associated with shortness of breath, insomnia leading to impaired mobility and ADLs  She was last seen in the cliinic on 4/8/2021     Her main symptoms are joint pains, fatigue and   She states that she needs assistance with getting her out of bed/chair when she gets stiff.   She needs pain in her joints   Headaches are ongoing   She feels her eye sight is getting worse, she is seeing blurred however she states that she completed her eye exam this year and has had a eyeglasses.   Fatigue is ongoing, she thinks she is improved. It stops her perform activities that she would like.   She denies any insomnia. This is improved. She sleeps 9-11 pm, if not tired, she wakes up at 6-7 am, she has 2 puppies. She sleeps with the puppies on the sofa. She has to wake up once to twice for the puppies or by herself. She does not feel rested. She is able to go back to sleep. She is not taking any somnolent. She states that she sleeps 6-8 hours.    Cecilia is grieving from her cousins loss who committed suicide. She slid her carotid artery and wrist.   She has completed her pulmonary therapy and OP PT.   Her brain fog seems to be improving   She is full time, 2 rest breaks, and one hour lunch break.   Her PHQ9 score is 3 today, and GAD7 is 0.   She scores mild for insomnia and pain.       Goals of Care:   Post viral fatigue: conservative management.     Brain fog: improving, and she is working 8-9 hours  which provides her good stimulation.     Insomnia;  Her sleep is disturbed by both her puppies, and by herself. She feels a sense of coziness when sleeping with the puppies. Counseled on REM sleep and need to sleep without interruptions. None of the medications have worked for her.     Depression/anxiety: she is on Wellbutrin and Lexapro. Recommend no change as she is doing well with this regimen and recommend continuing it.   [facility] at [email or web address] or [phone]     Nutrition:   She is on Vitamin A, E, D, C, and Zinc. Recommend adding magnesium as a cognitive enhancer. Quercetin is another supplement she takes, one of the side effects are headaches.     Joint pains: recommend stretching exercises, caution with medications.      Exercise regimen:   She uses a stationary bike and stretches given by Pul[facility] and walking. Recommend recording her activities.      Workability   She is working FT, is taking rest breaks and is nearing a 1 year period. She is much improved now. Continue the current strategies such as rest breaks.     Current concerns: Grief or Loss    PHQ Assesment Total Score(s) 10/26/2021   PHQ-9 Score 3   Some recent data might be hidden     BRAULIO-7 Results 10/26/2021   BRAULIO 7 TOTAL SCORE 0 (minimal anxiety)   Some recent data might be hidden     PTSD Screen Score 10/26/2021   Have you ever experienced this kind of event? Yes   PTSD Screen (Score of 3 or more suggests positive screen) 2   Some recent data might be hidden     PROMIS-29 10/26/2021   PROMIS Physical Function T-Score 48 (within normal limits)   PROMIS Anxiety T-Score 40.3 (within normal limits)   PROMIS Depression T-Score 41 (within normal limits)   PROMIS Fatigue T-Score 60.7   PROMIS Sleep Disturbance T-Score 56.1 (mild)   PROMIS Ability to Participate in Social Roles & Activities T-Score 51.6 (within normal limits)   PROMIS Pain Interference T-Score 57.1  (mild)   PROMIS Pain Intensity 7     Work Productivity and Activity Impairment Questionnaire: General Health V2.0 10/27/2021   Are you currently employed (working for pay)? Yes   During the past seven days, how many hours did you miss from work because of your health problems?  0   During the past seven days, how many hours did you miss from work because of any other reason, such as vacation, holidays, time off to participate in this study? 1   During the past seven days, how many hours did you actually work? 9   During the past seven days, how much did your health problems affect your productivity while you were working? 3   During the past seven days, how much did your health problems affect your ability to do your regular activities, other than work at a job? 3         Past Medical History:   Diagnosis Date     Abdominal pain, other specified site      Abdominal pain, right upper quadrant      Anxiety state, unspecified      ASCUS on Pap smear 2003    +HR HPV 16     COPD (chronic obstructive pulmonary disease) (H)     My Mom has this     Family history of breast cancer in mother 9/17/2009     Family history of breast cancer in mother      Family history of breast cancer in mother      Headache 2/25/2005     Headache      Headache      Headache(784.0)      Hx of colposcopy with cervical biopsy 6/2/2003    WNL     Mental disorders of mother, postpartum     postpartum depression     Migraine, unspecified, without mention of intractable migraine without mention of status migrainosus      Other specified cardiac dysrhythmias(427.89)      Other specified complication, antepartum(646.83)      Swelling of limb      Uncomplicated asthma     Myself     Unspecified viral infection, in conditions classified elsewhere and of unspecified site      Urinary tract infection, site not specified        Past Surgical History:   Procedure Laterality Date     ABDOMEN SURGERY  [id number]    Csecttion     COLONOSCOPY       GI SURGERY        LAPAROSCOPIC CHOLECYSTECTOMY N/A 2014    Procedure: LAPAROSCOPIC CHOLECYSTECTOMY;  Surgeon: Ulises Starr MD;  Location: [facility] OR     SURGICAL HISTORY OF -       Csection       Family History   Problem Relation Age of Onset     Depression Mother      Respiratory Mother         COPD, emphysema     Cancer Mother      Neurologic Disorder Mother         raunads syndrom     Breast Cancer Mother         Stage 4     Other Cancer Mother         Cervical     Anesthesia Reaction Mother      Osteoporosis Mother      Anxiety Disorder Mother      Unknown/Adopted Mother         Dying of COPD     Gastrointestinal Disease Maternal Grandmother      Depression Maternal Grandmother      Hypertension Maternal Grandmother      Cancer Maternal Grandmother         skin cancer     Breast Cancer Maternal Grandmother      Other Cancer Maternal Grandmother         Melanoma     Anxiety Disorder Maternal Grandmother      Breast Cancer Cousin         Grandfathers side     Breast Cancer Cousin         Grandfathers side BRCA 1     Prostate Cancer Other         maternal side     Prostate Cancer Other         maternal side     Prostate Cancer Other         maternal side     Leukemia Paternal Half-Sister         1/2 sister,  in age 13 or 15     Diabetes Cousin      Hypertension Other      Hypertension Maternal Grandfather      Hyperlipidemia Maternal Grandfather      Asthma Other        Social History     Tobacco Use     Smoking status: Never Smoker     Smokeless tobacco: Never Used   Substance Use Topics     Alcohol use: Yes     Comment: Maybe 1-2 times a week     Drug use: No         Current Outpatient Medications:      albuterol (PROAIR HFA/PROVENTIL HFA/VENTOLIN HFA) 108 (90 Base) MCG/ACT inhaler, Inhale 2 puffs into the lungs every 6 hours as needed for shortness of breath / dyspnea, Disp: 18 g, Rfl: 1     albuterol (PROVENTIL) (2.5 MG/3ML) 0.083% neb solution, NEBULIZE CONTENTS OF 1 VIAL EVERY 6 HOURS AS NEEDED FOR SHORTNESS OF  BREATH / DYSPNEA / WHEEZING (Patient not taking: Reported on 9/22/2021), Disp: 90 mL, Rfl: 1     augmented betamethasone dipropionate (DIPROLENE-AF) 0.05 % external cream, Apply sparingly to affected area twice daily as needed.  Do not apply to face., Disp: 100 g, Rfl: 3     buPROPion (WELLBUTRIN XL) 150 MG 24 hr tablet, Take 1 tablet (150 mg) by mouth daily, Disp: 90 tablet, Rfl: 1     buPROPion (WELLBUTRIN) 100 MG tablet, Take 0.5 tablets (50 mg) by mouth 2 times daily, Disp: 60 tablet, Rfl: 3     escitalopram (LEXAPRO) 20 MG tablet, Take 1 tablet (20 mg) by mouth daily, Disp: 90 tablet, Rfl: 1     fish oil-omega-3 fatty acids 1000 MG capsule, Take 2 g by mouth daily, Disp: , Rfl:      fluticasone-salmeterol (ADVAIR) 250-50 MCG/DOSE inhaler, Inhale 1 puff into the lungs every 12 hours (Patient not taking: Reported on 9/22/2021), Disp: 1 each, Rfl: 11     SUMAtriptan (IMITREX) 100 MG tablet, Take 1 tablet (100 mg) by mouth at onset of headache for migraine May repeat dose in 2 hours.  Do not exceed 200 mg in 24 hours, Disp: 18 tablet, Rfl: 3     tiotropium (SPIRIVA RESPIMAT) 2.5 MCG/ACT inhaler, Inhale 2 puffs into the lungs daily (Patient not taking: Reported on 9/22/2021), Disp: 4 g, Rfl: 0     triamcinolone (KENALOG) 0.1 % external cream, Apply sparingly to affected area three times daily for 7-14 days., Disp: 45 g, Rfl: 1     Vitamin A 7.5 MG (62574 UT) CAPS, , Disp: , Rfl:      vitamin B complex with vitamin C (VITAMIN  B COMPLEX) tablet, Take 1 tablet by mouth daily , Disp: , Rfl:      Vitamin D, Cholecalciferol, 25 MCG (1000 UT) CAPS, , Disp: , Rfl:       Platform used for Video Visit: Well  Answers for HPI/ROS submitted by the patient on 10/26/2021  If you checked off any problems, how difficult have these problems made it for you to do your work, take care of things at home, or get along with other people?: Not difficult at all  PHQ9 TOTAL SCORE: 3  BRAULIO 7 TOTAL SCORE: 0  General Symptoms: Yes  Skin  Symptoms: Yes  HENT Symptoms: No  EYE SYMPTOMS: Yes  HEART SYMPTOMS: Yes  LUNG SYMPTOMS: No  INTESTINAL SYMPTOMS: Yes  URINARY SYMPTOMS: No  GYNECOLOGIC SYMPTOMS: Yes  BREAST SYMPTOMS: No  SKELETAL SYMPTOMS: Yes  BLOOD SYMPTOMS: No  NERVOUS SYSTEM SYMPTOMS: Yes  MENTAL HEALTH SYMPTOMS: No  Fever: No  Loss of appetite: No  Weight loss: No  Weight gain: Yes  Fatigue: Yes  Night sweats: No  Chills: No  Increased stress: No  Excessive hunger: Yes  Excessive thirst: Yes  Feeling hot or cold when others believe the temperature is normal: No  Loss of height: No  Post-operative complications: No  Surgical site pain: No  Hallucinations: No  Change in or Loss of Energy: Yes  Hyperactivity: No  Confusion: No  Changes in hair: No  Changes in moles/birth marks: No  Itching: Yes  Rashes: No  Changes in nails: Yes  Acne: No  Hair in places you don't want it: No  Change in facial hair: No  Warts: No  Non-healing sores: No  Scarring: No  Flaking of skin: No  Color changes of hands/feet in cold : Yes  Sun sensitivity: No  Skin thickening: No  Eye pain: No  Vision loss: Yes  Dry eyes: No  Watery eyes: No  Eye bulging: No  Double vision: No  Flashing of lights: No  Spots: No  Floaters: No  Redness: No  Crossed eyes: No  Tunnel Vision: No  Yellowing of eyes: No  Eye irritation: No  Chest pain or pressure: No  Fast or irregular heartbeat: No  Pain in legs with walking: Yes  Trouble breathing while lying down: No  Fingers or toes appear blue: No  High blood pressure: No  Low blood pressure: Yes  Fainting: No  Murmurs: No  Pacemaker: No  Varicose veins: No  Edema or swelling: Yes  Wake up at night with shortness of breath: No  Light-headedness: Yes  Exercise intolerance: Yes  Heart burn or indigestion: Yes  Nausea: Yes  Vomiting: No  Abdominal pain: Yes  Bloating: No  Constipation: Yes  Diarrhea: Yes  Blood in stool: No  Black stools: No  Rectal or Anal pain: No  Fecal incontinence: No  Yellowing of skin or eyes: No  Vomit with blood:  No  Change in stools: No  Bleeding or spotting between periods: No  Heavy or painful periods: Yes  Irregular periods: No  Vaginal discharge: No  Hot flashes: No  Vaginal dryness: No  Genital ulcers: No  Reduced libido: Yes  Painful intercourse: No  Difficulty with sexual arousal: No  Post-menopausal bleeding: No  Back pain: Yes  Muscle aches: Yes  Neck pain: Yes  Swollen joints: Yes  Joint pain: Yes  Bone pain: Yes  Muscle cramps: Yes  Muscle weakness: Yes  Joint stiffness: Yes  Bone fracture: No  Trouble with coordination: No  Dizziness or trouble with balance: Yes  Fainting or black-out spells: No  Memory loss: Yes  Headache: Yes  Seizures: No  Speech problems: No  Tingling: No  Tremor: No  Weakness: Yes  Difficulty walking: Yes  Paralysis: No  Numbness: Yes          Again, thank you for allowing me to participate in the care of your patient.      Sincerely,    Corina Cash MD

## 2021-11-03 ENCOUNTER — DOCUMENTATION ONLY (OUTPATIENT)
Dept: PHYSICAL MEDICINE AND REHAB | Facility: CLINIC | Age: 46
End: 2021-11-03

## 2021-11-03 NOTE — PROGRESS NOTES
Mental Health referral ordered.  Patient was called on 10/28/21 (VM full) and letter sent on 11/2/21 to schedule.    Tucker Hunt

## 2021-12-28 ENCOUNTER — MYC MEDICAL ADVICE (OUTPATIENT)
Dept: OTHER | Age: 46
End: 2021-12-28

## 2021-12-28 ENCOUNTER — ALLIED HEALTH/NURSE VISIT (OUTPATIENT)
Dept: FAMILY MEDICINE | Facility: CLINIC | Age: 46
End: 2021-12-28
Payer: COMMERCIAL

## 2021-12-28 ENCOUNTER — MYC MEDICAL ADVICE (OUTPATIENT)
Dept: FAMILY MEDICINE | Facility: CLINIC | Age: 46
End: 2021-12-28

## 2021-12-28 ENCOUNTER — E-VISIT (OUTPATIENT)
Dept: FAMILY MEDICINE | Facility: CLINIC | Age: 46
End: 2021-12-28
Payer: COMMERCIAL

## 2021-12-28 ENCOUNTER — HOSPITAL ENCOUNTER (EMERGENCY)
Facility: CLINIC | Age: 46
Discharge: HOME | End: 2021-12-29
Attending: EMERGENCY MEDICINE | Admitting: EMERGENCY MEDICINE
Payer: COMMERCIAL

## 2021-12-28 DIAGNOSIS — Z20.822 SUSPECTED COVID-19 VIRUS INFECTION: Primary | ICD-10-CM

## 2021-12-28 DIAGNOSIS — R11.2 NON-INTRACTABLE VOMITING WITH NAUSEA, UNSPECIFIED VOMITING TYPE: ICD-10-CM

## 2021-12-28 DIAGNOSIS — J10.1 INFLUENZA A: ICD-10-CM

## 2021-12-28 DIAGNOSIS — J10.1 INFLUENZA A: Primary | ICD-10-CM

## 2021-12-28 DIAGNOSIS — Z20.822 SUSPECTED COVID-19 VIRUS INFECTION: ICD-10-CM

## 2021-12-28 LAB
ALBUMIN SERPL-MCNC: 3.6 G/DL (ref 3.4–5)
ALP SERPL-CCNC: 64 U/L (ref 40–150)
ALT SERPL W P-5'-P-CCNC: 27 U/L (ref 0–50)
ANION GAP SERPL CALCULATED.3IONS-SCNC: 10 MMOL/L (ref 3–14)
AST SERPL W P-5'-P-CCNC: 24 U/L (ref 0–45)
BASOPHILS # BLD AUTO: 0 10E3/UL (ref 0–0.2)
BASOPHILS NFR BLD AUTO: 0 %
BILIRUB SERPL-MCNC: 0.3 MG/DL (ref 0.2–1.3)
BUN SERPL-MCNC: 8 MG/DL (ref 7–30)
CALCIUM SERPL-MCNC: 8.8 MG/DL (ref 8.5–10.1)
CHLORIDE BLD-SCNC: 106 MMOL/L (ref 94–109)
CO2 SERPL-SCNC: 23 MMOL/L (ref 20–32)
CREAT SERPL-MCNC: 0.72 MG/DL (ref 0.52–1.04)
EOSINOPHIL # BLD AUTO: 0 10E3/UL (ref 0–0.7)
EOSINOPHIL NFR BLD AUTO: 0 %
ERYTHROCYTE [DISTWIDTH] IN BLOOD BY AUTOMATED COUNT: 12 % (ref 10–15)
FLUAV AG SPEC QL IA: POSITIVE
FLUBV AG SPEC QL IA: NEGATIVE
GFR SERPL CREATININE-BSD FRML MDRD: >90 ML/MIN/1.73M2
GLUCOSE BLD-MCNC: 111 MG/DL (ref 70–99)
HCT VFR BLD AUTO: 38.7 % (ref 35–47)
HGB BLD-MCNC: 13.2 G/DL (ref 11.7–15.7)
HOLD SPECIMEN: NORMAL
HOLD SPECIMEN: NORMAL
IMM GRANULOCYTES # BLD: 0 10E3/UL
IMM GRANULOCYTES NFR BLD: 0 %
LYMPHOCYTES # BLD AUTO: 0.6 10E3/UL (ref 0.8–5.3)
LYMPHOCYTES NFR BLD AUTO: 13 %
MCH RBC QN AUTO: 32 PG (ref 26.5–33)
MCHC RBC AUTO-ENTMCNC: 34.1 G/DL (ref 31.5–36.5)
MCV RBC AUTO: 94 FL (ref 78–100)
MONOCYTES # BLD AUTO: 0.3 10E3/UL (ref 0–1.3)
MONOCYTES NFR BLD AUTO: 6 %
NEUTROPHILS # BLD AUTO: 3.8 10E3/UL (ref 1.6–8.3)
NEUTROPHILS NFR BLD AUTO: 81 %
NRBC # BLD AUTO: 0 10E3/UL
NRBC BLD AUTO-RTO: 0 /100
PLATELET # BLD AUTO: 204 10E3/UL (ref 150–450)
POTASSIUM BLD-SCNC: 3.6 MMOL/L (ref 3.4–5.3)
PROT SERPL-MCNC: 7.7 G/DL (ref 6.8–8.8)
RBC # BLD AUTO: 4.12 10E6/UL (ref 3.8–5.2)
SODIUM SERPL-SCNC: 139 MMOL/L (ref 133–144)
WBC # BLD AUTO: 4.8 10E3/UL (ref 4–11)

## 2021-12-28 PROCEDURE — 99284 EMERGENCY DEPT VISIT MOD MDM: CPT | Performed by: EMERGENCY MEDICINE

## 2021-12-28 PROCEDURE — 96374 THER/PROPH/DIAG INJ IV PUSH: CPT | Performed by: EMERGENCY MEDICINE

## 2021-12-28 PROCEDURE — 250N000011 HC RX IP 250 OP 636: Performed by: EMERGENCY MEDICINE

## 2021-12-28 PROCEDURE — U0005 INFEC AGEN DETEC AMPLI PROBE: HCPCS

## 2021-12-28 PROCEDURE — 87804 INFLUENZA ASSAY W/OPTIC: CPT

## 2021-12-28 PROCEDURE — 258N000003 HC RX IP 258 OP 636: Performed by: EMERGENCY MEDICINE

## 2021-12-28 PROCEDURE — 99207 PR NO CHARGE NURSE ONLY: CPT

## 2021-12-28 PROCEDURE — 85025 COMPLETE CBC W/AUTO DIFF WBC: CPT | Performed by: EMERGENCY MEDICINE

## 2021-12-28 PROCEDURE — 82040 ASSAY OF SERUM ALBUMIN: CPT | Performed by: EMERGENCY MEDICINE

## 2021-12-28 PROCEDURE — 96375 TX/PRO/DX INJ NEW DRUG ADDON: CPT | Performed by: EMERGENCY MEDICINE

## 2021-12-28 PROCEDURE — 99421 OL DIG E/M SVC 5-10 MIN: CPT | Performed by: FAMILY MEDICINE

## 2021-12-28 PROCEDURE — 36415 COLL VENOUS BLD VENIPUNCTURE: CPT | Performed by: EMERGENCY MEDICINE

## 2021-12-28 PROCEDURE — 96361 HYDRATE IV INFUSION ADD-ON: CPT | Performed by: EMERGENCY MEDICINE

## 2021-12-28 PROCEDURE — 99284 EMERGENCY DEPT VISIT MOD MDM: CPT | Mod: 25 | Performed by: EMERGENCY MEDICINE

## 2021-12-28 PROCEDURE — U0003 INFECTIOUS AGENT DETECTION BY NUCLEIC ACID (DNA OR RNA); SEVERE ACUTE RESPIRATORY SYNDROME CORONAVIRUS 2 (SARS-COV-2) (CORONAVIRUS DISEASE [COVID-19]), AMPLIFIED PROBE TECHNIQUE, MAKING USE OF HIGH THROUGHPUT TECHNOLOGIES AS DESCRIBED BY CMS-2020-01-R: HCPCS

## 2021-12-28 RX ORDER — ONDANSETRON 2 MG/ML
4 INJECTION INTRAMUSCULAR; INTRAVENOUS ONCE
Status: DISCONTINUED | OUTPATIENT
Start: 2021-12-28 | End: 2021-12-28

## 2021-12-28 RX ORDER — KETOROLAC TROMETHAMINE 15 MG/ML
15 INJECTION, SOLUTION INTRAMUSCULAR; INTRAVENOUS ONCE
Status: COMPLETED | OUTPATIENT
Start: 2021-12-28 | End: 2021-12-28

## 2021-12-28 RX ORDER — ONDANSETRON 2 MG/ML
4 INJECTION INTRAMUSCULAR; INTRAVENOUS ONCE
Status: COMPLETED | OUTPATIENT
Start: 2021-12-28 | End: 2021-12-28

## 2021-12-28 RX ORDER — OSELTAMIVIR PHOSPHATE 75 MG/1
75 CAPSULE ORAL 2 TIMES DAILY
Qty: 10 CAPSULE | Refills: 0 | Status: SHIPPED | OUTPATIENT
Start: 2021-12-28 | End: 2022-01-02

## 2021-12-28 RX ORDER — ONDANSETRON 4 MG/1
4 TABLET, ORALLY DISINTEGRATING ORAL EVERY 8 HOURS PRN
Qty: 10 TABLET | Refills: 0 | Status: SHIPPED | OUTPATIENT
Start: 2021-12-28 | End: 2022-07-28

## 2021-12-28 RX ADMIN — ONDANSETRON 4 MG: 2 INJECTION INTRAMUSCULAR; INTRAVENOUS at 22:34

## 2021-12-28 RX ADMIN — SODIUM CHLORIDE, POTASSIUM CHLORIDE, SODIUM LACTATE AND CALCIUM CHLORIDE 1000 ML: 600; 310; 30; 20 INJECTION, SOLUTION INTRAVENOUS at 22:30

## 2021-12-28 RX ADMIN — KETOROLAC TROMETHAMINE 15 MG: 15 INJECTION, SOLUTION INTRAMUSCULAR; INTRAVENOUS at 22:34

## 2021-12-28 NOTE — LETTER
December 28, 2021      Cecilia Pabon  [address] [address] [place] [phone]        To Whom It May Concern:    Cecilia Pabon was seen in our clinic December 28, 2021.  She may return to work without restrictions on Rajinder 3 2021       Sincerely,        Ammy Rolon MD

## 2021-12-28 NOTE — PATIENT INSTRUCTIONS
Cecilia,    Your symptoms show that you may have coronavirus (COVID-19). This illness can cause fever, cough and trouble breathing. Many people get a mild case and get better on their own. Some people can get very sick.    Because you reported additional symptoms    What should I do?  We would like to test you for COVID-19 virus and influenza  . I have placed orders for these tests. To schedule: go to your [employer] home page and scroll down to the section that says  You have an appointment that needs to be scheduled  and click the large green button that says  Schedule Now  and follow the steps to find the next available openings. It is important that when you are asked what the reason for your appointment is that you mention you need BOTH COVID and flu tests.    If you are unable to complete these [employer] scheduling steps, please call [phone] to schedule your testing.     Return to work/school/ guidance:   Please let your workplace manager and staffing office know when your isolation ends.       If you receive a positive COVID-19 test result, follow the guidance of the those who are giving you the results. Usually the return to work is 10 days from symptom onset or positive test date, whichever comes first (or in some cases 20 days if you are immunocompromised). If your symptoms started after your positive test, the 10 days should start when your symptoms started.   o If you work at [employer], you must also be cleared by Employee Occupational Health and Safety to return to work.      If you receive a negative COVID-19 test result and did not have a high risk exposure to someone with a known positive COVID-19 test, you can return to work once you're free of fever for 24 hours without fever-reducing medication and your symptoms are improving or resolved.    If you receive a negative COVID-19 test and if you had a high risk exposure to someone who has tested positive for COVID-19 then you can  return to work 14 days after your last contact with the positive individual. Follow quarantine guidance given by your doctor or public health officials.    Sign up for [employer].   We know it's scary to hear that you might have COVID-19. We want to track your symptoms to make sure you're okay over the next 2 weeks. Please look for an email from [employer]--this is a free, online program that we'll use to keep in touch. To sign up, follow the link in the email you will receive. Learn more at [email or web address]    How can I take care of myself?  Over the counter medications may help with your symptoms like congestion, cough, chills, or fever.     There are not many effective prescription treatments for early COVID-19. Hydroxychloroquine, ivermectin, and azithromycin are not effective or recommended for COVID-19.    If your symptoms started in the last 10 days, you may be able to receive a treatment with monoclonal antibodies. This treatment can lower your risk of severe illness and going to the hospital. It is given through an IV or under your skin (subcutaneous) and must be given at an infusion center. You must be 12 or older, weight at least 88 pounds, and have a positive COVID-19 test.      If you would like to sign up to be considered to receive the monoclonal antibody medicine, please complete a participation form through the [facility] here:  MNRAP ([email or web address]). You may also call the [facility] COVID-19 Public Hotline at 1-833.685.4800 (open Mon-Fri: 9am-7pm and Sat: 10am-6pm).     Not all people who are eligible will receive the medicine, since supply is limited. You will be contacted in the next 1 to 2 business days only if you are selected. If you do not receive a call, you have not been selected to receive the medicine. If you have any questions about this medication, please contact your primary care provider. For more  information, see [email or web address]      Get lots of rest. Drink extra fluids (unless a doctor has told you not to)    Take Tylenol (acetaminophen) or ibuprofen for fever or pain. If you have liver or kidney problems, ask your family doctor if it's okay to take Tylenol or ibuprofen    Take over the counter medications for your symptoms, as directed by your doctor. You may also talk to your pharmacist.      If you have other health problems (like cancer, heart failure, an organ transplant or severe kidney disease): Call your specialty clinic if you don't feel better in the next 2 days.    Know when to call 911. Emergency warning signs include:  o Trouble breathing or shortness of breath  o Pain or pressure in the chest that doesn't go away  o Feeling confused like you haven't felt before, or not being able to wake up  o Bluish-colored lips or face    Where can I get more information?    [facility] - About COVID-19: [email or web address]     CDC - What to Do If You're Sick:   www.cdc.gov/coronavirus/2019-ncov/about/steps-when-sick.html    CDC - Ending Home Isolation:  https://www.cdc.gov/coronavirus/2019-ncov/your-health/quarantine-isolation.html    CDC - Caring for Someone:  www.cdc.gov/coronavirus/2019-ncov/if-you-are-sick/care-for-someone.html    [facility] clinical trials (COVID-19 research studies): clinicalaffairs.[facility]-clinical-trials    Below are the COVID-19 hotlines at the [facility] ([facility]). Interpreters are available.  o For health questions: Call 595-510-1015 or 1-263.478.3591 (7 a.m. to 7 p.m.)  o For questions about schools and childcare: Call 381-198-7608 or 1-357.127.3960 (7 a.m. to 7 p.m.)

## 2021-12-29 ENCOUNTER — MYC MEDICAL ADVICE (OUTPATIENT)
Dept: OTHER | Age: 46
End: 2021-12-29

## 2021-12-29 ENCOUNTER — MYC MEDICAL ADVICE (OUTPATIENT)
Dept: FAMILY MEDICINE | Facility: CLINIC | Age: 46
End: 2021-12-29
Payer: COMMERCIAL

## 2021-12-29 VITALS
WEIGHT: 162 LBS | SYSTOLIC BLOOD PRESSURE: 117 MMHG | OXYGEN SATURATION: 94 % | BODY MASS INDEX: 29.87 KG/M2 | HEART RATE: 107 BPM | TEMPERATURE: 98.5 F | DIASTOLIC BLOOD PRESSURE: 77 MMHG | RESPIRATION RATE: 18 BRPM

## 2021-12-29 NOTE — ED TRIAGE NOTES
Diagnosed with influenza today, given tamiflu, tylenol for fevers up to 103, body aches, epigastric pain

## 2021-12-29 NOTE — DISCHARGE INSTRUCTIONS
Use acetaminophen and ibuprofen as needed for symptoms.  Use ondansetron as needed for nausea and vomiting.  Return to the emergency department for worsening symptoms, abdominal pain, lightheadedness, difficulty breathing, or other concerns.  Follow-up in clinic if not feeling better over the next week.

## 2021-12-29 NOTE — ED PROVIDER NOTES
History     Chief Complaint   Patient presents with     Influenza     diagnosed today in clinic     Vomiting     not keeping anything down today     HPI  Cecilia Pabon is a 46 year old female with known influenza infection who presents for vomiting.  The patient says that she became sick acutely yesterday with headache, fever up to 103  F, cough, fatigue, body aches.  She went to clinic today and had a positive influenza swab.  She was started on oseltamivir.  Then about 6 hours prior to her arrival here she started vomiting profusely.  She reports multiple episodes of nonbloody emesis throughout the day with epigastric abdominal pain.  This started prior to taking her first dose of oseltamivir.  No diarrhea.  She continues to feel weak and rundown.  She has been using acetaminophen intermittently for symptoms.  Pain is aching in her epigastric region, throbbing, radiates up her throat.  She rates her symptoms as severe.  Minimal difficulty breathing and wheeze she reports.    Allergies:  No Known Allergies    Problem List:    Patient Active Problem List    Diagnosis Date Noted     Enteritis 02/17/2015     Priority: Medium     Abdominal pain 02/17/2015     Priority: Medium     Acute cholecystitis 12/29/2014     Priority: Medium     HYPERLIPIDEMIA LDL GOAL <160 10/31/2010     Priority: Medium     Family history of breast cancer in mother 09/17/2009     Priority: Medium     Mild persistent asthma 03/28/2006     Priority: Medium     Headache 02/25/2005     Priority: Medium      2-3 in past yr.  Reduced after ear piercing at acupressure point.       Anxiety state 02/25/2005     Priority: Medium     Problem list name updated by automated process. Provider to review          Past Medical History:    Past Medical History:   Diagnosis Date     Abdominal pain, other specified site      Abdominal pain, right upper quadrant      Anxiety state, unspecified      ASCUS on Pap smear 2003     COPD (chronic obstructive  pulmonary disease) (H)      Family history of breast cancer in mother 9/17/2009     Family history of breast cancer in mother      Family history of breast cancer in mother      Headache 2/25/2005     Headache      Headache      Headache(784.0)      Hx of colposcopy with cervical biopsy 6/2/2003     Mental disorders of mother, postpartum      Migraine, unspecified, without mention of intractable migraine without mention of status migrainosus      Other specified cardiac dysrhythmias(427.89)      Other specified complication, antepartum(646.83)      Swelling of limb      Uncomplicated asthma      Unspecified viral infection, in conditions classified elsewhere and of unspecified site      Urinary tract infection, site not specified        Past Surgical History:    Past Surgical History:   Procedure Laterality Date     ABDOMEN SURGERY  [id number]    Csecttion     COLONOSCOPY       GI SURGERY       LAPAROSCOPIC CHOLECYSTECTOMY N/A 12/30/2014    Procedure: LAPAROSCOPIC CHOLECYSTECTOMY;  Surgeon: Ulises Starr MD;  Location: [facility]     SURGICAL HISTORY OF -       Csection       Family History:    Family History   Problem Relation Age of Onset     Depression Mother      Respiratory Mother         COPD, emphysema     Cancer Mother      Neurologic Disorder Mother         raunads syndrom     Breast Cancer Mother         Stage 4     Other Cancer Mother         Cervical     Anesthesia Reaction Mother      Osteoporosis Mother      Anxiety Disorder Mother      Unknown/Adopted Mother         Dying of COPD     Gastrointestinal Disease Maternal Grandmother      Depression Maternal Grandmother      Hypertension Maternal Grandmother      Cancer Maternal Grandmother         skin cancer     Breast Cancer Maternal Grandmother      Other Cancer Maternal Grandmother         Melanoma     Anxiety Disorder Maternal Grandmother      Breast Cancer Cousin         Grandfathers side     Breast Cancer Cousin         Grandfathers side BRCA 1      Prostate Cancer Other         maternal side     Prostate Cancer Other         maternal side     Prostate Cancer Other         maternal side     Leukemia Paternal Half-Sister         1/2 sister,  in age 13 or 15     Diabetes Cousin      Hypertension Other      Hypertension Maternal Grandfather      Hyperlipidemia Maternal Grandfather      Asthma Other        Social History:  Marital Status:   [2]  Social History     Tobacco Use     Smoking status: Never Smoker     Smokeless tobacco: Never Used   Substance Use Topics     Alcohol use: Yes     Comment: Maybe 1-2 times a week     Drug use: No        Medications:    ondansetron (ZOFRAN-ODT) 4 MG ODT tab  albuterol (PROAIR HFA/PROVENTIL HFA/VENTOLIN HFA) 108 (90 Base) MCG/ACT inhaler  albuterol (PROVENTIL) (2.5 MG/3ML) 0.083% neb solution  augmented betamethasone dipropionate (DIPROLENE-AF) 0.05 % external cream  buPROPion (WELLBUTRIN XL) 150 MG 24 hr tablet  buPROPion (WELLBUTRIN) 100 MG tablet  escitalopram (LEXAPRO) 20 MG tablet  fish oil-omega-3 fatty acids 1000 MG capsule  fluticasone-salmeterol (ADVAIR) 250-50 MCG/DOSE inhaler  oseltamivir (TAMIFLU) 75 MG capsule  SUMAtriptan (IMITREX) 100 MG tablet  tiotropium (SPIRIVA RESPIMAT) 2.5 MCG/ACT inhaler  triamcinolone (KENALOG) 0.1 % external cream  Vitamin A 7.5 MG (43883 UT) CAPS  vitamin B complex with vitamin C (VITAMIN  B COMPLEX) tablet  Vitamin D, Cholecalciferol, 25 MCG (1000 UT) CAPS          Review of Systems  Pertinent positives and negatives listed in the HPI, all other systems reviewed and are negative.    Physical Exam   BP: 117/77  Pulse: 107  Temp: 98.5  F (36.9  C)  Resp: 18  Weight: 73.5 kg (162 lb)  SpO2: 97 %      Physical Exam  Vitals and nursing note reviewed.   Constitutional:       General: She is in acute distress.      Appearance: She is well-developed. She is not diaphoretic.   HENT:      Head: Normocephalic and atraumatic.      Right Ear: External ear normal.      Left Ear: External  ear normal.      Nose: Nose normal.   Eyes:      General: No scleral icterus.     Conjunctiva/sclera: Conjunctivae normal.   Cardiovascular:      Rate and Rhythm: Regular rhythm. Tachycardia present.      Heart sounds: No murmur heard.      Pulmonary:      Effort: Pulmonary effort is normal. No respiratory distress.      Breath sounds: No stridor.   Abdominal:      General: There is no distension.      Palpations: Abdomen is soft.      Tenderness: There is abdominal tenderness in the epigastric area. There is no guarding or rebound.   Musculoskeletal:      Cervical back: Normal range of motion.   Skin:     General: Skin is warm and dry.   Neurological:      Mental Status: She is alert and oriented to person, place, and time.   Psychiatric:         Behavior: Behavior normal.         ED Course                 Procedures              Critical Care time:  none               Results for orders placed or performed during the hospital encounter of 12/28/21 (from the past 24 hour(s))   CBC with Platelets & Differential    Narrative    The following orders were created for panel order CBC with Platelets & Differential.  Procedure                               Abnormality         Status                     ---------                               -----------         ------                     CBC with platelets and d...[[id number]]  Abnormal            Final result                 Please view results for these tests on the individual orders.   Comprehensive metabolic panel   Result Value Ref Range    Sodium 139 133 - 144 mmol/L    Potassium 3.6 3.4 - 5.3 mmol/L    Chloride 106 94 - 109 mmol/L    Carbon Dioxide (CO2) 23 20 - 32 mmol/L    Anion Gap 10 3 - 14 mmol/L    Urea Nitrogen 8 7 - 30 mg/dL    Creatinine 0.72 0.52 - 1.04 mg/dL    Calcium 8.8 8.5 - 10.1 mg/dL    Glucose 111 (H) 70 - 99 mg/dL    Alkaline Phosphatase 64 40 - 150 U/L    AST 24 0 - 45 U/L    ALT 27 0 - 50 U/L    Protein Total 7.7 6.8 - 8.8 g/dL    Albumin 3.6  3.4 - 5.0 g/dL    Bilirubin Total 0.3 0.2 - 1.3 mg/dL    GFR Estimate >90 >60 mL/min/1.73m2   CBC with platelets and differential   Result Value Ref Range    WBC Count 4.8 4.0 - 11.0 10e3/uL    RBC Count 4.12 3.80 - 5.20 10e6/uL    Hemoglobin 13.2 11.7 - 15.7 g/dL    Hematocrit 38.7 35.0 - 47.0 %    MCV 94 78 - 100 fL    MCH 32.0 26.5 - 33.0 pg    MCHC 34.1 31.5 - 36.5 g/dL    RDW 12.0 10.0 - 15.0 %    Platelet Count 204 150 - 450 10e3/uL    % Neutrophils 81 %    % Lymphocytes 13 %    % Monocytes 6 %    % Eosinophils 0 %    % Basophils 0 %    % Immature Granulocytes 0 %    NRBCs per 100 WBC 0 <1 /100    Absolute Neutrophils 3.8 1.6 - 8.3 10e3/uL    Absolute Lymphocytes 0.6 (L) 0.8 - 5.3 10e3/uL    Absolute Monocytes 0.3 0.0 - 1.3 10e3/uL    Absolute Eosinophils 0.0 0.0 - 0.7 10e3/uL    Absolute Basophils 0.0 0.0 - 0.2 10e3/uL    Absolute Immature Granulocytes 0.0 <=0.4 10e3/uL    Absolute NRBCs 0.0 10e3/uL   [facility] Draw    Narrative    The following orders were created for panel order [facility] Draw.  Procedure                               Abnormality         Status                     ---------                               -----------         ------                     Extra Blue Top Tube[[id number]]                              Final result               Extra Red Top Tube[[id number]]                               Final result                 Please view results for these tests on the individual orders.   Extra Blue Top Tube   Result Value Ref Range    Hold Specimen JIC    Extra Red Top Tube   Result Value Ref Range    Hold Specimen JIC        Medications   lactated ringers BOLUS 1,000 mL (0 mLs Intravenous Stopped 12/29/21 0008)   ondansetron (ZOFRAN) injection 4 mg (4 mg Intravenous Given 12/28/21 2234)   ketorolac (TORADOL) injection 15 mg (15 mg Intravenous Given 12/28/21 2234)       Assessments & Plan (with Medical Decision Making)   46-year-old female presents for vomiting in the setting of active influenza a  infection.  Blood pressure is 117/77, temperature is 36.9  C, heart 107, SPO2 is 97% on room air.  She is given IV fluids, ondansetron, and ketorolac for symptoms.  Electrolytes are within normal limits.  White blood cell count is 4.8 and hemoglobin is 13.2, no signs of anemia causing symptoms.  On recheck she is feeling better and tolerating some oral intake and is safe to discharge home with a short course of ondansetron and instructions to return if she has worsening symptoms or other concerns, otherwise follow-up in clinic.  The patient is in agreement to this plan peer    I have reviewed the nursing notes.    I have reviewed the findings, diagnosis, plan and need for follow up with the patient.       Discharge Medication List as of 12/29/2021 12:09 AM      START taking these medications    Details   ondansetron (ZOFRAN-ODT) 4 MG ODT tab Take 1 tablet (4 mg) by mouth every 8 hours as needed for nausea, Disp-10 tablet, R-0, [facility]             Final diagnoses:   Influenza A   Non-intractable vomiting with nausea, unspecified vomiting type       12/28/2021   [facility] EMERGENCY DEPT     Luis Enrique Mott MD  12/29/21 0034

## 2021-12-30 ENCOUNTER — PATIENT OUTREACH (OUTPATIENT)
Dept: CARE COORDINATION | Facility: CLINIC | Age: 46
End: 2021-12-30
Payer: COMMERCIAL

## 2021-12-30 DIAGNOSIS — Z71.89 OTHER SPECIFIED COUNSELING: ICD-10-CM

## 2021-12-30 LAB — SARS-COV-2 RNA RESP QL NAA+PROBE: NEGATIVE

## 2021-12-30 NOTE — PROGRESS NOTES
Clinic Care Coordination Contact  [facility]/Voicemail       Clinical Data: Care Coordinator Outreach  Reason for referral: TCM outreach  Outreach attempted x 1.  Left message on patient's voicemail with call back information and requested return call.  Plan:  Care Coordinator will try to reach patient again in 1-2 business days.       Coretta Ron  Community Health Worker  [facility] [place]  Ph: [phone]

## 2021-12-31 NOTE — PROGRESS NOTES
Clinic Care Coordination Contact  [facility]/Voicemail       Clinical Data: Care Coordinator Outreach  Outreach attempted x 2.  Left message on patient's voicemail with call back information and requested return call.  Plan:Care Coordinator will do no further outreaches at this time.          GILLES Dasilva  [phone]  [facility]

## 2022-01-08 ENCOUNTER — HEALTH MAINTENANCE LETTER (OUTPATIENT)
Age: 47
End: 2022-01-08

## 2022-01-12 ENCOUNTER — MYC MEDICAL ADVICE (OUTPATIENT)
Dept: FAMILY MEDICINE | Facility: CLINIC | Age: 47
End: 2022-01-12
Payer: COMMERCIAL

## 2022-02-16 ENCOUNTER — MYC MEDICAL ADVICE (OUTPATIENT)
Dept: FAMILY MEDICINE | Facility: CLINIC | Age: 47
End: 2022-02-16
Payer: COMMERCIAL

## 2022-02-16 ENCOUNTER — APPOINTMENT (OUTPATIENT)
Dept: CT IMAGING | Facility: CLINIC | Age: 47
End: 2022-02-16
Attending: EMERGENCY MEDICINE
Payer: COMMERCIAL

## 2022-02-16 ENCOUNTER — HOSPITAL ENCOUNTER (EMERGENCY)
Facility: CLINIC | Age: 47
Discharge: HOME | End: 2022-02-16
Attending: EMERGENCY MEDICINE | Admitting: EMERGENCY MEDICINE
Payer: COMMERCIAL

## 2022-02-16 VITALS
SYSTOLIC BLOOD PRESSURE: 126 MMHG | DIASTOLIC BLOOD PRESSURE: 82 MMHG | TEMPERATURE: 98.7 F | OXYGEN SATURATION: 97 % | HEART RATE: 89 BPM | WEIGHT: 163 LBS | BODY MASS INDEX: 30.05 KG/M2

## 2022-02-16 DIAGNOSIS — R10.9 RIGHT FLANK PAIN: ICD-10-CM

## 2022-02-16 DIAGNOSIS — B02.9 HERPES ZOSTER WITHOUT COMPLICATION: Primary | ICD-10-CM

## 2022-02-16 LAB
ALBUMIN SERPL-MCNC: 3.8 G/DL (ref 3.4–5)
ALBUMIN UR-MCNC: NEGATIVE MG/DL
ALP SERPL-CCNC: 63 U/L (ref 40–150)
ALT SERPL W P-5'-P-CCNC: 19 U/L (ref 0–50)
ANION GAP SERPL CALCULATED.3IONS-SCNC: 3 MMOL/L (ref 3–14)
APPEARANCE UR: CLEAR
AST SERPL W P-5'-P-CCNC: 9 U/L (ref 0–45)
BACTERIA #/AREA URNS HPF: ABNORMAL /HPF
BASOPHILS # BLD AUTO: 0 10E3/UL (ref 0–0.2)
BASOPHILS NFR BLD AUTO: 0 %
BILIRUB SERPL-MCNC: 0.2 MG/DL (ref 0.2–1.3)
BILIRUB UR QL STRIP: NEGATIVE
BUN SERPL-MCNC: 11 MG/DL (ref 7–30)
CALCIUM SERPL-MCNC: 9.1 MG/DL (ref 8.5–10.1)
CHLORIDE BLD-SCNC: 106 MMOL/L (ref 94–109)
CO2 SERPL-SCNC: 31 MMOL/L (ref 20–32)
COLOR UR AUTO: YELLOW
CREAT SERPL-MCNC: 0.76 MG/DL (ref 0.52–1.04)
EOSINOPHIL # BLD AUTO: 0.1 10E3/UL (ref 0–0.7)
EOSINOPHIL NFR BLD AUTO: 2 %
ERYTHROCYTE [DISTWIDTH] IN BLOOD BY AUTOMATED COUNT: 12.5 % (ref 10–15)
GFR SERPL CREATININE-BSD FRML MDRD: >90 ML/MIN/1.73M2
GLUCOSE BLD-MCNC: 110 MG/DL (ref 70–99)
GLUCOSE UR STRIP-MCNC: NEGATIVE MG/DL
HCG UR QL: NEGATIVE
HCT VFR BLD AUTO: 37.7 % (ref 35–47)
HGB BLD-MCNC: 13 G/DL (ref 11.7–15.7)
HGB UR QL STRIP: NEGATIVE
IMM GRANULOCYTES # BLD: 0 10E3/UL
IMM GRANULOCYTES NFR BLD: 0 %
KETONES UR STRIP-MCNC: NEGATIVE MG/DL
LEUKOCYTE ESTERASE UR QL STRIP: NEGATIVE
LIPASE SERPL-CCNC: 145 U/L (ref 73–393)
LYMPHOCYTES # BLD AUTO: 3 10E3/UL (ref 0.8–5.3)
LYMPHOCYTES NFR BLD AUTO: 40 %
MCH RBC QN AUTO: 32.7 PG (ref 26.5–33)
MCHC RBC AUTO-ENTMCNC: 34.5 G/DL (ref 31.5–36.5)
MCV RBC AUTO: 95 FL (ref 78–100)
MONOCYTES # BLD AUTO: 0.5 10E3/UL (ref 0–1.3)
MONOCYTES NFR BLD AUTO: 7 %
NEUTROPHILS # BLD AUTO: 3.8 10E3/UL (ref 1.6–8.3)
NEUTROPHILS NFR BLD AUTO: 51 %
NITRATE UR QL: NEGATIVE
NRBC # BLD AUTO: 0 10E3/UL
NRBC BLD AUTO-RTO: 0 /100
PH UR STRIP: 6.5 [PH] (ref 5–7)
PLATELET # BLD AUTO: 257 10E3/UL (ref 150–450)
POTASSIUM BLD-SCNC: 4.2 MMOL/L (ref 3.4–5.3)
PROT SERPL-MCNC: 7.6 G/DL (ref 6.8–8.8)
RBC # BLD AUTO: 3.98 10E6/UL (ref 3.8–5.2)
RBC URINE: 0 /HPF
SODIUM SERPL-SCNC: 140 MMOL/L (ref 133–144)
SP GR UR STRIP: 1.01 (ref 1–1.03)
SQUAMOUS EPITHELIAL: 1 /HPF
UROBILINOGEN UR STRIP-MCNC: NORMAL MG/DL
WBC # BLD AUTO: 7.5 10E3/UL (ref 4–11)
WBC URINE: <1 /HPF

## 2022-02-16 PROCEDURE — 250N000011 HC RX IP 250 OP 636: Performed by: EMERGENCY MEDICINE

## 2022-02-16 PROCEDURE — 99284 EMERGENCY DEPT VISIT MOD MDM: CPT | Mod: 25

## 2022-02-16 PROCEDURE — 99284 EMERGENCY DEPT VISIT MOD MDM: CPT | Performed by: EMERGENCY MEDICINE

## 2022-02-16 PROCEDURE — 80053 COMPREHEN METABOLIC PANEL: CPT | Performed by: EMERGENCY MEDICINE

## 2022-02-16 PROCEDURE — 81001 URINALYSIS AUTO W/SCOPE: CPT | Performed by: EMERGENCY MEDICINE

## 2022-02-16 PROCEDURE — 83690 ASSAY OF LIPASE: CPT | Performed by: EMERGENCY MEDICINE

## 2022-02-16 PROCEDURE — 36415 COLL VENOUS BLD VENIPUNCTURE: CPT | Performed by: EMERGENCY MEDICINE

## 2022-02-16 PROCEDURE — 74176 CT ABD & PELVIS W/O CONTRAST: CPT

## 2022-02-16 PROCEDURE — 85025 COMPLETE CBC W/AUTO DIFF WBC: CPT | Performed by: EMERGENCY MEDICINE

## 2022-02-16 PROCEDURE — 81025 URINE PREGNANCY TEST: CPT | Performed by: EMERGENCY MEDICINE

## 2022-02-16 RX ORDER — OXYCODONE AND ACETAMINOPHEN 5; 325 MG/1; MG/1
1 TABLET ORAL EVERY 6 HOURS PRN
Qty: 4 TABLET | Refills: 0 | Status: SHIPPED | OUTPATIENT
Start: 2022-02-16 | End: 2022-02-17

## 2022-02-16 RX ORDER — KETOROLAC TROMETHAMINE 15 MG/ML
15 INJECTION, SOLUTION INTRAMUSCULAR; INTRAVENOUS ONCE
Status: COMPLETED | OUTPATIENT
Start: 2022-02-16 | End: 2022-02-16

## 2022-02-16 RX ADMIN — KETOROLAC TROMETHAMINE 15 MG: 15 INJECTION, SOLUTION INTRAMUSCULAR; INTRAVENOUS at 16:19

## 2022-02-16 NOTE — DISCHARGE INSTRUCTIONS
Return if symptoms worsen or new symptoms develop.  Follow-up with primary care physician next available.  Drink plenty of fluids.  Take ibuprofen or Tylenol for pain take Percocet for severe pain.  If pain is worsening or new symptoms develop including rash fever vomiting decreased urine output or change in abdominal pain please return for further evaluation and care.  There was no obvious abnormality noted on the CT scan today.  All your labs are within normal limits.

## 2022-02-16 NOTE — ED PROVIDER NOTES
History     Chief Complaint   Patient presents with     Flank Pain     HPI  Cecilia Pabon is a 46 year old female with a past medical history significant for abdominal pain enteritis asthma anxiety and headaches who presents emergency department complaining of right flank and right upper quadrant pain.  Patient states it was a sharp pain last night in her flank radiating to her right upper quadrant.  Patient states pain dull throughout the evening but is still present now is an aching pain that is worsened in intensity over the last few hours.  Patient has not had nausea vomiting.  She denies any loose stools or bloody diarrhea she has not had blood in stool she denies any focal numbness weakness in extremities she denies any calf pain.  She denies any bowel or bladder dysfunction.  She has not had fever chills denies headache or visual changes.  She has not had chest pain or shortness of breath.    Allergies:  No Known Allergies    Problem List:    Patient Active Problem List    Diagnosis Date Noted     Enteritis 02/17/2015     Priority: Medium     Abdominal pain 02/17/2015     Priority: Medium     Acute cholecystitis 12/29/2014     Priority: Medium     HYPERLIPIDEMIA LDL GOAL <160 10/31/2010     Priority: Medium     Family history of breast cancer in mother 09/17/2009     Priority: Medium     Mild persistent asthma 03/28/2006     Priority: Medium     Headache 02/25/2005     Priority: Medium      2-3 in past yr.  Reduced after ear piercing at acupressure point.       Anxiety state 02/25/2005     Priority: Medium     Problem list name updated by automated process. Provider to review          Past Medical History:    Past Medical History:   Diagnosis Date     Abdominal pain, other specified site      Abdominal pain, right upper quadrant      Anxiety state, unspecified      ASCUS on Pap smear 2003     COPD (chronic obstructive pulmonary disease) (H)      Family history of breast cancer in mother 9/17/2009      Family history of breast cancer in mother      Family history of breast cancer in mother      Headache 2/25/2005     Headache      Headache      Headache(784.0)      Hx of colposcopy with cervical biopsy 6/2/2003     Mental disorders of mother, postpartum      Migraine, unspecified, without mention of intractable migraine without mention of status migrainosus      Other specified cardiac dysrhythmias(427.89)      Other specified complication, antepartum(646.83)      Swelling of limb      Uncomplicated asthma      Unspecified viral infection, in conditions classified elsewhere and of unspecified site      Urinary tract infection, site not specified        Past Surgical History:    Past Surgical History:   Procedure Laterality Date     ABDOMEN SURGERY  [id number]    Csecttion     COLONOSCOPY       GI SURGERY       LAPAROSCOPIC CHOLECYSTECTOMY N/A 12/30/2014    Procedure: LAPAROSCOPIC CHOLECYSTECTOMY;  Surgeon: Ulises Starr MD;  Location: [facility]     SURGICAL HISTORY OF -       Csection       Family History:    Family History   Problem Relation Age of Onset     Depression Mother      Respiratory Mother         COPD, emphysema     Cancer Mother      Neurologic Disorder Mother         raunads syndrom     Breast Cancer Mother         Stage 4     Other Cancer Mother         Cervical     Anesthesia Reaction Mother      Osteoporosis Mother      Anxiety Disorder Mother      Unknown/Adopted Mother         Dying of COPD     Gastrointestinal Disease Maternal Grandmother      Depression Maternal Grandmother      Hypertension Maternal Grandmother      Cancer Maternal Grandmother         skin cancer     Breast Cancer Maternal Grandmother      Other Cancer Maternal Grandmother         Melanoma     Anxiety Disorder Maternal Grandmother      Breast Cancer Cousin         Grandfathers side     Breast Cancer Cousin         Grandfathers side BRCA 1     Prostate Cancer Other         maternal side     Prostate Cancer Other         maternal  side     Prostate Cancer Other         maternal side     Leukemia Paternal Half-Sister         1/2 sister,  in age 13 or 15     Diabetes Cousin      Hypertension Other      Hypertension Maternal Grandfather      Hyperlipidemia Maternal Grandfather      Asthma Other        Social History:  Marital Status:   [2]  Social History     Tobacco Use     Smoking status: Never Smoker     Smokeless tobacco: Never Used   Substance Use Topics     Alcohol use: Yes     Comment: Maybe 1-2 times a week     Drug use: No        Medications:    albuterol (PROAIR HFA/PROVENTIL HFA/VENTOLIN HFA) 108 (90 Base) MCG/ACT inhaler  albuterol (PROVENTIL) (2.5 MG/3ML) 0.083% neb solution  augmented betamethasone dipropionate (DIPROLENE-AF) 0.05 % external cream  buPROPion (WELLBUTRIN XL) 150 MG 24 hr tablet  buPROPion (WELLBUTRIN) 100 MG tablet  escitalopram (LEXAPRO) 20 MG tablet  fish oil-omega-3 fatty acids 1000 MG capsule  fluticasone-salmeterol (ADVAIR) 250-50 MCG/DOSE inhaler  ondansetron (ZOFRAN-ODT) 4 MG ODT tab  SUMAtriptan (IMITREX) 100 MG tablet  tiotropium (SPIRIVA RESPIMAT) 2.5 MCG/ACT inhaler  triamcinolone (KENALOG) 0.1 % external cream  Vitamin A 7.5 MG (62357 UT) CAPS  vitamin B complex with vitamin C (VITAMIN  B COMPLEX) tablet  Vitamin D, Cholecalciferol, 25 MCG (1000 UT) CAPS          Review of Systems  All systems reviewed and other than pertinent positives and negatives in HPI all other systems are negative.  Physical Exam   BP: 121/79  Pulse: (!) 129  Temp: 98.7  F (37.1  C)  Weight: 73.9 kg (163 lb)  SpO2: 99 %      Physical Exam  Vitals and nursing note reviewed.   Constitutional:       General: She is not in acute distress.     Appearance: Normal appearance. She is not ill-appearing, toxic-appearing or diaphoretic.   HENT:      Head: Normocephalic and atraumatic.      Nose: Nose normal.      Mouth/Throat:      Mouth: Mucous membranes are moist.      Pharynx: Oropharynx is clear.   Eyes:       Conjunctiva/sclera: Conjunctivae normal.   Cardiovascular:      Rate and Rhythm: Normal rate and regular rhythm.      Pulses: Normal pulses.      Heart sounds: Normal heart sounds. No murmur heard.  Pulmonary:      Effort: Pulmonary effort is normal.      Breath sounds: Normal breath sounds. No wheezing or rhonchi.   Chest:      Chest wall: No tenderness.   Abdominal:      Comments: Soft, nondistended tender to palpation in the right flank and right upper side region. No erythema edema or rash are noted in the back or side or anterior abdomen. There is no epigastric or left upper quadrant pain present no lower abdominal pain present. There is no guarding or rebound bowel sounds are positive no masses palpated. Patient has had previous laparoscopic cholecystectomy.   Musculoskeletal:         General: No swelling or tenderness. Normal range of motion.      Cervical back: Normal range of motion and neck supple.      Right lower leg: No edema.      Left lower leg: No edema.   Skin:     General: Skin is dry.      Findings: No rash.   Neurological:      General: No focal deficit present.      Mental Status: She is alert and oriented to person, place, and time.      Sensory: No sensory deficit.      Motor: No weakness.      Coordination: Coordination normal.   Psychiatric:         Mood and Affect: Mood normal.         ED Course                 Procedures              Critical Care time:  none               Results for orders placed or performed during the hospital encounter of 02/16/22 (from the past 24 hour(s))   UA with Microscopic reflex to Culture    Specimen: Urine, Clean Catch   Result Value Ref Range    Color Urine Yellow Colorless, Straw, Light Yellow, Yellow    Appearance Urine Clear Clear    Glucose Urine Negative Negative mg/dL    Bilirubin Urine Negative Negative    Ketones Urine Negative Negative mg/dL    Specific Gravity Urine 1.010 1.003 - 1.035    Blood Urine Negative Negative    pH Urine 6.5 5.0 - 7.0     Protein Albumin Urine Negative Negative mg/dL    Urobilinogen Urine Normal Normal, 2.0 mg/dL    Nitrite Urine Negative Negative    Leukocyte Esterase Urine Negative Negative    Bacteria Urine Few (A) None Seen /HPF    RBC Urine 0 <=2 /HPF    WBC Urine <1 <=5 /HPF    Squamous Epithelials Urine 1 <=1 /HPF    Narrative    Urine Culture not indicated   HCG qualitative urine   Result Value Ref Range    hCG Urine Qualitative Negative Negative   CBC with platelets differential    Narrative    The following orders were created for panel order CBC with platelets differential.  Procedure                               Abnormality         Status                     ---------                               -----------         ------                     CBC with platelets and d...[[id number]]                      Final result                 Please view results for these tests on the individual orders.   Comprehensive metabolic panel   Result Value Ref Range    Sodium 140 133 - 144 mmol/L    Potassium 4.2 3.4 - 5.3 mmol/L    Chloride 106 94 - 109 mmol/L    Carbon Dioxide (CO2) 31 20 - 32 mmol/L    Anion Gap 3 3 - 14 mmol/L    Urea Nitrogen 11 7 - 30 mg/dL    Creatinine 0.76 0.52 - 1.04 mg/dL    Calcium 9.1 8.5 - 10.1 mg/dL    Glucose 110 (H) 70 - 99 mg/dL    Alkaline Phosphatase 63 40 - 150 U/L    AST 9 0 - 45 U/L    ALT 19 0 - 50 U/L    Protein Total 7.6 6.8 - 8.8 g/dL    Albumin 3.8 3.4 - 5.0 g/dL    Bilirubin Total 0.2 0.2 - 1.3 mg/dL    GFR Estimate >90 >60 mL/min/1.73m2   Lipase   Result Value Ref Range    Lipase 145 73 - 393 U/L   CBC with platelets and differential   Result Value Ref Range    WBC Count 7.5 4.0 - 11.0 10e3/uL    RBC Count 3.98 3.80 - 5.20 10e6/uL    Hemoglobin 13.0 11.7 - 15.7 g/dL    Hematocrit 37.7 35.0 - 47.0 %    MCV 95 78 - 100 fL    MCH 32.7 26.5 - 33.0 pg    MCHC 34.5 31.5 - 36.5 g/dL    RDW 12.5 10.0 - 15.0 %    Platelet Count 257 150 - 450 10e3/uL    % Neutrophils 51 %    % Lymphocytes 40 %    %  Monocytes 7 %    % Eosinophils 2 %    % Basophils 0 %    % Immature Granulocytes 0 %    NRBCs per 100 WBC 0 <1 /100    Absolute Neutrophils 3.8 1.6 - 8.3 10e3/uL    Absolute Lymphocytes 3.0 0.8 - 5.3 10e3/uL    Absolute Monocytes 0.5 0.0 - 1.3 10e3/uL    Absolute Eosinophils 0.1 0.0 - 0.7 10e3/uL    Absolute Basophils 0.0 0.0 - 0.2 10e3/uL    Absolute Immature Granulocytes 0.0 <=0.4 10e3/uL    Absolute NRBCs 0.0 10e3/uL   Abd/pelvis CT - no contrast - Stone Protocol    Narrative    CT ABDOMEN/PELVIS WITHOUT CONTRAST February 16, 2022 4:53 PM    CLINICAL HISTORY: Flank pain, kidney stone suspected.    TECHNIQUE: CT scan of the abdomen and pelvis was performed without IV  contrast. Multiplanar reformats were obtained. Dose reduction  techniques were used.  CONTRAST: None.    COMPARISON: 6/19/2018.    FINDINGS:   LOWER CHEST: Normal.    HEPATOBILIARY: The gallbladder has been removed.    PANCREAS: Normal.    SPLEEN: Normal.    ADRENAL GLANDS: Normal.    KIDNEYS/BLADDER: No hydronephrosis, hydroureter, or urinary tract  calculus demonstrated. No contour deforming renal masses. Urinary  bladder unremarkable.    BOWEL: Normal appendix. No bowel obstruction or inflammatory change.    LYMPH NODES: Normal.    VASCULATURE: Unremarkable.    PELVIC ORGANS: Normal.    OTHER: No ascites.    MUSCULOSKELETAL: Unremarkable.      Impression    IMPRESSION: No cause for flank pain demonstrated.    JANENE PEDROZA MD         SYSTEM ID:  [id number]       Medications   ketorolac (TORADOL) injection 15 mg (15 mg Intravenous Given 2/16/22 2663)       Assessments & Plan (with Medical Decision Making) records were reviewed. Labs were obtained. Patient was given Toradol for pain. CBC was unremarkable. Comprehensive metabolic panel without significant abnormality. Lipase was 145. Pregnancy test was negative. UA without significant abnormality. Due to her presentation a CT renal stone protocol was discussed with the patient and she was agreement  with obtaining this. This revealed no obvious cause of flank pain. Findings discussed in detail with patient. Just because of the burning pain she could have an early shingles type presentation but no rashes present. This also could be musculoskeletal in nature. I am going to give the patient a few pain pills and have her use heat heat in this area. If any rash develops or other symptoms present including nausea vomiting worsening abdominal pain or other things present patient should immediately return for further evaluation and care. Patient is agreement this plan.     I have reviewed the nursing notes.    I have reviewed the findings, diagnosis, plan and need for follow up with the patient.       Discharge Medication List as of 2/16/2022  5:18 PM      START taking these medications    Details   oxyCODONE-acetaminophen (PERCOCET) 5-325 MG tablet Take 1 tablet by mouth every 6 hours as needed for severe pain, Disp-4 tablet, R-0, Local Print             Final diagnoses:   Right flank pain       2/16/2022   [facility] EMERGENCY DEPT     Michael Appiah MD  02/16/22 1945

## 2022-02-18 RX ORDER — VALACYCLOVIR HYDROCHLORIDE 1 G/1
1000 TABLET, FILM COATED ORAL 3 TIMES DAILY
Qty: 21 TABLET | Refills: 0 | Status: SHIPPED | OUTPATIENT
Start: 2022-02-18 | End: 2022-09-15

## 2022-03-29 ENCOUNTER — TELEPHONE (OUTPATIENT)
Dept: FAMILY MEDICINE | Facility: CLINIC | Age: 47
End: 2022-03-29
Payer: COMMERCIAL

## 2022-03-29 NOTE — TELEPHONE ENCOUNTER
Patient Quality Outreach    Patient is due for the following:   Asthma  -  ACT needed    NEXT STEPS:   No follow up needed at this time.    Type of outreach:    Sent [email or web address] message.      Questions for provider review:    None     Maddie Roberts, CMA

## 2022-07-28 ENCOUNTER — OFFICE VISIT (OUTPATIENT)
Dept: URGENT CARE | Facility: URGENT CARE | Age: 47
End: 2022-07-28
Payer: COMMERCIAL

## 2022-07-28 VITALS
TEMPERATURE: 97.9 F | DIASTOLIC BLOOD PRESSURE: 64 MMHG | OXYGEN SATURATION: 97 % | RESPIRATION RATE: 16 BRPM | SYSTOLIC BLOOD PRESSURE: 102 MMHG | HEART RATE: 76 BPM

## 2022-07-28 DIAGNOSIS — R07.0 THROAT PAIN: ICD-10-CM

## 2022-07-28 DIAGNOSIS — J02.9 VIRAL PHARYNGITIS: Primary | ICD-10-CM

## 2022-07-28 LAB
DEPRECATED S PYO AG THROAT QL EIA: NEGATIVE
GROUP A STREP BY PCR: NOT DETECTED

## 2022-07-28 PROCEDURE — 87651 STREP A DNA AMP PROBE: CPT | Performed by: FAMILY MEDICINE

## 2022-07-28 PROCEDURE — 99213 OFFICE O/P EST LOW 20 MIN: CPT | Performed by: FAMILY MEDICINE

## 2022-07-28 NOTE — PROGRESS NOTES
(J02.9) Viral pharyngitis  (primary encounter diagnosis)  Comment:   Plan:     (R07.0) Throat pain  Comment:   Plan: Streptococcus A Rapid Screen w/Reflex to PCR -         Clinic Collect, Group A Streptococcus PCR         Throat Swab          Symptomatic management advised.      CHIEF COMPLAINT    Sore throat 1 to 2 days.      HISTORY    Patient has sore throat.  No fever.  No congestion or cough.    No known exposures.    Does not desire COVID testing.      REVIEW OF SYSTEMS    No fever.  No cough or short of breath.  No abdominal pain.  No rash.      EXAM  /64 (BP Location: Right arm, Patient Position: Sitting, Cuff Size: Adult Regular)   Pulse 76   Temp 97.9  F (36.6  C) (Oral)   Resp 16   SpO2 97%     Pharynx mildly red, small tonsils without inflammation.  No cervical adenopathy.  No observed cough.      Results for orders placed or performed in visit on 07/28/22   Streptococcus A Rapid Screen w/Reflex to PCR - Clinic Collect     Status: Normal    Specimen: Throat; Swab   Result Value Ref Range    Group A Strep antigen Negative Negative

## 2022-07-29 NOTE — RESULT ENCOUNTER NOTE
Group A Streptococcus PCR is NEGATIVE  No treatment or change in treatment [facility] ED lab result Strep Group A protocol.

## 2022-09-15 ENCOUNTER — OFFICE VISIT (OUTPATIENT)
Dept: FAMILY MEDICINE | Facility: CLINIC | Age: 47
End: 2022-09-15
Payer: COMMERCIAL

## 2022-09-15 VITALS
BODY MASS INDEX: 30.91 KG/M2 | HEIGHT: 62 IN | DIASTOLIC BLOOD PRESSURE: 70 MMHG | TEMPERATURE: 98.4 F | SYSTOLIC BLOOD PRESSURE: 122 MMHG | WEIGHT: 168 LBS | HEART RATE: 92 BPM

## 2022-09-15 DIAGNOSIS — Z11.59 NEED FOR HEPATITIS C SCREENING TEST: ICD-10-CM

## 2022-09-15 DIAGNOSIS — Z12.4 CERVICAL CANCER SCREENING: ICD-10-CM

## 2022-09-15 DIAGNOSIS — F32.1 CURRENT MODERATE EPISODE OF MAJOR DEPRESSIVE DISORDER WITHOUT PRIOR EPISODE (H): ICD-10-CM

## 2022-09-15 DIAGNOSIS — N93.8 DUB (DYSFUNCTIONAL UTERINE BLEEDING): Primary | ICD-10-CM

## 2022-09-15 PROBLEM — F32.9 MAJOR DEPRESSION, SINGLE EPISODE: Status: ACTIVE | Noted: 2022-09-15

## 2022-09-15 LAB
ERYTHROCYTE [DISTWIDTH] IN BLOOD BY AUTOMATED COUNT: 12.3 % (ref 10–15)
HCT VFR BLD AUTO: 38.3 % (ref 35–47)
HGB BLD-MCNC: 13.3 G/DL (ref 11.7–15.7)
MCH RBC QN AUTO: 32.8 PG (ref 26.5–33)
MCHC RBC AUTO-ENTMCNC: 34.7 G/DL (ref 31.5–36.5)
MCV RBC AUTO: 95 FL (ref 78–100)
PLATELET # BLD AUTO: 261 10E3/UL (ref 150–450)
RBC # BLD AUTO: 4.05 10E6/UL (ref 3.8–5.2)
TSH SERPL DL<=0.005 MIU/L-ACNC: 1.59 UIU/ML (ref 0.3–4.2)
WBC # BLD AUTO: 7.5 10E3/UL (ref 4–11)

## 2022-09-15 PROCEDURE — G0145 SCR C/V CYTO,THINLAYER,RESCR: HCPCS | Performed by: NURSE PRACTITIONER

## 2022-09-15 PROCEDURE — 84443 ASSAY THYROID STIM HORMONE: CPT | Performed by: NURSE PRACTITIONER

## 2022-09-15 PROCEDURE — 85027 COMPLETE CBC AUTOMATED: CPT | Performed by: NURSE PRACTITIONER

## 2022-09-15 PROCEDURE — 83001 ASSAY OF GONADOTROPIN (FSH): CPT | Performed by: NURSE PRACTITIONER

## 2022-09-15 PROCEDURE — 87624 HPV HI-RISK TYP POOLED RSLT: CPT | Performed by: NURSE PRACTITIONER

## 2022-09-15 PROCEDURE — 36415 COLL VENOUS BLD VENIPUNCTURE: CPT | Performed by: NURSE PRACTITIONER

## 2022-09-15 PROCEDURE — 99214 OFFICE O/P EST MOD 30 MIN: CPT | Performed by: NURSE PRACTITIONER

## 2022-09-15 ASSESSMENT — PAIN SCALES - GENERAL: PAINLEVEL: NO PAIN (0)

## 2022-09-15 ASSESSMENT — ASTHMA QUESTIONNAIRES
ACT_TOTALSCORE: 22
ACT_TOTALSCORE: 22
QUESTION_5 LAST FOUR WEEKS HOW WOULD YOU RATE YOUR ASTHMA CONTROL: SOMEWHAT CONTROLLED
QUESTION_2 LAST FOUR WEEKS HOW OFTEN HAVE YOU HAD SHORTNESS OF BREATH: ONCE OR TWICE A WEEK
QUESTION_1 LAST FOUR WEEKS HOW MUCH OF THE TIME DID YOUR ASTHMA KEEP YOU FROM GETTING AS MUCH DONE AT WORK, SCHOOL OR AT HOME: NONE OF THE TIME
QUESTION_3 LAST FOUR WEEKS HOW OFTEN DID YOUR ASTHMA SYMPTOMS (WHEEZING, COUGHING, SHORTNESS OF BREATH, CHEST TIGHTNESS OR PAIN) WAKE YOU UP AT NIGHT OR EARLIER THAN USUAL IN THE MORNING: NOT AT ALL
QUESTION_4 LAST FOUR WEEKS HOW OFTEN HAVE YOU USED YOUR RESCUE INHALER OR NEBULIZER MEDICATION (SUCH AS ALBUTEROL): NOT AT ALL

## 2022-09-15 ASSESSMENT — ANXIETY QUESTIONNAIRES
GAD7 TOTAL SCORE: 12
1. FEELING NERVOUS, ANXIOUS, OR ON EDGE: SEVERAL DAYS
7. FEELING AFRAID AS IF SOMETHING AWFUL MIGHT HAPPEN: NEARLY EVERY DAY
7. FEELING AFRAID AS IF SOMETHING AWFUL MIGHT HAPPEN: NEARLY EVERY DAY
GAD7 TOTAL SCORE: 12
GAD7 TOTAL SCORE: 12
8. IF YOU CHECKED OFF ANY PROBLEMS, HOW DIFFICULT HAVE THESE MADE IT FOR YOU TO DO YOUR WORK, TAKE CARE OF THINGS AT HOME, OR GET ALONG WITH OTHER PEOPLE?: VERY DIFFICULT
6. BECOMING EASILY ANNOYED OR IRRITABLE: NEARLY EVERY DAY
2. NOT BEING ABLE TO STOP OR CONTROL WORRYING: MORE THAN HALF THE DAYS
4. TROUBLE RELAXING: SEVERAL DAYS
5. BEING SO RESTLESS THAT IT IS HARD TO SIT STILL: NOT AT ALL
3. WORRYING TOO MUCH ABOUT DIFFERENT THINGS: MORE THAN HALF THE DAYS
IF YOU CHECKED OFF ANY PROBLEMS ON THIS QUESTIONNAIRE, HOW DIFFICULT HAVE THESE PROBLEMS MADE IT FOR YOU TO DO YOUR WORK, TAKE CARE OF THINGS AT HOME, OR GET ALONG WITH OTHER PEOPLE: VERY DIFFICULT

## 2022-09-15 ASSESSMENT — ENCOUNTER SYMPTOMS: CRAMPS: 1

## 2022-09-15 NOTE — PROGRESS NOTES
"  Assessment & Plan     DUB (dysfunctional uterine bleeding)    - Follicle stimulating hormone; Future  - TSH with free T4 reflex; Future  - US Pelvic Complete with Transvaginal; Future  - CBC with platelets; Future  - Ob/Gyn Referral; Future  - CBC with platelets; Future  - CBC with platelets  - TSH with free T4 reflex; Future  - Follicle stimulating hormone; Future  - TSH with free T4 reflex  - Follicle stimulating hormone    Current moderate episode of major depressive disorder without prior episode (H)   Controlled no change in treatment plan  - CBC with platelets; Future  - CBC with platelets  - TSH with free T4 reflex; Future  - Follicle stimulating hormone; Future  - TSH with free T4 reflex  - Follicle stimulating hormone    Need for hepatitis C screening test  Reviewed declined removed from chart   - CBC with platelets; Future  - CBC with platelets  - TSH with free T4 reflex; Future  - Follicle stimulating hormone; Future  - TSH with free T4 reflex  - Follicle stimulating hormone    Cervical cancer screening  - Pap Screen with HPV - recommended age 30 - 65 years  - CBC with platelets; Future  - CBC with platelets  - TSH with free T4 reflex; Future  - Follicle stimulating hormone; Future  - TSH with free T4 reflex  - Follicle stimulating hormone  - Gynecologic Cytology (PAP Smear); Future             BMI:   Estimated body mass index is 30.98 kg/m  as calculated from the following:    Height as of this encounter: 1.568 m (5' 1.75\").    Weight as of this encounter: 76.2 kg (168 lb).   Weight management plan: Discussed healthy diet and exercise guidelines    Depression Screening Follow Up    PHQ 9/15/2022   PHQ-9 Total Score 12   Q9: Thoughts of better off dead/self-harm past 2 weeks Not at all     Last PHQ-9 9/15/2022   1.  Little interest or pleasure in doing things 1   2.  Feeling down, depressed, or hopeless 1   3.  Trouble falling or staying asleep, or sleeping too much 3   4.  Feeling tired or having " "little energy 2   5.  Poor appetite or overeating 3   6.  Feeling bad about yourself 1   7.  Trouble concentrating 1   8.  Moving slowly or restless 0   Q9: Thoughts of better off dead/self-harm past 2 weeks 0   PHQ-9 Total Score 12   Difficulty at work, home, or with people -       Follow Up Actions Taken  Crisis resource information provided in After Visit Summary  Mental Health Referral placed     See Patient Instructions    No follow-ups on file.    Waleska Hoyos, ARTURO CNP  [facility]    Noah Brooks is a 46 year old, presenting for the following health issues:  Menstrual Problem, Abdominal Cramping, and Weight Problem      Abdominal Cramping         Patient has not had a period in about two months now. She was having normal periods before. She is wondering if she is starting menopause.    Every now and then over the last couple months she has had a cramping pain in her left pelvic region. This happens every now and then. She has also had some weight gain and has not been able to lose it despite diet and exercise.        Review of Systems   Constitutional, HEENT, cardiovascular, pulmonary, gi and gu systems are negative, except as otherwise noted.      Objective    /70 (BP Location: Right arm, Cuff Size: Adult Regular)   Pulse 92   Temp 98.4  F (36.9  C) (Tympanic)   Ht 1.568 m (5' 1.75\")   Wt 76.2 kg (168 lb)   LMP 07/22/2022   BMI 30.98 kg/m    There is no height or weight on file to calculate BMI.  Physical Exam   GENERAL: healthy, alert and no distress  EYES: Eyes grossly normal to inspection, PERRL and conjunctivae and sclerae normal  HENT: ear canals and TM's normal, nose and mouth without ulcers or lesions  NECK: no adenopathy, no asymmetry, masses, or scars and thyroid normal to palpation  RESP: lungs clear to auscultation - no rales, rhonchi or wheezes  BREAST: normal without masses, tenderness or nipple discharge and no palpable axillary masses or " adenopathy  CV: regular rate and rhythm, normal S1 S2, no S3 or S4, no murmur, click or rub, no peripheral edema and peripheral pulses strong  ABDOMEN: soft, nontender, no hepatosplenomegaly, no masses and bowel sounds normal   (female): normal female external genitalia, normal urethral meatus, vaginal mucosa pink, moist, well rugated, and normal cervix/adnexa/uterus without masses or discharge  MS: no gross musculoskeletal defects noted, no edema  SKIN: no suspicious lesions or rashes  NEURO: Normal strength and tone, mentation intact and speech normal  PSYCH: mentation appears normal, affect normal/bright    Results for orders placed or performed in visit on 09/15/22   CBC with platelets     Status: Normal   Result Value Ref Range    WBC Count 7.5 4.0 - 11.0 10e3/uL    RBC Count 4.05 3.80 - 5.20 10e6/uL    Hemoglobin 13.3 11.7 - 15.7 g/dL    Hematocrit 38.3 35.0 - 47.0 %    MCV 95 78 - 100 fL    MCH 32.8 26.5 - 33.0 pg    MCHC 34.7 31.5 - 36.5 g/dL    RDW 12.3 10.0 - 15.0 %    Platelet Count 261 150 - 450 10e3/uL   TSH with free T4 reflex     Status: Normal   Result Value Ref Range    TSH 1.59 0.30 - 4.20 uIU/mL   Follicle stimulating hormone     Status: None   Result Value Ref Range    FSH 5.6 mIU/mL               Answers for HPI/ROS submitted by the patient on 9/15/2022  If you checked off any problems, how difficult have these problems made it for you to do your work, take care of things at home, or get along with other people?: Somewhat difficult  PHQ9 TOTAL SCORE: 12  BRAULIO 7 TOTAL SCORE: 12

## 2022-09-16 LAB — FSH SERPL IRP2-ACNC: 5.6 MIU/ML

## 2022-09-20 LAB
BKR LAB AP GYN ADEQUACY: NORMAL
BKR LAB AP GYN INTERPRETATION: NORMAL
BKR LAB AP HPV REFLEX: NORMAL
BKR LAB AP LMP: NORMAL
BKR LAB AP PREVIOUS ABNORMAL: NORMAL
PATH REPORT.COMMENTS IMP SPEC: NORMAL
PATH REPORT.COMMENTS IMP SPEC: NORMAL
PATH REPORT.RELEVANT HX SPEC: NORMAL

## 2022-09-22 LAB
HUMAN PAPILLOMA VIRUS 16 DNA: NEGATIVE
HUMAN PAPILLOMA VIRUS 18 DNA: NEGATIVE
HUMAN PAPILLOMA VIRUS FINAL DIAGNOSIS: NORMAL
HUMAN PAPILLOMA VIRUS OTHER HR: NEGATIVE

## 2022-09-28 ENCOUNTER — HOSPITAL ENCOUNTER (OUTPATIENT)
Dept: ULTRASOUND IMAGING | Facility: CLINIC | Age: 47
Discharge: HOME | End: 2022-09-28
Attending: NURSE PRACTITIONER | Admitting: NURSE PRACTITIONER
Payer: COMMERCIAL

## 2022-09-28 ENCOUNTER — OFFICE VISIT (OUTPATIENT)
Dept: OBGYN | Facility: CLINIC | Age: 47
End: 2022-09-28
Payer: COMMERCIAL

## 2022-09-28 VITALS
TEMPERATURE: 97.7 F | HEIGHT: 62 IN | DIASTOLIC BLOOD PRESSURE: 67 MMHG | RESPIRATION RATE: 18 BRPM | SYSTOLIC BLOOD PRESSURE: 93 MMHG | BODY MASS INDEX: 31.1 KG/M2 | WEIGHT: 169 LBS | HEART RATE: 88 BPM

## 2022-09-28 DIAGNOSIS — Z12.31 ENCOUNTER FOR SCREENING MAMMOGRAM FOR BREAST CANCER: ICD-10-CM

## 2022-09-28 DIAGNOSIS — N93.8 DUB (DYSFUNCTIONAL UTERINE BLEEDING): ICD-10-CM

## 2022-09-28 DIAGNOSIS — N93.9 ABNORMAL UTERINE BLEEDING (AUB): Primary | ICD-10-CM

## 2022-09-28 LAB
ESTRADIOL SERPL-MCNC: 150 PG/ML
FSH SERPL IRP2-ACNC: 4.1 MIU/ML
HBA1C MFR BLD: 5.1 % (ref 0–5.6)
HCG UR QL: NEGATIVE
INTERNAL QC OK POCT: NORMAL
POCT KIT EXPIRATION DATE: NORMAL
POCT KIT LOT NUMBER: NORMAL
PROLACTIN SERPL 3RD IS-MCNC: 25 NG/ML (ref 5–23)

## 2022-09-28 PROCEDURE — 76830 TRANSVAGINAL US NON-OB: CPT

## 2022-09-28 PROCEDURE — 81025 URINE PREGNANCY TEST: CPT | Performed by: STUDENT IN AN ORGANIZED HEALTH CARE EDUCATION/TRAINING PROGRAM

## 2022-09-28 PROCEDURE — 82627 DEHYDROEPIANDROSTERONE: CPT | Performed by: STUDENT IN AN ORGANIZED HEALTH CARE EDUCATION/TRAINING PROGRAM

## 2022-09-28 PROCEDURE — 36415 COLL VENOUS BLD VENIPUNCTURE: CPT | Performed by: STUDENT IN AN ORGANIZED HEALTH CARE EDUCATION/TRAINING PROGRAM

## 2022-09-28 PROCEDURE — 84146 ASSAY OF PROLACTIN: CPT | Performed by: STUDENT IN AN ORGANIZED HEALTH CARE EDUCATION/TRAINING PROGRAM

## 2022-09-28 PROCEDURE — 83036 HEMOGLOBIN GLYCOSYLATED A1C: CPT | Performed by: STUDENT IN AN ORGANIZED HEALTH CARE EDUCATION/TRAINING PROGRAM

## 2022-09-28 PROCEDURE — 84403 ASSAY OF TOTAL TESTOSTERONE: CPT | Performed by: STUDENT IN AN ORGANIZED HEALTH CARE EDUCATION/TRAINING PROGRAM

## 2022-09-28 PROCEDURE — 99203 OFFICE O/P NEW LOW 30 MIN: CPT | Performed by: STUDENT IN AN ORGANIZED HEALTH CARE EDUCATION/TRAINING PROGRAM

## 2022-09-28 PROCEDURE — 83498 ASY HYDROXYPROGESTERONE 17-D: CPT | Performed by: STUDENT IN AN ORGANIZED HEALTH CARE EDUCATION/TRAINING PROGRAM

## 2022-09-28 PROCEDURE — 82670 ASSAY OF TOTAL ESTRADIOL: CPT | Performed by: STUDENT IN AN ORGANIZED HEALTH CARE EDUCATION/TRAINING PROGRAM

## 2022-09-28 PROCEDURE — 83001 ASSAY OF GONADOTROPIN (FSH): CPT | Performed by: STUDENT IN AN ORGANIZED HEALTH CARE EDUCATION/TRAINING PROGRAM

## 2022-09-28 PROCEDURE — 84270 ASSAY OF SEX HORMONE GLOBUL: CPT | Performed by: STUDENT IN AN ORGANIZED HEALTH CARE EDUCATION/TRAINING PROGRAM

## 2022-09-28 NOTE — NURSING NOTE
"Initial BP 93/67 (BP Location: Right arm, Patient Position: Chair, Cuff Size: Adult Regular)   Pulse 88   Temp 97.7  F (36.5  C) (Tympanic)   Resp 18   Ht 1.568 m (5' 1.75\")   Wt 76.7 kg (169 lb)   LMP 07/22/2022   Breastfeeding No   BMI 31.16 kg/m   Estimated body mass index is 31.16 kg/m  as calculated from the following:    Height as of this encounter: 1.568 m (5' 1.75\").    Weight as of this encounter: 76.7 kg (169 lb). .      "

## 2022-09-28 NOTE — PROGRESS NOTES
[facility]  Gynecology Office Note    Assessment and Plan:   Cecilia Pabon, 46 year old , presents for abnormal uterine bleeding - no period since LMP 2022.    AUB  - Patient has pelvic ultrasound to evaluate for a structural cause of her bleeding today after our clinic visit.  Her TSH is normal.  To further evaluate for endocrinopathy, we will obtain for prolactin level. PCOS is possible, also recommended blood levels of testosterone, DHEAS, 17-OHP, FSH/estradiol as well as a metabolic syndrome evaluation with HgbA1c. Given possibility of perimenopausal bleeding, would also plan for a hormonal evaluation with an FSH and estradiol.  This is patient's first episode of anovulatory bleeding and she does not have any classical symptoms of endometrial hyperplasia or malignancy (intermenstrual spotting or heavy vaginal bleeding), I did not recommend an endometrial biopsy.   -We will let patient know the result via [employer].    Breast cancer screening  -Screening mammogram ordered for patient.    Luzmaria Dutta MD  Office phone: [phone]  Pager: [phone]  Obstetrics and Gynecology  [facility]   2022   -----------------------------------------------------------------------------------------------------------------------------------    HPI: Cecilia Pabon, 46 year old , presents for abnormal uterine bleeding.   LMP 2022.  Has not had any periods since.  Endorsed external vulvar itching without change in vaginal discharge, vaginal odor, or other intravaginal symptoms. Denied other symptoms or concerns.   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    Patient denied any acne or abnormal hair growth.    ROS: A 10 pt ROS was completed and found to be otherwise negative unless mentioned in the HPI.     OBHx:   OB History    Para Term  AB Living   2 2 2 0 0 2   SAB IAB Ectopic Multiple Live  Births   0 0 0 0 2      # Outcome Date GA Lbr Maldonado/2nd Weight Sex Delivery Anes PTL Lv   2 Term            1 Term             x1.   x1    GYN Hx:   Menarche: 12-13 years old  Cycle: q28 days  Duration: Normal per patient report  Dysmenorrhea: Mild cramping that is not bothersome to patient  Last Pap Smear: 9/15/2022 NILM w/ neg HPV  Sexually Transmitted Infections: Denied    PMH:   Past Medical History:   Diagnosis Date     Anxiety state, unspecified      ASCUS on Pap smear     +HR HPV 16     COPD (chronic obstructive pulmonary disease) (H)     My Mom has this     Family history of breast cancer in mother 2009     Family history of breast cancer in mother      Headache 2005     Hx of colposcopy with cervical biopsy 2003    WNL     Major depression, single episode 09/15/2022     Mental disorders of mother, postpartum     postpartum depression     Migraine, unspecified, without mention of intractable migraine without mention of status migrainosus      Other specified cardiac dysrhythmias(427.89)      Swelling of limb      Uncomplicated asthma     Myself     Unspecified viral infection, in conditions classified elsewhere and of unspecified site      Urinary tract infection, site not specified      PSHx:   Past Surgical History:   Procedure Laterality Date     ABDOMEN SURGERY      Csecttion     COLONOSCOPY       GI SURGERY       LAPAROSCOPIC CHOLECYSTECTOMY N/A 2014    Procedure: LAPAROSCOPIC CHOLECYSTECTOMY;  Surgeon: Ulises Starr MD;  Location: [facility] OR     SURGICAL HISTORY OF -       Csection     Medications:   albuterol (PROAIR HFA/PROVENTIL HFA/VENTOLIN HFA) inhaler  buPROPion (WELLBUTRIN XL)   escitalopram (LEXAPRO)  SUMAtriptan (IMITREX)     Allergies:    No Known Allergies    Social History: occasional alcohol use.  Denied smoking or other recreational drug use.    Family History: mother - breast cancer. Maternal grandmother on breast cancer hormonal therapy.  "Maternal aunt is going through menopause now 55.     Physical Exam:   Vitals:    09/28/22 1305   BP: 93/67   BP Location: Right arm   Patient Position: Chair   Cuff Size: Adult Regular   Pulse: 88   Resp: 18   Temp: 97.7  F (36.5  C)   TempSrc: Tympanic   Weight: 76.7 kg (169 lb)   Height: 1.568 m (5' 1.75\")      Estimated body mass index is 31.16 kg/m  as calculated from the following:    Height as of this encounter: 1.568 m (5' 1.75\").    Weight as of this encounter: 76.7 kg (169 lb).    General appearance: well-hydrated, A&O x 3, no apparent distress  Lungs: Equal expansion bilaterally, no accessory muscle use  Heart: No heaves or thrills.   Constitutional: See vitals  Abdomen: Soft, non-tender, non-distended. No rebound, rigidity, or guarding.  Extremities: Trace edema  Neuro: CN II-XII grossly intact  Genitourinary:  External genitalia: no erythema, no lesions.  Bilateral labium majora appear erythematous.  Urethral meatus appropriate location without lesions or prolapse  Urethra: No masses, tenderness, or scarring  Bladder no fullness, masses, or tenderness.  Anus and Perineum: Unremarkable, no visible lesions  Vagina: Normal, healthy pink mucosa without any lesions. Physiologic vaginal discharge.   Cervix: normal appearance, no cervical motion tenderness.   Uterus: normal size, shape and consistency.   Adnexa: no masses or tenderness bilaterally.    Labs/Imaging: Urine pregnancy test on 09/28/22: neg  Component      Latest Ref Rng & Units 9/15/2022   WBC      4.0 - 11.0 10e3/uL 7.5   RBC Count      3.80 - 5.20 10e6/uL 4.05   Hemoglobin      11.7 - 15.7 g/dL 13.3   Hematocrit      35.0 - 47.0 % 38.3   MCV      78 - 100 fL 95   MCH      26.5 - 33.0 pg 32.8   MCHC      31.5 - 36.5 g/dL 34.7   RDW      10.0 - 15.0 % 12.3   Platelet Count      150 - 450 10e3/uL 261   Other HR HPV       Negative   HPV 16 DNA       Negative   HPV 18 DNA       Negative   Final Diagnosis       This result contains rich text formatting " which cannot be displayed here.   TSH      0.30 - 4.20 uIU/mL 1.59   FSH      mIU/mL 5.6            No

## 2022-09-29 DIAGNOSIS — E22.1 HYPERPROLACTINEMIA (H): Primary | ICD-10-CM

## 2022-09-29 LAB
DHEA-S SERPL-MCNC: 179 UG/DL (ref 35–430)
SHBG SERPL-SCNC: 15 NMOL/L (ref 30–135)

## 2022-09-30 ENCOUNTER — MYC MEDICAL ADVICE (OUTPATIENT)
Dept: FAMILY MEDICINE | Facility: CLINIC | Age: 47
End: 2022-09-30

## 2022-09-30 LAB
TESTOST FREE SERPL-MCNC: 0.21 NG/DL
TESTOST SERPL-MCNC: 8 NG/DL (ref 8–60)

## 2022-10-02 ENCOUNTER — MYC MEDICAL ADVICE (OUTPATIENT)
Dept: FAMILY MEDICINE | Facility: CLINIC | Age: 47
End: 2022-10-02

## 2022-10-03 ENCOUNTER — PREP FOR PROCEDURE (OUTPATIENT)
Dept: OBGYN | Facility: CLINIC | Age: 47
End: 2022-10-03

## 2022-10-03 ENCOUNTER — TELEPHONE (OUTPATIENT)
Dept: OBGYN | Facility: CLINIC | Age: 47
End: 2022-10-03

## 2022-10-03 DIAGNOSIS — N93.9 ABNORMAL UTERINE BLEEDING (AUB): Primary | ICD-10-CM

## 2022-10-03 RX ORDER — ACETAMINOPHEN 325 MG/1
975 TABLET ORAL ONCE
Status: CANCELLED | OUTPATIENT
Start: 2022-10-03 | End: 2022-10-03

## 2022-10-03 NOTE — TELEPHONE ENCOUNTER
LVM to have pt call back to set up procedure.    Possible dates for procedure with Dr. Dutta is  10/13/22 @ 7:30 am  11/3/22 @ 7:30 am  11/10/22 @ 9:00 am      Katie Martines   Clinic Station    [facility]  [phone]

## 2022-10-03 NOTE — PROGRESS NOTES
"Cecilia Pabon, 46 year old , presents for abnormal uterine bleeding - no period since LMP 2022 + irregular bleeding w/ cramping.  Work-up indicated possible perimenopausal transition and thickened endometrial stripe with possible submucosa fibroid.    Discussed with patient on the phone about treatment options.  Patient elected to proceed with \" hysteroscopy, dilation and curettage, possible myomectomy\".  Considering whether she would place Mirena IUD or not.    Preop orders placed.  Patient will need a H&P performed by PCP due to her history of asthma.    Luzmaria Dutta MD  Office phone: [phone]  Pager: [phone]  Obstetrics and Gynecology  [facility]   10/03/2022 12:24 PM        "

## 2022-10-06 LAB — 17OHP SERPL-MCNC: 50 NG/DL

## 2022-10-11 NOTE — TELEPHONE ENCOUNTER
"[id number]  Cecilia Pabon    You are now scheduled for surgery at [facility].  Below are the details for your surgery.  Please read the \"Preparing for Your Surgery\" instructions and let us know if you have any questions.    Type of surgery: HYSTEROSCOPY, WITH DILATION AND CURETTAGE OF UTERUS, possible hysteroscopic myomectomy  Surgeon:  Luzmaria Dutta MD  Location of surgery: [facility] OR    Date of surgery: 11/10/22    Time:  10:30 am   Arrival Time:  9:30 am    Time can change, to be confirmed a couple of days prior by pre-op surgery nurse.    Pre-Op Appt Date: Patient to schedule with a PCP or Family Practice Provider within 30 days to the surgery.  Post-Op Appt Date: To be determined by provider     *For overnight Surgery - PCR Covid-19 test from a lab required 2-4 days prior.  See \"testing for Covid-19 handout\"    *For Same Day Surgery Covid-19 test required 1-2 days prior.  See \"testing for Covid-19 handout\"  Pt will do at home covid test.  Packet sent out: Yes  Pre-cert/Authorization completed:  TBD by Financial Securing Office.   MA Sterilization/Hysterectomy Acknowledgment Consent signed: No    [facility]  [phone]    Fax: [phone]  Same Day Surgery [phone]  Fax: [phone]  [facility] [phone]    "

## 2022-10-14 ENCOUNTER — MYC MEDICAL ADVICE (OUTPATIENT)
Dept: OBGYN | Facility: CLINIC | Age: 47
End: 2022-10-14

## 2022-10-14 DIAGNOSIS — N93.9 ABNORMAL UTERINE BLEEDING (AUB): Primary | ICD-10-CM

## 2022-10-17 RX ORDER — NORGESTIMATE AND ETHINYL ESTRADIOL 0.25-0.035
1 KIT ORAL DAILY
Qty: 84 TABLET | Refills: 0 | Status: ON HOLD | OUTPATIENT
Start: 2022-10-17 | End: 2022-11-03

## 2022-10-28 DIAGNOSIS — F41.1 ANXIETY STATE: ICD-10-CM

## 2022-10-28 RX ORDER — BUPROPION HYDROCHLORIDE 150 MG/1
150 TABLET ORAL DAILY
Qty: 90 TABLET | Refills: 0 | Status: SHIPPED | OUTPATIENT
Start: 2022-10-28 | End: 2023-02-16

## 2022-10-28 RX ORDER — ESCITALOPRAM OXALATE 20 MG/1
20 TABLET ORAL DAILY
Qty: 90 TABLET | Refills: 0 | Status: SHIPPED | OUTPATIENT
Start: 2022-10-28 | End: 2023-05-17

## 2022-10-28 NOTE — TELEPHONE ENCOUNTER
Pt has been rescheduled to 11/3/22 instead of 11/10/22 per Dr. Angulo request.    Pt is aware  OR notified  MD notified  [place] updated       Katie Martines   Clinic Station    [facility]  [phone]

## 2022-11-02 ENCOUNTER — ANESTHESIA EVENT (OUTPATIENT)
Dept: SURGERY | Facility: CLINIC | Age: 47
End: 2022-11-02
Payer: COMMERCIAL

## 2022-11-02 NOTE — ANESTHESIA PREPROCEDURE EVALUATION
Anesthesia Pre-Procedure Evaluation    Patient: Cecilia Pabon   MRN: [id number] : 1975        Procedure : Procedure(s):  HYSTEROSCOPY, WITH DILATION AND CURETTAGE OF UTERUS, possible hysteroscopic myomectomy          Past Medical History:   Diagnosis Date     Anxiety state, unspecified      ASCUS on Pap smear     +HR HPV 16     COPD (chronic obstructive pulmonary disease) (H)     My Mom has this     Family history of breast cancer in mother 2009     Family history of breast cancer in mother      Headache 2005     Hx of colposcopy with cervical biopsy 2003    WNL     Major depression, single episode 09/15/2022     Mental disorders of mother, postpartum     postpartum depression     Migraine, unspecified, without mention of intractable migraine without mention of status migrainosus      Other specified cardiac dysrhythmias(427.89)      Swelling of limb      Uncomplicated asthma     Myself     Unspecified viral infection, in conditions classified elsewhere and of unspecified site       Past Surgical History:   Procedure Laterality Date     ABDOMEN SURGERY      Csecttion     COLONOSCOPY       GI SURGERY       LAPAROSCOPIC CHOLECYSTECTOMY N/A 2014    Procedure: LAPAROSCOPIC CHOLECYSTECTOMY;  Surgeon: Ulises Starr MD;  Location: [facility] OR     SURGICAL HISTORY OF -       Csection      No Known Allergies   Social History     Tobacco Use     Smoking status: Never     Smokeless tobacco: Never   Substance Use Topics     Alcohol use: Yes     Comment: Maybe 1-2 times a week      Wt Readings from Last 1 Encounters:   22 76.7 kg (169 lb)        Anesthesia Evaluation   Pt has had prior anesthetic. Type: General and MAC.    No history of anesthetic complications       ROS/MED HX  ENT/Pulmonary:     (+) Mild Persistent, asthma     Neurologic:     (+) migraines,     Cardiovascular:  - neg cardiovascular ROS     METS/Exercise Tolerance: >4 METS    Hematologic:  - neg hematologic  ROS      Musculoskeletal:  - neg musculoskeletal ROS     GI/Hepatic:  - neg GI/hepatic ROS     Renal/Genitourinary:  - neg Renal ROS     Endo:  - neg endo ROS     Psychiatric/Substance Use:     (+) psychiatric history anxiety and depression     Infectious Disease:  - neg infectious disease ROS     Malignancy:  - neg malignancy ROS     Other:  - neg other ROS          Physical Exam    Airway        Mallampati: II   TM distance: > 3 FB   Neck ROM: full   Mouth opening: > 3 cm    Respiratory Devices and Support         Dental  no notable dental history         Cardiovascular   cardiovascular exam normal       Rhythm and rate: regular and normal     Pulmonary   pulmonary exam normal        breath sounds clear to auscultation           OUTSIDE LABS:  CBC:   Lab Results   Component Value Date    WBC 7.5 09/15/2022    WBC 7.5 02/16/2022    HGB 13.3 09/15/2022    HGB 13.0 02/16/2022    HCT 38.3 09/15/2022    HCT 37.7 02/16/2022     09/15/2022     02/16/2022     BMP:   Lab Results   Component Value Date     02/16/2022     12/28/2021    POTASSIUM 4.2 02/16/2022    POTASSIUM 3.6 12/28/2021    CHLORIDE 106 02/16/2022    CHLORIDE 106 12/28/2021    CO2 31 02/16/2022    CO2 23 12/28/2021    BUN 11 02/16/2022    BUN 8 12/28/2021    CR 0.76 02/16/2022    CR 0.72 12/28/2021     (H) 02/16/2022     (H) 12/28/2021     COAGS: No results found for: PTT, INR, FIBR  POC:   Lab Results   Component Value Date    HCG Negative 09/28/2022    HCGS Negative 02/23/2017     HEPATIC:   Lab Results   Component Value Date    ALBUMIN 3.8 02/16/2022    PROTTOTAL 7.6 02/16/2022    ALT 19 02/16/2022    AST 9 02/16/2022    ALKPHOS 63 02/16/2022    BILITOTAL 0.2 02/16/2022     OTHER:   Lab Results   Component Value Date    LACT 0.5 02/16/2015    A1C 5.1 09/28/2022    ALMA 9.1 02/16/2022    LIPASE 145 02/16/2022    TSH 1.59 09/15/2022    T4 7.8 02/27/2007    CRP 8.4 (H) 02/16/2015       Anesthesia Plan    ASA Status:  2   NPO  Status:  NPO Appropriate    Anesthesia Type: General.     - Airway: Native airway   Induction: Intravenous, Propofol.           Consents    Anesthesia Plan(s) and associated risks, benefits, and realistic alternatives discussed. Questions answered and patient/representative(s) expressed understanding.     - Discussed: Risks, Benefits and Alternatives for BOTH SEDATION and the PROCEDURE were discussed     - Discussed with:  Patient      - Extended Intubation/Ventilatory Support Discussed: No.      - Patient is DNR/DNI Status: No    Use of blood products discussed: No .     Postoperative Care    Pain management: Oral pain medications, IV analgesics.   PONV prophylaxis: Background Propofol Infusion, Ondansetron (or other 5HT-3), Dexamethasone or Solumedrol     Comments:                ARTURO Woods CRNA

## 2022-11-03 ENCOUNTER — MYC MEDICAL ADVICE (OUTPATIENT)
Dept: OBGYN | Facility: CLINIC | Age: 47
End: 2022-11-03

## 2022-11-03 ENCOUNTER — HOSPITAL ENCOUNTER (OUTPATIENT)
Facility: CLINIC | Age: 47
Discharge: HOME | End: 2022-11-03
Attending: STUDENT IN AN ORGANIZED HEALTH CARE EDUCATION/TRAINING PROGRAM | Admitting: STUDENT IN AN ORGANIZED HEALTH CARE EDUCATION/TRAINING PROGRAM
Payer: COMMERCIAL

## 2022-11-03 ENCOUNTER — ANESTHESIA (OUTPATIENT)
Dept: SURGERY | Facility: CLINIC | Age: 47
End: 2022-11-03
Payer: COMMERCIAL

## 2022-11-03 VITALS
SYSTOLIC BLOOD PRESSURE: 98 MMHG | RESPIRATION RATE: 14 BRPM | TEMPERATURE: 98 F | HEIGHT: 64 IN | BODY MASS INDEX: 28.85 KG/M2 | DIASTOLIC BLOOD PRESSURE: 57 MMHG | WEIGHT: 169 LBS | HEART RATE: 68 BPM | OXYGEN SATURATION: 94 %

## 2022-11-03 DIAGNOSIS — Z98.890 STATUS POST HYSTEROSCOPIC MYOMECTOMY: Primary | ICD-10-CM

## 2022-11-03 LAB
ABO/RH(D): NORMAL
ANTIBODY SCREEN: NEGATIVE
BASOPHILS # BLD AUTO: 0 10E3/UL (ref 0–0.2)
BASOPHILS NFR BLD AUTO: 1 %
EOSINOPHIL # BLD AUTO: 0.1 10E3/UL (ref 0–0.7)
EOSINOPHIL NFR BLD AUTO: 2 %
ERYTHROCYTE [DISTWIDTH] IN BLOOD BY AUTOMATED COUNT: 12 % (ref 10–15)
HCG UR QL: NEGATIVE
HCT VFR BLD AUTO: 36.5 % (ref 35–47)
HGB BLD-MCNC: 12.5 G/DL (ref 11.7–15.7)
IMM GRANULOCYTES # BLD: 0 10E3/UL
IMM GRANULOCYTES NFR BLD: 0 %
LYMPHOCYTES # BLD AUTO: 2.1 10E3/UL (ref 0.8–5.3)
LYMPHOCYTES NFR BLD AUTO: 36 %
MCH RBC QN AUTO: 32.3 PG (ref 26.5–33)
MCHC RBC AUTO-ENTMCNC: 34.2 G/DL (ref 31.5–36.5)
MCV RBC AUTO: 94 FL (ref 78–100)
MONOCYTES # BLD AUTO: 0.3 10E3/UL (ref 0–1.3)
MONOCYTES NFR BLD AUTO: 5 %
NEUTROPHILS # BLD AUTO: 3.3 10E3/UL (ref 1.6–8.3)
NEUTROPHILS NFR BLD AUTO: 56 %
NRBC # BLD AUTO: 0 10E3/UL
NRBC BLD AUTO-RTO: 0 /100
PLATELET # BLD AUTO: 302 10E3/UL (ref 150–450)
RBC # BLD AUTO: 3.87 10E6/UL (ref 3.8–5.2)
SPECIMEN EXPIRATION DATE: NORMAL
WBC # BLD AUTO: 5.8 10E3/UL (ref 4–11)

## 2022-11-03 PROCEDURE — 88305 TISSUE EXAM BY PATHOLOGIST: CPT | Mod: 26 | Performed by: PATHOLOGY

## 2022-11-03 PROCEDURE — 258N000001 HC RX 258: Performed by: STUDENT IN AN ORGANIZED HEALTH CARE EDUCATION/TRAINING PROGRAM

## 2022-11-03 PROCEDURE — 81025 URINE PREGNANCY TEST: CPT | Performed by: STUDENT IN AN ORGANIZED HEALTH CARE EDUCATION/TRAINING PROGRAM

## 2022-11-03 PROCEDURE — 360N000076 HC SURGERY LEVEL 3, PER MIN: Performed by: STUDENT IN AN ORGANIZED HEALTH CARE EDUCATION/TRAINING PROGRAM

## 2022-11-03 PROCEDURE — 86901 BLOOD TYPING SEROLOGIC RH(D): CPT | Performed by: STUDENT IN AN ORGANIZED HEALTH CARE EDUCATION/TRAINING PROGRAM

## 2022-11-03 PROCEDURE — 86850 RBC ANTIBODY SCREEN: CPT | Performed by: STUDENT IN AN ORGANIZED HEALTH CARE EDUCATION/TRAINING PROGRAM

## 2022-11-03 PROCEDURE — 999N000141 HC STATISTIC PRE-PROCEDURE NURSING ASSESSMENT: Performed by: STUDENT IN AN ORGANIZED HEALTH CARE EDUCATION/TRAINING PROGRAM

## 2022-11-03 PROCEDURE — 370N000017 HC ANESTHESIA TECHNICAL FEE, PER MIN: Performed by: STUDENT IN AN ORGANIZED HEALTH CARE EDUCATION/TRAINING PROGRAM

## 2022-11-03 PROCEDURE — 250N000013 HC RX MED GY IP 250 OP 250 PS 637: Performed by: NURSE ANESTHETIST, CERTIFIED REGISTERED

## 2022-11-03 PROCEDURE — 271N000001 HC OR GENERAL SUPPLY NON-STERILE: Performed by: STUDENT IN AN ORGANIZED HEALTH CARE EDUCATION/TRAINING PROGRAM

## 2022-11-03 PROCEDURE — 250N000011 HC RX IP 250 OP 636: Performed by: STUDENT IN AN ORGANIZED HEALTH CARE EDUCATION/TRAINING PROGRAM

## 2022-11-03 PROCEDURE — 88305 TISSUE EXAM BY PATHOLOGIST: CPT | Mod: TC | Performed by: STUDENT IN AN ORGANIZED HEALTH CARE EDUCATION/TRAINING PROGRAM

## 2022-11-03 PROCEDURE — 250N000013 HC RX MED GY IP 250 OP 250 PS 637: Performed by: STUDENT IN AN ORGANIZED HEALTH CARE EDUCATION/TRAINING PROGRAM

## 2022-11-03 PROCEDURE — 250N000009 HC RX 250: Performed by: NURSE ANESTHETIST, CERTIFIED REGISTERED

## 2022-11-03 PROCEDURE — 272N000001 HC OR GENERAL SUPPLY STERILE: Performed by: STUDENT IN AN ORGANIZED HEALTH CARE EDUCATION/TRAINING PROGRAM

## 2022-11-03 PROCEDURE — 58300 INSERT INTRAUTERINE DEVICE: CPT | Performed by: STUDENT IN AN ORGANIZED HEALTH CARE EDUCATION/TRAINING PROGRAM

## 2022-11-03 PROCEDURE — 85004 AUTOMATED DIFF WBC COUNT: CPT | Performed by: STUDENT IN AN ORGANIZED HEALTH CARE EDUCATION/TRAINING PROGRAM

## 2022-11-03 PROCEDURE — 710N000012 HC RECOVERY PHASE 2, PER MINUTE: Performed by: STUDENT IN AN ORGANIZED HEALTH CARE EDUCATION/TRAINING PROGRAM

## 2022-11-03 PROCEDURE — 58558 HYSTEROSCOPY BIOPSY: CPT | Performed by: STUDENT IN AN ORGANIZED HEALTH CARE EDUCATION/TRAINING PROGRAM

## 2022-11-03 PROCEDURE — 250N000011 HC RX IP 250 OP 636: Performed by: NURSE ANESTHETIST, CERTIFIED REGISTERED

## 2022-11-03 PROCEDURE — 258N000003 HC RX IP 258 OP 636: Performed by: NURSE ANESTHETIST, CERTIFIED REGISTERED

## 2022-11-03 PROCEDURE — 36415 COLL VENOUS BLD VENIPUNCTURE: CPT | Performed by: STUDENT IN AN ORGANIZED HEALTH CARE EDUCATION/TRAINING PROGRAM

## 2022-11-03 DEVICE — IUD CONTRACEPTIVE DEVICE MIRENA 50419-4230-01: Type: IMPLANTABLE DEVICE | Site: ABDOMEN | Status: FUNCTIONAL

## 2022-11-03 RX ORDER — IBUPROFEN 800 MG/1
800 TABLET, FILM COATED ORAL EVERY 6 HOURS PRN
Qty: 30 TABLET | Refills: 0 | Status: SHIPPED | OUTPATIENT
Start: 2022-11-03

## 2022-11-03 RX ORDER — ONDANSETRON 2 MG/ML
INJECTION INTRAMUSCULAR; INTRAVENOUS PRN
Status: DISCONTINUED | OUTPATIENT
Start: 2022-11-03 | End: 2022-11-03

## 2022-11-03 RX ORDER — OXYCODONE HYDROCHLORIDE 5 MG/1
5 TABLET ORAL EVERY 4 HOURS PRN
Status: DISCONTINUED | OUTPATIENT
Start: 2022-11-03 | End: 2022-11-03 | Stop reason: HOSPADM

## 2022-11-03 RX ORDER — ACETAMINOPHEN 325 MG/1
650 TABLET ORAL EVERY 6 HOURS PRN
Qty: 24 TABLET | Refills: 0 | Status: SHIPPED | OUTPATIENT
Start: 2022-11-03

## 2022-11-03 RX ORDER — PROPOFOL 10 MG/ML
INJECTION, EMULSION INTRAVENOUS CONTINUOUS PRN
Status: DISCONTINUED | OUTPATIENT
Start: 2022-11-03 | End: 2022-11-03

## 2022-11-03 RX ORDER — NALOXONE HYDROCHLORIDE 0.4 MG/ML
0.2 INJECTION, SOLUTION INTRAMUSCULAR; INTRAVENOUS; SUBCUTANEOUS
Status: DISCONTINUED | OUTPATIENT
Start: 2022-11-03 | End: 2022-11-03 | Stop reason: HOSPADM

## 2022-11-03 RX ORDER — ONDANSETRON 2 MG/ML
4 INJECTION INTRAMUSCULAR; INTRAVENOUS EVERY 30 MIN PRN
Status: DISCONTINUED | OUTPATIENT
Start: 2022-11-03 | End: 2022-11-03 | Stop reason: HOSPADM

## 2022-11-03 RX ORDER — NALOXONE HYDROCHLORIDE 0.4 MG/ML
0.4 INJECTION, SOLUTION INTRAMUSCULAR; INTRAVENOUS; SUBCUTANEOUS
Status: DISCONTINUED | OUTPATIENT
Start: 2022-11-03 | End: 2022-11-03 | Stop reason: HOSPADM

## 2022-11-03 RX ORDER — KETAMINE HYDROCHLORIDE 10 MG/ML
INJECTION INTRAMUSCULAR; INTRAVENOUS PRN
Status: DISCONTINUED | OUTPATIENT
Start: 2022-11-03 | End: 2022-11-03

## 2022-11-03 RX ORDER — FENTANYL CITRATE 50 UG/ML
25 INJECTION, SOLUTION INTRAMUSCULAR; INTRAVENOUS
Status: DISCONTINUED | OUTPATIENT
Start: 2022-11-03 | End: 2022-11-03 | Stop reason: HOSPADM

## 2022-11-03 RX ORDER — ACETAMINOPHEN 325 MG/1
975 TABLET ORAL ONCE
Status: COMPLETED | OUTPATIENT
Start: 2022-11-03 | End: 2022-11-03

## 2022-11-03 RX ORDER — IBUPROFEN 600 MG/1
600 TABLET, FILM COATED ORAL ONCE
Status: CANCELLED | OUTPATIENT
Start: 2022-11-03 | End: 2022-11-03

## 2022-11-03 RX ORDER — BUPIVACAINE HYDROCHLORIDE 2.5 MG/ML
INJECTION, SOLUTION INFILTRATION; PERINEURAL PRN
Status: DISCONTINUED | OUTPATIENT
Start: 2022-11-03 | End: 2022-11-03 | Stop reason: HOSPADM

## 2022-11-03 RX ORDER — MEPERIDINE HYDROCHLORIDE 25 MG/ML
12.5 INJECTION INTRAMUSCULAR; INTRAVENOUS; SUBCUTANEOUS
Status: DISCONTINUED | OUTPATIENT
Start: 2022-11-03 | End: 2022-11-03 | Stop reason: HOSPADM

## 2022-11-03 RX ORDER — ONDANSETRON 4 MG/1
4 TABLET, ORALLY DISINTEGRATING ORAL EVERY 30 MIN PRN
Status: DISCONTINUED | OUTPATIENT
Start: 2022-11-03 | End: 2022-11-03 | Stop reason: HOSPADM

## 2022-11-03 RX ORDER — SODIUM CHLORIDE, SODIUM LACTATE, POTASSIUM CHLORIDE, CALCIUM CHLORIDE 600; 310; 30; 20 MG/100ML; MG/100ML; MG/100ML; MG/100ML
INJECTION, SOLUTION INTRAVENOUS CONTINUOUS PRN
Status: DISCONTINUED | OUTPATIENT
Start: 2022-11-03 | End: 2022-11-03

## 2022-11-03 RX ORDER — SODIUM CHLORIDE, SODIUM LACTATE, POTASSIUM CHLORIDE, CALCIUM CHLORIDE 600; 310; 30; 20 MG/100ML; MG/100ML; MG/100ML; MG/100ML
INJECTION, SOLUTION INTRAVENOUS CONTINUOUS
Status: DISCONTINUED | OUTPATIENT
Start: 2022-11-03 | End: 2022-11-03 | Stop reason: HOSPADM

## 2022-11-03 RX ORDER — HYDROMORPHONE HCL IN WATER/PF 6 MG/30 ML
0.2 PATIENT CONTROLLED ANALGESIA SYRINGE INTRAVENOUS EVERY 5 MIN PRN
Status: DISCONTINUED | OUTPATIENT
Start: 2022-11-03 | End: 2022-11-03 | Stop reason: HOSPADM

## 2022-11-03 RX ORDER — FENTANYL CITRATE 50 UG/ML
25 INJECTION, SOLUTION INTRAMUSCULAR; INTRAVENOUS EVERY 5 MIN PRN
Status: DISCONTINUED | OUTPATIENT
Start: 2022-11-03 | End: 2022-11-03 | Stop reason: HOSPADM

## 2022-11-03 RX ORDER — DEXAMETHASONE SODIUM PHOSPHATE 4 MG/ML
INJECTION, SOLUTION INTRA-ARTICULAR; INTRALESIONAL; INTRAMUSCULAR; INTRAVENOUS; SOFT TISSUE PRN
Status: DISCONTINUED | OUTPATIENT
Start: 2022-11-03 | End: 2022-11-03

## 2022-11-03 RX ADMIN — OXYCODONE HYDROCHLORIDE 5 MG: 5 TABLET ORAL at 11:27

## 2022-11-03 RX ADMIN — ACETAMINOPHEN 975 MG: 325 TABLET, FILM COATED ORAL at 10:11

## 2022-11-03 RX ADMIN — MIDAZOLAM 2 MG: 1 INJECTION INTRAMUSCULAR; INTRAVENOUS at 10:27

## 2022-11-03 RX ADMIN — DEXAMETHASONE SODIUM PHOSPHATE 4 MG: 4 INJECTION, SOLUTION INTRA-ARTICULAR; INTRALESIONAL; INTRAMUSCULAR; INTRAVENOUS; SOFT TISSUE at 10:31

## 2022-11-03 RX ADMIN — PHENYLEPHRINE HYDROCHLORIDE 50 MCG: 10 INJECTION INTRAVENOUS at 10:55

## 2022-11-03 RX ADMIN — ONDANSETRON 4 MG: 2 INJECTION INTRAMUSCULAR; INTRAVENOUS at 10:31

## 2022-11-03 RX ADMIN — PROPOFOL 200 MCG/KG/MIN: 10 INJECTION, EMULSION INTRAVENOUS at 10:31

## 2022-11-03 RX ADMIN — SODIUM CHLORIDE, POTASSIUM CHLORIDE, SODIUM LACTATE AND CALCIUM CHLORIDE: 600; 310; 30; 20 INJECTION, SOLUTION INTRAVENOUS at 10:27

## 2022-11-03 RX ADMIN — PHENYLEPHRINE HYDROCHLORIDE 100 MCG: 10 INJECTION INTRAVENOUS at 10:57

## 2022-11-03 RX ADMIN — KETAMINE HYDROCHLORIDE 25 MG: 10 INJECTION, SOLUTION INTRAMUSCULAR; INTRAVENOUS at 10:45

## 2022-11-03 RX ADMIN — KETAMINE HYDROCHLORIDE 25 MG: 10 INJECTION, SOLUTION INTRAMUSCULAR; INTRAVENOUS at 10:31

## 2022-11-03 ASSESSMENT — ACTIVITIES OF DAILY LIVING (ADL): ADLS_ACUITY_SCORE: 35

## 2022-11-03 NOTE — DISCHARGE INSTRUCTIONS
Same Day Surgery Discharge Instructions  Special Precautions After Surgery - Adult    It is not unusual to feel lightheaded or faint, up to 24 hours after surgery or while taking pain medication.  If you have these symptoms; sit for a few minutes before standing and have someone assist you when getting up.  You should rest and relax for the next 24 hours and must have someone stay with you for at least 24 hours after your discharge.  DO NOT DRIVE any vehicle or operate mechanical equipment for 24 hours following the end of your surgery.  DO NOT DRIVE while taking narcotic pain medications that have been prescribed by your physician.  If you had a limb operated on, you must be able to use it fully to drive.  DO NOT drink alcoholic beverages for 24 hours following surgery or while taking prescription pain medication.  Drink clear liquids (apple juice, ginger ale, broth, 7-Up, etc.).  Progress to your regular diet as you feel able.  Any questions call your physician and do not make important decisions for 24 hours.    Nausea and Vomiting: Nausea and vomiting can occur any time after receiving anesthesia. If you experience nausea and vomiting we encourage you to move to a clear liquid diet and advance your diet as tolerated. If nausea and vomiting do not improve within 12 hours please call the surgeon or present to the Emergency department.     Break-through Bleeding: If your experience bleeding from your surgical site apply pressure and additional dressing per nurse instruction. For simple problems such as a saturated dressing, you may need to reinforce the dressing with more gauze and tape and put slight pressure on the site. If bleeding does not subside contact the surgeon or present to the Emergency Department.    Post-op Infection: If you develop a fever of 100.4 or greater, have pus like drainage, redness, swelling or severe pain at the surgical site not alleviated with pain medications; please  contact the surgeon or present to the Emergency Department.  Nurse Advice Line: [phone]  __________________________________________________________________________________________________________________________________  IMPORTANT NUMBERS:    [facility] Main Number:  [phone], [phone]  Pharmacy:  [phone]  Same Day Surgery:  [phone], Monday - Friday until 8:30 p.m.  Urgent Care:  [phone]  Emergency Room:  [phone]      [facility]:  [phone]                                                                                Clinic:  [phone]

## 2022-11-03 NOTE — ANESTHESIA POSTPROCEDURE EVALUATION
Patient: Cecilia Pabon    Procedure: Procedure(s):  HYSTEROSCOPY, WITH DILATION AND CURETTAGE OF UTERUS, hysteroscopic endometrial biopsy,  Mirena Intra Uterine Device Placement       Anesthesia Type:  General    Note:  Disposition: Outpatient   Postop Pain Control: Uneventful            Sign Out: Well controlled pain   PONV: No   Neuro/Psych: Uneventful            Sign Out: Acceptable/Baseline neuro status   Airway/Respiratory: Uneventful            Sign Out: Acceptable/Baseline resp. status   CV/Hemodynamics: Uneventful            Sign Out: Acceptable CV status; No obvious hypovolemia; No obvious fluid overload   Other NRE: NONE   DID A NON-ROUTINE EVENT OCCUR? No           Last vitals:  Vitals Value Taken Time   BP 95/57 11/03/22 1215   Temp 36.7  C (98  F) 11/03/22 1122   Pulse 76 11/03/22 1215   Resp 14 11/03/22 1200   SpO2 94 % 11/03/22 1215   Vitals shown include unvalidated device data.    Electronically Signed By: Aneesh Rushing CRNA, APRN CRNA  November 3, 2022  12:55 PM

## 2022-11-03 NOTE — INTERVAL H&P NOTE
"I have reviewed the surgical (or preoperative) H&P that is linked to this encounter, and examined the patient. There are no significant changes    Clinical Conditions Present on Arrival:  Clinically Significant Risk Factors Present on Admission                    # Overweight: Estimated body mass index is 29.24 kg/m  as calculated from the following:    Height as of this encounter: 1.619 m (5' 3.75\").    Weight as of this encounter: 76.7 kg (169 lb).       "

## 2022-11-03 NOTE — ANESTHESIA CARE TRANSFER NOTE
Patient: Cecilia Pabon    Procedure: Procedure(s):  HYSTEROSCOPY, WITH DILATION AND CURETTAGE OF UTERUS, hysteroscopic myomectomy, Mirena Intra Uterine Device Placement       Diagnosis: Abnormal uterine bleeding (AUB) [N93.9]  Diagnosis Additional Information: No value filed.    Anesthesia Type:   General     Note:    Oropharynx: oropharynx clear of all foreign objects and spontaneously breathing  Level of Consciousness: awake  Oxygen Supplementation: room air    Independent Airway: airway patency satisfactory and stable  Dentition: dentition unchanged  Vital Signs Stable: post-procedure vital signs reviewed and stable  Report to RN Given: handoff report given  Patient transferred to: Phase II    Handoff Report: Identifed the Patient, Identified the Reponsible Provider, Reviewed the pertinent medical history, Discussed the surgical course, Reviewed Intra-OP anesthesia mangement and issues during anesthesia, Set expectations for post-procedure period and Allowed opportunity for questions and acknowledgement of understanding      Vitals:  Vitals Value Taken Time   BP 94/56 11/03/22 1118   Temp     Pulse 75 11/03/22 1118   Resp     SpO2     Vitals shown include unvalidated device data.    Electronically Signed By: Aneesh Rushing CRNA, APRN CRNA  November 3, 2022  11:19 AM

## 2022-11-03 NOTE — OP NOTE
[facility] Operative Note  Name: Cecilia Pabon   MRN: [id number]   : 1975   Procedure date: 22     Pre-operative diagnosis: Abnormal uterine bleeding from submucosal fibroid  Post-operative diagnosis: Abnormal uterine bleeding from submucosal fibroid, s/p below procedure    Procedure: Dilation and curettage, hysteroscopic endometrial biopsy, Mirena intrauterine system placement  Anesthesia: Monitored anesthesia care with paracervical block    Surgeon: Luzmaria Dutta MD    Indications:Cecilia Pabon, 46 year old , presents for abnormal uterine bleeding and was found to have submucosal fibroid on ultrasound.     Estimated blood loss: 15mL  Total IV fluids: 500 mL, Lactated Ringer  Fluid deficit: 440mL normal saline  Specimens: endometrial biopsy  Mirena IUS information:  - S/N: [id number]  - Expiration date: 2024 OCT  - Lot#: [id number]  Findings:   - Exam under anesthesia: normal appearing external genitalia, vaginal canal, and cervix  - Hysteroscopy: proliferative endometrium without evidence of pathology    Procedure:  The patient was taken to the operating room where she underwent MAC anesthesia without difficulty.  She was placed in a dorsal lithotomy position using yellow fin stirrups.  The patient was examined for the above noted findings and then prepped and draped in the usual sterile fashion.  A safety timeout was performed.     A medium graves speculum was inserted into the vagina.  A single tooth tenaculum was placed on there anterior cervical lip.  A total of 20cc of 0.25% plain bupivacaine was then injected at 4 o'clock and 8 o'clock positions of the cervicovaginal junction. Dilation of the cervix was difficult as the dilator would not pass through the internal cervical os. Thus, hydradilation with the cervical canal was performed with the Myosure hysteroscope. Examination of the uterine cavity demonstrated thickened endometrial stripe consistent with proliferative  endometrium without any specific pathology.  The remainder of the cavity was unremarkable. Once a hysteroscopically removable lesion is confirmed to be in the uterine cavity, the disposable morcellator Myosure REACH device was opened and inserted through the working channel of the hysteroscope to perform endometrial biopsy. Uterine sounding was performed with the hysteroscope and noted at 9cm. The hysteroscope apparatus was removed and total of 440 mL fluid deficit of normal saline noted. Mirena IUS placement was difficult due to the direction of cervical canal and the long canal. Dilation with Hegar dilators to 6mm was performed after an Allis clamp was applied at the posterior cervical os. The Mirena IUS was placed per manufacture instruction. The string was trimmed to 3cm. The tenaculum was removed from the cervix and good hemostasis was noted. The speculum was removed from the vagina.The patient was repositioned to the supine position.  The patient tolerated the procedure well and was taken to the recovery room in stable condition.  All sponge, lap, and needle counts were correct x2.     Luzmaria Dutta MD  Office phone: [phone]  Pager: [phone]  Obstetrics and Gynecology  [facility]   11/03/2022 11:27 AM

## 2022-11-03 NOTE — H&P
[facility]   Pre-op H&P    Name: Cecilia Pabon   : 1975   MRN: [id number]     Proposed procedure: Hysteroscopic myomectomy  Date of Surgery/ Procedure: 22     Cecilia Pabon, 46 year old , presents for pre-operative evaluation and assessment prior to undergoing surgery/procedure for treatment of  AUB-leiomyoma.      Preop questions   Primary Physician: Ammy Rolon  Type of Anesthesia Anticipated: Local with MAC  History of anesthesia complications: NONE  History of  abnormal bleeding: NONE   History of blood transfusions: NO    Preoperative Questions   No - Have you ever had a heart attack or stroke?  No - Have you ever had surgery on your heart or blood vessels, such as a stent, coronary (heart) bypass, or surgery on an artery in the head, neck, heart, or legs?  No - Do you have chest pain when you are physically active?  No - Do you have a history of heart failure?  No - Do you currently have a cold, bronchitis, or symptoms of other respiratory (head and chest) infections?  No - Do you have a cough, shortness of breath, or wheezing?  No - Do you or anyone in your family have a history of blood clots?  No - Do you or anyone in your family have a serious bleeding problem, such as long-lasting bleeding after surgeries or cuts?  No - Have you had any abnormal blood loss such as black, tarry or bloody stools, or abnormal vaginal bleeding?  No - Have you ever had a blood transfusion?  Yes - Are you willing to have a blood transfusion if it is medically needed before, during, or after your surgery?  No - Have you or anyone in your family ever had problems with anesthesia (sedation for surgery)?  No - Do you have sleep apnea, excessive snoring, or daytime drowsiness?   No - Do you have any artifical heart valves or other implanted medical devices, such as a pacemaker, defibrillator, or continuous glucose monitor?  No - Do you have any artifical joints?  No - Are you  allergic to latex?    REVIEW OF SYSTEMS:   Constitutional, HEENT, cardiovascular, pulmonary, gi and gu systems are negative, except as otherwise noted.    PMH/PSH/Meds/Allergy/SH/FH   OBHx:                    OB History    Para Term  AB Living   2 2 2 0 0 2   SAB IAB Ectopic Multiple Live Births      0 0 0 0 2          # Outcome Date GA Lbr Maldonado/2nd Weight Sex Delivery Anes PTL Lv   2 Term                     1 Term                      x1.   x1     GYN Hx:   Menarche: 12-13 years old  Cycle: q28 days  Duration: Normal per patient report  Dysmenorrhea: Mild cramping that is not bothersome to patient  Last Pap Smear: 9/15/2022 NILM w/ neg HPV  Sexually Transmitted Infections: Denied     PMH:   Diagnosis Date     Anxiety state, unspecified       ASCUS on Pap smear      +HR HPV 16     Headache 2005     Hx of colposcopy with cervical biopsy 2003     WNL     Major depression, single episode 09/15/2022     Migraine, unspecified, without mention of intractable migraine without mention of status migrainosus       Other specified cardiac dysrhythmias(427.89)       Swelling of limb       Uncomplicated asthma       Myself     Unspecified viral infection, in conditions classified elsewhere and of unspecified site       Urinary tract infection, site not specified           PSHx:   Procedure Laterality Date     ABDOMEN SURGERY        Csecttion     COLONOSCOPY         GI SURGERY         LAPAROSCOPIC CHOLECYSTECTOMY N/A 2014     Procedure: LAPAROSCOPIC CHOLECYSTECTOMY;  Surgeon: Ulises Starr MD;  Location: [facility] OR     SURGICAL HISTORY OF -          Csection         Medications:   albuterol (PROAIR HFA/PROVENTIL HFA/VENTOLIN HFA) inhaler  buPROPion (WELLBUTRIN XL)   escitalopram (LEXAPRO)  SUMAtriptan (IMITREX)      Allergies:    No Known Allergies     Social History: occasional alcohol use.  Denied smoking or other recreational drug use.     Family History: mother - breast  "cancer. Maternal grandmother on breast cancer hormonal therapy. Maternal aunt is going through menopause now 55.     EXAM:   /63 (BP Location: Right arm)   Pulse 78   Temp 99  F (37.2  C) (Oral)   Resp 16   Ht 1.619 m (5' 3.75\")   Wt 76.7 kg (169 lb)   SpO2 97%   BMI 29.24 kg/m    Gen: Resting comfortably, NAD  CV: Well perfused  Pulm: Normal respiratory efforts  Abd: Soft, appropriately ttp, non-distended  Ext: SCDs in place, trace LE edema b/l     DIAGNOSTICS:      Recent Results (from the past 24 hour(s))   CBC with platelets and differential    Collection Time: 11/03/22  9:46 AM   Result Value Ref Range    WBC Count 5.8 4.0 - 11.0 10e3/uL    RBC Count 3.87 3.80 - 5.20 10e6/uL    Hemoglobin 12.5 11.7 - 15.7 g/dL    Hematocrit 36.5 35.0 - 47.0 %    MCV 94 78 - 100 fL    MCH 32.3 26.5 - 33.0 pg    MCHC 34.2 31.5 - 36.5 g/dL    RDW 12.0 10.0 - 15.0 %    Platelet Count 302 150 - 450 10e3/uL    % Neutrophils 56 %    % Lymphocytes 36 %    % Monocytes 5 %    % Eosinophils 2 %    % Basophils 1 %    % Immature Granulocytes 0 %    NRBCs per 100 WBC 0 <1 /100    Absolute Neutrophils 3.3 1.6 - 8.3 10e3/uL    Absolute Lymphocytes 2.1 0.8 - 5.3 10e3/uL    Absolute Monocytes 0.3 0.0 - 1.3 10e3/uL    Absolute Eosinophils 0.1 0.0 - 0.7 10e3/uL    Absolute Basophils 0.0 0.0 - 0.2 10e3/uL    Absolute Immature Granulocytes 0.0 <=0.4 10e3/uL    Absolute NRBCs 0.0 10e3/uL     RISK ASSESSMENT:     Cardiovascular Risk:  -Patient is able to participate in strenuous activities without chest pain.  -The patient does not have chest pain with exertion.  -Patient does not have a history of congestive heart failure.    -The patient does not have a history of stroke and does not have a history of valvular disease.    Pulmonary Risk:  -In terms of risk factors for pulmonary complication, the patient has asthma that's very well controlled    Perioperative Complications:  -The patient does not have a history of bleeding or clotting " problems in the past.    -The patient has not had complications from surgeries.    -The patient does not have a family history of any anesthesia or surgical complications.      IMPRESSION:   Reason for surgery/procedure: AUB-leiomyoma    The proposed surgical procedure is considered LOW risk.    For above listed surgery and anesthesia:   Patient is at LOW risk for surgery/procedure and perioperative/procedure complications.    RECOMMENDATIONS:     Proceed to planned surgery    Luzmaria Dutta MD  Office phone: [phone]  Pager: [phone]  Obstetrics and Gynecology  [facility]   11/03/2022

## 2022-11-04 NOTE — TELEPHONE ENCOUNTER
Called pt 11/04/22 12:42 PM: pt's vaginal burning sensation is still ongoing but improved; dysuria resolved. Denied other questions. Informed her that her IUD is Mirena IUD, which contains hormone to help her with bleeding. Encouraged her to MyChart message us if she has more questions.    Luzmaria Dutta MD  Office phone: [phone]  Pager: [phone]  Obstetrics and Gynecology  [facility]   11/04/2022 12:43 PM

## 2022-11-07 LAB
PATH REPORT.COMMENTS IMP SPEC: NORMAL
PATH REPORT.COMMENTS IMP SPEC: NORMAL
PATH REPORT.FINAL DX SPEC: NORMAL
PATH REPORT.GROSS SPEC: NORMAL
PATH REPORT.MICROSCOPIC SPEC OTHER STN: NORMAL
PATH REPORT.RELEVANT HX SPEC: NORMAL
PHOTO IMAGE: NORMAL

## 2022-11-08 ENCOUNTER — MYC MEDICAL ADVICE (OUTPATIENT)
Dept: OBGYN | Facility: CLINIC | Age: 47
End: 2022-11-08

## 2022-11-15 ENCOUNTER — OFFICE VISIT (OUTPATIENT)
Dept: OBGYN | Facility: CLINIC | Age: 47
End: 2022-11-15
Payer: COMMERCIAL

## 2022-11-15 VITALS
SYSTOLIC BLOOD PRESSURE: 102 MMHG | HEART RATE: 93 BPM | WEIGHT: 170.6 LBS | OXYGEN SATURATION: 95 % | BODY MASS INDEX: 31.39 KG/M2 | DIASTOLIC BLOOD PRESSURE: 60 MMHG | HEIGHT: 62 IN | TEMPERATURE: 99.1 F | RESPIRATION RATE: 16 BRPM

## 2022-11-15 DIAGNOSIS — R10.32 LEFT LOWER QUADRANT PAIN: Primary | ICD-10-CM

## 2022-11-15 PROCEDURE — 99212 OFFICE O/P EST SF 10 MIN: CPT | Performed by: OBSTETRICS & GYNECOLOGY

## 2022-11-15 NOTE — TELEPHONE ENCOUNTER
Pt had a hysteroscopy, D&C on 11/3/22.  Pt wrote in on 11/8, stating she was experiencing some mild cramping mainly on her left side.  She was told this is normal and advised to let us know if it worsened.    She is writing in today, 11/15, stating he pain has gotten worse on her left side.  She describes it as a sharp, stabbing pain.  This pain is intermittent.  She states it can get as bad as an 8 out of 10.    Denies constipation, heavy vaginal bleeding.    Izabel Miles, RN

## 2022-11-15 NOTE — PROGRESS NOTES
"SUBJECTIVE:  Cecilia returns for followup of hysteroscopy, D&C, polypectomy and Mirena IUD insertion on 11/3/22. In the interval of time Cecilia is doing well.   She is experiencing  LLQ pain on and off.  The last few days it has been getting worse.  It is a sharp/stabbing pain at times rated an 8/10.  It is generally a sharp/dull pain rated a 3-5 when it is not at it's worst.  Her bleeding stopped a couple of days after surgery.  Now she has a little \"shedding\" of the lining.  No fevers or chills.         ROS: Musculoskeletal and general review of systems are negative, per review of previous clinic questionnaire.  See HPI    EXAM:   Gen: A&O, NAD  Abd: soft, Pos LLQ tenderness.  Pos Carnett sign, no rebound or guarding  Genitourinary:  External genitalia: no erythema, no lesions.   Urethral meatus appropriate location without lesions or prolapse  Urethra: No masses, tenderness, or scarring  Bladder no fullness, masses, or tenderness.  Anus and Perineum: Unremarkable, no visible lesions  Vagina: Normal, healthy pink mucosa without any lesions. Light brown discharge  Cervix: normal appearance, no cervical motion tenderness, IUD string visible   Uterus: normal size, shape and consistency, non-tender.   Adnexa: no masses POS LLQ tenderness    A/P:      POD# 12 s/p HSC, D&C, Polypectomy and Mirena IUD insertion.    LLQ pain.  No signs of infection.  Will get a pelvic ultrasound to confirm proper IUD placement and evaluate the ovaries.          "

## 2022-11-15 NOTE — NURSING NOTE
"Initial /60 (BP Location: Left arm, Patient Position: Chair, Cuff Size: Adult Regular)   Pulse 93   Temp 99.1  F (37.3  C) (Tympanic)   Resp 16   Ht 1.568 m (5' 1.75\")   Wt 77.4 kg (170 lb 9.6 oz)   BMI 31.46 kg/m   Estimated body mass index is 31.46 kg/m  as calculated from the following:    Height as of this encounter: 1.568 m (5' 1.75\").    Weight as of this encounter: 77.4 kg (170 lb 9.6 oz). .    Katty Magana, LING    "

## 2022-11-15 NOTE — TELEPHONE ENCOUNTER
Deepthi Pino MD  P [facility] Ob Triage Pool  Phone Number: [phone]     This doesn't sound like it is likely due to surgery.  I would be more suspicious of an ovarian issue.  If she wants to come in today, I would be happy to see her.     Deepthi Pino MD on 11/15/2022 at 2:39 PM

## 2022-11-19 ENCOUNTER — HEALTH MAINTENANCE LETTER (OUTPATIENT)
Age: 47
End: 2022-11-19

## 2022-11-20 ENCOUNTER — HOSPITAL ENCOUNTER (OUTPATIENT)
Dept: ULTRASOUND IMAGING | Facility: CLINIC | Age: 47
Discharge: HOME | End: 2022-11-20
Attending: OBSTETRICS & GYNECOLOGY | Admitting: OBSTETRICS & GYNECOLOGY
Payer: COMMERCIAL

## 2022-11-20 DIAGNOSIS — R10.32 LEFT LOWER QUADRANT PAIN: ICD-10-CM

## 2022-11-20 PROCEDURE — 76856 US EXAM PELVIC COMPLETE: CPT

## 2022-11-21 ENCOUNTER — MYC MEDICAL ADVICE (OUTPATIENT)
Dept: OBGYN | Facility: CLINIC | Age: 47
End: 2022-11-21

## 2022-11-21 NOTE — TELEPHONE ENCOUNTER
Discussed with Dr. Dutta.    Dr. Dutta will call her today.    Deepthi Pino MD on 11/21/2022 at 11:52 AM    Called pt at [phone] on 11/21/22 at 12:18 PM. No one picked up. Voicemail left and will call back in 30min.   Called again at 12:45 PM. No one picked up. Will mychart message patient.    Luzmaria Dutta MD

## 2022-12-18 ENCOUNTER — HEALTH MAINTENANCE LETTER (OUTPATIENT)
Age: 47
End: 2022-12-18

## 2023-02-05 DIAGNOSIS — G93.31 POSTVIRAL FATIGUE SYNDROME: ICD-10-CM

## 2023-02-08 NOTE — TELEPHONE ENCOUNTER
BUPROPION HCL 100MG TABS  MOST RECENT ORDER ON MEDICATION LIST AND REFILL REQUESTED DO NOT MATCH - DIFFERENT STRENGTHS.    Last Office Visit : 10-  Future Office visit:  NONE    Routing refill request to provider for review/approval because:  ORDERS DO NOT MATCH  OVER A YEAR SINCE LAST SEEN  NO SCHEDULED APPOINTMENT      Kathleen M Doege RN

## 2023-02-08 NOTE — TELEPHONE ENCOUNTER
Attempted to call the patient to check on the medication refill requested, since has not been ordered since 8/31/2021, no answer. Called [facility] back regarding the refill request received for Bupropion (Wellbutrin) 100 mg that was prescribed by Dr. Cash 8/31/21 and last virtual visit with her was 10/27/21 (had scheduled a virtual visit 10/26/22 and canceled the appt). Patient has since been prescribed Bupropion (Wellbutrin XL) 150 mg, last on 10/28/2022 by a different provider, Dr. Ammy Rolon at [facility]. They will follow back up with patient and then update us regarding the refill if okay to cancel the request or if patient will be having a follow up with Dr. Cash.     Courtney Osborne, JAYMEN RN Care Coordinator  [facility]   [facility] main phone # [phone] fax # [phone]

## 2023-02-14 RX ORDER — BUPROPION HYDROCHLORIDE 100 MG/1
TABLET ORAL
OUTPATIENT
Start: 2023-02-14

## 2023-02-15 DIAGNOSIS — F41.1 ANXIETY STATE: ICD-10-CM

## 2023-02-16 ENCOUNTER — MYC MEDICAL ADVICE (OUTPATIENT)
Dept: FAMILY MEDICINE | Facility: CLINIC | Age: 48
End: 2023-02-16
Payer: COMMERCIAL

## 2023-02-16 RX ORDER — BUPROPION HYDROCHLORIDE 150 MG/1
150 TABLET ORAL DAILY
Qty: 90 TABLET | Refills: 0 | Status: SHIPPED | OUTPATIENT
Start: 2023-02-16 | End: 2023-05-17

## 2023-02-28 ENCOUNTER — MYC MEDICAL ADVICE (OUTPATIENT)
Dept: FAMILY MEDICINE | Facility: CLINIC | Age: 48
End: 2023-02-28
Payer: COMMERCIAL

## 2023-02-28 DIAGNOSIS — G93.31 POSTVIRAL FATIGUE SYNDROME: ICD-10-CM

## 2023-03-01 RX ORDER — BUPROPION HYDROCHLORIDE 100 MG/1
50 TABLET ORAL AT BEDTIME
Qty: 90 TABLET | Refills: 3 | Status: SHIPPED | OUTPATIENT
Start: 2023-03-01 | End: 2023-11-10 | Stop reason: DRUGHIGH

## 2023-04-09 ENCOUNTER — HEALTH MAINTENANCE LETTER (OUTPATIENT)
Age: 48
End: 2023-04-09

## 2023-05-01 ENCOUNTER — OFFICE VISIT (OUTPATIENT)
Dept: URGENT CARE | Facility: URGENT CARE | Age: 48
End: 2023-05-01
Payer: COMMERCIAL

## 2023-05-01 VITALS
HEART RATE: 84 BPM | OXYGEN SATURATION: 98 % | BODY MASS INDEX: 31.16 KG/M2 | WEIGHT: 169 LBS | SYSTOLIC BLOOD PRESSURE: 112 MMHG | DIASTOLIC BLOOD PRESSURE: 69 MMHG | TEMPERATURE: 99.2 F

## 2023-05-01 DIAGNOSIS — L50.9 HIVES: Primary | ICD-10-CM

## 2023-05-01 PROCEDURE — 99213 OFFICE O/P EST LOW 20 MIN: CPT

## 2023-05-01 RX ORDER — METHYLPREDNISOLONE 4 MG
TABLET, DOSE PACK ORAL
Qty: 21 TABLET | Refills: 0 | Status: SHIPPED | OUTPATIENT
Start: 2023-05-01 | End: 2023-09-29

## 2023-05-01 RX ORDER — TRIAMCINOLONE ACETONIDE 1 MG/G
CREAM TOPICAL 2 TIMES DAILY
Qty: 60 G | Refills: 0 | Status: SHIPPED | OUTPATIENT
Start: 2023-05-01 | End: 2024-07-03

## 2023-05-01 NOTE — PROGRESS NOTES
URGENT CARE  Assessment & Plan   Assessment:   Cecilia Pabon is a 47 year old female who's clinical presentation today is consistent with:   1. Hives  - methylPREDNISolone (MEDROL DOSEPAK) 4 MG tablet therapy pack;  - triamcinolone (KENALOG) 0.1 % external cream; Apply topically 2 times daily      No alarm signs or symptoms present   Differential Diagnoses for this patient's CC include    Viral exanthem, strep pharyngitis    Viral URI, dermatitis allergic, Erythema infectiosum    Rubella, rubeola, Scarlet Fever, Enteroviral infection   Drug reaction    Plan:  Will treat patient's hives/urticaria today with first generation antihistamines such as diphenhydramine and a second-generation agents such as: cetirizine.   Additionally will prescribe patient with an anti-pruritic / antinflammatory cream for skin irritation    Given patient's prior conservative home treatments using benadryl will treat patient today with glucocorticosteroids as a skin taper, encouraged patient to continue with antihistamine for 1-2 weeks.}  Educated patient to monitor for skin triggers that may affect urticaria and to follow up with an allergy specialist if further allergen testing is desired   Additionally we discussed if symptoms do not improve after starting today's treatment (or if symptoms worsen) to follow up in 3-5 days.    Patient  is agreeable to treatment plan and state they will follow-up if symptoms do not improve and/or if symptoms worsen (see patient's AVS 'monitor for' section for specific patient instructions given and discussed regarding what to watch for and when to follow up)    Medications ordered are listed above, please see AVS for patient's specific and personalized discharge instructions given     ARTURO Kwon [facility] [place]      ______________________________________________________________________        Subjective  Subjective     HPI: Cecilia Pabon  is a 47 year old  female  who presents today for evaluation the following concerns:   Patient reports a rash noted to their arms and legs, which started 2-3 days ago, while in [place]. Patient states she thinks it might be a reaction to the sunscreen   Patient endorses the rash is severly pruritic but not painful   Patient denies that there is any mucus membrane involvement   Patient states the rash is: pink, warm, and raised.   Patient states they have tried: OTC benedryl PO, and anti-itch cream (Cortizone) cream}.   Patient states they have never had a rash like this before     Patient denies any constitutional symptoms, respiratory difficulty, neither is rash associated w/ fever, arthralgias, URI symptoms, or other recent viral  syndromes. No change in speaking or breathing reported   Patient denies any:  throat swelling, difficulty breathing, shortness of breath, hoarse voice, voice changes.   Patient denies any whistling sounds when breathing, wheezing or a cough  Patient denies any nasal drainage, nasal congestion, or sneezing, denies mucus membrane involvement.   Denies any flushing, or generalized hives/ urticaria, patient denies any generalized itching  Patient denies feeling like his throat is tight, his throat is itchy or his ears are itchy    Patient denies any systemic involvement such as whole body skin reactions, rashes, chills, fevers, nausea, vomiting, diarrhea or abdominal cramping.   Patient denies any signs of hypotension or elevated heart rate.   Patient denies any swelling of the lips, tongue, face, periorbital area or conjunctiva.      No Known Allergies  Patient Active Problem List   Diagnosis     Headache     Anxiety state     Mild persistent asthma     Family history of breast cancer in mother     HYPERLIPIDEMIA LDL GOAL <160     Acute cholecystitis     Enteritis     Abdominal pain     Major depression, single episode       Review of Systems:  Pertinent review of systems as reflected in HPI, otherwise negative.      Objective  Objective    Physical Exam:  Vitals:    05/01/23 1648   BP: 112/69   BP Location: Right arm   Patient Position: Sitting   Cuff Size: Adult Regular   Pulse: 84   Temp: 99.2  F (37.3  C)   TempSrc: Tympanic   SpO2: 98%   Weight: 76.7 kg (169 lb)      General:   alert and oriented, no acute distress, non- ill-appearing   Vital signs reviewed: afebrile and normotensive    Psy/mental status: cooperative and pleasant     Skin:   BUE and  BLEs: noted  diffuse urticarial circumscribed edematous pink lesions/wheals  that are raised,   flat topped, erythematous, with multiple shapes and sizes present  Negative for angioedema or distribution on the face or neck.   No signs of secondary infection at this time. Lesions, blanchable   There is no drainage, intense pruritis noted.     EYES: EOMs intact, PERRLA bilaterally   Conjunctiva: Clear bilaterally, no injection or erythema present  NOSE:  mucosa moist   MOUTH/THROAT: lips, tongue, & oral mucosa appear normal upon inspection         NECK: supple, has full range of motion with no meningeal signs              No lymphadenopathy present  LUNG: normal work of breathing, good respiratory effort without retractions, good air  movement, non labored, inspection reveals normal chest expansion w/  inspiration          I explained my diagnostic considerations and recommendations to the patient, who voiced understanding and agreement with the treatment plan.   All questions were answered.   We discussed potential side effects, risks and benefits of any prescribed or recommended therapies, as well as expectations for response to treatments.  Please see AVS for any patient instructions & handouts given.   Patient was advised to contact the Nurse Care Line, their Primary Care provider, Urgent Care, or the Emergency Department if there are new or worsening symptoms, or call 911 for  emergencies.        ______________________________________________________________________          Patient Instructions   Diagnosis: facial swelling, allergic reaction, hives, rash, angioedema    Plan:     Rest quietly today. Don't do vigorous physical activity.    Medicines: antihistamines, steroids} for itching, swelling, or pain.  o Diphenhydramine for next two days   o Certrizine for next 1-2 weeks     You may use acetaminophen or ibuprofen for pain, unless another pain medicine was prescribed.    Monitor for triggers that may have caused the reaction: medications, bugs, irritants, foods - if note what caused it then STOP     Steroid  creams for itching}     Follow up w/ your PCP vs an allergy specialist for further testing and prevention strategies     and for possible prescription of an epinephrine auto injector kit, keep it with you at all times incase of future reactions   Monitor for:   1. Trouble breathing or swallowing, or wheezing  2. Hoarse voice or trouble speaking, or drooling  3. Chest pain or tightness  4. Confusion, lightheadedness, or dizziness  5. Extreme drowsiness or trouble awakening  6. Fainting or loss of consciousness  7. Rapid heart rate  8. Vomiting blood, or large amounts of blood in stool  9. Seizure  10. Nausea, vomiting, diarrhea, abdominal pain, or stomach cramp    Symptoms don't go away    Symptoms come back    Symptoms get worse or new symptoms develop    Fever of 100.4 F         Hives   Hives are pink or red bumps on the skin.   These bumps are also known as wheals.   The bumps can itch, burn, or sting.   Hives can occur anywhere on the body. They vary in size and shape and can form in clusters. Individual hives can appear and go away quickly.   New hives may develop as old ones fade.   --Hives are common and usually harmless. They are not contagious.     Hives are often caused by an allergic reaction.   They may occur from:    Certain foods, such as shellfish, nuts, tomatoes, or  berries    Contact with something in the environment, such as pollens, animals, or mold    Certain medicines    Sun or cold air    Viral infections, such as a cold, the flu, or strep throat  ---  the cause may be very hard to figure out.  The hives will usually fade in a few days.   But they can last for weeks or months    Common causes:   food and drinks such as:    Tree nuts (almonds, walnuts, hazelnuts)    Peanuts    Eggs    Shellfish    Milk   can be caused by medicines such as:    Antibiotics, especially penicillin and sulfa-based medicines     Anticonvulsant or antiseizure medicines     Chemotherapy medicines   Other causes:     Infection or virus    Heat    Cold air or cold water    Exercise    Scratching or rubbing your skin, or wearing tight-fitting clothes that rub your skin    Being exposed to sunlight or light from a light bulb, in rare cases    Inhaled chemicals in the environment from foods and medicines, insects, plants, or other sources  In some cases, hives may occur again and again with no specific cause (idiopathic urticaria).}

## 2023-05-01 NOTE — PATIENT INSTRUCTIONS
Diagnosis: facial swelling, allergic reaction, hives, rash, angioedema    Plan:   Rest quietly today. Don't do vigorous physical activity.  Medicines: antihistamines, steroids} for itching, swelling, or pain.  Diphenhydramine for next two days   Certrizine for next 1-2 weeks   You may use acetaminophen or ibuprofen for pain, unless another pain medicine was prescribed.  Monitor for triggers that may have caused the reaction: medications, bugs, irritants, foods - if note what caused it then STOP   Steroid  creams for itching}   Follow up w/ your PCP vs an allergy specialist for further testing and prevention strategies   and for possible prescription of an epinephrine auto injector kit, keep it with you at all times incase of future reactions   Monitor for:   Trouble breathing or swallowing, or wheezing  Hoarse voice or trouble speaking, or drooling  Chest pain or tightness  Confusion, lightheadedness, or dizziness  Extreme drowsiness or trouble awakening  Fainting or loss of consciousness  Rapid heart rate  Vomiting blood, or large amounts of blood in stool  Seizure  Nausea, vomiting, diarrhea, abdominal pain, or stomach cramp  Symptoms don't go away  Symptoms come back  Symptoms get worse or new symptoms develop  Fever of 100.4 F         Hives   Hives are pink or red bumps on the skin.   These bumps are also known as wheals.   The bumps can itch, burn, or sting.   Hives can occur anywhere on the body. They vary in size and shape and can form in clusters. Individual hives can appear and go away quickly.   New hives may develop as old ones fade.   --Hives are common and usually harmless. They are not contagious.     Hives are often caused by an allergic reaction.   They may occur from:  Certain foods, such as shellfish, nuts, tomatoes, or berries  Contact with something in the environment, such as pollens, animals, or mold  Certain medicines  Sun or cold air  Viral infections, such as a cold, the flu, or strep  throat  ---  the cause may be very hard to figure out.  The hives will usually fade in a few days.   But they can last for weeks or months    Common causes:   food and drinks such as:  Tree nuts (almonds, walnuts, hazelnuts)  Peanuts  Eggs  Shellfish  Milk   can be caused by medicines such as:  Antibiotics, especially penicillin and sulfa-based medicines   Anticonvulsant or antiseizure medicines   Chemotherapy medicines   Other causes:   Infection or virus  Heat  Cold air or cold water  Exercise  Scratching or rubbing your skin, or wearing tight-fitting clothes that rub your skin  Being exposed to sunlight or light from a light bulb, in rare cases  Inhaled chemicals in the environment from foods and medicines, insects, plants, or other sources  In some cases, hives may occur again and again with no specific cause (idiopathic urticaria).}

## 2023-05-11 ENCOUNTER — APPOINTMENT (OUTPATIENT)
Dept: CT IMAGING | Facility: CLINIC | Age: 48
End: 2023-05-11
Attending: EMERGENCY MEDICINE
Payer: COMMERCIAL

## 2023-05-11 ENCOUNTER — HOSPITAL ENCOUNTER (EMERGENCY)
Facility: CLINIC | Age: 48
Discharge: HOME | End: 2023-05-11
Attending: EMERGENCY MEDICINE | Admitting: EMERGENCY MEDICINE
Payer: COMMERCIAL

## 2023-05-11 VITALS
BODY MASS INDEX: 30.91 KG/M2 | RESPIRATION RATE: 16 BRPM | SYSTOLIC BLOOD PRESSURE: 130 MMHG | TEMPERATURE: 97.2 F | HEIGHT: 62 IN | HEART RATE: 84 BPM | OXYGEN SATURATION: 98 % | WEIGHT: 168 LBS | DIASTOLIC BLOOD PRESSURE: 87 MMHG

## 2023-05-11 DIAGNOSIS — E86.0 DEHYDRATION: ICD-10-CM

## 2023-05-11 DIAGNOSIS — R11.2 NAUSEA VOMITING AND DIARRHEA: ICD-10-CM

## 2023-05-11 DIAGNOSIS — R19.7 NAUSEA VOMITING AND DIARRHEA: ICD-10-CM

## 2023-05-11 LAB
ALBUMIN SERPL BCG-MCNC: 4.5 G/DL (ref 3.5–5.2)
ALP SERPL-CCNC: 71 U/L (ref 35–104)
ALT SERPL W P-5'-P-CCNC: 11 U/L (ref 10–35)
ANION GAP SERPL CALCULATED.3IONS-SCNC: 12 MMOL/L (ref 7–15)
AST SERPL W P-5'-P-CCNC: 16 U/L (ref 10–35)
BASOPHILS # BLD AUTO: 0.1 10E3/UL (ref 0–0.2)
BASOPHILS NFR BLD AUTO: 0 %
BILIRUB SERPL-MCNC: 0.5 MG/DL
BUN SERPL-MCNC: 7.8 MG/DL (ref 6–20)
CALCIUM SERPL-MCNC: 9.9 MG/DL (ref 8.6–10)
CHLORIDE SERPL-SCNC: 101 MMOL/L (ref 98–107)
CREAT SERPL-MCNC: 0.78 MG/DL (ref 0.51–0.95)
DEPRECATED HCO3 PLAS-SCNC: 26 MMOL/L (ref 22–29)
EOSINOPHIL # BLD AUTO: 0.1 10E3/UL (ref 0–0.7)
EOSINOPHIL NFR BLD AUTO: 1 %
ERYTHROCYTE [DISTWIDTH] IN BLOOD BY AUTOMATED COUNT: 12.2 % (ref 10–15)
GFR SERPL CREATININE-BSD FRML MDRD: >90 ML/MIN/1.73M2
GLUCOSE SERPL-MCNC: 101 MG/DL (ref 70–99)
HCG SERPL QL: NEGATIVE
HCT VFR BLD AUTO: 42.6 % (ref 35–47)
HGB BLD-MCNC: 14.3 G/DL (ref 11.7–15.7)
HOLD SPECIMEN: NORMAL
HOLD SPECIMEN: NORMAL
IMM GRANULOCYTES # BLD: 0 10E3/UL
IMM GRANULOCYTES NFR BLD: 0 %
LIPASE SERPL-CCNC: 28 U/L (ref 13–60)
LYMPHOCYTES # BLD AUTO: 4.2 10E3/UL (ref 0.8–5.3)
LYMPHOCYTES NFR BLD AUTO: 34 %
MCH RBC QN AUTO: 32.3 PG (ref 26.5–33)
MCHC RBC AUTO-ENTMCNC: 33.6 G/DL (ref 31.5–36.5)
MCV RBC AUTO: 96 FL (ref 78–100)
MONOCYTES # BLD AUTO: 0.7 10E3/UL (ref 0–1.3)
MONOCYTES NFR BLD AUTO: 6 %
NEUTROPHILS # BLD AUTO: 7.4 10E3/UL (ref 1.6–8.3)
NEUTROPHILS NFR BLD AUTO: 59 %
NRBC # BLD AUTO: 0 10E3/UL
NRBC BLD AUTO-RTO: 0 /100
PLATELET # BLD AUTO: 318 10E3/UL (ref 150–450)
POTASSIUM SERPL-SCNC: 3.6 MMOL/L (ref 3.4–5.3)
PROT SERPL-MCNC: 8 G/DL (ref 6.4–8.3)
RBC # BLD AUTO: 4.43 10E6/UL (ref 3.8–5.2)
SODIUM SERPL-SCNC: 139 MMOL/L (ref 136–145)
WBC # BLD AUTO: 12.4 10E3/UL (ref 4–11)

## 2023-05-11 PROCEDURE — 96375 TX/PRO/DX INJ NEW DRUG ADDON: CPT | Performed by: EMERGENCY MEDICINE

## 2023-05-11 PROCEDURE — 96376 TX/PRO/DX INJ SAME DRUG ADON: CPT | Performed by: EMERGENCY MEDICINE

## 2023-05-11 PROCEDURE — 258N000003 HC RX IP 258 OP 636: Performed by: EMERGENCY MEDICINE

## 2023-05-11 PROCEDURE — 250N000009 HC RX 250: Performed by: EMERGENCY MEDICINE

## 2023-05-11 PROCEDURE — 85025 COMPLETE CBC W/AUTO DIFF WBC: CPT | Performed by: EMERGENCY MEDICINE

## 2023-05-11 PROCEDURE — 36415 COLL VENOUS BLD VENIPUNCTURE: CPT | Performed by: EMERGENCY MEDICINE

## 2023-05-11 PROCEDURE — 250N000011 HC RX IP 250 OP 636: Performed by: EMERGENCY MEDICINE

## 2023-05-11 PROCEDURE — 99285 EMERGENCY DEPT VISIT HI MDM: CPT | Mod: 25 | Performed by: EMERGENCY MEDICINE

## 2023-05-11 PROCEDURE — 99284 EMERGENCY DEPT VISIT MOD MDM: CPT | Performed by: EMERGENCY MEDICINE

## 2023-05-11 PROCEDURE — 80053 COMPREHEN METABOLIC PANEL: CPT | Performed by: EMERGENCY MEDICINE

## 2023-05-11 PROCEDURE — 96374 THER/PROPH/DIAG INJ IV PUSH: CPT | Mod: 59 | Performed by: EMERGENCY MEDICINE

## 2023-05-11 PROCEDURE — 84703 CHORIONIC GONADOTROPIN ASSAY: CPT | Performed by: EMERGENCY MEDICINE

## 2023-05-11 PROCEDURE — 96361 HYDRATE IV INFUSION ADD-ON: CPT | Performed by: EMERGENCY MEDICINE

## 2023-05-11 PROCEDURE — 83690 ASSAY OF LIPASE: CPT | Performed by: EMERGENCY MEDICINE

## 2023-05-11 PROCEDURE — 74177 CT ABD & PELVIS W/CONTRAST: CPT

## 2023-05-11 RX ORDER — PROMETHAZINE HYDROCHLORIDE 25 MG/1
25 TABLET ORAL EVERY 6 HOURS PRN
Qty: 24 TABLET | Refills: 1 | Status: SHIPPED | OUTPATIENT
Start: 2023-05-11 | End: 2023-11-10

## 2023-05-11 RX ORDER — ONDANSETRON 2 MG/ML
4 INJECTION INTRAMUSCULAR; INTRAVENOUS ONCE
Status: COMPLETED | OUTPATIENT
Start: 2023-05-11 | End: 2023-05-11

## 2023-05-11 RX ORDER — IOPAMIDOL 755 MG/ML
74 INJECTION, SOLUTION INTRAVASCULAR ONCE
Status: COMPLETED | OUTPATIENT
Start: 2023-05-11 | End: 2023-05-11

## 2023-05-11 RX ORDER — OLANZAPINE 10 MG/1
2.5 INJECTION, POWDER, LYOPHILIZED, FOR SOLUTION INTRAMUSCULAR ONCE
Status: COMPLETED | OUTPATIENT
Start: 2023-05-11 | End: 2023-05-11

## 2023-05-11 RX ORDER — ONDANSETRON 4 MG/1
4 TABLET, ORALLY DISINTEGRATING ORAL EVERY 8 HOURS PRN
Qty: 15 TABLET | Refills: 1 | Status: SHIPPED | OUTPATIENT
Start: 2023-05-11 | End: 2024-05-22

## 2023-05-11 RX ORDER — DROPERIDOL 2.5 MG/ML
2.5 INJECTION, SOLUTION INTRAMUSCULAR; INTRAVENOUS ONCE
Status: COMPLETED | OUTPATIENT
Start: 2023-05-11 | End: 2023-05-11

## 2023-05-11 RX ORDER — OLANZAPINE 10 MG/1
5 INJECTION, POWDER, LYOPHILIZED, FOR SOLUTION INTRAMUSCULAR ONCE
Status: DISCONTINUED | OUTPATIENT
Start: 2023-05-11 | End: 2023-05-11

## 2023-05-11 RX ADMIN — IOPAMIDOL 74 ML: 755 INJECTION, SOLUTION INTRAVENOUS at 20:53

## 2023-05-11 RX ADMIN — OLANZAPINE 2.5 MG: 10 INJECTION, POWDER, FOR SOLUTION INTRAMUSCULAR at 21:51

## 2023-05-11 RX ADMIN — ONDANSETRON 4 MG: 2 INJECTION INTRAMUSCULAR; INTRAVENOUS at 19:31

## 2023-05-11 RX ADMIN — SODIUM CHLORIDE 61 ML: 9 INJECTION, SOLUTION INTRAVENOUS at 20:53

## 2023-05-11 RX ADMIN — SODIUM CHLORIDE, POTASSIUM CHLORIDE, SODIUM LACTATE AND CALCIUM CHLORIDE 1000 ML: 600; 310; 30; 20 INJECTION, SOLUTION INTRAVENOUS at 19:31

## 2023-05-11 RX ADMIN — SODIUM CHLORIDE, POTASSIUM CHLORIDE, SODIUM LACTATE AND CALCIUM CHLORIDE 1000 ML: 600; 310; 30; 20 INJECTION, SOLUTION INTRAVENOUS at 20:34

## 2023-05-11 RX ADMIN — ONDANSETRON 4 MG: 2 INJECTION INTRAMUSCULAR; INTRAVENOUS at 21:46

## 2023-05-11 RX ADMIN — DROPERIDOL 2.5 MG: 2.5 INJECTION, SOLUTION INTRAMUSCULAR; INTRAVENOUS at 20:32

## 2023-05-11 RX ADMIN — SODIUM CHLORIDE, POTASSIUM CHLORIDE, SODIUM LACTATE AND CALCIUM CHLORIDE 1000 ML: 600; 310; 30; 20 INJECTION, SOLUTION INTRAVENOUS at 21:44

## 2023-05-11 ASSESSMENT — ACTIVITIES OF DAILY LIVING (ADL)
ADLS_ACUITY_SCORE: 35
ADLS_ACUITY_SCORE: 35

## 2023-05-12 NOTE — ED PROVIDER NOTES
History     Chief Complaint   Patient presents with     Nausea, Vomiting, & Diarrhea     Nausea, vomiting, and diarrhea for 2.5 days now.      HPI  Cecilia Pabon is a 47 year old female who presents emergency department for evaluation of approximately 48 hours of nausea, vomiting and diarrhea.  She presents due to concern of dehydration and because she cannot keep anything down when she takes by mouth.  She has emesis of food and yellow liquid without signs or symptoms of GI bleeding.  She has had profuse watery diarrhea x 4-6 per day, without blood or mucus.  No fever or chills. No relief with Pepto-Bismol.  Today she has had some right upper abdominal pain.  She is status postcholecystectomy.  Suspicious bad food ingestion: ate grilled chicken sandwich, fries, ranch dressing at a restaurant 2 days ago.  Recent travel: returned from a trip to [place] 10 days ago.  No recent antibiotic use.  No history of inflammatory bowel disease.  No other pertinent history or acute complaints or concerns.      Allergies:  No Known Allergies    Problem List:    Patient Active Problem List    Diagnosis Date Noted     Major depression, single episode 09/15/2022     Priority: Medium     Enteritis 02/17/2015     Priority: Medium     Abdominal pain 02/17/2015     Priority: Medium     Acute cholecystitis 12/29/2014     Priority: Medium     HYPERLIPIDEMIA LDL GOAL <160 10/31/2010     Priority: Medium     Family history of breast cancer in mother 09/17/2009     Priority: Medium     Mild persistent asthma 03/28/2006     Priority: Medium     Headache 02/25/2005     Priority: Medium      2-3 in past yr.  Reduced after ear piercing at acupressure point.       Anxiety state 02/25/2005     Priority: Medium     Problem list name updated by automated process. Provider to review          Past Medical History:    Past Medical History:   Diagnosis Date     Anxiety state, unspecified      ASCUS on Pap smear 2003     COPD (chronic obstructive  pulmonary disease) (H)      Family history of breast cancer in mother 09/17/2009     Family history of breast cancer in mother      Headache 02/25/2005     Hx of colposcopy with cervical biopsy 06/02/2003     Major depression, single episode 09/15/2022     Mental disorders of mother, postpartum      Migraine, unspecified, without mention of intractable migraine without mention of status migrainosus      Other specified cardiac dysrhythmias(427.89)      Swelling of limb      Uncomplicated asthma      Unspecified viral infection, in conditions classified elsewhere and of unspecified site        Past Surgical History:    Past Surgical History:   Procedure Laterality Date     ABDOMEN SURGERY  [id number]    Csecttion     COLONOSCOPY       DILATION AND CURETTAGE, OPERATIVE HYSTEROSCOPY, COMBINED N/A 11/3/2022    Procedure: HYSTEROSCOPY, WITH DILATION AND CURETTAGE OF UTERUS, hysteroscopic endometrial biopsy,  Mirena Intra Uterine Device Placement;  Surgeon: Luzmaria Dutta MD;  Location: [facility]     GI SURGERY       LAPAROSCOPIC CHOLECYSTECTOMY N/A 12/30/2014    Procedure: LAPAROSCOPIC CHOLECYSTECTOMY;  Surgeon: Ulises Starr MD;  Location: [facility]     SURGICAL HISTORY OF -       Csection       Family History:    Family History   Problem Relation Age of Onset     Depression Mother      Respiratory Mother         COPD, emphysema     Cancer Mother      Neurologic Disorder Mother         raunads syndrom     Breast Cancer Mother         Stage 4     Other Cancer Mother         Cervical     Anesthesia Reaction Mother      Osteoporosis Mother      Anxiety Disorder Mother      Unknown/Adopted Mother         Dying of COPD     Gastrointestinal Disease Maternal Grandmother      Depression Maternal Grandmother      Hypertension Maternal Grandmother      Cancer Maternal Grandmother         skin cancer     Breast Cancer Maternal Grandmother      Other Cancer Maternal Grandmother         Melanoma     Anxiety Disorder Maternal Grandmother       "Breast Cancer Cousin         Grandfathers side     Breast Cancer Cousin         Grandfathers side BRCA 1     Prostate Cancer Other         maternal side     Prostate Cancer Other         maternal side     Prostate Cancer Other         maternal side     Leukemia Paternal Half-Sister         1/2 sister,  in age 13 or 15     Diabetes Cousin      Hypertension Other      Hypertension Maternal Grandfather      Hyperlipidemia Maternal Grandfather      Asthma Other        Social History:  Marital Status:   [2]  Social History     Tobacco Use     Smoking status: Never     Smokeless tobacco: Never   Vaping Use     Vaping status: Never Used   Substance Use Topics     Alcohol use: Yes     Comment: Maybe 1-2 times a week     Drug use: No        Medications:    ondansetron (ZOFRAN ODT) 4 MG ODT tab  promethazine (PHENERGAN) 25 MG tablet  acetaminophen (TYLENOL) 325 MG tablet  albuterol (PROAIR HFA/PROVENTIL HFA/VENTOLIN HFA) 108 (90 Base) MCG/ACT inhaler  buPROPion (WELLBUTRIN XL) 150 MG 24 hr tablet  buPROPion (WELLBUTRIN) 100 MG tablet  escitalopram (LEXAPRO) 20 MG tablet  ibuprofen (ADVIL/MOTRIN) 800 MG tablet  methylPREDNISolone (MEDROL DOSEPAK) 4 MG tablet therapy pack  SUMAtriptan (IMITREX) 100 MG tablet  triamcinolone (KENALOG) 0.1 % external cream  Vitamin A 7.5 MG (41228 UT) CAPS  vitamin B complex with vitamin C (VITAMIN  B COMPLEX) tablet  Vitamin D, Cholecalciferol, 25 MCG (1000 UT) CAPS        Review of Systems  As mentioned in the HPI, in addition focused review of systems was negative.    Physical Exam   BP: 127/82  Pulse: 97  Temp: 97.2  F (36.2  C)  Resp: 16  Height: 157.5 cm (5' 2\")  Weight: 76.2 kg (168 lb)  SpO2: 99 %      Physical Exam  Vitals and nursing note reviewed.   Constitutional:       General: She is not in acute distress.     Appearance: Normal appearance. She is well-developed. She is not ill-appearing or diaphoretic.   HENT:      Head: Normocephalic and atraumatic.      Mouth/Throat:    "   Mouth: Mucous membranes are dry.   Eyes:      General: No scleral icterus.     Extraocular Movements: Extraocular movements intact.      Conjunctiva/sclera: Conjunctivae normal.   Neck:      Trachea: No tracheal deviation.   Cardiovascular:      Rate and Rhythm: Normal rate and regular rhythm.      Heart sounds: Normal heart sounds. No murmur heard.     No friction rub. No gallop.   Pulmonary:      Effort: Pulmonary effort is normal. No respiratory distress.      Breath sounds: Normal breath sounds. No wheezing, rhonchi or rales.   Abdominal:      General: There is no distension.      Palpations: Abdomen is soft. There is no mass.      Tenderness: There is abdominal tenderness. There is no right CVA tenderness, left CVA tenderness, guarding or rebound.      Hernia: No hernia is present.      Comments: Hyperactive normal pitch bowel sounds.   Musculoskeletal:         General: No swelling. Normal range of motion.      Cervical back: Normal range of motion and neck supple.      Right lower leg: No edema.      Left lower leg: No edema.   Skin:     General: Skin is warm and dry.      Coloration: Skin is not pale.      Findings: No erythema or rash.   Neurological:      General: No focal deficit present.      Mental Status: She is alert and oriented to person, place, and time.      Coordination: Coordination normal.   Psychiatric:         Mood and Affect: Mood normal.         Behavior: Behavior normal.         ED Course           Procedures                Results for orders placed or performed during the hospital encounter of 05/11/23 (from the past 24 hour(s))   CBC with platelets differential    Narrative    The following orders were created for panel order CBC with platelets differential.  Procedure                               Abnormality         Status                     ---------                               -----------         ------                     CBC with platelets and d...[[id number]]  Abnormal             Final result                 Please view results for these tests on the individual orders.   Comprehensive metabolic panel   Result Value Ref Range    Sodium 139 136 - 145 mmol/L    Potassium 3.6 3.4 - 5.3 mmol/L    Chloride 101 98 - 107 mmol/L    Carbon Dioxide (CO2) 26 22 - 29 mmol/L    Anion Gap 12 7 - 15 mmol/L    Urea Nitrogen 7.8 6.0 - 20.0 mg/dL    Creatinine 0.78 0.51 - 0.95 mg/dL    Calcium 9.9 8.6 - 10.0 mg/dL    Glucose 101 (H) 70 - 99 mg/dL    Alkaline Phosphatase 71 35 - 104 U/L    AST 16 10 - 35 U/L    ALT 11 10 - 35 U/L    Protein Total 8.0 6.4 - 8.3 g/dL    Albumin 4.5 3.5 - 5.2 g/dL    Bilirubin Total 0.5 <=1.2 mg/dL    GFR Estimate >90 >60 mL/min/1.73m2   [place] Draw    Narrative    The following orders were created for panel order [place] Draw.  Procedure                               Abnormality         Status                     ---------                               -----------         ------                     Extra Blue Top Tube[[id number]]                              Final result               Extra Red Top Tube[[id number]]                               Final result                 Please view results for these tests on the individual orders.   CBC with platelets and differential   Result Value Ref Range    WBC Count 12.4 (H) 4.0 - 11.0 10e3/uL    RBC Count 4.43 3.80 - 5.20 10e6/uL    Hemoglobin 14.3 11.7 - 15.7 g/dL    Hematocrit 42.6 35.0 - 47.0 %    MCV 96 78 - 100 fL    MCH 32.3 26.5 - 33.0 pg    MCHC 33.6 31.5 - 36.5 g/dL    RDW 12.2 10.0 - 15.0 %    Platelet Count 318 150 - 450 10e3/uL    % Neutrophils 59 %    % Lymphocytes 34 %    % Monocytes 6 %    % Eosinophils 1 %    % Basophils 0 %    % Immature Granulocytes 0 %    NRBCs per 100 WBC 0 <1 /100    Absolute Neutrophils 7.4 1.6 - 8.3 10e3/uL    Absolute Lymphocytes 4.2 0.8 - 5.3 10e3/uL    Absolute Monocytes 0.7 0.0 - 1.3 10e3/uL    Absolute Eosinophils 0.1 0.0 - 0.7 10e3/uL    Absolute Basophils 0.1 0.0 - 0.2 10e3/uL    Absolute  Immature Granulocytes 0.0 <=0.4 10e3/uL    Absolute NRBCs 0.0 10e3/uL   Extra Blue Top Tube   Result Value Ref Range    Hold Specimen JIC    Extra Red Top Tube   Result Value Ref Range    Hold Specimen JIC    Lipase   Result Value Ref Range    Lipase 28 13 - 60 U/L   HCG qualitative Blood   Result Value Ref Range    hCG Serum Qualitative Negative Negative   CT Abdomen Pelvis w Contrast    Narrative    EXAM: CT ABDOMEN PELVIS W CONTRAST  LOCATION: [facility]  DATE/TIME: 5/11/2023 9:02 PM CDT    INDICATION: right sided abdominal pain, nausea, vomiting and diarrhea  COMPARISON: CT abdomen and pelvis renal stone protocol 02/16/2022  TECHNIQUE: CT scan of the abdomen and pelvis was performed following injection of IV contrast. Multiplanar reformats were obtained. Dose reduction techniques were used.  CONTRAST: 74mL Isovue 370    FINDINGS:   LOWER CHEST: Normal.    HEPATOBILIARY: Prior cholecystectomy. No bile duct enlargement. Homogeneous liver attenuation. Smooth liver capsule. No liver lesions.    PANCREAS: Normal.    SPLEEN: Normal.    ADRENAL GLANDS: Normal.    KIDNEYS/BLADDER: Prominent bilateral extrarenal pelvis but no hydronephrosis. Kidneys are normal in size with symmetric cortical thickness and parenchymal enhancement. No nephrolithiasis. Urinary bladder is normal.    BOWEL: Normal with no obstruction or acute inflammatory change. Normal appendix.    LYMPH NODES: Normal.    VASCULATURE: Unremarkable.    PELVIC ORGANS: Uterus is normal in size. There is an IUD in the uterine fundus. There are physiologic follicles in both ovaries. No inflammatory stranding in the pelvis or pelvic free fluid.    MUSCULOSKELETAL: Normal.      Impression    IMPRESSION:     No findings to suggest acute bowel inflammation or mechanical obstruction. Normal appendix.         Medications   lactated ringers BOLUS 1,000 mL (has no administration in time range)   ondansetron (ZOFRAN) injection 4 mg (has no  administration in time range)   OLANZapine (zyPREXA) IV injection 2.5 mg (has no administration in time range)   lactated ringers BOLUS 1,000 mL (0 mLs Intravenous Stopped 5/11/23 2035)   ondansetron (ZOFRAN) injection 4 mg (4 mg Intravenous $Given 5/11/23 1931)   lactated ringers BOLUS 1,000 mL (1,000 mLs Intravenous $New Bag 5/11/23 2034)   droperidol (INAPSINE) injection 2.5 mg (2.5 mg Intravenous $Given 5/11/23 2032)   iopamidol (ISOVUE-370) solution 74 mL (74 mLs Intravenous $Given 5/11/23 2053)   sodium chloride 0.9 % bag 500mL for CT scan flush use (61 mLs Intravenous $Given 5/11/23 2053)     Elevated WBC, right-sided abdominal pain, will CT to evaluate for emergent disease process such as appendicitis, diverticulitis, bowel obstruction.    9:32 PM - after 2 L IV fluid bolus, Zofran and then additional Inapsine she still feels nauseous and vomited when I went to check on her and reevaluate her.  We will give a Insty to set up in several spots allergy symptoms ongoing by 1 another liter of fluids lets repeat the Zofran with him and see if he has a preference I will give Maxide prior is a possibility More Zofran, see tablet 3 L IV fluid bolus and repeat Zofran and add and reassess.  The patient signed out to Dr. Diaz at the end of my shift.     Assessments & Plan (with Medical Decision Making)   ~ 48 hours of nausea, vomiting diarrhea with inability to keep any p.o. down and development of dehydration.  No signs or symptoms of GI bleeding and no fever or other significant other infectious signs or symptoms. Due to right-sided abdominal pain a CT scan of the abdomen/pelvis was performed and was unremarkable.  Abdomen is benign, I suspect this is a benign viral illness, doubt food poisoning or inflammatory bowel disease.  Will order stool evaluation, rx Zofran and Phenergan. She was instructed to use Imodium prn.   She was provided instructions for supportive care and will return as needed for worsened  condition or worsening symptoms, or new problems or concerns.      I have reviewed the nursing notes.    I have reviewed the findings, diagnosis, plan and need for follow up with the patient.    Medical Decision Making: High Complexity  Hospitalization for IV fluids and antiemetic therapy and observation was considered and deferred. Currently appears stable and appropriate for outpatient management with close f/u.      New Prescriptions    ONDANSETRON (ZOFRAN ODT) 4 MG ODT TAB    Take 1 tablet (4 mg) by mouth every 8 hours as needed for nausea or vomiting    PROMETHAZINE (PHENERGAN) 25 MG TABLET    Take 1 tablet (25 mg) by mouth every 6 hours as needed for nausea or vomiting       Final diagnoses:   Nausea vomiting and diarrhea   Dehydration       5/11/2023   [facility] EMERGENCY DEPT     Christian Lee MD  05/11/23 1895

## 2023-05-12 NOTE — ED PROVIDER NOTES
Emergency Department Patient Sign-out     ED Summary and Plan at Sign Out:  Patient presenting with nausea, vomiting, and diarrhea.  Multiple medications given while the patient is here in the ED.  Infectious gastroenteritis likely.    ED MDM:  1780.  Patient signed out to me at shift change.  Patient improved and wanting to go home.  Dr. Lee had prepared the patient's discharge and this will be used, no medication changes.     IMPRESSION:    ICD-10-CM    1. Nausea vomiting and diarrhea  R11.2 Enteric Bacteria and Virus Panel by [facility] Stool    R19.7 C. difficile Toxin B PCR with reflex to C. difficile Antigen and Toxins A/B EIA      2. Dehydration  E86.0              Saurabh Saenz MD  05/11/23 2028

## 2023-05-12 NOTE — ED TRIAGE NOTES
Nausea, vomiting, and diarrhea for 2.5 days now.      Triage Assessment     Row Name 05/11/23 9322       Triage Assessment (Adult)    Airway WDL WDL       Cardiac WDL    Cardiac WDL WDL       Cognitive/Neuro/Behavioral WDL    Cognitive/Neuro/Behavioral WDL WDL

## 2023-05-17 ENCOUNTER — MYC MEDICAL ADVICE (OUTPATIENT)
Dept: FAMILY MEDICINE | Facility: CLINIC | Age: 48
End: 2023-05-17
Payer: COMMERCIAL

## 2023-05-17 DIAGNOSIS — F41.1 ANXIETY STATE: ICD-10-CM

## 2023-05-17 DIAGNOSIS — J45.30 MILD PERSISTENT ASTHMA WITHOUT COMPLICATION: ICD-10-CM

## 2023-05-17 DIAGNOSIS — R51.9 HEADACHE, UNSPECIFIED HEADACHE TYPE: ICD-10-CM

## 2023-05-17 RX ORDER — BUPROPION HYDROCHLORIDE 150 MG/1
150 TABLET ORAL DAILY
Qty: 90 TABLET | Refills: 0 | Status: SHIPPED | OUTPATIENT
Start: 2023-05-17 | End: 2023-11-10

## 2023-05-17 RX ORDER — ESCITALOPRAM OXALATE 20 MG/1
20 TABLET ORAL DAILY
Qty: 90 TABLET | Refills: 0 | Status: SHIPPED | OUTPATIENT
Start: 2023-05-17 | End: 2023-10-09

## 2023-05-18 RX ORDER — ALBUTEROL SULFATE 90 UG/1
2 AEROSOL, METERED RESPIRATORY (INHALATION) EVERY 6 HOURS PRN
Qty: 18 G | Refills: 0 | Status: SHIPPED | OUTPATIENT
Start: 2023-05-18 | End: 2023-11-10

## 2023-05-18 RX ORDER — SUMATRIPTAN 100 MG/1
100 TABLET, FILM COATED ORAL
Qty: 18 TABLET | Refills: 0 | Status: SHIPPED | OUTPATIENT
Start: 2023-05-18 | End: 2023-11-10

## 2023-09-29 ENCOUNTER — OFFICE VISIT (OUTPATIENT)
Dept: URGENT CARE | Facility: URGENT CARE | Age: 48
End: 2023-09-29
Payer: COMMERCIAL

## 2023-09-29 VITALS
HEART RATE: 73 BPM | DIASTOLIC BLOOD PRESSURE: 72 MMHG | RESPIRATION RATE: 16 BRPM | BODY MASS INDEX: 30.36 KG/M2 | WEIGHT: 166 LBS | TEMPERATURE: 98.6 F | OXYGEN SATURATION: 98 % | SYSTOLIC BLOOD PRESSURE: 107 MMHG

## 2023-09-29 DIAGNOSIS — J45.31 MILD PERSISTENT ASTHMA WITH EXACERBATION: ICD-10-CM

## 2023-09-29 DIAGNOSIS — J06.9 URI WITH COUGH AND CONGESTION: Primary | ICD-10-CM

## 2023-09-29 PROCEDURE — 99213 OFFICE O/P EST LOW 20 MIN: CPT | Performed by: FAMILY MEDICINE

## 2023-09-29 RX ORDER — PREDNISONE 20 MG/1
40 TABLET ORAL DAILY
Qty: 10 TABLET | Refills: 0 | Status: SHIPPED | OUTPATIENT
Start: 2023-09-29 | End: 2023-10-04

## 2023-09-29 NOTE — PROGRESS NOTES
SUBJECTIVE:  Cecilia Pabon is a 47 year old female who presents to the clinic today with a chief complaint of cough  for 5 day(s).  Her cough is described as persistent.    The patient's symptoms are moderate   and not changing over the course of time.  Associated symptoms include . The patient's symptoms are exacerbated by no particular triggers  Patient has been using albuterol nebs  to improve symptoms.    Past Medical History:   Diagnosis Date    Anxiety state, unspecified     ASCUS on Pap smear 2003    +HR HPV 16    COPD (chronic obstructive pulmonary disease) (H)     My Mom has this    Family history of breast cancer in mother 09/17/2009    Family history of breast cancer in mother     Headache 02/25/2005    Hx of colposcopy with cervical biopsy 06/02/2003    WNL    Major depression, single episode 09/15/2022    Mental disorders of mother, postpartum     postpartum depression    Migraine, unspecified, without mention of intractable migraine without mention of status migrainosus     Other specified cardiac dysrhythmias(427.89)     Swelling of limb     Uncomplicated asthma     Myself    Unspecified viral infection, in conditions classified elsewhere and of unspecified site        Current Outpatient Medications   Medication Sig Dispense Refill    acetaminophen (TYLENOL) 325 MG tablet Take 2 tablets (650 mg) by mouth every 6 hours as needed for mild pain 24 tablet 0    albuterol (PROAIR HFA/PROVENTIL HFA/VENTOLIN HFA) 108 (90 Base) MCG/ACT inhaler Inhale 2 puffs into the lungs every 6 hours as needed for shortness of breath 18 g 0    buPROPion (WELLBUTRIN XL) 150 MG 24 hr tablet TAKE 1 TABLET (150 MG) BY MOUTH DAILY 90 tablet 0    buPROPion (WELLBUTRIN) 100 MG tablet Take 0.5 tablets (50 mg) by mouth At Bedtime 90 tablet 3    escitalopram (LEXAPRO) 20 MG tablet TAKE 1 TABLET (20 MG) BY MOUTH DAILY 90 tablet 0    ibuprofen (ADVIL/MOTRIN) 800 MG tablet Take 1 tablet (800 mg) by mouth every 6 hours as needed for  other (mild and/or inflammatory pain) 30 tablet 0    ondansetron (ZOFRAN ODT) 4 MG ODT tab Take 1 tablet (4 mg) by mouth every 8 hours as needed for nausea or vomiting 15 tablet 1    SUMAtriptan (IMITREX) 100 MG tablet Take 1 tablet (100 mg) by mouth at onset of headache for migraine May repeat dose in 2 hours.  Do not exceed 200 mg in 24 hours 18 tablet 0    promethazine (PHENERGAN) 25 MG tablet Take 1 tablet (25 mg) by mouth every 6 hours as needed for nausea or vomiting (Patient not taking: Reported on 9/29/2023) 24 tablet 1    triamcinolone (KENALOG) 0.1 % external cream Apply topically 2 times daily 60 g 0    Vitamin A 7.5 MG (53072 UT) CAPS       vitamin B complex with vitamin C (VITAMIN  B COMPLEX) tablet Take 1 tablet by mouth daily       Vitamin D, Cholecalciferol, 25 MCG (1000 UT) CAPS          Social History     Tobacco Use    Smoking status: Never    Smokeless tobacco: Never   Substance Use Topics    Alcohol use: Yes     Comment: Maybe 1-2 times a week     Worse lying down   ROS  Review of systems negative except as stated above.    OBJECTIVE:  /72   Pulse 73   Temp 98.6  F (37  C) (Tympanic)   Resp 16   Wt 75.3 kg (166 lb)   SpO2 98%   BMI 30.36 kg/m    GENERAL APPEARANCE: healthy, alert and no distress  EYES: EOMI,  PERRL, conjunctiva clear  HENT: ear canals and TM's normal.  Nose and mouth without ulcers, erythema or lesions  NECK: supple, nontender, no lymphadenopathy  RESP: lungs clear to auscultation - no rales, rhonchi or wheezes  CV: regular rates and rhythm, normal S1 S2, no murmur noted  NEURO: Normal strength and tone, sensory exam grossly normal,  normal speech and mentation  SKIN: no suspicious lesions or rashes    ASSESSMENT:  Asthma exacerbation  1. URI with cough and congestion  Over the counter     2. Mild persistent asthma with exacerbation  Start prednisone now.  She has taken this before I also encouraged her to use her albuterol a bit more often.  I do not see any  evidence of a bacterial infection.  - predniSONE (DELTASONE) 20 MG tablet; Take 2 tablets (40 mg) by mouth daily for 5 days  Dispense: 10 tablet; Refill: 0    Patient instructed to return to the office in 3-5 days for reevaluation unless improving. Signs and symptoms of worsening are reviewed with the patient. If symptoms acutely change and condition worsens patient is instructed to seek medical evaluation through the office or urgent care department or if during non business hours through the emergency department.  Nuzhat Morris M.D.

## 2023-10-09 ENCOUNTER — MYC REFILL (OUTPATIENT)
Dept: FAMILY MEDICINE | Facility: CLINIC | Age: 48
End: 2023-10-09
Payer: COMMERCIAL

## 2023-10-09 DIAGNOSIS — F41.1 ANXIETY STATE: ICD-10-CM

## 2023-10-09 RX ORDER — ESCITALOPRAM OXALATE 20 MG/1
20 TABLET ORAL DAILY
Qty: 30 TABLET | Refills: 0 | Status: SHIPPED | OUTPATIENT
Start: 2023-10-09 | End: 2023-11-10

## 2023-10-31 ENCOUNTER — TELEPHONE (OUTPATIENT)
Dept: FAMILY MEDICINE | Facility: CLINIC | Age: 48
End: 2023-10-31
Payer: COMMERCIAL

## 2023-10-31 ENCOUNTER — MYC MEDICAL ADVICE (OUTPATIENT)
Dept: FAMILY MEDICINE | Facility: CLINIC | Age: 48
End: 2023-10-31

## 2023-10-31 ASSESSMENT — ANXIETY QUESTIONNAIRES
8. IF YOU CHECKED OFF ANY PROBLEMS, HOW DIFFICULT HAVE THESE MADE IT FOR YOU TO DO YOUR WORK, TAKE CARE OF THINGS AT HOME, OR GET ALONG WITH OTHER PEOPLE?: SOMEWHAT DIFFICULT
GAD7 TOTAL SCORE: 5
7. FEELING AFRAID AS IF SOMETHING AWFUL MIGHT HAPPEN: SEVERAL DAYS
7. FEELING AFRAID AS IF SOMETHING AWFUL MIGHT HAPPEN: SEVERAL DAYS
3. WORRYING TOO MUCH ABOUT DIFFERENT THINGS: SEVERAL DAYS
GAD7 TOTAL SCORE: 5
6. BECOMING EASILY ANNOYED OR IRRITABLE: SEVERAL DAYS
2. NOT BEING ABLE TO STOP OR CONTROL WORRYING: SEVERAL DAYS
5. BEING SO RESTLESS THAT IT IS HARD TO SIT STILL: NOT AT ALL
4. TROUBLE RELAXING: NOT AT ALL
GAD7 TOTAL SCORE: 5
1. FEELING NERVOUS, ANXIOUS, OR ON EDGE: SEVERAL DAYS
IF YOU CHECKED OFF ANY PROBLEMS ON THIS QUESTIONNAIRE, HOW DIFFICULT HAVE THESE PROBLEMS MADE IT FOR YOU TO DO YOUR WORK, TAKE CARE OF THINGS AT HOME, OR GET ALONG WITH OTHER PEOPLE: SOMEWHAT DIFFICULT

## 2023-10-31 ASSESSMENT — PATIENT HEALTH QUESTIONNAIRE - PHQ9
10. IF YOU CHECKED OFF ANY PROBLEMS, HOW DIFFICULT HAVE THESE PROBLEMS MADE IT FOR YOU TO DO YOUR WORK, TAKE CARE OF THINGS AT HOME, OR GET ALONG WITH OTHER PEOPLE: SOMEWHAT DIFFICULT
SUM OF ALL RESPONSES TO PHQ QUESTIONS 1-9: 11
SUM OF ALL RESPONSES TO PHQ QUESTIONS 1-9: 11

## 2023-10-31 NOTE — TELEPHONE ENCOUNTER
Patient Quality Outreach    Patient is due for the following:   Depression  -  PHQ-9 needed    Next Steps:   Patient needs to complete a PHQ9    Type of outreach:    Sent [employer] message.      Questions for provider review:    None           Vika Dockery, CMA

## 2023-11-07 ASSESSMENT — ASTHMA QUESTIONNAIRES: ACT_TOTALSCORE: 9

## 2023-11-07 ASSESSMENT — ENCOUNTER SYMPTOMS
FREQUENCY: 1
PARESTHESIAS: 0
HEMATOCHEZIA: 0
DIARRHEA: 1
ARTHRALGIAS: 1
SHORTNESS OF BREATH: 0
HEADACHES: 1
DIZZINESS: 0
CHILLS: 0
FEVER: 0
BREAST MASS: 0
SORE THROAT: 0
DYSURIA: 0
NAUSEA: 1
MYALGIAS: 1
EYE PAIN: 0
HEARTBURN: 0
PALPITATIONS: 0
CONSTIPATION: 1
WEAKNESS: 0
NERVOUS/ANXIOUS: 1
HEMATURIA: 0
COUGH: 1
ABDOMINAL PAIN: 1
JOINT SWELLING: 0

## 2023-11-07 NOTE — COMMUNITY RESOURCES LIST (ENGLISH)
11/07/2023   [facility]  N/A  For questions about this resource list or additional care needs, please contact your primary care clinic or care manager.  Phone: [phone]   Email: N/A   Address: [address] [place] [id number]   Hours: N/A        Food and Nutrition       Food pantry  1  [facility] Distance: 0.62 miles      [facility], Pickup   [address] [place] [address]  Language: English  Hours: Mon - Thu 8:00 AM - 3:00 PM  Fees: Free   Phone: (566) 221-5695 Website: http://www.[facility].[employer]/     2  [employer] [facility] Distance: 0.92 miles      [facility]   [address] [place] [address]  Language: English  Hours: Mon 9:00 AM - 5:00 PM , Wed 9:00 AM - 5:00 PM , Fri 9:00 AM - 5:00 PM  Fees: Free   Phone: (310) 897-6760 Email: mail@[employer] Website: https://www.[employer]/our-work/food-access-and-equity/     SNAP application assistance  3  [facility] ([facility] Distance: 1.86 miles      In-Person, Phone/Virtual   [address] [place] [address]  Language: English  Hours: Mon - Fri 8:00 AM - 4:30 PM  Fees: Free   Phone: (840) 215-7708 Email: [email or web address].[employer] Website: https://www.[facility].org/agency-information     4  [facility] [place] Family Investment Program (MFIP) - [place] Family Investment Program (MFIP) - SNAP application assistance Distance: 10.08 miles      In-Person, Phone/Virtual   [address] [address]  Language: English  Hours: Mon - Fri 8:00 AM - 4:30 PM  Fees: Free   Phone: (150) 459-8532 Email: layla@[facility].gov Website: https://www.[facility]./499/MFIP-Biennial-Service-Agreement-VCA-Plan          Important Numbers & Websites       Emergency Services   911  [facility] Services   311  Poison Control   (800)  510-8761  Suicide Prevention Lifeline   (383) 978-8374 (TALK)  Child Abuse Hotline   (121) 484-3623 (4-A-Child)  Sexual Assault Hotline   (672) 913-8197 (HOPE)  National Runaway Safeline   (429) 962-6829 (RUNAWAY)  All-Options Talkline   (743) 619-8322  Substance Abuse Referral   (135) 932-6371 (HELP)

## 2023-11-10 ENCOUNTER — OFFICE VISIT (OUTPATIENT)
Dept: FAMILY MEDICINE | Facility: CLINIC | Age: 48
End: 2023-11-10
Payer: COMMERCIAL

## 2023-11-10 ENCOUNTER — PATIENT OUTREACH (OUTPATIENT)
Dept: ONCOLOGY | Facility: CLINIC | Age: 48
End: 2023-11-10

## 2023-11-10 VITALS
OXYGEN SATURATION: 98 % | BODY MASS INDEX: 31.06 KG/M2 | RESPIRATION RATE: 22 BRPM | HEIGHT: 62 IN | TEMPERATURE: 98.2 F | DIASTOLIC BLOOD PRESSURE: 70 MMHG | WEIGHT: 168.8 LBS | SYSTOLIC BLOOD PRESSURE: 100 MMHG | HEART RATE: 79 BPM

## 2023-11-10 DIAGNOSIS — F32.1 CURRENT MODERATE EPISODE OF MAJOR DEPRESSIVE DISORDER WITHOUT PRIOR EPISODE (H): Primary | ICD-10-CM

## 2023-11-10 DIAGNOSIS — Z12.31 VISIT FOR SCREENING MAMMOGRAM: ICD-10-CM

## 2023-11-10 DIAGNOSIS — Z80.3 FAMILY HISTORY OF MALIGNANT NEOPLASM OF BREAST: ICD-10-CM

## 2023-11-10 DIAGNOSIS — Z11.59 NEED FOR HEPATITIS C SCREENING TEST: ICD-10-CM

## 2023-11-10 DIAGNOSIS — Z00.00 ROUTINE GENERAL MEDICAL EXAMINATION AT A HEALTH CARE FACILITY: ICD-10-CM

## 2023-11-10 DIAGNOSIS — E78.5 HYPERLIPIDEMIA LDL GOAL <160: ICD-10-CM

## 2023-11-10 DIAGNOSIS — J45.40 MODERATE PERSISTENT ASTHMA WITHOUT COMPLICATION: ICD-10-CM

## 2023-11-10 DIAGNOSIS — F41.1 ANXIETY STATE: ICD-10-CM

## 2023-11-10 DIAGNOSIS — Z13.1 SCREENING FOR DIABETES MELLITUS: ICD-10-CM

## 2023-11-10 DIAGNOSIS — R51.9 HEADACHE, UNSPECIFIED HEADACHE TYPE: ICD-10-CM

## 2023-11-10 LAB
CHOLEST SERPL-MCNC: 186 MG/DL
HBA1C MFR BLD: 5.4 % (ref 0–5.6)
HDLC SERPL-MCNC: 27 MG/DL
LDLC SERPL CALC-MCNC: ABNORMAL MG/DL
NONHDLC SERPL-MCNC: 159 MG/DL
TRIGL SERPL-MCNC: 444 MG/DL

## 2023-11-10 PROCEDURE — 99396 PREV VISIT EST AGE 40-64: CPT | Performed by: PHYSICIAN ASSISTANT

## 2023-11-10 PROCEDURE — 36415 COLL VENOUS BLD VENIPUNCTURE: CPT | Performed by: PHYSICIAN ASSISTANT

## 2023-11-10 PROCEDURE — 83036 HEMOGLOBIN GLYCOSYLATED A1C: CPT | Performed by: PHYSICIAN ASSISTANT

## 2023-11-10 PROCEDURE — 86803 HEPATITIS C AB TEST: CPT | Performed by: PHYSICIAN ASSISTANT

## 2023-11-10 PROCEDURE — 83721 ASSAY OF BLOOD LIPOPROTEIN: CPT | Performed by: PHYSICIAN ASSISTANT

## 2023-11-10 PROCEDURE — 99214 OFFICE O/P EST MOD 30 MIN: CPT | Mod: 25 | Performed by: PHYSICIAN ASSISTANT

## 2023-11-10 PROCEDURE — 80061 LIPID PANEL: CPT | Performed by: PHYSICIAN ASSISTANT

## 2023-11-10 RX ORDER — ALBUTEROL SULFATE 90 UG/1
2 AEROSOL, METERED RESPIRATORY (INHALATION) EVERY 6 HOURS PRN
Qty: 18 G | Refills: 3 | Status: SHIPPED | OUTPATIENT
Start: 2023-11-10

## 2023-11-10 RX ORDER — BUPROPION HYDROCHLORIDE 150 MG/1
300 TABLET ORAL DAILY
Qty: 180 TABLET | Refills: 3 | Status: SHIPPED | OUTPATIENT
Start: 2023-11-10

## 2023-11-10 RX ORDER — ESCITALOPRAM OXALATE 20 MG/1
20 TABLET ORAL DAILY
Qty: 90 TABLET | Refills: 3 | Status: SHIPPED | OUTPATIENT
Start: 2023-11-10

## 2023-11-10 RX ORDER — SUMATRIPTAN 100 MG/1
100 TABLET, FILM COATED ORAL
Qty: 18 TABLET | Refills: 3 | Status: SHIPPED | OUTPATIENT
Start: 2023-11-10

## 2023-11-10 ASSESSMENT — ENCOUNTER SYMPTOMS
EYE PAIN: 0
SORE THROAT: 0
MYALGIAS: 1
HEMATOCHEZIA: 0
CONSTIPATION: 1
PARESTHESIAS: 0
COUGH: 1
BREAST MASS: 0
FEVER: 0
ARTHRALGIAS: 1
CHILLS: 0
HEADACHES: 1
HEMATURIA: 0
PALPITATIONS: 0
JOINT SWELLING: 0
ABDOMINAL PAIN: 1
NAUSEA: 1
DIZZINESS: 0
DYSURIA: 0
FREQUENCY: 1
HEARTBURN: 0
SHORTNESS OF BREATH: 0
NERVOUS/ANXIOUS: 1
WEAKNESS: 0
DIARRHEA: 1

## 2023-11-10 ASSESSMENT — ANXIETY QUESTIONNAIRES
7. FEELING AFRAID AS IF SOMETHING AWFUL MIGHT HAPPEN: MORE THAN HALF THE DAYS
2. NOT BEING ABLE TO STOP OR CONTROL WORRYING: MORE THAN HALF THE DAYS
5. BEING SO RESTLESS THAT IT IS HARD TO SIT STILL: SEVERAL DAYS
IF YOU CHECKED OFF ANY PROBLEMS ON THIS QUESTIONNAIRE, HOW DIFFICULT HAVE THESE PROBLEMS MADE IT FOR YOU TO DO YOUR WORK, TAKE CARE OF THINGS AT HOME, OR GET ALONG WITH OTHER PEOPLE: SOMEWHAT DIFFICULT
GAD7 TOTAL SCORE: 13
3. WORRYING TOO MUCH ABOUT DIFFERENT THINGS: MORE THAN HALF THE DAYS
6. BECOMING EASILY ANNOYED OR IRRITABLE: MORE THAN HALF THE DAYS
GAD7 TOTAL SCORE: 13
1. FEELING NERVOUS, ANXIOUS, OR ON EDGE: MORE THAN HALF THE DAYS

## 2023-11-10 ASSESSMENT — PATIENT HEALTH QUESTIONNAIRE - PHQ9
10. IF YOU CHECKED OFF ANY PROBLEMS, HOW DIFFICULT HAVE THESE PROBLEMS MADE IT FOR YOU TO DO YOUR WORK, TAKE CARE OF THINGS AT HOME, OR GET ALONG WITH OTHER PEOPLE: NOT DIFFICULT AT ALL
SUM OF ALL RESPONSES TO PHQ QUESTIONS 1-9: 10
SUM OF ALL RESPONSES TO PHQ QUESTIONS 1-9: 10
5. POOR APPETITE OR OVEREATING: MORE THAN HALF THE DAYS

## 2023-11-10 ASSESSMENT — PAIN SCALES - GENERAL: PAINLEVEL: NO PAIN (0)

## 2023-11-10 NOTE — PATIENT INSTRUCTIONS
Increase Wellbutrin to 300 mg daily.     Start Pulmicort for asthma. Use Albuterol inhaler less.     Lab work today.     Mammogram is ordered. Please complete this.     Preventive Health Recommendations  Female Ages 40 to 49    Yearly exam:   See your health care provider every year in order to  Review health changes.   Discuss preventive care.    Review your medicines if your doctor prescribed any.    Get a Pap test every three years (unless you have an abnormal result and your provider advises testing more often).    If you get Pap tests with HPV test, you only need to test every 5 years, unless you have an abnormal result. You do not need a Pap test if your uterus was removed (hysterectomy) and you have not had cancer.    You should be tested each year for STDs (sexually transmitted diseases), if you're at risk.   Ask your doctor if you should have a mammogram.    Have a colonoscopy (test for colon cancer) beginning at age 45.  Ask your provider about other options like a yearly FIT test or Cologuard test every 3 years (stool tests)      Have a cholesterol test every 5 years.     Have a diabetes test (fasting glucose) after age 45. If you are at risk for diabetes, you should have this test every 3 years.    Shots: Get a flu shot each year. Get a tetanus shot every 10 years.     Nutrition:   Eat at least 5 servings of fruits and vegetables each day.  Eat whole-grain bread, whole-wheat pasta and brown rice instead of white grains and rice.  Get adequate Calcium and Vitamin D.      Lifestyle  Exercise at least 150 minutes a week (an average of 30 minutes a day, 5 days a week). This will help you control your weight and prevent disease.  Limit alcohol to one drink per day.  No smoking.   Wear sunscreen to prevent skin cancer.  See your dentist every six months for an exam and cleaning.

## 2023-11-10 NOTE — PROGRESS NOTES
Writer received referral to Cancer Risk Management/Genetic Counseling.    Referred for: Family history of malignant neoplasm of breast      Referral reviewed for appropriate plan, and sent to New Patient Scheduling for completion.    Melvi Lang, RN, BSN  Oncology New Patient Nurse Navigator   [facility]  [phone]

## 2023-11-10 NOTE — PROGRESS NOTES
SUBJECTIVE:   CC: Cecilia is an 47 year old who presents for preventive health visit.       11/10/2023    11:05 AM   Additional Questions   Roomed by Marlyn CAMACHO CMA     Healthy Habits:     Getting at least 3 servings of Calcium per day:  Yes    Bi-annual eye exam:  Yes    Dental care twice a year:  NO    Sleep apnea or symptoms of sleep apnea:  Daytime drowsiness    Diet:  Regular (no restrictions)    Frequency of exercise:  None    Taking medications regularly:  Yes    Medication side effects:  None    Additional concerns today:  Yes    Today's PHQ-9 Score:       11/10/2023    11:00 AM   PHQ-9 SCORE   PHQ-9 Total Score MyChart 10 (Moderate depression)   PHQ-9 Total Score 10       Depression and Anxiety Follow-Up  How are you doing with your depression since your last visit? Same-doesn't feel she has many issues for depression.  How are you doing with your anxiety since your last visit?  Worsened a little worse-her daughter moved to [place] and that makes her anxious.  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  Are you having other symptoms that might be associated with depression or anxiety? Yes:  nervousness and anxiousness.   Have you had a significant life event? OTHER: Daughter moved to [place].     Do you have any concerns with your use of alcohol or other drugs? No    Social History     Tobacco Use    Smoking status: Never    Smokeless tobacco: Never   Vaping Use    Vaping Use: Never used   Substance Use Topics    Alcohol use: Yes     Comment: Maybe 1-2 times a week    Drug use: No         9/15/2022     6:48 AM 10/31/2023     9:08 PM 11/10/2023    11:00 AM   PHQ   PHQ-9 Total Score 12 11 10   Q9: Thoughts of better off dead/self-harm past 2 weeks Not at all Not at all Not at all         9/15/2022     6:48 AM 10/31/2023     9:09 PM 11/10/2023    11:00 AM   BRAULIO-7 SCORE   Total Score 12 (moderate anxiety) 5 (mild anxiety)    Total Score 12 5 13       Was ACT completed  today?  Yes        11/7/2023     7:13 AM   ACT Total Scores   ACT TOTAL SCORE (Goal Greater than or Equal to 20) 9   In the past 12 months, how many times did you visit the emergency room for your asthma without being admitted to the hospital? 0   In the past 12 months, how many times were you hospitalized overnight because of your asthma? 0     How many days per week do you miss taking your asthma controller medication?  I do not have an asthma controller medication  Please describe any recent triggers for your asthma:  Dry air  Have you had any Emergency Room Visits, Urgent Care Visits, or Hospital Admissions since your last office visit?  No  Migraine   Since your last clinic visit, how have your headaches changed?  No change  How often are you getting headaches or migraines? About once or twice a month-notices it with significant weather  changes.   Are you able to do normal daily activities when you have a migraine? No  Are you taking rescue/relief medications? (Select all that apply) ibuprofen (Advil, Motrin), Tylenol, and sumatriptan (Imitrex)  How helpful is your rescue/relief medication?  I get total relief  Are you taking any medications to prevent migraines? (Select all that apply)  No  In the past 4 weeks, how often have you gone to urgent care or the emergency room because of your headaches?  0  Have you ever done Advance Care Planning? (For example, a Health Directive, POLST, or a discussion with a medical provider or your loved ones about your wishes): No, advance care planning information given to patient to review.  Patient declined advance care planning discussion at this time.    Social History     Tobacco Use    Smoking status: Never    Smokeless tobacco: Never   Substance Use Topics    Alcohol use: Yes     Comment: Maybe 1-2 times a week         11/7/2023     7:12 AM   Alcohol Use   Prescreen: >3 drinks/day or >7 drinks/week? Yes   AUDIT SCORE  3         11/7/2023     7:12 AM   AUDIT - Alcohol  Use Disorders Identification Test - Reproduced from the World Health Organization Audit 2001 (Second Edition)   1.  How often do you have a drink containing alcohol? 2 to 3 times a week   2.  How many drinks containing alcohol do you have on a typical day when you are drinking? 1 or 2   3.  How often do you have five or more drinks on one occasion? Never   4.  How often during the last year have you found that you were not able to stop drinking once you had started? Never   5.  How often during the last year have you failed to do what was normally expected of you because of drinking? Never   6.  How often during the last year have you needed a first drink in the morning to get yourself going after a heavy drinking session? Never   7.  How often during the last year have you had a feeling of guilt or remorse after drinking? Never   8.  How often during the last year have you been unable to remember what happened the night before because of your drinking? Never   9.  Have you or someone else been injured because of your drinking? No   10. Has a relative, friend, doctor or other health care worker been concerned about your drinking or suggested you cut down? No   TOTAL SCORE 3     Reviewed orders with patient.  Reviewed health maintenance and updated orders accordingly - Yes  Lab work is in process  Labs reviewed in EPIC    Breast Cancer Screening:    FHS-7:       9/8/2023     9:29 AM 11/7/2023     7:14 AM   Breast CA Risk Assessment (FHS-7)   Did any of your first-degree relatives have breast or ovarian cancer? Yes Yes   Did any of your relatives have bilateral breast cancer? Unknown Unknown   Did any man in your family have breast cancer? No No   Did any woman in your family have breast and ovarian cancer? Unknown Yes   Did any woman in your family have breast cancer before age 50 y? Yes Yes   Do you have 2 or more relatives with breast and/or ovarian cancer? Yes Yes   Do you have 2 or more relatives with breast  and/or bowel cancer? Yes Yes       Pertinent mammograms are reviewed under the imaging tab.    History of abnormal Pap smear: NO - age 30-65 PAP every 5 years with negative HPV co-testing recommended      Latest Ref Rng & Units 9/15/2022     4:17 PM 1/23/2017     5:00 PM 1/23/2017     4:43 PM   PAP / HPV   PAP  Negative for Intraepithelial Lesion or Malignancy (NILM)      PAP (Historical)    NIL    HPV 16 DNA Negative Negative  Negative     HPV 18 DNA Negative Negative  Negative     Other HR HPV Negative Negative  Negative       Reviewed and updated as needed this visit by clinical staff   Tobacco  Allergies  Meds  Problems  Med Hx  Surg Hx  Fam Hx          Reviewed and updated as needed this visit by Provider   Tobacco  Allergies  Meds  Problems  Med Hx  Surg Hx  Fam Hx           Review of Systems   Constitutional:  Negative for chills and fever.   HENT:  Negative for ear pain, hearing loss and sore throat.    Eyes:  Positive for visual disturbance. Negative for pain.   Respiratory:  Positive for cough. Negative for shortness of breath.    Cardiovascular:  Positive for peripheral edema. Negative for chest pain and palpitations.   Gastrointestinal:  Positive for abdominal pain, constipation, diarrhea and nausea. Negative for heartburn and hematochezia.   Breasts:  Negative for tenderness, breast mass and discharge.   Genitourinary:  Positive for frequency and urgency. Negative for dysuria, hematuria, pelvic pain, vaginal bleeding and vaginal discharge.   Musculoskeletal:  Positive for arthralgias and myalgias. Negative for joint swelling.   Skin:  Negative for rash.   Neurological:  Positive for headaches. Negative for dizziness, weakness and paresthesias.   Psychiatric/Behavioral:  Negative for mood changes. The patient is nervous/anxious.       OBJECTIVE:   /70 (BP Location: Right arm, Patient Position: Sitting, Cuff Size: Adult Regular)   Pulse 79   Temp 98.2  F (36.8  C) (Tympanic)   Resp  "22   Ht 1.575 m (5' 2\")   Wt 76.6 kg (168 lb 12.8 oz)   LMP  (LMP Unknown)   SpO2 98%   BMI 30.87 kg/m    Physical Exam  GENERAL: healthy, alert and no distress  NECK: no adenopathy, no asymmetry, masses, or scars and thyroid normal to palpation  RESP: lungs clear to auscultation - no rales, rhonchi or wheezes  CV: regular rate and rhythm, normal S1 S2, no S3 or S4, no murmur, click or rub, no peripheral edema and peripheral pulses strong  ABDOMEN: soft, nontender, no hepatosplenomegaly, no masses and bowel sounds normal  MS: no gross musculoskeletal defects noted, no edema    Diagnostic Test Results:  Labs reviewed in Epic    ASSESSMENT/PLAN:   Current moderate episode of major depressive disorder without prior episode (H)  Anxiety state  Symptoms have increased, especially anxiety related. Mostly due to life stressors. Will increase Wellbutrin to 300 mg daily. Continue Lexapro. Follow-up in 12 months if stable, sooner if needed.   - buPROPion (WELLBUTRIN XL) 150 MG 24 hr tablet; Take 2 tablets (300 mg) by mouth daily  - escitalopram (LEXAPRO) 20 MG tablet; Take 1 tablet (20 mg) by mouth daily    Moderate persistent asthma without complication  ACT score is very poor. Not on a controller inhaler. Will start Pulmicort as this looks to be covered by her insurance. Follow-up in 3 months with ACT.   - albuterol (PROAIR HFA/PROVENTIL HFA/VENTOLIN HFA) 108 (90 Base) MCG/ACT inhaler; Inhale 2 puffs into the lungs every 6 hours as needed for shortness of breath  - budesonide (PULMICORT FLEXHALER) 180 MCG/ACT inhaler; Inhale 1 puff into the lungs 2 times daily    Headache, unspecified headache type  Stable and well controlled. Refilled.   - SUMAtriptan (IMITREX) 100 MG tablet; Take 1 tablet (100 mg) by mouth at onset of headache for migraine May repeat dose in 2 hours.  Do not exceed 200 mg in 24 hours    Hyperlipidemia LDL goal <160  Due for recheck.   - Lipid panel reflex to direct LDL Non-fasting; Future    Routine " "general medical examination at a health care facility  47 yr old here for physical exam. Last physical exam done several years ago. Discussed preventative screenings which are updated below. Counseled on immunizations and healthy lifestyle. Follow-up in 1 year for repeat physical exam.     Need for hepatitis C screening test  Due for screening.   - Hepatitis C Screen Reflex to HCV RNA Quant and Genotype; Future    Visit for screening mammogram  Due for screening.   - MA SCREENING DIGITAL BILAT - Future  (s+30); Future    Screening for diabetes mellitus  Due for screening.   - Hemoglobin A1c; Future    Family history of malignant neoplasm of breast  Multiple members in her family had early onset breast cancer. Shared decision making discussed. Genetics referral placed.   - Adult Genetics & Metabolism Referral; Future    Patient has been advised of split billing requirements and indicates understanding: Yes    COUNSELING:  Reviewed preventive health counseling, as reflected in patient instructions    BMI:   Estimated body mass index is 30.87 kg/m  as calculated from the following:    Height as of this encounter: 1.575 m (5' 2\").    Weight as of this encounter: 76.6 kg (168 lb 12.8 oz).     She reports that she has never smoked. She has never used smokeless tobacco.    Fermin Arvizu PA-C  [facility]  Answers submitted by the patient for this visit:  Patient Health Questionnaire (Submitted on 11/10/2023)  If you checked off any problems, how difficult have these problems made it for you to do your work, take care of things at home, or get along with other people?: Not difficult at all  PHQ9 TOTAL SCORE: 10    "

## 2023-11-10 NOTE — COMMUNITY RESOURCES LIST (ENGLISH)
11/10/2023   [facility]  N/A  For additional resource needs, please contact your health insurance member services or your primary care team.  Phone: [phone]   Email: N/A   Address: [address] [place] [address]   Hours: N/A        Food and Nutrition       Food pantry  1  [facility] Distance: 0.62 miles      Delivery, Pickup   [address] [place] [address]  Language: English  Hours: Mon - Thu 8:00 AM - 3:00 PM  Fees: Free   Phone: (408) 800-4738 Website: http://[email or web address]/     2  [employer] [facility] Distance: 0.92 miles      Pickup   [address] [place] [address]  Language: English  Hours: Mon 9:00 AM - 5:00 PM , Wed 9:00 AM - 5:00 PM , Fri 9:00 AM - 5:00 PM  Fees: Free   Phone: (364) 587-4942 Email: mail@[employer].Moviecom.tv Website: https://www.[employer]/our-work/food-access-and-equity/     SNAP application assistance  3  [facility] ([facility] Office Distance: 1.86 miles      In-Person, Phone/Virtual   [address] [place] [address]  Language: English  Hours: Mon - Fri 8:00 AM - 4:30 PM  Fees: Free   Phone: (770) 758-7228 Email: kelly@[facility].Moviecom.tv Website: https://www.[facility].org/agency-information     4  [facility] Human [facility] [place] Family Investment Program (MFIP) - [place] Family Investment Program (MFIP) - SNAP application assistance Distance: 10.08 miles      In-Person, Phone/Virtual   [address] [address]  Language: English  Hours: Mon - Fri 8:00 AM - 4:30 PM  Fees: Free   Phone: (439) 677-6918 Email: layla@[facility].gov Website: https://www.[facility]./499/MFIP-Biennial-Service-Agreement-VCA-Plan          Important Numbers & Websites       [facility]   [address] [place] [address].[facility]  Poison Control   (782) 118-2195  [place]poison.org  Suicide and Crisis Lifeline   988 988lifeline.org  Childhelp [place] Child Abuse Hotline   536.436.8770 Childhelphotline.org  [place] Sexual Assault Hotline   (405) 586-3915 (HOPE) Rainn.org  [place] Runaway Safeline   (287) 750-5112 (RUNAWAY) 1800runaway.org  Pregnancy & Postpartum Support [place]   Call/text 676-120-2894 Ppsupportmn.org  Substance Abuse National Helpline ([facility]   879-882-HELP (2880) Findtreatment.gov  Emergency Services   911

## 2023-11-10 NOTE — COMMUNITY RESOURCES LIST (ENGLISH)
11/10/2023   [facility]  N/A  For questions about this resource list or additional care needs, please contact your primary care clinic or care manager.  Phone: [phone]   Email: N/A   Address: [address] [place] [address]   Hours: N/A        Food and Nutrition       Food pantry  1  [facility] Distance: 0.62 miles      [facility], Pickup   [address] [place] [address]  Language: English  Hours: Mon - Thu 8:00 AM - 3:00 PM  Fees: Free   Phone: (911) 273-9261 Website: http://www.[facility].Flasma/     2  [employer] [facility] Distance: 0.92 miles      [facility]   [address] [place] [address]  Language: English  Hours: Mon 9:00 AM - 5:00 PM , Wed 9:00 AM - 5:00 PM , Fri 9:00 AM - 5:00 PM  Fees: Free   Phone: (522) 473-6161 Email: mail@[employer].Flasma Website: https://www.[employer].Flasma/our-work/food-access-and-equity/     SNAP application assistance  3  [facility] ([facility] Office Distance: 1.86 miles      In-Person, Phone/Virtual   [address] [place] [address]  Language: English  Hours: Mon - Fri 8:00 AM - 4:30 PM  Fees: Free   Phone: (361) 799-9451 Email: kelly@[facility].Flasma Website: https://www.[facility].org/agency-information     4  [facility] & Human [facility] - [place] Family Investment Program (MFIP) - [place] Family Investment Program (MFIP) - SNAP application assistance Distance: 10.08 miles      In-Person, Phone/Virtual   [address] [address]  Language: English  Hours: Mon - Fri 8:00 AM - 4:30 PM  Fees: Free   Phone: (921) 132-3243 Email: layla@[facility].gov Website: https://www.[facility]./499/MFIP-Biennial-Service-Agreement-VCA-Plan          Important Numbers & Websites       Emergency Services   911  [facility] Services   311  Poison Control   (800)  341-2123  Suicide Prevention Lifeline   (172) 563-4732 (TALK)  Child Abuse Hotline   (244) 447-9850 (4-A-Child)  Sexual Assault Hotline   (980) 222-5284 (HOPE)  National Runaway Safeline   (700) 770-7412 (RUNAWAY)  All-Options Talkline   (282) 766-1852  Substance Abuse Referral   (332) 709-7640 (HELP)

## 2023-11-11 LAB
HCV AB SERPL QL IA: NONREACTIVE
LDLC SERPL DIRECT ASSAY-MCNC: 81 MG/DL

## 2023-11-29 ENCOUNTER — MYC MEDICAL ADVICE (OUTPATIENT)
Dept: FAMILY MEDICINE | Facility: CLINIC | Age: 48
End: 2023-11-29

## 2023-11-29 ENCOUNTER — E-VISIT (OUTPATIENT)
Dept: FAMILY MEDICINE | Facility: CLINIC | Age: 48
End: 2023-11-29
Payer: COMMERCIAL

## 2023-11-29 DIAGNOSIS — J02.9 SORE THROAT: Primary | ICD-10-CM

## 2023-11-29 PROCEDURE — 99421 OL DIG E/M SVC 5-10 MIN: CPT | Performed by: PHYSICIAN ASSISTANT

## 2023-11-29 NOTE — LETTER
November 29, 2023      Cecilia Pabon  [address] [facility] [address]        To Whom It May Concern:    Cecilia Pabon  was seen on 11/29/2023.  Please excuse her from work from 11/27/23-11/30/23 due to illness.        Sincerely,        Fermin Arvizu PA-C

## 2023-11-29 NOTE — PATIENT INSTRUCTIONS
Sore Throat: Care Instructions  Overview     Infection by bacteria or a virus causes most sore throats. Cigarette smoke, dry air, air pollution, allergies, and yelling can also cause a sore throat. Sore throats can be painful and annoying. Fortunately, most sore throats go away on their own. If you have a bacterial infection, your doctor may prescribe antibiotics.  Follow-up care is a key part of your treatment and safety. Be sure to make and go to all appointments, and call your doctor if you are having problems. It's also a good idea to know your test results and keep a list of the medicines you take.  How can you care for yourself at home?  If your doctor prescribed antibiotics, take them as directed. Do not stop taking them just because you feel better. You need to take the full course of antibiotics.  Gargle with warm salt water several times a day to help reduce swelling and relieve pain. Mix 1/2 teaspoon of salt in 1 cup of warm water.  Take an over-the-counter pain medicine, such as acetaminophen (Tylenol), ibuprofen (Advil, Motrin), or naproxen (Aleve). Read and follow all instructions on the label.  Be careful when taking over-the-counter cold or flu medicines and Tylenol at the same time. Many of these medicines have acetaminophen, which is Tylenol. Read the labels to make sure that you are not taking more than the recommended dose. Too much acetaminophen (Tylenol) can be harmful.  Drink plenty of fluids. Fluids may help soothe an irritated throat. Hot fluids, such as tea or soup, may help decrease throat pain.  Use over-the-counter throat lozenges to soothe pain. Regular cough drops or hard candy may also help. These should not be given to young children because of the risk of choking.  Do not smoke or allow others to smoke around you. If you need help quitting, talk to your doctor about stop-smoking programs and medicines. These can increase your chances of quitting for good.  Use a vaporizer or  "humidifier to add moisture to your bedroom. Follow the directions for cleaning the machine.  When should you call for help?   Call your doctor now or seek immediate medical care if:    You have trouble breathing.     Your sore throat gets much worse on one side.     You have new or worse trouble swallowing.     You have a new or higher fever.   Watch closely for changes in your health, and be sure to contact your doctor if you do not get better as expected.  Where can you learn more?  Go to https://www.[employer].net/patiented  Enter U420 in the search box to learn more about \"Sore Throat: Care Instructions.\"  Current as of: February 28, 2023               Content Version: 13.8    5249-0470 [employer].   Care instructions adapted under license by your healthcare professional. If you have questions about a medical condition or this instruction, always ask your healthcare professional. [employer] disclaims any warranty or liability for your use of this information.      "

## 2023-11-30 ENCOUNTER — OFFICE VISIT (OUTPATIENT)
Dept: URGENT CARE | Facility: URGENT CARE | Age: 48
End: 2023-11-30
Payer: COMMERCIAL

## 2023-11-30 VITALS
BODY MASS INDEX: 30.36 KG/M2 | TEMPERATURE: 98.4 F | DIASTOLIC BLOOD PRESSURE: 65 MMHG | RESPIRATION RATE: 19 BRPM | SYSTOLIC BLOOD PRESSURE: 103 MMHG | OXYGEN SATURATION: 98 % | HEART RATE: 92 BPM | WEIGHT: 166 LBS

## 2023-11-30 DIAGNOSIS — J45.30 MILD PERSISTENT ASTHMA WITHOUT COMPLICATION: ICD-10-CM

## 2023-11-30 DIAGNOSIS — J01.90 ACUTE BACTERIAL RHINOSINUSITIS: Primary | ICD-10-CM

## 2023-11-30 DIAGNOSIS — B96.89 ACUTE BACTERIAL RHINOSINUSITIS: Primary | ICD-10-CM

## 2023-11-30 LAB
DEPRECATED S PYO AG THROAT QL EIA: NEGATIVE
FLUAV AG SPEC QL IA: NEGATIVE
FLUBV AG SPEC QL IA: NEGATIVE
GROUP A STREP BY PCR: NOT DETECTED

## 2023-11-30 PROCEDURE — 87651 STREP A DNA AMP PROBE: CPT | Performed by: FAMILY MEDICINE

## 2023-11-30 PROCEDURE — 87804 INFLUENZA ASSAY W/OPTIC: CPT | Performed by: FAMILY MEDICINE

## 2023-11-30 PROCEDURE — 87635 SARS-COV-2 COVID-19 AMP PRB: CPT | Performed by: FAMILY MEDICINE

## 2023-11-30 PROCEDURE — 99214 OFFICE O/P EST MOD 30 MIN: CPT | Performed by: FAMILY MEDICINE

## 2023-11-30 RX ORDER — FLUTICASONE PROPIONATE 50 MCG
1 SPRAY, SUSPENSION (ML) NASAL 2 TIMES DAILY
Qty: 16 G | Refills: 0 | Status: SHIPPED | OUTPATIENT
Start: 2023-11-30 | End: 2024-07-03

## 2023-11-30 NOTE — PROGRESS NOTES
ASSESSMENT/PLAN:      ICD-10-CM    1. Acute bacterial rhinosinusitis  J01.90 Streptococcus A Rapid Screen w/Reflex to PCR    B96.89 Influenza A & B Antigen     Symptomatic COVID-19 Virus (Coronavirus) by PCR Nose     Group A Streptococcus PCR Throat Swab     amoxicillin-clavulanate (AUGMENTIN) 875-125 MG tablet     fluticasone (FLONASE) 50 MCG/ACT nasal spray      2. Mild persistent asthma without complication  J45.30     mild cough, no wheezing noted on exam          Patient Instructions     Start Augmentin-an antibiotic  2 times a day for 10 days always take with food to prevent nausea vomiting for your sinus infection     Continue mucinex -for congestion/sinus congestion for 14 days     Start Flonase1 spray in each nostril in am and bedtime for nasal congestion and drainage for 14 days      Start Claritin 10 mg a day for 30 days             Take benadryl ( diphenhydramine) at bedtime will help with congestion and cough will make you sleepy          Reviewed medication instructions and side effects. Follow up if experiences side effects.     I reviewed supportive care, otc meds to use if needed, expected course, and signs of concern.  Follow up as needed or if she does not improve within  1-2 days or if worsens in any way.  Reviewed red flag symptoms and is to go to the ER if experiences any of these.     The use of [employer] dictation services may have been used to construct the content in this note; any grammatical or spelling errors are non-intentional. Please contact the author of this note directly if you are in need of any clarification.                Patient presents with:  Sinus Problem: Had a cold going on for couple of weeks, sore throat, achy, fever, started on Sunday, says sinuses hurt, HA can't get rid of. Her voice is horse.        Subjective     Cecilia Pabon is a 47 year old female who presents to clinic today for the following health issues:    HPI       Acute Illness  Acute illness  concerns:/onset-  cough congestion for 2 to 3 weeks, 5 days ago onset of sore throat , hoarse voice, headache, fever    Symptoms:  Fever: YES l. 99- 101  Chills/Sweats:yes  Headache (location?): YES  Sinus Pressure: YES  Conjunctivitis:  No  Ear Pain: no  Rhinorrhea: YES  Congestion: YES  Sore Throat: YES  Cough: Mild from drainage from sinuses  Wheeze: No-history of mild persistent asthma  Decreased Appetite: No  Nausea: No  Vomiting: No  Diarrhea: No  Fatigue/Achiness: YES  Sick/Strep Exposure unknown-possible exposure at work   Therapies tried and outcome:   Otc meds  History of COVID   11/20/2021  Not vaccinated for covid    Past Medical History:   Diagnosis Date    Anxiety state, unspecified     ASCUS on Pap smear 2003    +HR HPV 16    COPD (chronic obstructive pulmonary disease) (H)     My Mom has this    Family history of breast cancer in mother 09/17/2009    Family history of breast cancer in mother     Headache 02/25/2005    Hx of colposcopy with cervical biopsy 06/02/2003    WNL    Major depression, single episode 09/15/2022    Mental disorders of mother, postpartum     postpartum depression    Migraine, unspecified, without mention of intractable migraine without mention of status migrainosus     Other specified cardiac dysrhythmias(427.89)     Swelling of limb     Uncomplicated asthma     Myself    Unspecified viral infection, in conditions classified elsewhere and of unspecified site      Social History     Tobacco Use    Smoking status: Never    Smokeless tobacco: Never   Substance Use Topics    Alcohol use: Yes     Comment: Maybe 1-2 times a week       Current Outpatient Medications   Medication Sig Dispense Refill    acetaminophen (TYLENOL) 325 MG tablet Take 2 tablets (650 mg) by mouth every 6 hours as needed for mild pain 24 tablet 0    albuterol (PROAIR HFA/PROVENTIL HFA/VENTOLIN HFA) 108 (90 Base) MCG/ACT inhaler Inhale 2 puffs into the lungs every 6 hours as needed for shortness of breath 18 g 3     budesonide (PULMICORT FLEXHALER) 180 MCG/ACT inhaler Inhale 1 puff into the lungs 2 times daily 1 each 3    buPROPion (WELLBUTRIN XL) 150 MG 24 hr tablet Take 2 tablets (300 mg) by mouth daily 180 tablet 3    escitalopram (LEXAPRO) 20 MG tablet Take 1 tablet (20 mg) by mouth daily 90 tablet 3    fluticasone (FLONASE) 50 MCG/ACT nasal spray Spray 1 spray into both nostrils 2 times daily 16 g 0    ibuprofen (ADVIL/MOTRIN) 800 MG tablet Take 1 tablet (800 mg) by mouth every 6 hours as needed for other (mild and/or inflammatory pain) 30 tablet 0    ondansetron (ZOFRAN ODT) 4 MG ODT tab Take 1 tablet (4 mg) by mouth every 8 hours as needed for nausea or vomiting 15 tablet 1    SUMAtriptan (IMITREX) 100 MG tablet Take 1 tablet (100 mg) by mouth at onset of headache for migraine May repeat dose in 2 hours.  Do not exceed 200 mg in 24 hours 18 tablet 3    Vitamin A 7.5 MG (19804 UT) CAPS       vitamin B complex with vitamin C (VITAMIN  B COMPLEX) tablet Take 1 tablet by mouth daily       Vitamin D, Cholecalciferol, 25 MCG (1000 UT) CAPS       triamcinolone (KENALOG) 0.1 % external cream Apply topically 2 times daily (Patient not taking: Reported on 11/10/2023) 60 g 0     No Known Allergies          ROS are negative, except as otherwise noted HPI      Objective    /65   Pulse 92   Temp 98.4  F (36.9  C) (Tympanic)   Resp 19   Wt 75.3 kg (166 lb)   LMP  (LMP Unknown)   SpO2 98%   BMI 30.36 kg/m    Body mass index is 30.36 kg/m .  Physical Exam     GENERAL: alert, mild fatigue,.no acute distress.  HEENT: Normocephalic, atraumatic. PEERRLA, EOMI.  Scleras, lids and conjunctivae normal. Pinnas, canals and TM's clear.  Nose with thick discharge. Oropharynx moist and posterior pharynx moderately injected, sinus tenderness to palpation present.   NECK: supple and and a few shotty anterior chain bilateral adenopathy  CHEST:  clear, no wheezing or rales. Normal symmetric air entry throughout both lung fields.  HEART:   S1 and S2 normal, no murmurs, clicks, gallops or rubs. Regular rate and rhythm.  NEURO:Alert and oriented x3, normal strength and tone, normal gait      Diagnostic Test Results:  Labs reviewed in Epic  Results for orders placed or performed in visit on 11/30/23   Symptomatic COVID-19 Virus (Coronavirus) by PCR Nose     Status: Normal    Specimen: Nose; Swab   Result Value Ref Range    SARS CoV2 PCR Negative Negative    Narrative    Testing was performed using the manny SARS-CoV-2 assay on the manny  [employer] System. This test should be ordered for the detection of  SARS-CoV-2 in individuals who meet SARS-CoV-2 clinical and/or  epidemiological criteria. Test performance is unknown in asymptomatic  patients. This test is for in vitro diagnostic use under the FDA EUA  for laboratories certified under CLIA to perform high and/or moderate  complexity testing. This test has not been FDA cleared or approved. A  negative result does not rule out the presence of PCR inhibitors in  the specimen or target RNA in concentration below the limit of  detection for the assay. The possibility of a false negative should  be considered if the patient's recent exposure or clinical  presentation suggests COVID-19. This test was validated by the [facility]. This  laboratory is certified under the Clinical Laboratory Improvement  Amendments of 1988 (CLIA-88) as qualified to perform high and/or  moderate complexity laboratory testing.   Streptococcus A Rapid Screen w/Reflex to PCR     Status: Normal    Specimen: Throat; Swab   Result Value Ref Range    Group A Strep antigen Negative Negative   Influenza A & B Antigen     Status: Normal    Specimen: Nose; Swab   Result Value Ref Range    Influenza A antigen Negative Negative    Influenza B antigen Negative Negative    Narrative    Test results must be correlated with clinical data. If necessary, results should be confirmed by a molecular assay or  viral culture.   Group A Streptococcus PCR Throat Swab     Status: Normal    Specimen: Throat; Swab   Result Value Ref Range    Group A strep by PCR Not Detected Not Detected    Narrative    The Xpert Xpress Strep A test, performed on the [employer], is a rapid, qualitative in vitro diagnostic test for the detection of Streptococcus pyogenes (Group A ß-hemolytic Streptococcus, Strep A) in throat swab specimens from patients with signs and symptoms of pharyngitis. The Xpert Xpress Strep A test can be used as an aid in the diagnosis of Group A Streptococcal pharyngitis. The assay is not intended to monitor treatment for Group A Streptococcus infections. The Xpert Xpress Strep A test utilizes an automated real-time polymerase chain reaction (PCR) to detect Streptococcus pyogenes DNA.

## 2023-11-30 NOTE — LETTER
November 30, 2023      Cecilia Pabon  [id number] [address] [id number]        To Whom It May Concern:    Cecilia Pabon was seen in our clinic today, 11/30/2023. Please excuse from work starting today 11/30/2023 and she can return to work on Monday, 12/4/2023.      Sincerely,        Brittanie Unger MD

## 2023-11-30 NOTE — PATIENT INSTRUCTIONS
Start Augmentin-an antibiotic  2 times a day for 10 days always take with food to prevent nausea vomiting for your sinus infection     Continue mucinex -for congestion/sinus congestion for 14 days     Start Flonase1 spray in each nostril in am and bedtime for nasal congestion and drainage for 14 days      Start Claritin 10 mg a day for 30 days             Take benadryl ( diphenhydramine) at bedtime will help with congestion and cough will make you sleepy

## 2023-12-01 LAB — SARS-COV-2 RNA RESP QL NAA+PROBE: NEGATIVE

## 2024-01-09 ENCOUNTER — MYC MEDICAL ADVICE (OUTPATIENT)
Dept: FAMILY MEDICINE | Facility: CLINIC | Age: 49
End: 2024-01-09

## 2024-01-28 ENCOUNTER — HEALTH MAINTENANCE LETTER (OUTPATIENT)
Age: 49
End: 2024-01-28

## 2024-04-15 ENCOUNTER — HOSPITAL ENCOUNTER (OUTPATIENT)
Dept: MAMMOGRAPHY | Facility: CLINIC | Age: 49
Discharge: HOME | End: 2024-04-15
Attending: PHYSICIAN ASSISTANT | Admitting: PHYSICIAN ASSISTANT
Payer: COMMERCIAL

## 2024-04-15 ENCOUNTER — MYC MEDICAL ADVICE (OUTPATIENT)
Dept: FAMILY MEDICINE | Facility: CLINIC | Age: 49
End: 2024-04-15

## 2024-04-15 ENCOUNTER — TELEPHONE (OUTPATIENT)
Dept: FAMILY MEDICINE | Facility: CLINIC | Age: 49
End: 2024-04-15
Payer: COMMERCIAL

## 2024-04-15 DIAGNOSIS — Z12.31 VISIT FOR SCREENING MAMMOGRAM: ICD-10-CM

## 2024-04-15 PROCEDURE — 77063 BREAST TOMOSYNTHESIS BI: CPT

## 2024-04-15 ASSESSMENT — ASTHMA QUESTIONNAIRES
QUESTION_2 LAST FOUR WEEKS HOW OFTEN HAVE YOU HAD SHORTNESS OF BREATH: ONCE OR TWICE A WEEK
ACT_TOTALSCORE: 21
QUESTION_3 LAST FOUR WEEKS HOW OFTEN DID YOUR ASTHMA SYMPTOMS (WHEEZING, COUGHING, SHORTNESS OF BREATH, CHEST TIGHTNESS OR PAIN) WAKE YOU UP AT NIGHT OR EARLIER THAN USUAL IN THE MORNING: NOT AT ALL
QUESTION_4 LAST FOUR WEEKS HOW OFTEN HAVE YOU USED YOUR RESCUE INHALER OR NEBULIZER MEDICATION (SUCH AS ALBUTEROL): ONCE A WEEK OR LESS
QUESTION_1 LAST FOUR WEEKS HOW MUCH OF THE TIME DID YOUR ASTHMA KEEP YOU FROM GETTING AS MUCH DONE AT WORK, SCHOOL OR AT HOME: NONE OF THE TIME
QUESTION_5 LAST FOUR WEEKS HOW WOULD YOU RATE YOUR ASTHMA CONTROL: SOMEWHAT CONTROLLED
ACT_TOTALSCORE: 21

## 2024-04-15 NOTE — TELEPHONE ENCOUNTER
Patient Quality Outreach    Patient is due for the following:   Asthma  -  ACT needed    Next Steps:   No follow up needed at this time.    Type of outreach:    Sent [employer] message.    Next Steps:  Reach out within 90 days via Phone.    Max number of attempts reached: No. Will try again in 90 days if patient still on fail list.    Questions for provider review:    None           Aida Mosqueda CMA  Chart routed to Care Team.

## 2024-05-22 ENCOUNTER — ALLIED HEALTH/NURSE VISIT (OUTPATIENT)
Dept: FAMILY MEDICINE | Facility: CLINIC | Age: 49
End: 2024-05-22
Payer: COMMERCIAL

## 2024-05-22 ENCOUNTER — HOSPITAL ENCOUNTER (EMERGENCY)
Facility: CLINIC | Age: 49
Discharge: HOME | End: 2024-05-22
Attending: EMERGENCY MEDICINE | Admitting: EMERGENCY MEDICINE
Payer: COMMERCIAL

## 2024-05-22 VITALS
HEART RATE: 83 BPM | BODY MASS INDEX: 31.15 KG/M2 | OXYGEN SATURATION: 99 % | DIASTOLIC BLOOD PRESSURE: 75 MMHG | WEIGHT: 165 LBS | RESPIRATION RATE: 14 BRPM | HEIGHT: 61 IN | SYSTOLIC BLOOD PRESSURE: 107 MMHG | TEMPERATURE: 97.8 F

## 2024-05-22 DIAGNOSIS — F07.81 POST CONCUSSION SYNDROME: ICD-10-CM

## 2024-05-22 DIAGNOSIS — S09.90XA HEAD INJURY, INITIAL ENCOUNTER: Primary | ICD-10-CM

## 2024-05-22 DIAGNOSIS — S09.90XA HEAD INJURY, CLOSED, WITHOUT LOC, INITIAL ENCOUNTER: ICD-10-CM

## 2024-05-22 PROCEDURE — 99284 EMERGENCY DEPT VISIT MOD MDM: CPT | Performed by: EMERGENCY MEDICINE

## 2024-05-22 PROCEDURE — 99283 EMERGENCY DEPT VISIT LOW MDM: CPT | Performed by: EMERGENCY MEDICINE

## 2024-05-22 PROCEDURE — 250N000011 HC RX IP 250 OP 636: Performed by: EMERGENCY MEDICINE

## 2024-05-22 PROCEDURE — 96372 THER/PROPH/DIAG INJ SC/IM: CPT | Performed by: EMERGENCY MEDICINE

## 2024-05-22 PROCEDURE — 99207 PR NO CHARGE NURSE ONLY: CPT

## 2024-05-22 RX ORDER — ONDANSETRON 4 MG/1
4 TABLET, ORALLY DISINTEGRATING ORAL EVERY 8 HOURS PRN
Qty: 15 TABLET | Refills: 1 | Status: SHIPPED | OUTPATIENT
Start: 2024-05-22

## 2024-05-22 RX ORDER — KETOROLAC TROMETHAMINE 30 MG/ML
30 INJECTION, SOLUTION INTRAMUSCULAR; INTRAVENOUS ONCE
Status: COMPLETED | OUTPATIENT
Start: 2024-05-22 | End: 2024-05-22

## 2024-05-22 RX ORDER — ONDANSETRON 4 MG/1
4 TABLET, ORALLY DISINTEGRATING ORAL ONCE
Status: COMPLETED | OUTPATIENT
Start: 2024-05-22 | End: 2024-05-22

## 2024-05-22 RX ADMIN — KETOROLAC TROMETHAMINE 30 MG: 30 INJECTION, SOLUTION INTRAMUSCULAR at 12:20

## 2024-05-22 RX ADMIN — ONDANSETRON 4 MG: 4 TABLET, ORALLY DISINTEGRATING ORAL at 12:20

## 2024-05-22 ASSESSMENT — COLUMBIA-SUICIDE SEVERITY RATING SCALE - C-SSRS
2. HAVE YOU ACTUALLY HAD ANY THOUGHTS OF KILLING YOURSELF IN THE PAST MONTH?: NO
1. IN THE PAST MONTH, HAVE YOU WISHED YOU WERE DEAD OR WISHED YOU COULD GO TO SLEEP AND NOT WAKE UP?: NO
6. HAVE YOU EVER DONE ANYTHING, STARTED TO DO ANYTHING, OR PREPARED TO DO ANYTHING TO END YOUR LIFE?: NO

## 2024-05-22 ASSESSMENT — ACTIVITIES OF DAILY LIVING (ADL)
ADLS_ACUITY_SCORE: 33
ADLS_ACUITY_SCORE: 35

## 2024-05-22 NOTE — LETTER
May 22, 2024      To Whom It May Concern:      Cecilia Pabon was seen in our Emergency Department today, Wednesday 05/22/24.  I expect her condition to improve over the next 1 week.  She may return to work when improved.  Please excuse her from work as needed in the coming week, through Tuesday 5/28/2024.    Sincerely,        MARYANNE Lee MD

## 2024-05-22 NOTE — ED NOTES
Pt arrived via triage, appears to be in acute pain.  Eyes closed and holding L side of her face and head.  Pt report her car door swinging closed and hitting her in the L frontal area.  Pt did not loose consciousness or fall down.   No hematoma, or abrasion noted.  Increased pain with palpation.  Pt reports 8/10 HA.  Pt reports issues with concentration, irritability, feeling tired, and nausea with no vomiting.        Lights out in room for comfort.  Neuro exam WNL other than HA.  Pt last took tylenol at 0900.   MD in room for assessment and orders received.      Pt medicated per orders. Comfort and reassurance given.    PLAN:  Will continue to monitor and reassess.

## 2024-05-22 NOTE — ED PROVIDER NOTES
History     Chief Complaint   Patient presents with    Head Injury     HPI  History per patient, review of [facility] EMR and Care Everywhere EMR.    Cecilia Pabon is a 48 year old female who presents emergency department approxitwenty 24 hours after a head injury without LOC with symptoms of a frontal headache, nausea, irritability, feeling confusion, anxious, mild ear ringing/tinnitus. Yesterday she was reaching over to  a water bottle from the ground while standing next to the door of her vehicle and when she stood up the door had moved in the wind and she struck her forehead on the door without LOC.  She had a mid upper forehead contusion with relatively mild swelling (she showed me a photograph of this) which today is significantly decreased and improved.  She has nausea but no vomiting.  She has posterior shoulder area stiffness but no posterior neck pain or back pain or injury.  No other neurologic deficit or abnormality and no other injury or trauma.  No anticoagulation therapy.  No other pertinent history or acute complaints or concerns.      Allergies:  No Known Allergies    Problem List:    Patient Active Problem List    Diagnosis Date Noted    Current moderate episode of major depressive disorder without prior episode (H) 09/15/2022     Priority: Medium    Enteritis 02/17/2015     Priority: Medium    Abdominal pain 02/17/2015     Priority: Medium    Acute cholecystitis 12/29/2014     Priority: Medium    HYPERLIPIDEMIA LDL GOAL <160 10/31/2010     Priority: Medium    Family history of breast cancer in mother 09/17/2009     Priority: Medium    Mild persistent asthma 03/28/2006     Priority: Medium    Headache 02/25/2005     Priority: Medium      2-3 in past yr.  Reduced after ear piercing at acupressure point.      Anxiety state 02/25/2005     Priority: Medium     Problem list name updated by automated process. Provider to review          Past Medical History:    Past Medical History:   Diagnosis  Date    Anxiety state, unspecified     ASCUS on Pap smear 2003    COPD (chronic obstructive pulmonary disease) (H)     Family history of breast cancer in mother 09/17/2009    Family history of breast cancer in mother     Headache 02/25/2005    Hx of colposcopy with cervical biopsy 06/02/2003    Major depression, single episode 09/15/2022    Mental disorders of mother, postpartum     Migraine, unspecified, without mention of intractable migraine without mention of status migrainosus     Other specified cardiac dysrhythmias(427.89)     Swelling of limb     Uncomplicated asthma     Unspecified viral infection, in conditions classified elsewhere and of unspecified site        Past Surgical History:    Past Surgical History:   Procedure Laterality Date    ABDOMEN SURGERY  [id number]    Csecttion    COLONOSCOPY      DILATION AND CURETTAGE, OPERATIVE HYSTEROSCOPY, COMBINED N/A 11/3/2022    Procedure: HYSTEROSCOPY, WITH DILATION AND CURETTAGE OF UTERUS, hysteroscopic endometrial biopsy,  Mirena Intra Uterine Device Placement;  Surgeon: Luzmaria Dutta MD;  Location: [facility] OR    GI SURGERY      LAPAROSCOPIC CHOLECYSTECTOMY N/A 12/30/2014    Procedure: LAPAROSCOPIC CHOLECYSTECTOMY;  Surgeon: Ulises Starr MD;  Location: [facility] OR    SURGICAL HISTORY OF -       Csection       Family History:    Family History   Problem Relation Age of Onset    Depression Mother     Respiratory Mother         COPD, emphysema    Cancer Mother     Neurologic Disorder Mother         raunads syndrom    Breast Cancer Mother         Stage 4    Other Cancer Mother         Cervical    Anesthesia Reaction Mother     Osteoporosis Mother     Anxiety Disorder Mother     Unknown/Adopted Mother         Dying of COPD    Gastrointestinal Disease Maternal Grandmother     Depression Maternal Grandmother     Hypertension Maternal Grandmother     Cancer Maternal Grandmother         skin cancer    Breast Cancer Maternal Grandmother     Other Cancer Maternal Grandmother          "Melanoma    Anxiety Disorder Maternal Grandmother     Breast Cancer Cousin         Grandfathers side    Breast Cancer Cousin         Grandfathers side BRCA 1    Prostate Cancer Other         maternal side    Prostate Cancer Other         maternal side    Prostate Cancer Other         maternal side    Leukemia Paternal Half-Sister         1/2 sister,  in age 13 or 15    Diabetes Cousin     Hypertension Other     Hypertension Maternal Grandfather     Hyperlipidemia Maternal Grandfather     Asthma Other        Social History:  Marital Status:   [2]  Social History     Tobacco Use    Smoking status: Never    Smokeless tobacco: Never   Vaping Use    Vaping status: Never Used   Substance Use Topics    Alcohol use: Yes     Comment: Maybe 1-2 times a week    Drug use: No        Medications:    ondansetron (ZOFRAN ODT) 4 MG ODT tab  acetaminophen (TYLENOL) 325 MG tablet  albuterol (PROAIR HFA/PROVENTIL HFA/VENTOLIN HFA) 108 (90 Base) MCG/ACT inhaler  budesonide (PULMICORT FLEXHALER) 180 MCG/ACT inhaler  buPROPion (WELLBUTRIN XL) 150 MG 24 hr tablet  escitalopram (LEXAPRO) 20 MG tablet  fluticasone (FLONASE) 50 MCG/ACT nasal spray  ibuprofen (ADVIL/MOTRIN) 800 MG tablet  SUMAtriptan (IMITREX) 100 MG tablet  triamcinolone (KENALOG) 0.1 % external cream  Vitamin A 7.5 MG (33142 UT) CAPS  vitamin B complex with vitamin C (VITAMIN  B COMPLEX) tablet  Vitamin D, Cholecalciferol, 25 MCG (1000 UT) CAPS      Review of Systems  As mentioned in the HPI, in addition focused review of systems was negative.    Physical Exam   BP: 107/75  Pulse: 83  Temp: 97.8  F (36.6  C)  Resp: 14  Height: 154.9 cm (5' 1\")  Weight: 74.8 kg (165 lb)  SpO2: 99 %      Physical Exam  Vitals and nursing note reviewed.   Constitutional:       General: She is not in acute distress.     Appearance: Normal appearance. She is well-developed. She is not ill-appearing, toxic-appearing or diaphoretic.   HENT:      Head: Normocephalic and atraumatic. No " raccoon eyes, Greene's sign, abrasion or contusion.      Right Ear: External ear normal. No drainage. No hemotympanum.      Left Ear: External ear normal. No drainage. No hemotympanum.      Nose: Nose normal. No rhinorrhea.      Mouth/Throat:      Mouth: Mucous membranes are moist.   Eyes:      General: No scleral icterus.     Extraocular Movements: Extraocular movements intact.      Conjunctiva/sclera: Conjunctivae normal.   Neck:      Trachea: No tracheal deviation.   Cardiovascular:      Rate and Rhythm: Normal rate and regular rhythm.      Heart sounds: Normal heart sounds.   Pulmonary:      Effort: Pulmonary effort is normal. No respiratory distress.      Breath sounds: Normal breath sounds.   Musculoskeletal:         General: Normal range of motion.      Cervical back: Normal range of motion and neck supple. No tenderness.      Right lower leg: No edema.      Left lower leg: No edema.   Skin:     General: Skin is warm and dry.      Coloration: Skin is not pale.      Findings: No bruising, erythema or rash.   Neurological:      General: No focal deficit present.      Mental Status: She is alert and oriented to person, place, and time.      GCS: GCS eye subscore is 4. GCS verbal subscore is 5. GCS motor subscore is 6.      Cranial Nerves: Cranial nerves 2-12 are intact. No cranial nerve deficit.      Sensory: Sensation is intact. No sensory deficit.      Motor: Motor function is intact. No weakness.      Coordination: Coordination is intact. Coordination normal.      Gait: Gait is intact. Gait normal.   Psychiatric:         Mood and Affect: Mood normal.         Behavior: Behavior normal.         ED Course        Procedures         No results found for this or any previous visit (from the past 24 hour(s)).    Medications   ketorolac (TORADOL) injection 30 mg (30 mg Intramuscular $Given 5/22/24 1220)   ondansetron (ZOFRAN ODT) ODT tab 4 mg (4 mg Oral $Given 5/22/24 1220)     Through shared decision making we  elected to defer CT head imaging as likelihood of this being a benefit or change in clinical management is extremely low and we wish to defer radiation exposure CT imaging.    Assessments & Plan (with Medical Decision Making)   48 year old female who presents emergency department approxitwenty 24 hours after a head injury without LOC with symptoms of a frontal headache, nausea, irritability, feeling confusion, anxious, mild ear ringing/tinnitus. Yesterday she was reaching over to  a water bottle from the ground while standing next to the door of her vehicle and when she stood up the door had moved in the wind and she struck her forehead on the door without LOC.  No head/forehead contusion appreciated on physical examination.  Neurologically intact.  No current indication for CT head imaging and through shared decision making we elected to defer this.  Symptoms appear to be secondary to postconcussion syndrome and I prescribed Zofran to use as needed for nausea.  She was provided instructions for supportive care and head injury instructions and signs and symptoms would indicate need for emergent reevaluation.  She will return as needed for worsened condition or worsening symptoms, or new problems or concerns.  She was advised to follow-up in primary clinic for reevaluation recheck of her symptoms have not resolved in 1 week.  She was provided a work note to excuse her as needed for the rest of this week.    I have reviewed the nursing notes.    I have reviewed the findings, diagnosis, plan and need for follow up with the patient.    Discharge Medication List as of 5/22/2024  1:15 PM   ondansetron (ZOFRAN ODT) 4 MG ODT tab 15 tablet 1 5/22/2024 -- --   Sig - Route: Take 1 tablet (4 mg) by mouth every 8 hours as needed - Oral   Sent to pharmacy as: Ondansetron 4 MG Oral Tablet Disintegrating (ZOFRAN ODT)   Class: E-Prescribe          Final diagnoses:   Head injury, closed, without LOC, initial encounter   Post  concussion syndrome       5/22/2024   [facility] EMERGENCY DEPT       Christian Lee MD  05/24/24 2829

## 2024-05-22 NOTE — ED TRIAGE NOTES
Pt presents with headache, irritability, and lethargy since pt hit head on car door yesterday. No LOC but saw stars immediately after hitting head. Nausea without vomiting.      Triage Assessment (Adult)       Row Name 05/22/24 1135          Triage Assessment    Airway WDL WDL        Respiratory WDL    Respiratory WDL WDL        Peripheral/Neurovascular WDL    Peripheral Neurovascular WDL WDL        Cognitive/Neuro/Behavioral WDL    Cognitive/Neuro/Behavioral WDL X  headache     Level of Consciousness intermittent confusion     Arousal Level opens eyes spontaneously     Orientation oriented x 4     Speech clear     Mood/Behavior other (see comments)  irritable        Pupils (CN II)    Pupil PERRLA yes     Pupil Size Left 3 mm     Pupil Size Right 3 mm

## 2024-05-22 NOTE — PROGRESS NOTES
Cecilia Pabon is a 48 year old female who presents with C/O possible concussion.    NURSING ASSESSMENT:  Description:  states yesterday her car door was open, she bent down, and when stood up wind was strong and door hit left side of head above left eyebrow.  States feels off today, not herself.  Is irritable, bit confused, and ears ringing.  States vision is blurry, but is always like that.  She walks without problem.  Is alert and oriented to time and place.  Was fine yesterday, but had headache.  Did not sleep well last night she says  Onset/duration:  yesterday  Precip. factors:  none  Associated symptoms:  headache, ears ringing.    Improves/worsens symptoms:  nothing  Pain scale (0-10)   6/10  LMP/preg/breast feeding:  N/A  Last exam/Treatment: 11/10/23   Allergies: No Known Allergies    MEDICATIONS:   Taking medication(s) as prescribed? Yes  Taking over the counter medication(s?) Yes  Any medication side effects? Not Applicable    Any barriers to taking medication(s) as prescribed?  No  Medication(s) improving/managing symptoms?  N/A  Medication reconciliation completed: Yes      NURSING PLAN: Nursing advice to patient go to the ER    RECOMMENDED DISPOSITION:  To ED, another person to drive   Will comply with recommendation: Yes  If further questions/concerns or if symptoms do not improve, worsen or new symptoms develop, call your PCP or [facility] as soon as possible.          Vashti Orozco RN

## 2024-05-23 ENCOUNTER — PATIENT OUTREACH (OUTPATIENT)
Dept: FAMILY MEDICINE | Facility: CLINIC | Age: 49
End: 2024-05-23
Payer: COMMERCIAL

## 2024-05-23 NOTE — TELEPHONE ENCOUNTER
ED / Discharge Outreach Protocol    Patient Contact    Attempt # 1    Was call answered?  No.  Left message on voicemail with information to call me back to schedule follow up appointment

## 2024-07-03 ENCOUNTER — MYC MEDICAL ADVICE (OUTPATIENT)
Dept: FAMILY MEDICINE | Facility: CLINIC | Age: 49
End: 2024-07-03

## 2024-07-03 ENCOUNTER — TELEPHONE (OUTPATIENT)
Dept: OBGYN | Facility: CLINIC | Age: 49
End: 2024-07-03

## 2024-07-03 ENCOUNTER — OFFICE VISIT (OUTPATIENT)
Dept: OBGYN | Facility: CLINIC | Age: 49
End: 2024-07-03
Payer: COMMERCIAL

## 2024-07-03 VITALS
WEIGHT: 167 LBS | HEART RATE: 86 BPM | RESPIRATION RATE: 18 BRPM | BODY MASS INDEX: 31.53 KG/M2 | SYSTOLIC BLOOD PRESSURE: 117 MMHG | HEIGHT: 61 IN | TEMPERATURE: 99.1 F | DIASTOLIC BLOOD PRESSURE: 73 MMHG

## 2024-07-03 DIAGNOSIS — R10.2 PELVIC PAIN IN FEMALE: Primary | ICD-10-CM

## 2024-07-03 PROCEDURE — 99213 OFFICE O/P EST LOW 20 MIN: CPT | Performed by: STUDENT IN AN ORGANIZED HEALTH CARE EDUCATION/TRAINING PROGRAM

## 2024-07-03 NOTE — TELEPHONE ENCOUNTER
Pt scheduled for an apt today w Dr Dutta for string check.    Yana Mays RN on 7/3/2024 at 1:46 PM

## 2024-07-03 NOTE — NURSING NOTE
"Initial /73 (BP Location: Left arm, Patient Position: Chair, Cuff Size: Adult Regular)   Pulse 86   Temp 99.1  F (37.3  C) (Tympanic)   Resp 18   Ht 1.549 m (5' 1\")   Wt 75.8 kg (167 lb)   BMI 31.55 kg/m   Estimated body mass index is 31.55 kg/m  as calculated from the following:    Height as of this encounter: 1.549 m (5' 1\").    Weight as of this encounter: 75.8 kg (167 lb). .    " normal...

## 2024-07-03 NOTE — TELEPHONE ENCOUNTER
11/3/2022 Mirena IUD in place  Placed during surgery by Dr Dutta - had polypectomy and D&C as well  LLQ pain and AUB    No bleeding since her IUD was placed.    This morning started bleeding and having cramps (more so on LLQ) - this is new to her since IUD but similar to what she experienced prior to surgery and IUD placement as above.    Not bright red but brown discharge  Cramping is similar to what it was like prior to IUD placement on LLQ   has had a vasectomy so is not worried about pregnancy after RN recommended home UPT    Moderate pain - 4-5/10 - more annoying discomfort.   Ibuprofen 600 mg and noticed a little relief.    Pt did not do sting check on her own as she has a retroverted uterus and never has been able to feel them.  It is not unusual w an IUD to have irregular bleeding/cycles but pt concerned IUD has moved.    Routing to Dr Dutta - do you have time to see her today for sting check or please advise on next steps.  If appointment - can you send to [place] scheduling team to help her schedule? RN covering from Carol Mays RN on 7/3/2024 at 12:38 PM

## 2024-07-03 NOTE — PROGRESS NOTES
"[facility]  Gynecology Office Note    Assessment and Plan:   Cecilia Pabon, 48 year old  s/p \"Dilation and curettage, hysteroscopic endometrial biopsy, Mirena intrauterine system placement\" on 11/3/2022, presented for new onset of vaginal bleeding and LLQ pain like what she had prior to Mirena IUD placement.    Mirena IUD appears to be in the correct position with bedside transabdominal ultrasound and strings (no IUD stem) visualized on the speculum exam.    If bleeding and LLQ pain continue, transvaginal US ordered.    Luzmaria Dutta MD  Obstetrics and Gynecology  [facility]   2024     -----------------------------------------------------------------------------------------------------------------------------------    S: eCcilia Pabon, 48 year old  s/p \"Dilation and curettage, hysteroscopic endometrial biopsy, Mirena intrauterine system placement\" on 11/3/2022, presented for new onset of vaginal bleeding and LLQ pain like what she had prior to Mirena IUD placement.    Since Mirena IUD placement, patient has not had any vaginal bleeding till when she woke up this morning when she noticed light period amount of bleeding. Denied any recent sexual intercourse or intense activities.     Have some LLQ pelvic pain, which is similar to her LLQ pain prior to IUD insertion.     O:   Vitals:    24 1425   BP: 117/73   BP Location: Left arm   Patient Position: Chair   Cuff Size: Adult Regular   Pulse: 86   Resp: 18   Temp: 99.1  F (37.3  C)   TempSrc: Tympanic   Weight: 75.8 kg (167 lb)   Height: 1.549 m (5' 1\")      Estimated body mass index is 31.55 kg/m  as calculated from the following:    Height as of this encounter: 1.549 m (5' 1\").    Weight as of this encounter: 75.8 kg (167 lb).    General appearance: well-hydrated, A&O x 3, no apparent distress  Lungs: Equal expansion bilaterally, no accessory muscle use  Heart: No heaves or thrills. "   Constitutional: See vitals  Abdomen: Soft, non-tender, non-distended. No rebound, rigidity, or guarding.  Genitourinary:  External genitalia: no erythema, no lesions.   Urethral meatus appropriate location without lesions or prolapse  Urethra: No masses, tenderness, or scarring  Bladder no fullness, masses, or tenderness.  Anus and Perineum: Unremarkable, no visible lesions  Vagina: Normal, healthy pink mucosa without any lesions. Physiologic vaginal discharge.   Cervix: normal appearance, no cervical motion tenderness. IUD strings visualized.    Bedside transabdominal US: Mirena IUD appears to be fundal and in good position

## 2024-08-15 ENCOUNTER — MYC MEDICAL ADVICE (OUTPATIENT)
Dept: FAMILY MEDICINE | Facility: CLINIC | Age: 49
End: 2024-08-15
Payer: COMMERCIAL

## 2024-08-15 PROCEDURE — 99207 REFERRAL TO ACUTE AND DIAGNOSTIC SERVICES: CPT | Performed by: PHYSICIAN ASSISTANT

## 2024-08-16 ENCOUNTER — OFFICE VISIT (OUTPATIENT)
Dept: PEDIATRICS | Facility: CLINIC | Age: 49
End: 2024-08-16
Payer: COMMERCIAL

## 2024-08-16 ENCOUNTER — HOSPITAL ENCOUNTER (OUTPATIENT)
Dept: MRI IMAGING | Facility: CLINIC | Age: 49
Discharge: HOME | End: 2024-08-16
Attending: EMERGENCY MEDICINE | Admitting: EMERGENCY MEDICINE
Payer: COMMERCIAL

## 2024-08-16 VITALS
HEIGHT: 61 IN | HEART RATE: 72 BPM | DIASTOLIC BLOOD PRESSURE: 74 MMHG | SYSTOLIC BLOOD PRESSURE: 111 MMHG | BODY MASS INDEX: 31.53 KG/M2 | RESPIRATION RATE: 16 BRPM | WEIGHT: 167 LBS | OXYGEN SATURATION: 98 % | TEMPERATURE: 98.7 F

## 2024-08-16 DIAGNOSIS — R51.9 ACUTE NONINTRACTABLE HEADACHE, UNSPECIFIED HEADACHE TYPE: ICD-10-CM

## 2024-08-16 DIAGNOSIS — B02.9 HERPES ZOSTER WITHOUT COMPLICATION: ICD-10-CM

## 2024-08-16 DIAGNOSIS — R51.9 ACUTE NONINTRACTABLE HEADACHE, UNSPECIFIED HEADACHE TYPE: Primary | ICD-10-CM

## 2024-08-16 LAB
ANION GAP SERPL CALCULATED.3IONS-SCNC: 9 MMOL/L (ref 7–15)
BUN SERPL-MCNC: 12.2 MG/DL (ref 6–20)
CALCIUM SERPL-MCNC: 9.6 MG/DL (ref 8.8–10.4)
CHLORIDE SERPL-SCNC: 105 MMOL/L (ref 98–107)
CREAT SERPL-MCNC: 0.89 MG/DL (ref 0.51–0.95)
EGFRCR SERPLBLD CKD-EPI 2021: 81 ML/MIN/1.73M2
ERYTHROCYTE [DISTWIDTH] IN BLOOD BY AUTOMATED COUNT: 11.8 % (ref 10–15)
ERYTHROCYTE [SEDIMENTATION RATE] IN BLOOD BY WESTERGREN METHOD: 11 MM/HR (ref 0–20)
GLUCOSE SERPL-MCNC: 95 MG/DL (ref 70–99)
HCO3 SERPL-SCNC: 27 MMOL/L (ref 22–29)
HCT VFR BLD AUTO: 40.8 % (ref 35–47)
HGB BLD-MCNC: 13.5 G/DL (ref 11.7–15.7)
MCH RBC QN AUTO: 31.8 PG (ref 26.5–33)
MCHC RBC AUTO-ENTMCNC: 33.1 G/DL (ref 31.5–36.5)
MCV RBC AUTO: 96 FL (ref 78–100)
PLATELET # BLD AUTO: 257 10E3/UL (ref 150–450)
POTASSIUM SERPL-SCNC: 4.2 MMOL/L (ref 3.4–5.3)
RBC # BLD AUTO: 4.25 10E6/UL (ref 3.8–5.2)
SODIUM SERPL-SCNC: 141 MMOL/L (ref 135–145)
WBC # BLD AUTO: 6.3 10E3/UL (ref 4–11)

## 2024-08-16 PROCEDURE — 85027 COMPLETE CBC AUTOMATED: CPT | Performed by: EMERGENCY MEDICINE

## 2024-08-16 PROCEDURE — 70551 MRI BRAIN STEM W/O DYE: CPT

## 2024-08-16 PROCEDURE — 99214 OFFICE O/P EST MOD 30 MIN: CPT | Performed by: EMERGENCY MEDICINE

## 2024-08-16 PROCEDURE — 36415 COLL VENOUS BLD VENIPUNCTURE: CPT | Performed by: EMERGENCY MEDICINE

## 2024-08-16 PROCEDURE — 80048 BASIC METABOLIC PNL TOTAL CA: CPT | Performed by: EMERGENCY MEDICINE

## 2024-08-16 PROCEDURE — 85652 RBC SED RATE AUTOMATED: CPT | Performed by: EMERGENCY MEDICINE

## 2024-08-16 RX ORDER — VALACYCLOVIR HYDROCHLORIDE 1 G/1
1000 TABLET, FILM COATED ORAL 3 TIMES DAILY
Qty: 21 TABLET | Refills: 0 | Status: SHIPPED | OUTPATIENT
Start: 2024-08-16 | End: 2024-08-23

## 2024-08-16 RX ORDER — ACETAMINOPHEN 500 MG
1000 TABLET ORAL ONCE
Status: COMPLETED | OUTPATIENT
Start: 2024-08-16 | End: 2024-08-16

## 2024-08-16 RX ADMIN — Medication 1000 MG: at 13:20

## 2024-08-16 ASSESSMENT — PAIN SCALES - GENERAL: PAINLEVEL: MODERATE PAIN (5)

## 2024-08-16 NOTE — PROGRESS NOTES
Acute and Diagnostic Services Clinic Visit    {PROVIDER CHARTING PREFERENCE:862024}    Noah Brooks is a 48 year old, presenting for the following health issues:  Headache    HPI     Headache  Onset/Duration: 3 days  Description:   Location: Left temple   Character: throbbing pain, dull pain  Frequency:  constant  Duration:  on going  Wake with headaches:  YES  Able to do daily activities when headache present:  YES- with difficulty  Intensity: moderate  Progression of Symptoms:  same and constant  Accompanying Signs & Symptoms:  Stiff neck: YES  Neck or upper back pain: YES           Sinus or URI symptoms: YES- cough, mild sore throat.  Covid test at home was negative  Fever: no   Nausea or vomiting: no   Dizziness: no   Numbness/tingling: YES  Weakness: no   Visual changes: no   History:   Head trauma: no   Family history of migraines: no   Daily pain medication use: no            Previous tests for headaches: no            Neurologist evaluations: no   Precipitating or alleviating factors:   Does light make it worse: no   Does sound make it worse: no   Does sleep help: no   Therapies Tried and outcome: Ibuprofen (Advil, Motrin) and Tylenol  {For O2 treatment of cluster migraines, see [facility] Provider Guide :349582}      {ROS Picklists (Optional):287976}      Objective    There were no vitals taken for this visit.  There is no height or weight on file to calculate BMI.  Physical Exam   {Exam List (Optional):099431}    {Diagnostic Test Results (Optional):939956}        Signed Electronically by: MAHI DALY MD  {Email feedback regarding this note to [email or web address]   :[id number]}

## 2024-08-16 NOTE — TELEPHONE ENCOUNTER
"S-(situation): Contacted patient per PCP's request.     B-(background):     A-(assessment): Cecilia states her headache has not gone away despite taking Ibuprofen. It is rated consistently throbbing at 4/10 and is higher at times. Her headache is located in the area of the \"swollen veins\" on her left temple area. No vision changes. She does report decreased appetite over the past 3 days as well. No known measured elevated temperature.     R-(recommendations): Should be seen for evaluation since no relief of headache in addition to prominent blood vessels to left temple area.   Information relayed to [place] in [facility]. Will await provider response.   Delaney RAMOS RN      Referral to Acute and Diagnostic Services    [phone] ([place]) [address] [address]    Transition to Acute & Diagnostic Services Clinic has been discussed with patient, and she agrees with next level of care.   Patient understands that evaluation/treatment at [facility] typically takes significantly longer than in clinic/urgent care (>2 hours).  The [facility]ostics [facility] has been contacted by provider/staff to confirm patient acceptance.         Special issues:      None                             Delaney RAMOS RN    "

## 2024-08-16 NOTE — PROGRESS NOTES
Impression:  Headache.  Patient does have a rash in the area of the headache that is most likely herpes zoster.  MRI is negative.  Lab work is unremarkable and sed rate is not elevated    Plan:  Valacyclovir 1000 mg 3 times daily for 7 days, Tylenol as needed for pain, follow-up with primary care if not improved      Chief Complaint:  Patient presents with:  Headache         HPI:   Cecilia Pabon is a 48 year old female who presents to this clinic for the evaluation of headache.  Patient complains of a 3 to 4-day history of headache on the left parietal area.  She woke with it 3 to 4 days ago.  She today she noticed that the veins on the left side of her temple were swollen.  It is a dull throbbing headache.  She has not had any nausea.  There is no photophobia or phonophobia.  She states is different than her previous migraines.  She did do a home COVID test that was negative.  She also complains of fatigue and night sweats.  She has a history of migraines but this headache is different.  No focal numbness or weakness in the extremities or face.  No double vision or blurry vision.  No eye pain.      PMH:   Past Medical History:   Diagnosis Date    Anxiety state, unspecified     ASCUS on Pap smear 2003    +HR HPV 16    COPD (chronic obstructive pulmonary disease) (H)     My Mom has this    Family history of breast cancer in mother 09/17/2009    Family history of breast cancer in mother     Headache 02/25/2005    Hx of colposcopy with cervical biopsy 06/02/2003    WNL    Major depression, single episode 09/15/2022    Mental disorders of mother, postpartum     postpartum depression    Migraine, unspecified, without mention of intractable migraine without mention of status migrainosus     Other specified cardiac dysrhythmias(427.89)     Swelling of limb     Uncomplicated asthma     Myself    Unspecified viral infection, in conditions classified elsewhere and of unspecified site      Past Surgical History:   Procedure  Laterality Date    ABDOMEN SURGERY  [id number]    Csecttion    COLONOSCOPY      DILATION AND CURETTAGE, OPERATIVE HYSTEROSCOPY, COMBINED N/A 11/3/2022    Procedure: HYSTEROSCOPY, WITH DILATION AND CURETTAGE OF UTERUS, hysteroscopic endometrial biopsy,  Mirena Intra Uterine Device Placement;  Surgeon: Luzmaria Dutta MD;  Location: [facility] OR    GI SURGERY      LAPAROSCOPIC CHOLECYSTECTOMY N/A 12/30/2014    Procedure: LAPAROSCOPIC CHOLECYSTECTOMY;  Surgeon: Ulises Starr MD;  Location: [facility] OR    SURGICAL HISTORY OF -       Csection         ROS:  All other systems negative    Meds:    Current Outpatient Medications:     acetaminophen (TYLENOL) 325 MG tablet, Take 2 tablets (650 mg) by mouth every 6 hours as needed for mild pain, Disp: 24 tablet, Rfl: 0    albuterol (PROAIR HFA/PROVENTIL HFA/VENTOLIN HFA) 108 (90 Base) MCG/ACT inhaler, Inhale 2 puffs into the lungs every 6 hours as needed for shortness of breath, Disp: 18 g, Rfl: 3    buPROPion (WELLBUTRIN XL) 150 MG 24 hr tablet, Take 2 tablets (300 mg) by mouth daily, Disp: 180 tablet, Rfl: 3    escitalopram (LEXAPRO) 20 MG tablet, Take 1 tablet (20 mg) by mouth daily, Disp: 90 tablet, Rfl: 3    ibuprofen (ADVIL/MOTRIN) 800 MG tablet, Take 1 tablet (800 mg) by mouth every 6 hours as needed for other (mild and/or inflammatory pain), Disp: 30 tablet, Rfl: 0    ondansetron (ZOFRAN ODT) 4 MG ODT tab, Take 1 tablet (4 mg) by mouth every 8 hours as needed, Disp: 15 tablet, Rfl: 1    SUMAtriptan (IMITREX) 100 MG tablet, Take 1 tablet (100 mg) by mouth at onset of headache for migraine May repeat dose in 2 hours.  Do not exceed 200 mg in 24 hours, Disp: 18 tablet, Rfl: 3    budesonide (PULMICORT FLEXHALER) 180 MCG/ACT inhaler, Inhale 1 puff into the lungs 2 times daily (Patient not taking: Reported on 8/16/2024), Disp: 1 each, Rfl: 3    Vitamin A 7.5 MG (44953 UT) CAPS, , Disp: , Rfl:     vitamin B complex with vitamin C (VITAMIN  B COMPLEX) tablet, Take 1 tablet by mouth daily   (Patient not taking: Reported on 8/16/2024), Disp: , Rfl:     Vitamin D, Cholecalciferol, 25 MCG (1000 UT) CAPS, , Disp: , Rfl:   No current facility-administered medications for this visit.        Social:  Social History     Socioeconomic History    Marital status:      Spouse name: Not on file    Number of children: Not on file    Years of education: Not on file    Highest education level: Not on file   Occupational History    Not on file   Tobacco Use    Smoking status: Never    Smokeless tobacco: Never   Vaping Use    Vaping status: Never Used   Substance and Sexual Activity    Alcohol use: Yes     Comment: Maybe 1-2 times a week    Drug use: No    Sexual activity: Yes     Partners: Male     Birth control/protection: Male Surgical, I.U.D.     Comment: my current partner has had a vasectomy.   Other Topics Concern    Parent/sibling w/ CABG, MI or angioplasty before 65F 55M? No   Social History Narrative    Not on file     Social Determinants of Health     Financial Resource Strain: Low Risk  (11/7/2023)    Financial Resource Strain     Within the past 12 months, have you or your family members you live with been unable to get utilities (heat, electricity) when it was really needed?: No   Food Insecurity: High Risk (11/7/2023)    Food Insecurity     Within the past 12 months, did you worry that your food would run out before you got money to buy more?: Yes     Within the past 12 months, did the food you bought just not last and you didn t have money to get more?: No   Transportation Needs: Low Risk  (11/7/2023)    Transportation Needs     Within the past 12 months, has lack of transportation kept you from medical appointments, getting your medicines, non-medical meetings or appointments, work, or from getting things that you need?: No   Physical Activity: Not on file   Stress: Not on file   Social Connections: Not on file   Interpersonal Safety: Low Risk  (11/10/2023)    Interpersonal Safety     Do you feel  "physically and emotionally safe where you currently live?: Yes     Within the past 12 months, have you been hit, slapped, kicked or otherwise physically hurt by someone?: No     Within the past 12 months, have you been humiliated or emotionally abused in other ways by your partner or ex-partner?: No   Housing Stability: Low Risk  (11/7/2023)    Housing Stability     Do you have housing? : Yes     Are you worried about losing your housing?: No         Physical Exam:  Vitals:    08/16/24 1254   BP: 111/74   BP Location: Right arm   Patient Position: Sitting   Cuff Size: Adult Regular   Pulse: 72   Resp: 16   Temp: 98.7  F (37.1  C)   TempSrc: Oral   SpO2: 98%   Weight: 75.8 kg (167 lb)   Height: 1.549 m (5' 1\")      Patient is awake, alert, no distress  Eyes: PERRL, EOMI  Extremities: No tenderness or deformity  Skin: There is an erythematous rash with some pinpoint vesicles above the hairline on the left side of the scalp.  The veins on the left temple are slightly swollen.  Neuro: Cranial nerves II through XII intact, normal motor and sensory function in all extremities, gait is normal, tandem gait is normal, Romberg testing is normal, speech is clear and coherent  Psych: Awake, alert, normally responsive      Results:    Results for orders placed or performed in visit on 08/16/24 (from the past 24 hour(s))   CBC with platelets   Result Value Ref Range    WBC Count 6.3 4.0 - 11.0 10e3/uL    RBC Count 4.25 3.80 - 5.20 10e6/uL    Hemoglobin 13.5 11.7 - 15.7 g/dL    Hematocrit 40.8 35.0 - 47.0 %    MCV 96 78 - 100 fL    MCH 31.8 26.5 - 33.0 pg    MCHC 33.1 31.5 - 36.5 g/dL    RDW 11.8 10.0 - 15.0 %    Platelet Count 257 150 - 450 10e3/uL   Basic metabolic panel   Result Value Ref Range    Sodium 141 135 - 145 mmol/L    Potassium 4.2 3.4 - 5.3 mmol/L    Chloride 105 98 - 107 mmol/L    Carbon Dioxide (CO2) 27 22 - 29 mmol/L    Anion Gap 9 7 - 15 mmol/L    Urea Nitrogen 12.2 6.0 - 20.0 mg/dL    Creatinine 0.89 0.51 - 0.95 " mg/dL    GFR Estimate 81 >60 mL/min/1.73m2    Calcium 9.6 8.8 - 10.4 mg/dL    Glucose 95 70 - 99 mg/dL   ESR: Erythrocyte sedimentation rate   Result Value Ref Range    Erythrocyte Sedimentation Rate 11 0 - 20 mm/hr        Recent Results (from the past 24 hour(s))   MR Brain w/o Contrast    Narrative    MRI BRAIN WITHOUT CONTRAST  8/16/2024 2:04 PM    HISTORY:  Headache; Acute HA (< 3 months), no complicating features;  Acute nonintractable headache, unspecified headache type.    TECHNIQUE:  Multiplanar, multisequence MRI of the brain without  gadolinium IV contrast material.      COMPARISON:  None.    FINDINGS: No abnormal intracranial restricted diffusion identified.  The ventricles appear within normal limits in size and configuration.  Normal morphology, volume, and signal intensity of the brain  parenchyma for age. No intracranial hemorrhage, extra axial fluid  collection, or mass effect. The major arterial flow voids at the skull  base are grossly maintained.    Orbits appear grossly unremarkable, accounting for technique. The  calvarium, skull base, and mid face appear unremarkable.      Impression    IMPRESSION: Unremarkable noncontrast brain MRI. No acute intracranial  process.     MAHI THOMPSON MD         SYSTEM ID:  [id number]          Mahi Miranda MD

## 2024-10-22 ASSESSMENT — ANXIETY QUESTIONNAIRES: GAD7 TOTAL SCORE: 0

## 2024-12-29 ENCOUNTER — HEALTH MAINTENANCE LETTER (OUTPATIENT)
Age: 49
End: 2024-12-29

## 2025-02-09 ENCOUNTER — HOSPITAL ENCOUNTER (EMERGENCY)
Facility: CLINIC | Age: 50
Discharge: HOME | End: 2025-02-09
Attending: FAMILY MEDICINE | Admitting: FAMILY MEDICINE
Payer: COMMERCIAL

## 2025-02-09 VITALS
HEIGHT: 61 IN | BODY MASS INDEX: 31.15 KG/M2 | RESPIRATION RATE: 18 BRPM | OXYGEN SATURATION: 97 % | DIASTOLIC BLOOD PRESSURE: 66 MMHG | HEART RATE: 76 BPM | WEIGHT: 165 LBS | SYSTOLIC BLOOD PRESSURE: 103 MMHG

## 2025-02-09 DIAGNOSIS — J45.30 MILD PERSISTENT ASTHMA WITHOUT COMPLICATION: ICD-10-CM

## 2025-02-09 DIAGNOSIS — J10.1 INFLUENZA A: ICD-10-CM

## 2025-02-09 LAB
FLUAV RNA SPEC QL NAA+PROBE: POSITIVE
FLUBV RNA RESP QL NAA+PROBE: NEGATIVE
RSV RNA SPEC NAA+PROBE: NEGATIVE
SARS-COV-2 RNA RESP QL NAA+PROBE: NEGATIVE

## 2025-02-09 PROCEDURE — 87637 SARSCOV2&INF A&B&RSV AMP PRB: CPT | Performed by: FAMILY MEDICINE

## 2025-02-09 PROCEDURE — 99283 EMERGENCY DEPT VISIT LOW MDM: CPT | Performed by: FAMILY MEDICINE

## 2025-02-09 PROCEDURE — 99284 EMERGENCY DEPT VISIT MOD MDM: CPT | Performed by: FAMILY MEDICINE

## 2025-02-09 RX ORDER — OSELTAMIVIR PHOSPHATE 75 MG/1
75 CAPSULE ORAL 2 TIMES DAILY
Qty: 10 CAPSULE | Refills: 0 | Status: SHIPPED | OUTPATIENT
Start: 2025-02-09 | End: 2025-02-14

## 2025-02-09 RX ORDER — BUDESONIDE 180 UG/1
1 AEROSOL, POWDER RESPIRATORY (INHALATION) 2 TIMES DAILY
Qty: 1 EACH | Refills: 0 | Status: SHIPPED | OUTPATIENT
Start: 2025-02-09

## 2025-02-09 RX ORDER — ONDANSETRON 4 MG/1
4 TABLET, ORALLY DISINTEGRATING ORAL EVERY 8 HOURS PRN
Qty: 10 TABLET | Refills: 0 | Status: SHIPPED | OUTPATIENT
Start: 2025-02-09 | End: 2025-02-12

## 2025-02-09 ASSESSMENT — COLUMBIA-SUICIDE SEVERITY RATING SCALE - C-SSRS
1. IN THE PAST MONTH, HAVE YOU WISHED YOU WERE DEAD OR WISHED YOU COULD GO TO SLEEP AND NOT WAKE UP?: NO
2. HAVE YOU ACTUALLY HAD ANY THOUGHTS OF KILLING YOURSELF IN THE PAST MONTH?: NO
6. HAVE YOU EVER DONE ANYTHING, STARTED TO DO ANYTHING, OR PREPARED TO DO ANYTHING TO END YOUR LIFE?: NO

## 2025-02-09 ASSESSMENT — ACTIVITIES OF DAILY LIVING (ADL): ADLS_ACUITY_SCORE: 41

## 2025-02-09 NOTE — ED TRIAGE NOTES
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  Pt had fever yesterday     Triage Assessment (Adult)       Row Name 02/09/25 4927          Triage Assessment    Airway WDL WDL        Respiratory WDL    Respiratory WDL WDL        Cardiac WDL    Cardiac WDL WDL

## 2025-02-09 NOTE — Clinical Note
Cecilia Pabon was seen and treated in our emergency department on 2/9/2025.  She may return to work on 02/14/2025.       If you have any questions or concerns, please don't hesitate to call.      Wily Renee MD

## 2025-02-10 NOTE — DISCHARGE INSTRUCTIONS
ICD-10-CM    1. Influenza A  J10.1     take tamiflu starting in am. stay hydatrated.  zofran for nausea, vomiitng. return for worsening.       2. Mild persistent asthma without complication  J45.30     start steroid inhaler and stay on. follow-up clinic

## 2025-02-10 NOTE — ED PROVIDER NOTES
History     Chief Complaint   Patient presents with    Flu Symptoms     HPI  Cecilia Pabon is a 49 year old female who presenting with influenza-like illness following exposure to her  over the last 2 days.  He was recently diagnosed with this.  Presents here with cough headache sweats myalgias sore throat onset in the last 24 hours.  Here primarily for testing is wondering if she can go to work and if her exposure to grandparents could be harmful.  She does have a history of asthma as well and has not been compliant with steroid inhalers only uses albuterol inhaler as needed and has not needed this recently although today felt some sense of chest heaviness with her recent illness.  No associated wheezing.  No significant dyspnea.  Denies tobacco use  Allergies:  No Known Allergies    Problem List:    Patient Active Problem List    Diagnosis Date Noted    Current moderate episode of major depressive disorder without prior episode (H) 09/15/2022     Priority: Medium    Enteritis 02/17/2015     Priority: Medium    Abdominal pain 02/17/2015     Priority: Medium    Acute cholecystitis 12/29/2014     Priority: Medium    HYPERLIPIDEMIA LDL GOAL <160 10/31/2010     Priority: Medium    Family history of breast cancer in mother 09/17/2009     Priority: Medium    Mild persistent asthma 03/28/2006     Priority: Medium    Headache 02/25/2005     Priority: Medium      2-3 in past yr.  Reduced after ear piercing at acupressure point.      Anxiety state 02/25/2005     Priority: Medium     Problem list name updated by automated process. Provider to review          Past Medical History:    Past Medical History:   Diagnosis Date    Anxiety state, unspecified     ASCUS on Pap smear 2003    COPD (chronic obstructive pulmonary disease) (H)     Family history of breast cancer in mother 09/17/2009    Family history of breast cancer in mother     Headache 02/25/2005    Hx of colposcopy with cervical biopsy 06/02/2003    Major  depression, single episode 09/15/2022    Mental disorders of mother, postpartum     Migraine, unspecified, without mention of intractable migraine without mention of status migrainosus     Other specified cardiac dysrhythmias(427.89)     Swelling of limb     Uncomplicated asthma     Unspecified viral infection, in conditions classified elsewhere and of unspecified site        Past Surgical History:    Past Surgical History:   Procedure Laterality Date    ABDOMEN SURGERY  [id number]    Csecttion    COLONOSCOPY      DILATION AND CURETTAGE, OPERATIVE HYSTEROSCOPY, COMBINED N/A 11/3/2022    Procedure: HYSTEROSCOPY, WITH DILATION AND CURETTAGE OF UTERUS, hysteroscopic endometrial biopsy,  Mirena Intra Uterine Device Placement;  Surgeon: Luzmaria Dutta MD;  Location: [facility] OR    GI SURGERY      LAPAROSCOPIC CHOLECYSTECTOMY N/A 12/30/2014    Procedure: LAPAROSCOPIC CHOLECYSTECTOMY;  Surgeon: Ulises Starr MD;  Location: [facility] OR    SURGICAL HISTORY OF -       Csection       Family History:    Family History   Problem Relation Age of Onset    Depression Mother     Respiratory Mother         COPD, emphysema    Cancer Mother     Neurologic Disorder Mother         raunads syndrom    Breast Cancer Mother         Stage 4    Other Cancer Mother         Cervical    Anesthesia Reaction Mother     Osteoporosis Mother     Anxiety Disorder Mother     Unknown/Adopted Mother         Dying of COPD    Gastrointestinal Disease Maternal Grandmother     Depression Maternal Grandmother     Hypertension Maternal Grandmother     Cancer Maternal Grandmother         skin cancer    Breast Cancer Maternal Grandmother     Other Cancer Maternal Grandmother         Melanoma    Anxiety Disorder Maternal Grandmother     Breast Cancer Cousin         Grandfathers side    Breast Cancer Cousin         Grandfathers side BRCA 1    Prostate Cancer Other         maternal side    Prostate Cancer Other         maternal side    Prostate Cancer Other         maternal side     "Leukemia Paternal Half-Sister         1/2 sister,  in age 13 or 15    Diabetes Cousin     Hypertension Other     Hypertension Maternal Grandfather     Hyperlipidemia Maternal Grandfather     Asthma Other        Social History:  Marital Status:   [2]  Social History     Tobacco Use    Smoking status: Never    Smokeless tobacco: Never   Vaping Use    Vaping status: Never Used   Substance Use Topics    Alcohol use: Yes     Comment: Maybe 1-2 times a week    Drug use: No        Medications:    acetaminophen (TYLENOL) 325 MG tablet  albuterol (PROAIR HFA/PROVENTIL HFA/VENTOLIN HFA) 108 (90 Base) MCG/ACT inhaler  budesonide (PULMICORT FLEXHALER) 180 MCG/ACT inhaler  buPROPion (WELLBUTRIN XL) 150 MG 24 hr tablet  escitalopram (LEXAPRO) 20 MG tablet  ibuprofen (ADVIL/MOTRIN) 800 MG tablet  ondansetron (ZOFRAN ODT) 4 MG ODT tab  SUMAtriptan (IMITREX) 100 MG tablet  valACYclovir (VALTREX) 1000 mg tablet  Vitamin A 7.5 MG (91023 UT) CAPS  vitamin B complex with vitamin C (VITAMIN  B COMPLEX) tablet  Vitamin D, Cholecalciferol, 25 MCG (1000 UT) CAPS          Review of Systems  ROS:  5 point ROS negative except as noted above in HPI, including Gen., Resp., CV, GI &  system review.      Physical Exam   BP: 103/66  Pulse: 76  Resp: 18  Height: 154.9 cm (5' 1\")  Weight: 74.8 kg (165 lb)  SpO2: 98 %      Physical Exam  Constitutional:       General: She is in acute distress.      Appearance: She is not diaphoretic.   Eyes:      Conjunctiva/sclera: Conjunctivae normal.   Cardiovascular:      Rate and Rhythm: Normal rate and regular rhythm.      Heart sounds: No murmur heard.  Pulmonary:      Effort: Pulmonary effort is normal. No respiratory distress.      Breath sounds: Normal breath sounds. No stridor. No wheezing or rhonchi.   Musculoskeletal:      Cervical back: Neck supple.   Neurological:      Mental Status: She is alert.         ED Course        Procedures              Critical Care time:  none              No " results found for this or any previous visit (from the past 24 hours).    Medications - No data to display    Assessments & Plan (with Medical Decision Making)     MDM; Cecilia Pabon is a 49 year old female presents with influenza-like illness.  We discussed that although we would test for influenza the testing would not change my diagnosis which is influenza a likely what her  has and that offered her treatment with Tamiflu which will cause nausea vomiting and many people up to 30% and given her Zofran in case this occurs.  We discussed the risks and benefits of Tamiflu and also that it likely would not keep her out of the hospital should she become ill.  We also talked about her asthma which has been mild persistent asthma and I recommended she be on a steroid inhaler which had been previously prescribed but she has not been taking.  She does not have to use her albuterol every day but does have a sense at times of feeling more short of breath associated with the asthma.  We discussed restarting this and I have sent it to pharmacy in preparation for the potential worsening she will experience with influenza  I have reviewed the nursing notes.    I have reviewed the findings, diagnosis, plan and need for follow up with the patient.           Medical Decision Making  The patient's presentation was of moderate complexity (an acute illness with systemic symptoms).    The patient's evaluation involved:  ordering and/or review of 1 test(s) in this encounter (see separate area of note for details)    The patient's management necessitated moderate risk (prescription drug management including medications given in the ED).        New Prescriptions    No medications on file       Final diagnoses:   Influenza A - take tamiflu starting in am. stay hydatrated.  zofran for nausea, vomiitng. return for worsening.    Mild persistent asthma without complication - start steroid inhaler and stay on. follow-up clinic        2/9/2025   [facility] EMERGENCY DEPT       Wily Renee MD  02/09/25 182

## 2025-03-09 ENCOUNTER — MYC REFILL (OUTPATIENT)
Dept: FAMILY MEDICINE | Facility: CLINIC | Age: 50
End: 2025-03-09
Payer: COMMERCIAL

## 2025-03-09 DIAGNOSIS — F41.1 ANXIETY STATE: ICD-10-CM

## 2025-03-09 DIAGNOSIS — J45.40 MODERATE PERSISTENT ASTHMA WITHOUT COMPLICATION: ICD-10-CM

## 2025-03-10 RX ORDER — ESCITALOPRAM OXALATE 20 MG/1
20 TABLET ORAL DAILY
Qty: 90 TABLET | Refills: 3 | OUTPATIENT
Start: 2025-03-10

## 2025-03-10 RX ORDER — BUPROPION HYDROCHLORIDE 150 MG/1
300 TABLET ORAL DAILY
Qty: 180 TABLET | Refills: 3 | OUTPATIENT
Start: 2025-03-10

## 2025-03-10 RX ORDER — ALBUTEROL SULFATE 90 UG/1
2 INHALANT RESPIRATORY (INHALATION) EVERY 6 HOURS PRN
Qty: 18 G | Refills: 3 | OUTPATIENT
Start: 2025-03-10

## 2025-03-16 DIAGNOSIS — F41.1 ANXIETY STATE: ICD-10-CM

## 2025-03-17 RX ORDER — ESCITALOPRAM OXALATE 20 MG/1
20 TABLET ORAL DAILY
Qty: 30 TABLET | Refills: 0 | Status: SHIPPED | OUTPATIENT
Start: 2025-03-17

## 2025-03-17 RX ORDER — BUPROPION HYDROCHLORIDE 150 MG/1
300 TABLET ORAL DAILY
Qty: 60 TABLET | Refills: 0 | Status: SHIPPED | OUTPATIENT
Start: 2025-03-17

## 2025-03-17 ASSESSMENT — ASTHMA QUESTIONNAIRES
QUESTION_1 LAST FOUR WEEKS HOW MUCH OF THE TIME DID YOUR ASTHMA KEEP YOU FROM GETTING AS MUCH DONE AT WORK, SCHOOL OR AT HOME: SOME OF THE TIME
ACT_TOTALSCORE: 16
QUESTION_3 LAST FOUR WEEKS HOW OFTEN DID YOUR ASTHMA SYMPTOMS (WHEEZING, COUGHING, SHORTNESS OF BREATH, CHEST TIGHTNESS OR PAIN) WAKE YOU UP AT NIGHT OR EARLIER THAN USUAL IN THE MORNING: NOT AT ALL
QUESTION_2 LAST FOUR WEEKS HOW OFTEN HAVE YOU HAD SHORTNESS OF BREATH: ONCE A DAY
QUESTION_4 LAST FOUR WEEKS HOW OFTEN HAVE YOU USED YOUR RESCUE INHALER OR NEBULIZER MEDICATION (SUCH AS ALBUTEROL): TWO OR THREE TIMES PER WEEK
ACT_TOTALSCORE: 16
QUESTION_5 LAST FOUR WEEKS HOW WOULD YOU RATE YOUR ASTHMA CONTROL: SOMEWHAT CONTROLLED

## 2025-03-20 ASSESSMENT — PATIENT HEALTH QUESTIONNAIRE - PHQ9
10. IF YOU CHECKED OFF ANY PROBLEMS, HOW DIFFICULT HAVE THESE PROBLEMS MADE IT FOR YOU TO DO YOUR WORK, TAKE CARE OF THINGS AT HOME, OR GET ALONG WITH OTHER PEOPLE: SOMEWHAT DIFFICULT
SUM OF ALL RESPONSES TO PHQ QUESTIONS 1-9: 15
SUM OF ALL RESPONSES TO PHQ QUESTIONS 1-9: 15

## 2025-03-21 ENCOUNTER — ANCILLARY PROCEDURE (OUTPATIENT)
Dept: GENERAL RADIOLOGY | Facility: CLINIC | Age: 50
End: 2025-03-21
Attending: PHYSICIAN ASSISTANT
Payer: COMMERCIAL

## 2025-03-21 ENCOUNTER — OFFICE VISIT (OUTPATIENT)
Dept: FAMILY MEDICINE | Facility: CLINIC | Age: 50
End: 2025-03-21
Payer: COMMERCIAL

## 2025-03-21 VITALS
DIASTOLIC BLOOD PRESSURE: 74 MMHG | SYSTOLIC BLOOD PRESSURE: 110 MMHG | BODY MASS INDEX: 32.2 KG/M2 | TEMPERATURE: 98.2 F | WEIGHT: 170.4 LBS | HEART RATE: 72 BPM | OXYGEN SATURATION: 99 % | RESPIRATION RATE: 18 BRPM

## 2025-03-21 DIAGNOSIS — E78.5 HYPERLIPIDEMIA LDL GOAL <160: ICD-10-CM

## 2025-03-21 DIAGNOSIS — E66.811 CLASS 1 OBESITY DUE TO EXCESS CALORIES WITH SERIOUS COMORBIDITY AND BODY MASS INDEX (BMI) OF 32.0 TO 32.9 IN ADULT: ICD-10-CM

## 2025-03-21 DIAGNOSIS — G89.29 CHRONIC PAIN OF RIGHT KNEE: ICD-10-CM

## 2025-03-21 DIAGNOSIS — M25.561 CHRONIC PAIN OF RIGHT KNEE: ICD-10-CM

## 2025-03-21 DIAGNOSIS — F41.1 ANXIETY STATE: ICD-10-CM

## 2025-03-21 DIAGNOSIS — F32.1 CURRENT MODERATE EPISODE OF MAJOR DEPRESSIVE DISORDER WITHOUT PRIOR EPISODE (H): ICD-10-CM

## 2025-03-21 DIAGNOSIS — J45.30 MILD PERSISTENT ASTHMA WITHOUT COMPLICATION: Primary | ICD-10-CM

## 2025-03-21 DIAGNOSIS — E66.09 CLASS 1 OBESITY DUE TO EXCESS CALORIES WITH SERIOUS COMORBIDITY AND BODY MASS INDEX (BMI) OF 32.0 TO 32.9 IN ADULT: ICD-10-CM

## 2025-03-21 LAB
ALBUMIN SERPL BCG-MCNC: 4.6 G/DL (ref 3.5–5.2)
ALP SERPL-CCNC: 69 U/L (ref 40–150)
ALT SERPL W P-5'-P-CCNC: 19 U/L (ref 0–50)
ANION GAP SERPL CALCULATED.3IONS-SCNC: 12 MMOL/L (ref 7–15)
AST SERPL W P-5'-P-CCNC: 20 U/L (ref 0–45)
BILIRUB SERPL-MCNC: <0.2 MG/DL
BUN SERPL-MCNC: 10.9 MG/DL (ref 6–20)
CALCIUM SERPL-MCNC: 9.5 MG/DL (ref 8.8–10.4)
CHLORIDE SERPL-SCNC: 101 MMOL/L (ref 98–107)
CHOLEST SERPL-MCNC: 177 MG/DL
CREAT SERPL-MCNC: 0.85 MG/DL (ref 0.51–0.95)
EGFRCR SERPLBLD CKD-EPI 2021: 85 ML/MIN/1.73M2
EST. AVERAGE GLUCOSE BLD GHB EST-MCNC: 105 MG/DL
FASTING STATUS PATIENT QL REPORTED: NO
FASTING STATUS PATIENT QL REPORTED: NO
GLUCOSE SERPL-MCNC: 85 MG/DL (ref 70–99)
HBA1C MFR BLD: 5.3 % (ref 0–5.6)
HCO3 SERPL-SCNC: 27 MMOL/L (ref 22–29)
HDLC SERPL-MCNC: 30 MG/DL
LDLC SERPL CALC-MCNC: 69 MG/DL
NONHDLC SERPL-MCNC: 147 MG/DL
POTASSIUM SERPL-SCNC: 3.8 MMOL/L (ref 3.4–5.3)
PROT SERPL-MCNC: 7.5 G/DL (ref 6.4–8.3)
SODIUM SERPL-SCNC: 140 MMOL/L (ref 135–145)
TRIGL SERPL-MCNC: 390 MG/DL

## 2025-03-21 PROCEDURE — 80061 LIPID PANEL: CPT | Performed by: PHYSICIAN ASSISTANT

## 2025-03-21 PROCEDURE — 90471 IMMUNIZATION ADMIN: CPT | Performed by: PHYSICIAN ASSISTANT

## 2025-03-21 PROCEDURE — 36415 COLL VENOUS BLD VENIPUNCTURE: CPT | Performed by: PHYSICIAN ASSISTANT

## 2025-03-21 PROCEDURE — G2211 COMPLEX E/M VISIT ADD ON: HCPCS | Performed by: PHYSICIAN ASSISTANT

## 2025-03-21 PROCEDURE — 80053 COMPREHEN METABOLIC PANEL: CPT | Performed by: PHYSICIAN ASSISTANT

## 2025-03-21 PROCEDURE — 99214 OFFICE O/P EST MOD 30 MIN: CPT | Mod: 25 | Performed by: PHYSICIAN ASSISTANT

## 2025-03-21 PROCEDURE — 73562 X-RAY EXAM OF KNEE 3: CPT | Mod: TC | Performed by: RADIOLOGY

## 2025-03-21 PROCEDURE — 83036 HEMOGLOBIN GLYCOSYLATED A1C: CPT | Performed by: PHYSICIAN ASSISTANT

## 2025-03-21 PROCEDURE — 90677 PCV20 VACCINE IM: CPT | Performed by: PHYSICIAN ASSISTANT

## 2025-03-21 RX ORDER — BUPROPION HYDROCHLORIDE 150 MG/1
300 TABLET ORAL DAILY
Qty: 180 TABLET | Refills: 3 | Status: SHIPPED | OUTPATIENT
Start: 2025-03-21

## 2025-03-21 RX ORDER — ESCITALOPRAM OXALATE 20 MG/1
20 TABLET ORAL DAILY
Qty: 90 TABLET | Refills: 3 | Status: SHIPPED | OUTPATIENT
Start: 2025-03-21

## 2025-03-21 ASSESSMENT — PAIN SCALES - GENERAL: PAINLEVEL_OUTOF10: SEVERE PAIN (7)

## 2025-03-21 NOTE — PROGRESS NOTES
Assessment & Plan   Mild persistent asthma without complication  She has not been using her inhaler.  Refilled Pulmicort and she will restart especially since her ACT is so poor today.  - budesonide (PULMICORT FLEXHALER) 180 MCG/ACT inhaler; Inhale 1 puff into the lungs 2 times daily.    Current moderate episode of major depressive disorder without prior episode (H)  Anxiety state  Overall this is stable.  Refilled  - buPROPion (WELLBUTRIN XL) 150 MG 24 hr tablet; Take 2 tablets (300 mg) by mouth daily.  - escitalopram (LEXAPRO) 20 MG tablet; Take 1 tablet (20 mg) by mouth daily.    Class 1 obesity due to excess calories with serious comorbidity and body mass index (BMI) of 32.0 to 32.9 in adult  Body mass index is 32.2 kg/m .. Associated with HLD, depression, chronic knee pain which would improve with weight loss. Shared decision making discussed. Will see if Zepbound is approved/affordable. Follow-up in 3 months if so. Encouraged healthy lifestyle changes.   - tirzepatide-Weight Management (ZEPBOUND) 7.5 MG/0.5ML prefilled pen; Inject 0.5 mLs (7.5 mg) subcutaneously every 7 days.  - tirzepatide-Weight Management (ZEPBOUND) 5 MG/0.5ML prefilled pen; Inject 0.5 mLs (5 mg) subcutaneously every 7 days.  - tirzepatide-Weight Management (ZEPBOUND) 2.5 MG/0.5ML prefilled pen; Inject 0.5 mLs (2.5 mg) subcutaneously every 7 days.  - Comprehensive metabolic panel; Future  - Hemoglobin A1c; Future    Hyperlipidemia LDL goal <160  Due for recheck  - Lipid panel reflex to direct LDL Non-fasting; Future  - Comprehensive metabolic panel; Future    Chronic pain of right knee  Chronic right knee pain.  States is mostly anterior but also medial and lateral pain.  Exam is difficult to discern 1 specific pathology as she is tender over the patellar tendon, lateral medial joint lines.  Her most significant tenderness is over the patellar tendon so this could be tendinitis versus patellofemoral syndrome.  X-ray done today to determine  "if there is any bony pathology which per my read there was not.  I recommended physical therapy right now but if no improvement in 4 to 6 weeks then we would move forward with an MRI.  - XR Knee Right 3 Views; Future  - Physical Therapy  Referral; Future    The longitudinal plan of care for the diagnosis(es)/condition(s) as documented were addressed during this visit. Due to the added complexity in care, I will continue to support Cecilia in the subsequent management and with ongoing continuity of care.     BMI  Estimated body mass index is 32.2 kg/m  as calculated from the following:    Height as of 2/9/25: 1.549 m (5' 1\").    Weight as of this encounter: 77.3 kg (170 lb 6.4 oz).     Noah Brooks is a 49 year old, presenting for the following health issues:  Weight Management, Edema, and Knee Pain        3/21/2025     2:39 PM   Additional Questions   Roomed by Aida TRAMMELL CMA   Accompanied by Self       History of Present Illness       Reason for visit:  Knee pain  Symptom onset:  More than a month (Years, worse the last 3-6 months)  Symptoms include:  Pain, swelling  Symptom intensity:  Moderate  Symptom progression:  Worsening She is missing 1 dose(s) of medications per week.  She is not taking prescribed medications regularly due to cost of medication and remembering to take.     Allergies  Onset/Duration: About week   Symptoms:   Nasal congestion: YES  Sneezing: YES  Red, itchy eyes: YES  Progression of Symptoms: worse  Accompanying Signs & Symptoms:  Cough: YES  Wheezing: YES  Rash: No  Sinus/facial pain: YES  History:   Is it seasonal: none   History of Asthma: YES  Has allergy testing been done: No  Precipitating factors:   None  Alleviating factors:  None  Therapies tried and outcome: None    Concern - Leg/Ankle  swelling   Onset: Past couple years   Description: Has noticed leg/ankle swelling says that some days are better then others   Intensity: mild, moderate  Progression of Symptoms:  " same  Accompanying Signs & Symptoms: none  Previous history of similar problem: no  Precipitating factors:        Worsened by: na  Alleviating factors:        Improved by: na  Therapies tried and outcome:  none     Review of Systems  See HPI       Objective    /74 (BP Location: Right arm, Patient Position: Sitting, Cuff Size: Adult Large)   Pulse 72   Temp 98.2  F (36.8  C) (Tympanic)   Resp 18   Wt 77.3 kg (170 lb 6.4 oz)   SpO2 99%   BMI 32.20 kg/m    Body mass index is 32.2 kg/m .  Physical Exam   Constitutional: healthy, alert, and no distress  Head: Normocephalic. Atraumatic  Eyes: No conjunctival injection, sclera anicteric  Respiratory: No resp distress.  Musculoskeletal: extremities normal- no gross deformities noted, and normal muscle tone.  Full range of motion of the right knee.  There is tenderness over the distal patellar tendon.  There is medial and lateral joint line tenderness.  Negative valgus and varus stresses.  Negative anterior drawer.  There is tenderness over the medial patellar facet.  No tenderness over the lateral patellar facet.  Neurologic: Gait normal. CN 2-12 grossly intact  Psychiatric: mentation appears normal and affect normal/bright     Right knee x-ray: Per my read, there is no evidence of acute fracture dislocation or significant osteoarthritis.      Signed Electronically by: Fermin Arvizu PA-C

## 2025-03-22 ENCOUNTER — MYC MEDICAL ADVICE (OUTPATIENT)
Dept: FAMILY MEDICINE | Facility: CLINIC | Age: 50
End: 2025-03-22
Payer: COMMERCIAL

## 2025-03-22 ENCOUNTER — MYC REFILL (OUTPATIENT)
Dept: FAMILY MEDICINE | Facility: CLINIC | Age: 50
End: 2025-03-22
Payer: COMMERCIAL

## 2025-03-22 DIAGNOSIS — Z98.890 STATUS POST HYSTEROSCOPIC MYOMECTOMY: ICD-10-CM

## 2025-03-23 ENCOUNTER — MYC MEDICAL ADVICE (OUTPATIENT)
Dept: FAMILY MEDICINE | Facility: CLINIC | Age: 50
End: 2025-03-23
Payer: COMMERCIAL

## 2025-03-24 RX ORDER — IBUPROFEN 800 MG/1
800 TABLET, FILM COATED ORAL EVERY 6 HOURS PRN
Start: 2025-03-24

## 2025-04-28 ENCOUNTER — TELEPHONE (OUTPATIENT)
Dept: FAMILY MEDICINE | Facility: CLINIC | Age: 50
End: 2025-04-28
Payer: COMMERCIAL

## 2025-04-28 NOTE — TELEPHONE ENCOUNTER
Patient Quality Outreach    Patient is due for the following:   Asthma  -  ACT needed    Action(s) Taken:   Patient was assigned appropriate questionnaire to complete    Type of outreach:    Sent [employer] message.    Questions for provider review:    None         Aida Mosqueda CMA  Chart routed to None.

## 2025-04-30 ENCOUNTER — MYC MEDICAL ADVICE (OUTPATIENT)
Dept: FAMILY MEDICINE | Facility: CLINIC | Age: 50
End: 2025-04-30
Payer: COMMERCIAL

## 2025-05-14 ENCOUNTER — OFFICE VISIT (OUTPATIENT)
Dept: OBGYN | Facility: CLINIC | Age: 50
End: 2025-05-14
Payer: COMMERCIAL

## 2025-05-14 VITALS
SYSTOLIC BLOOD PRESSURE: 119 MMHG | RESPIRATION RATE: 18 BRPM | DIASTOLIC BLOOD PRESSURE: 76 MMHG | BODY MASS INDEX: 28.32 KG/M2 | TEMPERATURE: 97.8 F | WEIGHT: 150 LBS | HEART RATE: 76 BPM | HEIGHT: 61 IN

## 2025-05-14 DIAGNOSIS — B96.89 BV (BACTERIAL VAGINOSIS): ICD-10-CM

## 2025-05-14 DIAGNOSIS — N76.0 BV (BACTERIAL VAGINOSIS): ICD-10-CM

## 2025-05-14 DIAGNOSIS — N89.8 VAGINAL ODOR: Primary | ICD-10-CM

## 2025-05-14 LAB
CLUE CELLS: PRESENT
TRICHOMONAS, WET PREP: ABNORMAL
WBC'S/HIGH POWER FIELD, WET PREP: ABNORMAL
YEAST, WET PREP: ABNORMAL

## 2025-05-14 PROCEDURE — 87210 SMEAR WET MOUNT SALINE/INK: CPT | Performed by: STUDENT IN AN ORGANIZED HEALTH CARE EDUCATION/TRAINING PROGRAM

## 2025-05-14 PROCEDURE — 99213 OFFICE O/P EST LOW 20 MIN: CPT | Performed by: STUDENT IN AN ORGANIZED HEALTH CARE EDUCATION/TRAINING PROGRAM

## 2025-05-14 PROCEDURE — 87086 URINE CULTURE/COLONY COUNT: CPT | Performed by: STUDENT IN AN ORGANIZED HEALTH CARE EDUCATION/TRAINING PROGRAM

## 2025-05-14 NOTE — NURSING NOTE
"Initial /76 (BP Location: Right arm, Patient Position: Chair, Cuff Size: Adult Regular)   Pulse 76   Temp 97.8  F (36.6  C) (Tympanic)   Resp 18   Ht 1.549 m (5' 1\")   Wt 68 kg (150 lb)   BMI 28.34 kg/m   Estimated body mass index is 28.34 kg/m  as calculated from the following:    Height as of this encounter: 1.549 m (5' 1\").    Weight as of this encounter: 68 kg (150 lb). .    "

## 2025-05-14 NOTE — PROGRESS NOTES
"[facility]  Gynecology Office Note    Assessment and Plan:   Cecilia Pabon, 49 year old  with Mirena IUD in place since 11/3/2022, presented for new onset of vaginal odor. Wet prep obtained, which showed BV. Metronidazole 500mg BID x7 days sent to pharmacy. UCx obtained. Discussed vulvar hygiene techniques. Pt is not concerned about STIs and declined screening. If infections are ruled out, plan with expectant management.  metroNIDAZOLE (FLAGYL) 500 MG tablet          Take 1 tablet (500 mg) by mouth 2 times daily for 7 days., Disp-14 tablet, R-0, E-Prescribe     Luzmaria Dutta MD  Obstetrics and Gynecology  [facility]   2025     -----------------------------------------------------------------------------------------------------------------------------------  S: Cecilia Pabon, 49 year old  with Mirena IUD in place since 11/3/2022, presented for new onset of vaginal odor without vagina/vulvar itching, changes in vaginal discharge, dysuria, or other UTI symptoms.     She started Zepbond recently with decreased appetitie. This vaginal odor started prior to starting Zepbond. Change her diet to gluten free.      Last Pap Smear: 9/15/2022 NILM w/ neg HPV    Physical Exam:   Vitals:    25 1308   BP: 119/76   BP Location: Right arm   Patient Position: Chair   Cuff Size: Adult Regular   Pulse: 76   Resp: 18   Temp: 97.8  F (36.6  C)   TempSrc: Tympanic   Weight: 68 kg (150 lb)   Height: 1.549 m (5' 1\")      Estimated body mass index is 28.34 kg/m  as calculated from the following:    Height as of this encounter: 1.549 m (5' 1\").    Weight as of this encounter: 68 kg (150 lb).    General appearance: well-hydrated, A&O x 3, no apparent distress  Genitourinary:  External genitalia: no erythema, no lesions.   Urethral meatus appropriate location without lesions or prolapse  Urethra: No masses, tenderness, or scarring  Bladder no fullness, masses, or " tenderness.  Anus and Perineum: Unremarkable, no visible lesions  Vagina: Normal, healthy pink mucosa without any lesions. Physiologic vaginal discharge.   Cervix: normal appearance, no cervical motion tenderness. IUD strings visualized.  Uterus: normal size, shape and consistency.   Adnexa: no masses or tenderness bilaterally.

## 2025-05-15 ENCOUNTER — RESULTS FOLLOW-UP (OUTPATIENT)
Dept: OBGYN | Facility: CLINIC | Age: 50
End: 2025-05-15

## 2025-05-15 LAB — BACTERIA UR CULT: NORMAL

## 2025-05-15 RX ORDER — METRONIDAZOLE 500 MG/1
500 TABLET ORAL 2 TIMES DAILY
Qty: 14 TABLET | Refills: 0 | Status: SHIPPED | OUTPATIENT
Start: 2025-05-15 | End: 2025-05-22

## 2025-05-25 ENCOUNTER — MYC MEDICAL ADVICE (OUTPATIENT)
Dept: FAMILY MEDICINE | Facility: CLINIC | Age: 50
End: 2025-05-25
Payer: COMMERCIAL

## 2025-05-25 DIAGNOSIS — E66.09 CLASS 1 OBESITY DUE TO EXCESS CALORIES WITH SERIOUS COMORBIDITY AND BODY MASS INDEX (BMI) OF 32.0 TO 32.9 IN ADULT: Primary | ICD-10-CM

## 2025-05-25 DIAGNOSIS — E66.811 CLASS 1 OBESITY DUE TO EXCESS CALORIES WITH SERIOUS COMORBIDITY AND BODY MASS INDEX (BMI) OF 32.0 TO 32.9 IN ADULT: Primary | ICD-10-CM

## 2025-05-27 NOTE — TELEPHONE ENCOUNTER
Letter reviewed.    Effective July 1, 2025 Zepbound is not covered. Wegovy is covered by insurance plan.    Damion CHRISTIANSEN RN  M [facility]

## 2025-06-07 DIAGNOSIS — R11.0 NAUSEA: ICD-10-CM

## 2025-06-09 RX ORDER — ONDANSETRON 4 MG/1
TABLET, ORALLY DISINTEGRATING ORAL
Qty: 20 TABLET | Refills: 2 | Status: SHIPPED | OUTPATIENT
Start: 2025-06-09

## 2025-07-06 ENCOUNTER — HOSPITAL ENCOUNTER (EMERGENCY)
Facility: CLINIC | Age: 50
Discharge: HOME | End: 2025-07-06
Payer: COMMERCIAL

## 2025-07-06 VITALS
TEMPERATURE: 98.3 F | WEIGHT: 130 LBS | BODY MASS INDEX: 24.55 KG/M2 | OXYGEN SATURATION: 99 % | RESPIRATION RATE: 18 BRPM | DIASTOLIC BLOOD PRESSURE: 77 MMHG | SYSTOLIC BLOOD PRESSURE: 110 MMHG | HEIGHT: 61 IN | HEART RATE: 95 BPM

## 2025-07-06 DIAGNOSIS — R42 DIZZINESS: Primary | ICD-10-CM

## 2025-07-06 LAB
ANION GAP SERPL CALCULATED.3IONS-SCNC: 14 MMOL/L (ref 7–15)
ATRIAL RATE - MUSE: 94 BPM
BUN SERPL-MCNC: 10.3 MG/DL (ref 6–20)
CALCIUM SERPL-MCNC: 9.5 MG/DL (ref 8.8–10.4)
CHLORIDE SERPL-SCNC: 103 MMOL/L (ref 98–107)
CREAT SERPL-MCNC: 0.93 MG/DL (ref 0.51–0.95)
DIASTOLIC BLOOD PRESSURE - MUSE: NORMAL MMHG
EGFRCR SERPLBLD CKD-EPI 2021: 76 ML/MIN/1.73M2
ERYTHROCYTE [DISTWIDTH] IN BLOOD BY AUTOMATED COUNT: 12.3 % (ref 10–15)
GLUCOSE SERPL-MCNC: 103 MG/DL (ref 70–99)
HCO3 SERPL-SCNC: 22 MMOL/L (ref 22–29)
HCT VFR BLD AUTO: 38.8 % (ref 35–47)
HGB BLD-MCNC: 13.5 G/DL (ref 11.7–15.7)
INTERPRETATION ECG - MUSE: NORMAL
MAGNESIUM SERPL-MCNC: 2 MG/DL (ref 1.7–2.3)
MCH RBC QN AUTO: 32.3 PG (ref 26.5–33)
MCHC RBC AUTO-ENTMCNC: 34.8 G/DL (ref 31.5–36.5)
MCV RBC AUTO: 93 FL (ref 78–100)
P AXIS - MUSE: 66 DEGREES
PLATELET # BLD AUTO: 235 10E3/UL (ref 150–450)
POTASSIUM SERPL-SCNC: 3.9 MMOL/L (ref 3.4–5.3)
PR INTERVAL - MUSE: 126 MS
QRS DURATION - MUSE: 98 MS
QT - MUSE: 366 MS
QTC - MUSE: 457 MS
R AXIS - MUSE: 53 DEGREES
RBC # BLD AUTO: 4.18 10E6/UL (ref 3.8–5.2)
SODIUM SERPL-SCNC: 139 MMOL/L (ref 135–145)
SYSTOLIC BLOOD PRESSURE - MUSE: NORMAL MMHG
T AXIS - MUSE: 46 DEGREES
VENTRICULAR RATE- MUSE: 94 BPM
WBC # BLD AUTO: 7.6 10E3/UL (ref 4–11)

## 2025-07-06 PROCEDURE — 93005 ELECTROCARDIOGRAM TRACING: CPT

## 2025-07-06 PROCEDURE — 80048 BASIC METABOLIC PNL TOTAL CA: CPT

## 2025-07-06 PROCEDURE — 99284 EMERGENCY DEPT VISIT MOD MDM: CPT

## 2025-07-06 PROCEDURE — 36415 COLL VENOUS BLD VENIPUNCTURE: CPT

## 2025-07-06 PROCEDURE — 85027 COMPLETE CBC AUTOMATED: CPT

## 2025-07-06 PROCEDURE — 83735 ASSAY OF MAGNESIUM: CPT

## 2025-07-06 ASSESSMENT — COLUMBIA-SUICIDE SEVERITY RATING SCALE - C-SSRS
2. HAVE YOU ACTUALLY HAD ANY THOUGHTS OF KILLING YOURSELF IN THE PAST MONTH?: NO
6. HAVE YOU EVER DONE ANYTHING, STARTED TO DO ANYTHING, OR PREPARED TO DO ANYTHING TO END YOUR LIFE?: NO
1. IN THE PAST MONTH, HAVE YOU WISHED YOU WERE DEAD OR WISHED YOU COULD GO TO SLEEP AND NOT WAKE UP?: NO

## 2025-07-06 ASSESSMENT — ACTIVITIES OF DAILY LIVING (ADL): ADLS_ACUITY_SCORE: 41

## 2025-07-07 ENCOUNTER — MYC MEDICAL ADVICE (OUTPATIENT)
Dept: FAMILY MEDICINE | Facility: CLINIC | Age: 50
End: 2025-07-07
Payer: COMMERCIAL

## 2025-07-07 DIAGNOSIS — E66.811 CLASS 1 OBESITY DUE TO EXCESS CALORIES WITH SERIOUS COMORBIDITY AND BODY MASS INDEX (BMI) OF 32.0 TO 32.9 IN ADULT: ICD-10-CM

## 2025-07-07 DIAGNOSIS — E66.09 CLASS 1 OBESITY DUE TO EXCESS CALORIES WITH SERIOUS COMORBIDITY AND BODY MASS INDEX (BMI) OF 32.0 TO 32.9 IN ADULT: ICD-10-CM

## 2025-07-07 NOTE — ED PROVIDER NOTES
"[facility]  Emergency Department Visit Note    PATIENT:  Cecilia Pabon     49 year old     female      [id number]    Chief complaint:  Chief Complaint   Patient presents with    Hypotension          History of present illness:  Patient is a 49 year old female with anxiety, mild persistent asthma, depression presenting for evaluation of concerns regarding low blood pressure.    Patient has had a few days of positional dizziness.  Notes she gets dizziness, for example when standing up.  She was feeling somewhat dizzy this evening.  She used her grandparents blood pressure cuff and noted it was in the 70s to 80s over 50s.  Had some dizziness at that time.  Her family wanted her to get checked out.    No fevers.  No vision changes.  No chest pain or dyspnea.  No vomiting.  At 1 point perhaps had some right-sided abdominal pain, not currently.  No diarrhea.    She is on tirzepatide for weight loss, she reports a 40 pound weight loss over the past several months.      Review of Systems:  As in HPI above    /71   Pulse 101   Temp 98.3  F (36.8  C) (Oral)   Resp 20   Ht 1.549 m (5' 1\")   Wt 59 kg (130 lb)   SpO2 98%   BMI 24.56 kg/m        Physical Exam:  Constitutional: laying in hospital bed, alert, oriented, in no apparent distress, and answering questions appropriately  HEENT: normocephalic, atraumatic, pupils 3mm, equal, round, and reactive to light, sclerae anicteric, extraocular motions intact, and no nystagmus  Cardiovascular: regular rate and rhythm and no murmurs, rubs, or extra heart sounds  Pulmonary: breathing comfortably on room air and lungs clear to auscultation bilaterally  Abdominal: soft, non-tender, non-distended  Extremities/MSK: no peripheral edema  Skin: warm, dry  Neurologic: moves all four extremities spontaneously, GCS 15, CNII-XII intact, 5/5  strength bilaterally, 5/5 strength in dorsiflexion and plantarflexion bilaterally, sensation intact to light touch " in bilateral upper and lower extremities, ambulates without assistance, and no dysmetria on finger-nose-finger  Psychiatric: calm, appropriate      MDM:  Patient is a 49 year old female with above history presenting for evaluation of dizziness and low blood pressure readings at home.    Vitals are overall reassuring. Exam similarly reassuring, she appears well.    Potentially experiencing orthostatic symptoms based on the history that she provides of intermittent dizziness with standing.  No concern for posterior circulation stroke.  No symptoms concerning for a cardiopulmonary etiology.  Dizziness not a typical side effect of tirzepatide, but she is under 60 kg today and I wonder if she even needs to be on this anymore.    Plan for ECG.  She would like labs which is reasonable.  Oral rehydration.    Disposition pending above workup. Remainder of ED course below.    ED COURSE:  ED Course as of 07/06/25 2006   Sun Jul 06, 2025 1930 EKG 12 lead  My independent ECG interpretation:  - Ventricular rate 94 bpm, regular  - WV, QRS, QT intervals normal  - Axis normal  - no ST segment or T wave changes concerning for acute ischemia  - Comparison to prior ECGs: No significant change  - My independent interpretation: NSR.  No electrocardiographic findings to explain dizziness.   1959 Labs reviewed, normal for nonfasting labs.   2005 Patient reassessed, feels tired, but otherwise well-appearing and generally feeling well.  Vitals have remained stable throughout emergency department stay.  We discussed reassuring workup.  Increased hydration with fluids, trialing of small meals throughout the day.  Like she is scheduled to have an increase in her tirzepatide, I recommended she discuss with her primary whether this is even necessary.  ED return precaution discussed in event of new or worsening symptoms.  She expressed understanding of and agreement with this.       Encounter Diagnoses:  Final diagnoses:   Dizziness       Final  disposition: discharge    Fabián Pak MD  7/6/2025  7:16 PM   Emergency Medicine  [facility]      Fabián Pak MD  07/06/25 2006

## 2025-07-07 NOTE — DISCHARGE INSTRUCTIONS
You were seen in the emergency department today for dizziness/lightheadedness and low blood pressure. We did tests including ECG and blood tests that showed no concerning findings.  Your symptoms are most consistent with orthostatic presyncope, which is the sensation of feeling like you might pass out after standing up too quickly.  This can also be due to dehydration, though there is no evidence of significant dehydration on the blood test today.    I recommend increasing your oral hydration, make sure you are staying well nourished and hydrated throughout the day.    Please follow up with your primary doctor in the next 1 to 2 weeks for ongoing evaluation and management of your symptoms and to ensure your symptoms have improved.  I would discuss with them whether you even need to still be on the tirzepatide at this point as your degree of weight loss over a short period of time may also be contributing to this.    Return to the emergency department with new or worsening symptoms that you find concerning.

## 2025-07-07 NOTE — ED TRIAGE NOTES
Pt was at grandparents where she felt dizzy and lightheaded, took her blood pressure and it was low. Still feels dizzy. Pt has been on zepbound. BP normal in triage.      Triage Assessment (Adult)       Row Name 07/06/25 1915          Triage Assessment    Airway WDL WDL        Respiratory WDL    Respiratory WDL WDL        Skin Circulation/Temperature WDL    Skin Circulation/Temperature WDL WDL        Cardiac WDL    Cardiac WDL WDL        Peripheral/Neurovascular WDL    Peripheral Neurovascular WDL WDL        Cognitive/Neuro/Behavioral WDL    Cognitive/Neuro/Behavioral WDL WDL

## 2025-07-10 ENCOUNTER — MYC REFILL (OUTPATIENT)
Dept: FAMILY MEDICINE | Facility: CLINIC | Age: 50
End: 2025-07-10
Payer: COMMERCIAL

## 2025-07-10 DIAGNOSIS — R11.0 NAUSEA: ICD-10-CM

## 2025-07-11 NOTE — TELEPHONE ENCOUNTER
Requested Prescriptions   Pending Prescriptions Disp Refills    ondansetron (ZOFRAN ODT) 4 MG ODT tab 20 tablet 2        Antivertigo/Antiemetic Agents Failed - 7/11/2025  8:24 AM        Failed - Medication is active on med list and the sig matches. RN to manually verify dose and sig if red X/fail.     If the protocol passes (green check), you do not need to verify med dose and sig.    A prescription matches if they are the same clinical intention.    For Example: once daily and every morning are the same.    The protocol can not identify upper and lower case letters as matching and will fail.     For Example: Take 1 tablet (50 mg) by mouth daily     TAKE 1 TABLET (50 MG) BY MOUTH DAILY    For all fails (red x), verify dose and sig.    If the refill does match what is on file, the RN can still proceed to approve the refill request.       If they do not match, route to the appropriate provider.             Passed - Medication indicated for associated diagnosis     The medication is prescribed for one or more of the following conditions:     Motion sickness   Nausea   Vomiting   Vertigo   Chemotherapy-induced nausea and vomiting   Radiation-induced nausea and vomiting,    Nausea and vomiting prophylaxis, migraine          Passed - Recent (12 month) or future (90 days) visit with authorizing provider's specialty (provided they have been seen in the past 15 months)     The patient must have completed an in-person or virtual visit within the past 12 months or has a future visit scheduled within the next 90 days with the authorizing provider s specialty.  Urgent care and e-visits do not qualify as an office visit for this protocol.          Passed - Patient is 18 years of age or older

## 2025-07-14 RX ORDER — ONDANSETRON 4 MG/1
4 TABLET, ORALLY DISINTEGRATING ORAL EVERY 8 HOURS PRN
Qty: 20 TABLET | Refills: 2 | Status: SHIPPED | OUTPATIENT
Start: 2025-07-14

## 2025-08-19 ENCOUNTER — HOSPITAL ENCOUNTER (EMERGENCY)
Facility: CLINIC | Age: 50
Discharge: HOME | End: 2025-08-19
Payer: COMMERCIAL

## 2025-08-19 ENCOUNTER — MYC MEDICAL ADVICE (OUTPATIENT)
Dept: FAMILY MEDICINE | Facility: CLINIC | Age: 50
End: 2025-08-19

## 2025-08-19 VITALS
DIASTOLIC BLOOD PRESSURE: 60 MMHG | BODY MASS INDEX: 21.84 KG/M2 | RESPIRATION RATE: 16 BRPM | HEART RATE: 95 BPM | OXYGEN SATURATION: 100 % | SYSTOLIC BLOOD PRESSURE: 95 MMHG | WEIGHT: 115.6 LBS | TEMPERATURE: 98.6 F

## 2025-08-19 DIAGNOSIS — R11.2 NAUSEA AND VOMITING, UNSPECIFIED VOMITING TYPE: Primary | ICD-10-CM

## 2025-08-19 LAB
ANION GAP SERPL CALCULATED.3IONS-SCNC: 20 MMOL/L (ref 7–15)
BUN SERPL-MCNC: 14.1 MG/DL (ref 6–20)
CALCIUM SERPL-MCNC: 9.3 MG/DL (ref 8.8–10.4)
CHLORIDE SERPL-SCNC: 99 MMOL/L (ref 98–107)
CREAT SERPL-MCNC: 0.92 MG/DL (ref 0.51–0.95)
EGFRCR SERPLBLD CKD-EPI 2021: 77 ML/MIN/1.73M2
GLUCOSE SERPL-MCNC: 63 MG/DL (ref 70–99)
HCO3 SERPL-SCNC: 17 MMOL/L (ref 22–29)
MAGNESIUM SERPL-MCNC: 2.1 MG/DL (ref 1.7–2.3)
POTASSIUM SERPL-SCNC: 4.3 MMOL/L (ref 3.4–5.3)
SODIUM SERPL-SCNC: 136 MMOL/L (ref 135–145)

## 2025-08-19 PROCEDURE — 96375 TX/PRO/DX INJ NEW DRUG ADDON: CPT

## 2025-08-19 PROCEDURE — 99284 EMERGENCY DEPT VISIT MOD MDM: CPT | Mod: 25

## 2025-08-19 PROCEDURE — 83735 ASSAY OF MAGNESIUM: CPT

## 2025-08-19 PROCEDURE — 80048 BASIC METABOLIC PNL TOTAL CA: CPT

## 2025-08-19 PROCEDURE — 250N000011 HC RX IP 250 OP 636

## 2025-08-19 PROCEDURE — 96361 HYDRATE IV INFUSION ADD-ON: CPT

## 2025-08-19 PROCEDURE — 36415 COLL VENOUS BLD VENIPUNCTURE: CPT

## 2025-08-19 PROCEDURE — 258N000003 HC RX IP 258 OP 636

## 2025-08-19 PROCEDURE — 96374 THER/PROPH/DIAG INJ IV PUSH: CPT

## 2025-08-19 PROCEDURE — 99284 EMERGENCY DEPT VISIT MOD MDM: CPT

## 2025-08-19 RX ORDER — METOCLOPRAMIDE HYDROCHLORIDE 5 MG/ML
10 INJECTION INTRAMUSCULAR; INTRAVENOUS ONCE
Status: COMPLETED | OUTPATIENT
Start: 2025-08-19 | End: 2025-08-19

## 2025-08-19 RX ORDER — ONDANSETRON 4 MG/1
4 TABLET, ORALLY DISINTEGRATING ORAL EVERY 8 HOURS PRN
Qty: 15 TABLET | Refills: 0 | Status: SHIPPED | OUTPATIENT
Start: 2025-08-19

## 2025-08-19 RX ORDER — ONDANSETRON 2 MG/ML
4 INJECTION INTRAMUSCULAR; INTRAVENOUS ONCE
Status: COMPLETED | OUTPATIENT
Start: 2025-08-19 | End: 2025-08-19

## 2025-08-19 RX ORDER — ONDANSETRON 4 MG/1
4 TABLET, ORALLY DISINTEGRATING ORAL EVERY 8 HOURS PRN
Qty: 15 TABLET | Refills: 0 | Status: SHIPPED | OUTPATIENT
Start: 2025-08-19 | End: 2025-08-19

## 2025-08-19 RX ADMIN — SODIUM CHLORIDE 1000 ML: 0.9 INJECTION, SOLUTION INTRAVENOUS at 09:43

## 2025-08-19 RX ADMIN — ONDANSETRON 4 MG: 2 INJECTION INTRAMUSCULAR; INTRAVENOUS at 11:09

## 2025-08-19 RX ADMIN — METOCLOPRAMIDE 10 MG: 5 INJECTION, SOLUTION INTRAMUSCULAR; INTRAVENOUS at 09:35

## 2025-08-19 ASSESSMENT — ACTIVITIES OF DAILY LIVING (ADL)
ADLS_ACUITY_SCORE: 41

## 2025-08-19 ASSESSMENT — PATIENT HEALTH QUESTIONNAIRE - PHQ9
10. IF YOU CHECKED OFF ANY PROBLEMS, HOW DIFFICULT HAVE THESE PROBLEMS MADE IT FOR YOU TO DO YOUR WORK, TAKE CARE OF THINGS AT HOME, OR GET ALONG WITH OTHER PEOPLE: NOT DIFFICULT AT ALL
SUM OF ALL RESPONSES TO PHQ QUESTIONS 1-9: 7
SUM OF ALL RESPONSES TO PHQ QUESTIONS 1-9: 7

## 2025-08-20 ENCOUNTER — VIRTUAL VISIT (OUTPATIENT)
Dept: FAMILY MEDICINE | Facility: CLINIC | Age: 50
End: 2025-08-20
Payer: COMMERCIAL

## 2025-08-20 DIAGNOSIS — E66.811 CLASS 1 OBESITY DUE TO EXCESS CALORIES WITH SERIOUS COMORBIDITY AND BODY MASS INDEX (BMI) OF 32.0 TO 32.9 IN ADULT: Primary | ICD-10-CM

## 2025-08-20 DIAGNOSIS — E66.09 CLASS 1 OBESITY DUE TO EXCESS CALORIES WITH SERIOUS COMORBIDITY AND BODY MASS INDEX (BMI) OF 32.0 TO 32.9 IN ADULT: Primary | ICD-10-CM

## 2025-08-20 DIAGNOSIS — R11.2 NAUSEA AND VOMITING, UNSPECIFIED VOMITING TYPE: ICD-10-CM

## 2025-08-20 PROCEDURE — 98006 SYNCH AUDIO-VIDEO EST MOD 30: CPT | Performed by: PHYSICIAN ASSISTANT

## (undated) DEVICE — STOCKING SLEEVE COMPRESSION CALF MED

## (undated) DEVICE — LUBRICATING JELLY 4.25OZ

## (undated) DEVICE — KIT PROCEDURE FLUENT IN/OUT FLOWPAK TISS TRAP FLT-112S

## (undated) DEVICE — SEAL SET MYOSURE ROD LENS SCOPE SINGLE USE 40-902

## (undated) DEVICE — GLOVE PROTEXIS BLUE W/NEU-THERA 6.0  2D73EB60

## (undated) DEVICE — TUBING SUCTION 12"X1/4" N612

## (undated) DEVICE — GOWN LG DISP 9515

## (undated) DEVICE — SUCTION MANIFOLD NEPTUNE 2 SYS 1 PORT 702-025-000

## (undated) DEVICE — GLOVE PROTEXIS MICRO 5.5  2D73PM55

## (undated) DEVICE — NDL SPINAL 22GA 3.5" QUINCKE 405181

## (undated) DEVICE — PAD PERI INDIV WRAP 11" 2022

## (undated) DEVICE — DECANTER VIAL 2006S

## (undated) DEVICE — SOL NACL 0.9% IRRIG 3000ML BAG 07972-08

## (undated) DEVICE — SOL WATER IRRIG 1000ML BOTTLE 07139-09

## (undated) DEVICE — PREP CHLORHEXIDINE 4% 4OZ (HIBICLENS) 57504

## (undated) DEVICE — Device

## (undated) DEVICE — SOL NACL 0.9% IRRIG 1000ML BOTTLE 07138-09

## (undated) DEVICE — DRSG TELFA 3X8" 1238

## (undated) DEVICE — PACK LAPAROSCOPY/PELVISCOPY STD

## (undated) DEVICE — PAD FLOOR SURGISAFE

## (undated) DEVICE — CATH INTERMITTENT CLEAN-CATH FEMALE 14FR 6" VINYL LF 420614

## (undated) RX ORDER — DEXAMETHASONE SODIUM PHOSPHATE 4 MG/ML
INJECTION, SOLUTION INTRA-ARTICULAR; INTRALESIONAL; INTRAMUSCULAR; INTRAVENOUS; SOFT TISSUE
Status: DISPENSED
Start: 2022-11-03

## (undated) RX ORDER — PROPOFOL 10 MG/ML
INJECTION, EMULSION INTRAVENOUS
Status: DISPENSED
Start: 2022-11-03

## (undated) RX ORDER — BUPIVACAINE HYDROCHLORIDE 2.5 MG/ML
INJECTION, SOLUTION EPIDURAL; INFILTRATION; INTRACAUDAL
Status: DISPENSED
Start: 2022-11-03

## (undated) RX ORDER — ACETAMINOPHEN 325 MG/1
TABLET ORAL
Status: DISPENSED
Start: 2022-11-03

## (undated) RX ORDER — OXYCODONE HYDROCHLORIDE 5 MG/1
TABLET ORAL
Status: DISPENSED
Start: 2022-11-03

## (undated) RX ORDER — FENTANYL CITRATE-0.9 % NACL/PF 10 MCG/ML
PLASTIC BAG, INJECTION (ML) INTRAVENOUS
Status: DISPENSED
Start: 2022-11-03

## (undated) RX ORDER — ONDANSETRON 2 MG/ML
INJECTION INTRAMUSCULAR; INTRAVENOUS
Status: DISPENSED
Start: 2022-11-03